# Patient Record
Sex: MALE | Race: WHITE | NOT HISPANIC OR LATINO | URBAN - METROPOLITAN AREA
[De-identification: names, ages, dates, MRNs, and addresses within clinical notes are randomized per-mention and may not be internally consistent; named-entity substitution may affect disease eponyms.]

---

## 2017-01-01 ENCOUNTER — INPATIENT (INPATIENT)
Facility: HOSPITAL | Age: 72
LOS: 22 days | DRG: 981 | End: 2017-12-27
Attending: INTERNAL MEDICINE | Admitting: INTERNAL MEDICINE
Payer: MEDICAID

## 2017-01-01 VITALS
HEART RATE: 83 BPM | DIASTOLIC BLOOD PRESSURE: 67 MMHG | TEMPERATURE: 99 F | SYSTOLIC BLOOD PRESSURE: 128 MMHG | OXYGEN SATURATION: 96 % | RESPIRATION RATE: 17 BRPM

## 2017-01-01 VITALS
OXYGEN SATURATION: 96 % | DIASTOLIC BLOOD PRESSURE: 62 MMHG | SYSTOLIC BLOOD PRESSURE: 104 MMHG | HEART RATE: 150 BPM | RESPIRATION RATE: 17 BRPM

## 2017-01-01 DIAGNOSIS — R53.81 OTHER MALAISE: ICD-10-CM

## 2017-01-01 DIAGNOSIS — J93.9 PNEUMOTHORAX, UNSPECIFIED: ICD-10-CM

## 2017-01-01 DIAGNOSIS — R50.9 FEVER, UNSPECIFIED: ICD-10-CM

## 2017-01-01 DIAGNOSIS — J96.01 ACUTE RESPIRATORY FAILURE WITH HYPOXIA: ICD-10-CM

## 2017-01-01 DIAGNOSIS — C85.20 MEDIASTINAL (THYMIC) LARGE B-CELL LYMPHOMA, UNSPECIFIED SITE: ICD-10-CM

## 2017-01-01 DIAGNOSIS — J44.9 CHRONIC OBSTRUCTIVE PULMONARY DISEASE, UNSPECIFIED: ICD-10-CM

## 2017-01-01 DIAGNOSIS — E87.1 HYPO-OSMOLALITY AND HYPONATREMIA: ICD-10-CM

## 2017-01-01 DIAGNOSIS — R53.1 WEAKNESS: ICD-10-CM

## 2017-01-01 DIAGNOSIS — T79.7XXA TRAUMATIC SUBCUTANEOUS EMPHYSEMA, INITIAL ENCOUNTER: ICD-10-CM

## 2017-01-01 DIAGNOSIS — N40.0 BENIGN PROSTATIC HYPERPLASIA WITHOUT LOWER URINARY TRACT SYMPTOMS: ICD-10-CM

## 2017-01-01 DIAGNOSIS — E87.2 ACIDOSIS: ICD-10-CM

## 2017-01-01 DIAGNOSIS — N48.89 OTHER SPECIFIED DISORDERS OF PENIS: ICD-10-CM

## 2017-01-01 DIAGNOSIS — I10 ESSENTIAL (PRIMARY) HYPERTENSION: ICD-10-CM

## 2017-01-01 DIAGNOSIS — J15.9 UNSPECIFIED BACTERIAL PNEUMONIA: ICD-10-CM

## 2017-01-01 DIAGNOSIS — Z29.9 ENCOUNTER FOR PROPHYLACTIC MEASURES, UNSPECIFIED: ICD-10-CM

## 2017-01-01 DIAGNOSIS — R60.1 GENERALIZED EDEMA: ICD-10-CM

## 2017-01-01 DIAGNOSIS — E43 UNSPECIFIED SEVERE PROTEIN-CALORIE MALNUTRITION: ICD-10-CM

## 2017-01-01 DIAGNOSIS — I27.20 PULMONARY HYPERTENSION, UNSPECIFIED: ICD-10-CM

## 2017-01-01 DIAGNOSIS — J96.90 RESPIRATORY FAILURE, UNSPECIFIED, UNSPECIFIED WHETHER WITH HYPOXIA OR HYPERCAPNIA: ICD-10-CM

## 2017-01-01 DIAGNOSIS — G93.40 ENCEPHALOPATHY, UNSPECIFIED: ICD-10-CM

## 2017-01-01 DIAGNOSIS — R91.8 OTHER NONSPECIFIC ABNORMAL FINDING OF LUNG FIELD: ICD-10-CM

## 2017-01-01 DIAGNOSIS — A41.9 SEPSIS, UNSPECIFIED ORGANISM: ICD-10-CM

## 2017-01-01 DIAGNOSIS — I95.89 OTHER HYPOTENSION: ICD-10-CM

## 2017-01-01 DIAGNOSIS — N17.9 ACUTE KIDNEY FAILURE, UNSPECIFIED: ICD-10-CM

## 2017-01-01 DIAGNOSIS — Z51.5 ENCOUNTER FOR PALLIATIVE CARE: ICD-10-CM

## 2017-01-01 DIAGNOSIS — E83.39 OTHER DISORDERS OF PHOSPHORUS METABOLISM: ICD-10-CM

## 2017-01-01 DIAGNOSIS — R22.2 LOCALIZED SWELLING, MASS AND LUMP, TRUNK: ICD-10-CM

## 2017-01-01 DIAGNOSIS — E87.5 HYPERKALEMIA: ICD-10-CM

## 2017-01-01 LAB
24R-OH-CALCIDIOL SERPL-MCNC: 20.7 NG/ML — LOW (ref 30–80)
ALBUMIN SERPL ELPH-MCNC: 1 G/DL — LOW (ref 3.5–5)
ALBUMIN SERPL ELPH-MCNC: 1.1 G/DL — LOW (ref 3.5–5)
ALBUMIN SERPL ELPH-MCNC: 1.2 G/DL — LOW (ref 3.5–5)
ALBUMIN SERPL ELPH-MCNC: 1.2 G/DL — LOW (ref 3.5–5)
ALBUMIN SERPL ELPH-MCNC: 1.3 G/DL — LOW (ref 3.5–5)
ALBUMIN SERPL ELPH-MCNC: 1.3 G/DL — LOW (ref 3.5–5)
ALBUMIN SERPL ELPH-MCNC: 1.9 G/DL — LOW (ref 3.5–5)
ALBUMIN SERPL ELPH-MCNC: 2 G/DL — LOW (ref 3.5–5)
ALBUMIN SERPL ELPH-MCNC: 2.4 G/DL — LOW (ref 3.5–5)
ALP SERPL-CCNC: 44 U/L — SIGNIFICANT CHANGE UP (ref 40–120)
ALP SERPL-CCNC: 46 U/L — SIGNIFICANT CHANGE UP (ref 40–120)
ALP SERPL-CCNC: 47 U/L — SIGNIFICANT CHANGE UP (ref 40–120)
ALP SERPL-CCNC: 47 U/L — SIGNIFICANT CHANGE UP (ref 40–120)
ALP SERPL-CCNC: 49 U/L — SIGNIFICANT CHANGE UP (ref 40–120)
ALP SERPL-CCNC: 50 U/L — SIGNIFICANT CHANGE UP (ref 40–120)
ALP SERPL-CCNC: 51 U/L — SIGNIFICANT CHANGE UP (ref 40–120)
ALP SERPL-CCNC: 54 U/L — SIGNIFICANT CHANGE UP (ref 40–120)
ALP SERPL-CCNC: 55 U/L — SIGNIFICANT CHANGE UP (ref 40–120)
ALP SERPL-CCNC: 73 U/L — SIGNIFICANT CHANGE UP (ref 40–120)
ALP SERPL-CCNC: 91 U/L — SIGNIFICANT CHANGE UP (ref 40–120)
ALT FLD-CCNC: 17 U/L DA — SIGNIFICANT CHANGE UP (ref 10–60)
ALT FLD-CCNC: 21 U/L DA — SIGNIFICANT CHANGE UP (ref 10–60)
ALT FLD-CCNC: 22 U/L DA — SIGNIFICANT CHANGE UP (ref 10–60)
ALT FLD-CCNC: 29 U/L DA — SIGNIFICANT CHANGE UP (ref 10–60)
ALT FLD-CCNC: 39 U/L DA — SIGNIFICANT CHANGE UP (ref 10–60)
ALT FLD-CCNC: 40 U/L DA — SIGNIFICANT CHANGE UP (ref 10–60)
ALT FLD-CCNC: 40 U/L DA — SIGNIFICANT CHANGE UP (ref 10–60)
ALT FLD-CCNC: 42 U/L DA — SIGNIFICANT CHANGE UP (ref 10–60)
ALT FLD-CCNC: 60 U/L DA — SIGNIFICANT CHANGE UP (ref 10–60)
ALT FLD-CCNC: 69 U/L DA — HIGH (ref 10–60)
ALT FLD-CCNC: 93 U/L DA — HIGH (ref 10–60)
ANION GAP SERPL CALC-SCNC: 10 MMOL/L — SIGNIFICANT CHANGE UP (ref 5–17)
ANION GAP SERPL CALC-SCNC: 10 MMOL/L — SIGNIFICANT CHANGE UP (ref 5–17)
ANION GAP SERPL CALC-SCNC: 11 MMOL/L — SIGNIFICANT CHANGE UP (ref 5–17)
ANION GAP SERPL CALC-SCNC: 12 MMOL/L — SIGNIFICANT CHANGE UP (ref 5–17)
ANION GAP SERPL CALC-SCNC: 13 MMOL/L — SIGNIFICANT CHANGE UP (ref 5–17)
ANION GAP SERPL CALC-SCNC: 15 MMOL/L — SIGNIFICANT CHANGE UP (ref 5–17)
ANION GAP SERPL CALC-SCNC: 17 MMOL/L — SIGNIFICANT CHANGE UP (ref 5–17)
ANION GAP SERPL CALC-SCNC: 20 MMOL/L — HIGH (ref 5–17)
ANION GAP SERPL CALC-SCNC: 6 MMOL/L — SIGNIFICANT CHANGE UP (ref 5–17)
ANION GAP SERPL CALC-SCNC: 7 MMOL/L — SIGNIFICANT CHANGE UP (ref 5–17)
ANION GAP SERPL CALC-SCNC: 8 MMOL/L — SIGNIFICANT CHANGE UP (ref 5–17)
ANION GAP SERPL CALC-SCNC: 8 MMOL/L — SIGNIFICANT CHANGE UP (ref 5–17)
ANION GAP SERPL CALC-SCNC: 9 MMOL/L — SIGNIFICANT CHANGE UP (ref 5–17)
ANISOCYTOSIS BLD QL: SLIGHT — SIGNIFICANT CHANGE UP
APPEARANCE UR: CLEAR — SIGNIFICANT CHANGE UP
APTT BLD: 23.4 SEC — LOW (ref 27.5–37.4)
APTT BLD: 23.5 SEC — LOW (ref 27.5–37.4)
APTT BLD: 25.5 SEC — LOW (ref 27.5–37.4)
APTT BLD: 27.5 SEC — SIGNIFICANT CHANGE UP (ref 27.5–37.4)
APTT BLD: 28.2 SEC — SIGNIFICANT CHANGE UP (ref 27.5–37.4)
APTT BLD: 30.5 SEC — SIGNIFICANT CHANGE UP (ref 27.5–37.4)
AST SERPL-CCNC: 16 U/L — SIGNIFICANT CHANGE UP (ref 10–40)
AST SERPL-CCNC: 21 U/L — SIGNIFICANT CHANGE UP (ref 10–40)
AST SERPL-CCNC: 24 U/L — SIGNIFICANT CHANGE UP (ref 10–40)
AST SERPL-CCNC: 30 U/L — SIGNIFICANT CHANGE UP (ref 10–40)
AST SERPL-CCNC: 33 U/L — SIGNIFICANT CHANGE UP (ref 10–40)
AST SERPL-CCNC: 36 U/L — SIGNIFICANT CHANGE UP (ref 10–40)
AST SERPL-CCNC: 43 U/L — HIGH (ref 10–40)
AST SERPL-CCNC: 50 U/L — HIGH (ref 10–40)
AST SERPL-CCNC: 63 U/L — HIGH (ref 10–40)
AST SERPL-CCNC: 75 U/L — HIGH (ref 10–40)
AST SERPL-CCNC: 79 U/L — HIGH (ref 10–40)
BASE EXCESS BLDA CALC-SCNC: -0.7 MMOL/L — SIGNIFICANT CHANGE UP (ref -2–2)
BASE EXCESS BLDA CALC-SCNC: -1.9 MMOL/L — SIGNIFICANT CHANGE UP (ref -2–2)
BASE EXCESS BLDA CALC-SCNC: -10.2 MMOL/L — LOW (ref -2–2)
BASE EXCESS BLDA CALC-SCNC: -10.2 MMOL/L — LOW (ref -2–2)
BASE EXCESS BLDA CALC-SCNC: -10.9 MMOL/L — LOW (ref -2–2)
BASE EXCESS BLDA CALC-SCNC: -11.1 MMOL/L — LOW (ref -2–2)
BASE EXCESS BLDA CALC-SCNC: -11.2 MMOL/L — LOW (ref -2–2)
BASE EXCESS BLDA CALC-SCNC: -11.4 MMOL/L — LOW (ref -2–2)
BASE EXCESS BLDA CALC-SCNC: -12.1 MMOL/L — LOW (ref -2–2)
BASE EXCESS BLDA CALC-SCNC: -12.2 MMOL/L — LOW (ref -2–2)
BASE EXCESS BLDA CALC-SCNC: -12.7 MMOL/L — LOW (ref -2–2)
BASE EXCESS BLDA CALC-SCNC: -13.2 MMOL/L — LOW (ref -2–2)
BASE EXCESS BLDA CALC-SCNC: -14.3 MMOL/L — LOW (ref -2–2)
BASE EXCESS BLDA CALC-SCNC: -15 MMOL/L — LOW (ref -2–2)
BASE EXCESS BLDA CALC-SCNC: -15.1 MMOL/L — LOW (ref -2–2)
BASE EXCESS BLDA CALC-SCNC: -15.5 MMOL/L — LOW (ref -2–2)
BASE EXCESS BLDA CALC-SCNC: -9.6 MMOL/L — LOW (ref -2–2)
BASE EXCESS BLDA CALC-SCNC: 5.7 MMOL/L — HIGH (ref -2–2)
BASE EXCESS BLDV CALC-SCNC: -11.1 MMOL/L — LOW (ref -2–2)
BASE EXCESS BLDV CALC-SCNC: -9.1 MMOL/L — LOW (ref -2–2)
BASOPHILS # BLD AUTO: 0 K/UL — SIGNIFICANT CHANGE UP (ref 0–0.2)
BASOPHILS # BLD AUTO: 0.1 K/UL — SIGNIFICANT CHANGE UP (ref 0–0.2)
BASOPHILS NFR BLD AUTO: 0.2 % — SIGNIFICANT CHANGE UP (ref 0–2)
BASOPHILS NFR BLD AUTO: 0.4 % — SIGNIFICANT CHANGE UP (ref 0–2)
BASOPHILS NFR BLD AUTO: 0.5 % — SIGNIFICANT CHANGE UP (ref 0–2)
BASOPHILS NFR BLD AUTO: 0.6 % — SIGNIFICANT CHANGE UP (ref 0–2)
BASOPHILS NFR BLD AUTO: 0.8 % — SIGNIFICANT CHANGE UP (ref 0–2)
BASOPHILS NFR BLD AUTO: 0.8 % — SIGNIFICANT CHANGE UP (ref 0–2)
BILIRUB SERPL-MCNC: 0.5 MG/DL — SIGNIFICANT CHANGE UP (ref 0.2–1.2)
BILIRUB SERPL-MCNC: 0.6 MG/DL — SIGNIFICANT CHANGE UP (ref 0.2–1.2)
BILIRUB SERPL-MCNC: 1.1 MG/DL — SIGNIFICANT CHANGE UP (ref 0.2–1.2)
BILIRUB SERPL-MCNC: 1.2 MG/DL — SIGNIFICANT CHANGE UP (ref 0.2–1.2)
BILIRUB SERPL-MCNC: 1.3 MG/DL — HIGH (ref 0.2–1.2)
BILIRUB SERPL-MCNC: 1.5 MG/DL — HIGH (ref 0.2–1.2)
BILIRUB SERPL-MCNC: 1.5 MG/DL — HIGH (ref 0.2–1.2)
BILIRUB SERPL-MCNC: 1.6 MG/DL — HIGH (ref 0.2–1.2)
BILIRUB SERPL-MCNC: 1.7 MG/DL — HIGH (ref 0.2–1.2)
BILIRUB SERPL-MCNC: 1.9 MG/DL — HIGH (ref 0.2–1.2)
BILIRUB SERPL-MCNC: 3 MG/DL — HIGH (ref 0.2–1.2)
BILIRUB UR-MCNC: ABNORMAL
BILIRUB UR-MCNC: NEGATIVE — SIGNIFICANT CHANGE UP
BILIRUB UR-MCNC: NEGATIVE — SIGNIFICANT CHANGE UP
BLOOD GAS COMMENTS ARTERIAL: SIGNIFICANT CHANGE UP
BLOOD GAS COMMENTS, VENOUS: SIGNIFICANT CHANGE UP
BLOOD GAS COMMENTS, VENOUS: SIGNIFICANT CHANGE UP
BUN SERPL-MCNC: 100 MG/DL — HIGH (ref 7–18)
BUN SERPL-MCNC: 114 MG/DL — HIGH (ref 7–18)
BUN SERPL-MCNC: 115 MG/DL — HIGH (ref 7–18)
BUN SERPL-MCNC: 128 MG/DL — HIGH (ref 7–18)
BUN SERPL-MCNC: 140 MG/DL — HIGH (ref 7–18)
BUN SERPL-MCNC: 149 MG/DL — SIGNIFICANT CHANGE UP (ref 7–18)
BUN SERPL-MCNC: 164 MG/DL — HIGH (ref 7–18)
BUN SERPL-MCNC: 171 MG/DL — SIGNIFICANT CHANGE UP (ref 7–18)
BUN SERPL-MCNC: 174 MG/DL — SIGNIFICANT CHANGE UP (ref 7–18)
BUN SERPL-MCNC: 21 MG/DL — HIGH (ref 7–18)
BUN SERPL-MCNC: 22 MG/DL — HIGH (ref 7–18)
BUN SERPL-MCNC: 22 MG/DL — HIGH (ref 7–18)
BUN SERPL-MCNC: 23 MG/DL — HIGH (ref 7–18)
BUN SERPL-MCNC: 23 MG/DL — HIGH (ref 7–18)
BUN SERPL-MCNC: 25 MG/DL — HIGH (ref 7–18)
BUN SERPL-MCNC: 32 MG/DL — HIGH (ref 7–18)
BUN SERPL-MCNC: 34 MG/DL — HIGH (ref 7–18)
BUN SERPL-MCNC: 43 MG/DL — HIGH (ref 7–18)
BUN SERPL-MCNC: 45 MG/DL — HIGH (ref 7–18)
BUN SERPL-MCNC: 60 MG/DL — HIGH (ref 7–18)
BUN SERPL-MCNC: 71 MG/DL — HIGH (ref 7–18)
BUN SERPL-MCNC: 82 MG/DL — HIGH (ref 7–18)
BUN SERPL-MCNC: 93 MG/DL — HIGH (ref 7–18)
BUN SERPL-MCNC: 96 MG/DL — HIGH (ref 7–18)
C DIFF BY PCR RESULT: SIGNIFICANT CHANGE UP
C DIFF TOX GENS STL QL NAA+PROBE: SIGNIFICANT CHANGE UP
CALCIUM SERPL-MCNC: 6.9 MG/DL — LOW (ref 8.4–10.5)
CALCIUM SERPL-MCNC: 6.9 MG/DL — LOW (ref 8.4–10.5)
CALCIUM SERPL-MCNC: 7 MG/DL — LOW (ref 8.4–10.5)
CALCIUM SERPL-MCNC: 7.1 MG/DL — LOW (ref 8.4–10.5)
CALCIUM SERPL-MCNC: 7.1 MG/DL — LOW (ref 8.4–10.5)
CALCIUM SERPL-MCNC: 7.2 MG/DL — LOW (ref 8.4–10.5)
CALCIUM SERPL-MCNC: 7.3 MG/DL — LOW (ref 8.4–10.5)
CALCIUM SERPL-MCNC: 7.4 MG/DL — LOW (ref 8.4–10.5)
CALCIUM SERPL-MCNC: 7.5 MG/DL — LOW (ref 8.4–10.5)
CALCIUM SERPL-MCNC: 7.6 MG/DL — LOW (ref 8.4–10.5)
CALCIUM SERPL-MCNC: 7.8 MG/DL — LOW (ref 8.4–10.5)
CALCIUM SERPL-MCNC: 7.8 MG/DL — LOW (ref 8.4–10.5)
CALCIUM SERPL-MCNC: 7.9 MG/DL — LOW (ref 8.4–10.5)
CALCIUM SERPL-MCNC: 7.9 MG/DL — LOW (ref 8.4–10.5)
CALCIUM SERPL-MCNC: 8 MG/DL — LOW (ref 8.4–10.5)
CALCIUM SERPL-MCNC: 8 MG/DL — LOW (ref 8.4–10.5)
CALCIUM SERPL-MCNC: 8.1 MG/DL — LOW (ref 8.4–10.5)
CALCIUM SERPL-MCNC: 8.1 MG/DL — LOW (ref 8.4–10.5)
CALCIUM SERPL-MCNC: 8.2 MG/DL — LOW (ref 8.4–10.5)
CALCIUM SERPL-MCNC: 8.5 MG/DL — SIGNIFICANT CHANGE UP (ref 8.4–10.5)
CANCER AG19-9 SERPL-ACNC: 31.3 U/ML — SIGNIFICANT CHANGE UP
CEA SERPL-MCNC: 3.9 NG/ML — HIGH (ref 0–3.8)
CEA SERPL-MCNC: 4.4 NG/ML — HIGH (ref 0–3.8)
CHLORIDE SERPL-SCNC: 103 MMOL/L — SIGNIFICANT CHANGE UP (ref 96–108)
CHLORIDE SERPL-SCNC: 107 MMOL/L — SIGNIFICANT CHANGE UP (ref 96–108)
CHLORIDE SERPL-SCNC: 109 MMOL/L — HIGH (ref 96–108)
CHLORIDE SERPL-SCNC: 110 MMOL/L — HIGH (ref 96–108)
CHLORIDE SERPL-SCNC: 111 MMOL/L — HIGH (ref 96–108)
CHLORIDE SERPL-SCNC: 112 MMOL/L — HIGH (ref 96–108)
CHLORIDE SERPL-SCNC: 113 MMOL/L — HIGH (ref 96–108)
CHLORIDE SERPL-SCNC: 114 MMOL/L — HIGH (ref 96–108)
CHLORIDE SERPL-SCNC: 115 MMOL/L — HIGH (ref 96–108)
CHLORIDE SERPL-SCNC: 116 MMOL/L — HIGH (ref 96–108)
CHLORIDE SERPL-SCNC: 118 MMOL/L — HIGH (ref 96–108)
CHLORIDE SERPL-SCNC: 119 MMOL/L — HIGH (ref 96–108)
CHLORIDE SERPL-SCNC: 90 MMOL/L — LOW (ref 96–108)
CHLORIDE SERPL-SCNC: 91 MMOL/L — LOW (ref 96–108)
CHLORIDE SERPL-SCNC: 93 MMOL/L — LOW (ref 96–108)
CHLORIDE SERPL-SCNC: 95 MMOL/L — LOW (ref 96–108)
CHLORIDE SERPL-SCNC: 96 MMOL/L — SIGNIFICANT CHANGE UP (ref 96–108)
CHLORIDE SERPL-SCNC: 97 MMOL/L — SIGNIFICANT CHANGE UP (ref 96–108)
CHLORIDE SERPL-SCNC: 97 MMOL/L — SIGNIFICANT CHANGE UP (ref 96–108)
CHLORIDE SERPL-SCNC: 98 MMOL/L — SIGNIFICANT CHANGE UP (ref 96–108)
CHLORIDE UR-SCNC: 90 MMOL/L — SIGNIFICANT CHANGE UP (ref 55–125)
CHLORIDE UR-SCNC: <10 MMOL/L — LOW (ref 55–125)
CHLORIDE UR-SCNC: <10 MMOL/L — LOW (ref 55–125)
CHOLEST SERPL-MCNC: 64 MG/DL — SIGNIFICANT CHANGE UP (ref 10–199)
CO2 SERPL-SCNC: 12 MMOL/L — LOW (ref 22–31)
CO2 SERPL-SCNC: 13 MMOL/L — LOW (ref 22–31)
CO2 SERPL-SCNC: 16 MMOL/L — LOW (ref 22–31)
CO2 SERPL-SCNC: 17 MMOL/L — LOW (ref 22–31)
CO2 SERPL-SCNC: 18 MMOL/L — LOW (ref 22–31)
CO2 SERPL-SCNC: 20 MMOL/L — LOW (ref 22–31)
CO2 SERPL-SCNC: 21 MMOL/L — LOW (ref 22–31)
CO2 SERPL-SCNC: 23 MMOL/L — SIGNIFICANT CHANGE UP (ref 22–31)
CO2 SERPL-SCNC: 23 MMOL/L — SIGNIFICANT CHANGE UP (ref 22–31)
CO2 SERPL-SCNC: 24 MMOL/L — SIGNIFICANT CHANGE UP (ref 22–31)
CO2 SERPL-SCNC: 24 MMOL/L — SIGNIFICANT CHANGE UP (ref 22–31)
CO2 SERPL-SCNC: 26 MMOL/L — SIGNIFICANT CHANGE UP (ref 22–31)
CO2 SERPL-SCNC: 27 MMOL/L — SIGNIFICANT CHANGE UP (ref 22–31)
CO2 SERPL-SCNC: 29 MMOL/L — SIGNIFICANT CHANGE UP (ref 22–31)
CO2 SERPL-SCNC: 30 MMOL/L — SIGNIFICANT CHANGE UP (ref 22–31)
COLOR SPEC: YELLOW — SIGNIFICANT CHANGE UP
CREAT ?TM UR-MCNC: 102 MG/DL — SIGNIFICANT CHANGE UP
CREAT ?TM UR-MCNC: 153 MG/DL — SIGNIFICANT CHANGE UP
CREAT ?TM UR-MCNC: 173 MG/DL — SIGNIFICANT CHANGE UP
CREAT ?TM UR-MCNC: 72 MG/DL — SIGNIFICANT CHANGE UP
CREAT SERPL-MCNC: 0.81 MG/DL — SIGNIFICANT CHANGE UP (ref 0.5–1.3)
CREAT SERPL-MCNC: 0.86 MG/DL — SIGNIFICANT CHANGE UP (ref 0.5–1.3)
CREAT SERPL-MCNC: 0.91 MG/DL — SIGNIFICANT CHANGE UP (ref 0.5–1.3)
CREAT SERPL-MCNC: 0.94 MG/DL — SIGNIFICANT CHANGE UP (ref 0.5–1.3)
CREAT SERPL-MCNC: 0.96 MG/DL — SIGNIFICANT CHANGE UP (ref 0.5–1.3)
CREAT SERPL-MCNC: 1.05 MG/DL — SIGNIFICANT CHANGE UP (ref 0.5–1.3)
CREAT SERPL-MCNC: 1.11 MG/DL — SIGNIFICANT CHANGE UP (ref 0.5–1.3)
CREAT SERPL-MCNC: 1.19 MG/DL — SIGNIFICANT CHANGE UP (ref 0.5–1.3)
CREAT SERPL-MCNC: 1.53 MG/DL — HIGH (ref 0.5–1.3)
CREAT SERPL-MCNC: 1.56 MG/DL — HIGH (ref 0.5–1.3)
CREAT SERPL-MCNC: 1.87 MG/DL — HIGH (ref 0.5–1.3)
CREAT SERPL-MCNC: 2.17 MG/DL — HIGH (ref 0.5–1.3)
CREAT SERPL-MCNC: 2.61 MG/DL — HIGH (ref 0.5–1.3)
CREAT SERPL-MCNC: 2.69 MG/DL — HIGH (ref 0.5–1.3)
CREAT SERPL-MCNC: 2.83 MG/DL — HIGH (ref 0.5–1.3)
CREAT SERPL-MCNC: 2.87 MG/DL — HIGH (ref 0.5–1.3)
CREAT SERPL-MCNC: 3.51 MG/DL — HIGH (ref 0.5–1.3)
CREAT SERPL-MCNC: 3.53 MG/DL — HIGH (ref 0.5–1.3)
CREAT SERPL-MCNC: 3.63 MG/DL — HIGH (ref 0.5–1.3)
CREAT SERPL-MCNC: 3.92 MG/DL — HIGH (ref 0.5–1.3)
CREAT SERPL-MCNC: 4.24 MG/DL — HIGH (ref 0.5–1.3)
CREAT SERPL-MCNC: 4.8 MG/DL — HIGH (ref 0.5–1.3)
CREAT SERPL-MCNC: 5.22 MG/DL — HIGH (ref 0.5–1.3)
CREAT SERPL-MCNC: 5.5 MG/DL — HIGH (ref 0.5–1.3)
CRP SERPL-MCNC: 5.8 MG/DL — HIGH (ref 0–0.4)
CULTURE RESULTS: SIGNIFICANT CHANGE UP
DIFF PNL FLD: ABNORMAL
DIFF PNL FLD: ABNORMAL
DIFF PNL FLD: NEGATIVE — SIGNIFICANT CHANGE UP
EOSINOPHIL # BLD AUTO: 0 K/UL — SIGNIFICANT CHANGE UP (ref 0–0.5)
EOSINOPHIL # BLD AUTO: 0.1 K/UL — SIGNIFICANT CHANGE UP (ref 0–0.5)
EOSINOPHIL # BLD AUTO: 0.1 K/UL — SIGNIFICANT CHANGE UP (ref 0–0.5)
EOSINOPHIL # BLD AUTO: 0.2 K/UL — SIGNIFICANT CHANGE UP (ref 0–0.5)
EOSINOPHIL # BLD AUTO: 0.2 K/UL — SIGNIFICANT CHANGE UP (ref 0–0.5)
EOSINOPHIL # BLD AUTO: 0.4 K/UL — SIGNIFICANT CHANGE UP (ref 0–0.5)
EOSINOPHIL # BLD AUTO: 0.6 K/UL — HIGH (ref 0–0.5)
EOSINOPHIL # BLD AUTO: 0.7 K/UL — HIGH (ref 0–0.5)
EOSINOPHIL # BLD AUTO: 0.8 K/UL — HIGH (ref 0–0.5)
EOSINOPHIL # BLD AUTO: 1 K/UL — HIGH (ref 0–0.5)
EOSINOPHIL # BLD AUTO: 1.4 K/UL — HIGH (ref 0–0.5)
EOSINOPHIL NFR BLD AUTO: 0 % — SIGNIFICANT CHANGE UP (ref 0–6)
EOSINOPHIL NFR BLD AUTO: 0 % — SIGNIFICANT CHANGE UP (ref 0–6)
EOSINOPHIL NFR BLD AUTO: 0.3 % — SIGNIFICANT CHANGE UP (ref 0–6)
EOSINOPHIL NFR BLD AUTO: 0.3 % — SIGNIFICANT CHANGE UP (ref 0–6)
EOSINOPHIL NFR BLD AUTO: 0.4 % — SIGNIFICANT CHANGE UP (ref 0–6)
EOSINOPHIL NFR BLD AUTO: 0.6 % — SIGNIFICANT CHANGE UP (ref 0–6)
EOSINOPHIL NFR BLD AUTO: 1 % — SIGNIFICANT CHANGE UP (ref 0–6)
EOSINOPHIL NFR BLD AUTO: 1.3 % — SIGNIFICANT CHANGE UP (ref 0–6)
EOSINOPHIL NFR BLD AUTO: 1.7 % — SIGNIFICANT CHANGE UP (ref 0–6)
EOSINOPHIL NFR BLD AUTO: 2 % — SIGNIFICANT CHANGE UP (ref 0–6)
EOSINOPHIL NFR BLD AUTO: 3.6 % — SIGNIFICANT CHANGE UP (ref 0–6)
EOSINOPHIL NFR BLD AUTO: 3.9 % — SIGNIFICANT CHANGE UP (ref 0–6)
EOSINOPHIL NFR BLD AUTO: 4.2 % — SIGNIFICANT CHANGE UP (ref 0–6)
EOSINOPHIL NFR BLD AUTO: 4.7 % — SIGNIFICANT CHANGE UP (ref 0–6)
EOSINOPHIL NFR BLD AUTO: 5.7 % — SIGNIFICANT CHANGE UP (ref 0–6)
EOSINOPHIL NFR BLD AUTO: 6 % — SIGNIFICANT CHANGE UP (ref 0–6)
EOSINOPHIL NFR BLD AUTO: 6 % — SIGNIFICANT CHANGE UP (ref 0–6)
EOSINOPHIL NFR BLD AUTO: 6.4 % — HIGH (ref 0–6)
EOSINOPHIL NFR BLD AUTO: 8.1 % — HIGH (ref 0–6)
ERYTHROCYTE [SEDIMENTATION RATE] IN BLOOD: 40 MM/HR — HIGH (ref 0–20)
FERRITIN SERPL-MCNC: 1142 NG/ML — HIGH (ref 30–400)
FOLATE SERPL-MCNC: 8.6 NG/ML — SIGNIFICANT CHANGE UP (ref 4.8–24.2)
GLUCOSE BLDC GLUCOMTR-MCNC: 201 MG/DL — HIGH (ref 70–99)
GLUCOSE SERPL-MCNC: 101 MG/DL — HIGH (ref 70–99)
GLUCOSE SERPL-MCNC: 104 MG/DL — HIGH (ref 70–99)
GLUCOSE SERPL-MCNC: 104 MG/DL — HIGH (ref 70–99)
GLUCOSE SERPL-MCNC: 107 MG/DL — HIGH (ref 70–99)
GLUCOSE SERPL-MCNC: 112 MG/DL — HIGH (ref 70–99)
GLUCOSE SERPL-MCNC: 115 MG/DL — HIGH (ref 70–99)
GLUCOSE SERPL-MCNC: 118 MG/DL — HIGH (ref 70–99)
GLUCOSE SERPL-MCNC: 123 MG/DL — HIGH (ref 70–99)
GLUCOSE SERPL-MCNC: 123 MG/DL — HIGH (ref 70–99)
GLUCOSE SERPL-MCNC: 125 MG/DL — HIGH (ref 70–99)
GLUCOSE SERPL-MCNC: 127 MG/DL — HIGH (ref 70–99)
GLUCOSE SERPL-MCNC: 133 MG/DL — HIGH (ref 70–99)
GLUCOSE SERPL-MCNC: 136 MG/DL — HIGH (ref 70–99)
GLUCOSE SERPL-MCNC: 149 MG/DL — HIGH (ref 70–99)
GLUCOSE SERPL-MCNC: 168 MG/DL — HIGH (ref 70–99)
GLUCOSE SERPL-MCNC: 169 MG/DL — HIGH (ref 70–99)
GLUCOSE SERPL-MCNC: 171 MG/DL — HIGH (ref 70–99)
GLUCOSE SERPL-MCNC: 172 MG/DL — HIGH (ref 70–99)
GLUCOSE SERPL-MCNC: 176 MG/DL — HIGH (ref 70–99)
GLUCOSE SERPL-MCNC: 70 MG/DL — SIGNIFICANT CHANGE UP (ref 70–99)
GLUCOSE SERPL-MCNC: 76 MG/DL — SIGNIFICANT CHANGE UP (ref 70–99)
GLUCOSE SERPL-MCNC: 84 MG/DL — SIGNIFICANT CHANGE UP (ref 70–99)
GLUCOSE SERPL-MCNC: 85 MG/DL — SIGNIFICANT CHANGE UP (ref 70–99)
GLUCOSE SERPL-MCNC: 96 MG/DL — SIGNIFICANT CHANGE UP (ref 70–99)
GLUCOSE UR QL: NEGATIVE — SIGNIFICANT CHANGE UP
GRAM STN FLD: SIGNIFICANT CHANGE UP
HBA1C BLD-MCNC: 6.2 % — HIGH (ref 4–5.6)
HBV SURFACE AG SER-ACNC: SIGNIFICANT CHANGE UP
HCO3 BLDA-SCNC: 12 MMOL/L — LOW (ref 23–27)
HCO3 BLDA-SCNC: 13 MMOL/L — LOW (ref 23–27)
HCO3 BLDA-SCNC: 14 MMOL/L — LOW (ref 23–27)
HCO3 BLDA-SCNC: 15 MMOL/L — LOW (ref 23–27)
HCO3 BLDA-SCNC: 16 MMOL/L — LOW (ref 23–27)
HCO3 BLDA-SCNC: 17 MMOL/L — LOW (ref 23–27)
HCO3 BLDA-SCNC: 17 MMOL/L — LOW (ref 23–27)
HCO3 BLDA-SCNC: 23 MMOL/L — SIGNIFICANT CHANGE UP (ref 23–27)
HCO3 BLDA-SCNC: 23 MMOL/L — SIGNIFICANT CHANGE UP (ref 23–27)
HCO3 BLDA-SCNC: 29 MMOL/L — HIGH (ref 23–27)
HCO3 BLDV-SCNC: 16 MMOL/L — LOW (ref 21–29)
HCO3 BLDV-SCNC: 17 MMOL/L — LOW (ref 21–29)
HCT VFR BLD CALC: 20 % — CRITICAL LOW (ref 39–50)
HCT VFR BLD CALC: 28.9 % — LOW (ref 39–50)
HCT VFR BLD CALC: 29.4 % — LOW (ref 39–50)
HCT VFR BLD CALC: 29.5 % — LOW (ref 39–50)
HCT VFR BLD CALC: 29.5 % — LOW (ref 39–50)
HCT VFR BLD CALC: 29.6 % — LOW (ref 39–50)
HCT VFR BLD CALC: 29.8 % — LOW (ref 39–50)
HCT VFR BLD CALC: 30 % — LOW (ref 39–50)
HCT VFR BLD CALC: 30.1 % — LOW (ref 39–50)
HCT VFR BLD CALC: 30.6 % — LOW (ref 39–50)
HCT VFR BLD CALC: 31.7 % — LOW (ref 39–50)
HCT VFR BLD CALC: 31.9 % — LOW (ref 39–50)
HCT VFR BLD CALC: 32.1 % — LOW (ref 39–50)
HCT VFR BLD CALC: 32.2 % — LOW (ref 39–50)
HCT VFR BLD CALC: 32.2 % — LOW (ref 39–50)
HCT VFR BLD CALC: 32.7 % — LOW (ref 39–50)
HCT VFR BLD CALC: 32.9 % — LOW (ref 39–50)
HCT VFR BLD CALC: 33.1 % — LOW (ref 39–50)
HCT VFR BLD CALC: 33.7 % — LOW (ref 39–50)
HCT VFR BLD CALC: 33.8 % — LOW (ref 39–50)
HCT VFR BLD CALC: 34.1 % — LOW (ref 39–50)
HCT VFR BLD CALC: 34.8 % — LOW (ref 39–50)
HCT VFR BLD CALC: 35.2 % — LOW (ref 39–50)
HCT VFR BLD CALC: 35.9 % — LOW (ref 39–50)
HCT VFR BLD CALC: 37.6 % — LOW (ref 39–50)
HCV RNA FLD QL NAA+PROBE: SIGNIFICANT CHANGE UP
HCV RNA SPEC QL PROBE+SIG AMP: DETECTED
HDLC SERPL-MCNC: 26 MG/DL — LOW (ref 40–125)
HGB BLD-MCNC: 10 G/DL — LOW (ref 13–17)
HGB BLD-MCNC: 10 G/DL — LOW (ref 13–17)
HGB BLD-MCNC: 10.1 G/DL — LOW (ref 13–17)
HGB BLD-MCNC: 10.2 G/DL — LOW (ref 13–17)
HGB BLD-MCNC: 10.2 G/DL — LOW (ref 13–17)
HGB BLD-MCNC: 10.7 G/DL — LOW (ref 13–17)
HGB BLD-MCNC: 10.9 G/DL — LOW (ref 13–17)
HGB BLD-MCNC: 10.9 G/DL — LOW (ref 13–17)
HGB BLD-MCNC: 11 G/DL — LOW (ref 13–17)
HGB BLD-MCNC: 11 G/DL — LOW (ref 13–17)
HGB BLD-MCNC: 11.1 G/DL — LOW (ref 13–17)
HGB BLD-MCNC: 11.1 G/DL — LOW (ref 13–17)
HGB BLD-MCNC: 11.4 G/DL — LOW (ref 13–17)
HGB BLD-MCNC: 12 G/DL — LOW (ref 13–17)
HGB BLD-MCNC: 12.1 G/DL — LOW (ref 13–17)
HGB BLD-MCNC: 12.9 G/DL — LOW (ref 13–17)
HGB BLD-MCNC: 6.4 G/DL — CRITICAL LOW (ref 13–17)
HGB BLD-MCNC: 8.9 G/DL — LOW (ref 13–17)
HGB BLD-MCNC: 9.2 G/DL — LOW (ref 13–17)
HGB BLD-MCNC: 9.3 G/DL — LOW (ref 13–17)
HGB BLD-MCNC: 9.5 G/DL — LOW (ref 13–17)
HGB BLD-MCNC: 9.7 G/DL — LOW (ref 13–17)
HGB BLD-MCNC: 9.7 G/DL — LOW (ref 13–17)
HOROWITZ INDEX BLDA+IHG-RTO: 28 — SIGNIFICANT CHANGE UP
HOROWITZ INDEX BLDA+IHG-RTO: 40 — SIGNIFICANT CHANGE UP
HOROWITZ INDEX BLDA+IHG-RTO: 50 — SIGNIFICANT CHANGE UP
HOROWITZ INDEX BLDA+IHG-RTO: SIGNIFICANT CHANGE UP
HOROWITZ INDEX BLDA+IHG-RTO: SIGNIFICANT CHANGE UP
HOROWITZ INDEX BLDV+IHG-RTO: 40 — SIGNIFICANT CHANGE UP
HOROWITZ INDEX BLDV+IHG-RTO: SIGNIFICANT CHANGE UP
INR BLD: 1.04 RATIO — SIGNIFICANT CHANGE UP (ref 0.88–1.16)
INR BLD: 1.04 RATIO — SIGNIFICANT CHANGE UP (ref 0.88–1.16)
INR BLD: 1.07 RATIO — SIGNIFICANT CHANGE UP (ref 0.88–1.16)
INR BLD: 1.21 RATIO — HIGH (ref 0.88–1.16)
INR BLD: 1.22 RATIO — HIGH (ref 0.88–1.16)
INR BLD: 1.28 RATIO — HIGH (ref 0.88–1.16)
INR BLD: 2.12 RATIO — HIGH (ref 0.88–1.16)
IRON SATN MFR SERPL: 18 % — LOW (ref 20–55)
IRON SATN MFR SERPL: 28 UG/DL — LOW (ref 65–170)
KETONES UR-MCNC: ABNORMAL
KETONES UR-MCNC: NEGATIVE — SIGNIFICANT CHANGE UP
KETONES UR-MCNC: NEGATIVE — SIGNIFICANT CHANGE UP
LACTATE SERPL-SCNC: 0.9 MMOL/L — SIGNIFICANT CHANGE UP (ref 0.7–2)
LACTATE SERPL-SCNC: 1.2 MMOL/L — SIGNIFICANT CHANGE UP (ref 0.7–2)
LACTATE SERPL-SCNC: 1.5 MMOL/L — SIGNIFICANT CHANGE UP (ref 0.7–2)
LDH SERPL L TO P-CCNC: 316 U/L — HIGH (ref 120–225)
LEUKOCYTE ESTERASE UR-ACNC: NEGATIVE — SIGNIFICANT CHANGE UP
LIPID PNL WITH DIRECT LDL SERPL: 20 MG/DL — SIGNIFICANT CHANGE UP
LYMPHOCYTES # BLD AUTO: 0.5 K/UL — LOW (ref 1–3.3)
LYMPHOCYTES # BLD AUTO: 0.7 K/UL — LOW (ref 1–3.3)
LYMPHOCYTES # BLD AUTO: 1 K/UL — SIGNIFICANT CHANGE UP (ref 1–3.3)
LYMPHOCYTES # BLD AUTO: 1.2 K/UL — SIGNIFICANT CHANGE UP (ref 1–3.3)
LYMPHOCYTES # BLD AUTO: 1.3 K/UL — SIGNIFICANT CHANGE UP (ref 1–3.3)
LYMPHOCYTES # BLD AUTO: 1.3 K/UL — SIGNIFICANT CHANGE UP (ref 1–3.3)
LYMPHOCYTES # BLD AUTO: 1.4 K/UL — SIGNIFICANT CHANGE UP (ref 1–3.3)
LYMPHOCYTES # BLD AUTO: 1.4 K/UL — SIGNIFICANT CHANGE UP (ref 1–3.3)
LYMPHOCYTES # BLD AUTO: 1.6 K/UL — SIGNIFICANT CHANGE UP (ref 1–3.3)
LYMPHOCYTES # BLD AUTO: 1.8 K/UL — SIGNIFICANT CHANGE UP (ref 1–3.3)
LYMPHOCYTES # BLD AUTO: 1.9 K/UL — SIGNIFICANT CHANGE UP (ref 1–3.3)
LYMPHOCYTES # BLD AUTO: 10 % — LOW (ref 13–44)
LYMPHOCYTES # BLD AUTO: 12 % — LOW (ref 13–44)
LYMPHOCYTES # BLD AUTO: 14.4 % — SIGNIFICANT CHANGE UP (ref 13–44)
LYMPHOCYTES # BLD AUTO: 2 % — LOW (ref 13–44)
LYMPHOCYTES # BLD AUTO: 2.2 K/UL — SIGNIFICANT CHANGE UP (ref 1–3.3)
LYMPHOCYTES # BLD AUTO: 3 % — LOW (ref 13–44)
LYMPHOCYTES # BLD AUTO: 3.3 % — LOW (ref 13–44)
LYMPHOCYTES # BLD AUTO: 4 % — LOW (ref 13–44)
LYMPHOCYTES # BLD AUTO: 4.2 % — LOW (ref 13–44)
LYMPHOCYTES # BLD AUTO: 5.3 % — LOW (ref 13–44)
LYMPHOCYTES # BLD AUTO: 5.3 % — LOW (ref 13–44)
LYMPHOCYTES # BLD AUTO: 6.3 % — LOW (ref 13–44)
LYMPHOCYTES # BLD AUTO: 6.7 % — LOW (ref 13–44)
LYMPHOCYTES # BLD AUTO: 6.8 % — LOW (ref 13–44)
LYMPHOCYTES # BLD AUTO: 6.8 % — LOW (ref 13–44)
LYMPHOCYTES # BLD AUTO: 7.5 % — LOW (ref 13–44)
LYMPHOCYTES # BLD AUTO: 7.6 % — LOW (ref 13–44)
LYMPHOCYTES # BLD AUTO: 7.8 % — LOW (ref 13–44)
LYMPHOCYTES # BLD AUTO: 8 % — LOW (ref 13–44)
LYMPHOCYTES # BLD AUTO: 9.2 % — LOW (ref 13–44)
MAGNESIUM SERPL-MCNC: 1.8 MG/DL — SIGNIFICANT CHANGE UP (ref 1.6–2.6)
MAGNESIUM SERPL-MCNC: 1.9 MG/DL — SIGNIFICANT CHANGE UP (ref 1.6–2.6)
MAGNESIUM SERPL-MCNC: 1.9 MG/DL — SIGNIFICANT CHANGE UP (ref 1.6–2.6)
MAGNESIUM SERPL-MCNC: 2.1 MG/DL — SIGNIFICANT CHANGE UP (ref 1.6–2.6)
MAGNESIUM SERPL-MCNC: 2.3 MG/DL — SIGNIFICANT CHANGE UP (ref 1.6–2.6)
MAGNESIUM SERPL-MCNC: 2.4 MG/DL — SIGNIFICANT CHANGE UP (ref 1.6–2.6)
MAGNESIUM SERPL-MCNC: 2.5 MG/DL — SIGNIFICANT CHANGE UP (ref 1.6–2.6)
MAGNESIUM SERPL-MCNC: 2.6 MG/DL — SIGNIFICANT CHANGE UP (ref 1.6–2.6)
MAGNESIUM SERPL-MCNC: 2.8 MG/DL — HIGH (ref 1.6–2.6)
MAGNESIUM SERPL-MCNC: 2.9 MG/DL — HIGH (ref 1.6–2.6)
MAGNESIUM SERPL-MCNC: 2.9 MG/DL — HIGH (ref 1.6–2.6)
MAGNESIUM SERPL-MCNC: 3 MG/DL — HIGH (ref 1.6–2.6)
MAGNESIUM SERPL-MCNC: 3.1 MG/DL — HIGH (ref 1.6–2.6)
MANUAL DIF COMMENT BLD-IMP: SIGNIFICANT CHANGE UP
MCHC RBC-ENTMCNC: 28.6 PG — SIGNIFICANT CHANGE UP (ref 27–34)
MCHC RBC-ENTMCNC: 30.1 PG — SIGNIFICANT CHANGE UP (ref 27–34)
MCHC RBC-ENTMCNC: 30.2 GM/DL — LOW (ref 32–36)
MCHC RBC-ENTMCNC: 30.7 GM/DL — LOW (ref 32–36)
MCHC RBC-ENTMCNC: 31 GM/DL — LOW (ref 32–36)
MCHC RBC-ENTMCNC: 31 PG — SIGNIFICANT CHANGE UP (ref 27–34)
MCHC RBC-ENTMCNC: 31.1 GM/DL — LOW (ref 32–36)
MCHC RBC-ENTMCNC: 31.2 GM/DL — LOW (ref 32–36)
MCHC RBC-ENTMCNC: 31.2 PG — SIGNIFICANT CHANGE UP (ref 27–34)
MCHC RBC-ENTMCNC: 31.3 GM/DL — LOW (ref 32–36)
MCHC RBC-ENTMCNC: 31.3 PG — SIGNIFICANT CHANGE UP (ref 27–34)
MCHC RBC-ENTMCNC: 31.4 GM/DL — LOW (ref 32–36)
MCHC RBC-ENTMCNC: 31.4 GM/DL — LOW (ref 32–36)
MCHC RBC-ENTMCNC: 31.5 GM/DL — LOW (ref 32–36)
MCHC RBC-ENTMCNC: 31.6 PG — SIGNIFICANT CHANGE UP (ref 27–34)
MCHC RBC-ENTMCNC: 31.7 GM/DL — LOW (ref 32–36)
MCHC RBC-ENTMCNC: 31.7 PG — SIGNIFICANT CHANGE UP (ref 27–34)
MCHC RBC-ENTMCNC: 31.7 PG — SIGNIFICANT CHANGE UP (ref 27–34)
MCHC RBC-ENTMCNC: 31.8 GM/DL — LOW (ref 32–36)
MCHC RBC-ENTMCNC: 31.8 PG — SIGNIFICANT CHANGE UP (ref 27–34)
MCHC RBC-ENTMCNC: 31.9 PG — SIGNIFICANT CHANGE UP (ref 27–34)
MCHC RBC-ENTMCNC: 32 GM/DL — SIGNIFICANT CHANGE UP (ref 32–36)
MCHC RBC-ENTMCNC: 32 GM/DL — SIGNIFICANT CHANGE UP (ref 32–36)
MCHC RBC-ENTMCNC: 32 PG — SIGNIFICANT CHANGE UP (ref 27–34)
MCHC RBC-ENTMCNC: 32.2 PG — SIGNIFICANT CHANGE UP (ref 27–34)
MCHC RBC-ENTMCNC: 32.3 PG — SIGNIFICANT CHANGE UP (ref 27–34)
MCHC RBC-ENTMCNC: 32.4 GM/DL — SIGNIFICANT CHANGE UP (ref 32–36)
MCHC RBC-ENTMCNC: 32.5 GM/DL — SIGNIFICANT CHANGE UP (ref 32–36)
MCHC RBC-ENTMCNC: 32.6 PG — SIGNIFICANT CHANGE UP (ref 27–34)
MCHC RBC-ENTMCNC: 32.6 PG — SIGNIFICANT CHANGE UP (ref 27–34)
MCHC RBC-ENTMCNC: 32.7 PG — SIGNIFICANT CHANGE UP (ref 27–34)
MCHC RBC-ENTMCNC: 32.8 PG — SIGNIFICANT CHANGE UP (ref 27–34)
MCHC RBC-ENTMCNC: 32.8 PG — SIGNIFICANT CHANGE UP (ref 27–34)
MCHC RBC-ENTMCNC: 33 PG — SIGNIFICANT CHANGE UP (ref 27–34)
MCHC RBC-ENTMCNC: 33.1 GM/DL — SIGNIFICANT CHANGE UP (ref 32–36)
MCHC RBC-ENTMCNC: 33.1 PG — SIGNIFICANT CHANGE UP (ref 27–34)
MCHC RBC-ENTMCNC: 33.2 PG — SIGNIFICANT CHANGE UP (ref 27–34)
MCHC RBC-ENTMCNC: 33.5 GM/DL — SIGNIFICANT CHANGE UP (ref 32–36)
MCHC RBC-ENTMCNC: 33.6 GM/DL — SIGNIFICANT CHANGE UP (ref 32–36)
MCHC RBC-ENTMCNC: 33.7 GM/DL — SIGNIFICANT CHANGE UP (ref 32–36)
MCHC RBC-ENTMCNC: 33.9 GM/DL — SIGNIFICANT CHANGE UP (ref 32–36)
MCHC RBC-ENTMCNC: 34.1 GM/DL — SIGNIFICANT CHANGE UP (ref 32–36)
MCHC RBC-ENTMCNC: 34.2 GM/DL — SIGNIFICANT CHANGE UP (ref 32–36)
MCHC RBC-ENTMCNC: 34.4 GM/DL — SIGNIFICANT CHANGE UP (ref 32–36)
MCHC RBC-ENTMCNC: 34.5 GM/DL — SIGNIFICANT CHANGE UP (ref 32–36)
MCHC RBC-ENTMCNC: 34.8 GM/DL — SIGNIFICANT CHANGE UP (ref 32–36)
MCV RBC AUTO: 100 FL — SIGNIFICANT CHANGE UP (ref 80–100)
MCV RBC AUTO: 100.1 FL — HIGH (ref 80–100)
MCV RBC AUTO: 100.5 FL — HIGH (ref 80–100)
MCV RBC AUTO: 101.3 FL — HIGH (ref 80–100)
MCV RBC AUTO: 101.4 FL — HIGH (ref 80–100)
MCV RBC AUTO: 101.4 FL — HIGH (ref 80–100)
MCV RBC AUTO: 102 FL — HIGH (ref 80–100)
MCV RBC AUTO: 102.4 FL — HIGH (ref 80–100)
MCV RBC AUTO: 103.3 FL — HIGH (ref 80–100)
MCV RBC AUTO: 90.4 FL — SIGNIFICANT CHANGE UP (ref 80–100)
MCV RBC AUTO: 94.5 FL — SIGNIFICANT CHANGE UP (ref 80–100)
MCV RBC AUTO: 94.6 FL — SIGNIFICANT CHANGE UP (ref 80–100)
MCV RBC AUTO: 94.8 FL — SIGNIFICANT CHANGE UP (ref 80–100)
MCV RBC AUTO: 94.9 FL — SIGNIFICANT CHANGE UP (ref 80–100)
MCV RBC AUTO: 95.5 FL — SIGNIFICANT CHANGE UP (ref 80–100)
MCV RBC AUTO: 95.8 FL — SIGNIFICANT CHANGE UP (ref 80–100)
MCV RBC AUTO: 96 FL — SIGNIFICANT CHANGE UP (ref 80–100)
MCV RBC AUTO: 96.1 FL — SIGNIFICANT CHANGE UP (ref 80–100)
MCV RBC AUTO: 97.4 FL — SIGNIFICANT CHANGE UP (ref 80–100)
MCV RBC AUTO: 97.4 FL — SIGNIFICANT CHANGE UP (ref 80–100)
MCV RBC AUTO: 98.8 FL — SIGNIFICANT CHANGE UP (ref 80–100)
MCV RBC AUTO: 99 FL — SIGNIFICANT CHANGE UP (ref 80–100)
MCV RBC AUTO: 99.4 FL — SIGNIFICANT CHANGE UP (ref 80–100)
MCV RBC AUTO: 99.7 FL — SIGNIFICANT CHANGE UP (ref 80–100)
MCV RBC AUTO: 99.8 FL — SIGNIFICANT CHANGE UP (ref 80–100)
MONOCYTES # BLD AUTO: 0.3 K/UL — SIGNIFICANT CHANGE UP (ref 0–0.9)
MONOCYTES # BLD AUTO: 0.4 K/UL — SIGNIFICANT CHANGE UP (ref 0–0.9)
MONOCYTES # BLD AUTO: 0.5 K/UL — SIGNIFICANT CHANGE UP (ref 0–0.9)
MONOCYTES # BLD AUTO: 0.6 K/UL — SIGNIFICANT CHANGE UP (ref 0–0.9)
MONOCYTES # BLD AUTO: 0.7 K/UL — SIGNIFICANT CHANGE UP (ref 0–0.9)
MONOCYTES # BLD AUTO: 0.7 K/UL — SIGNIFICANT CHANGE UP (ref 0–0.9)
MONOCYTES # BLD AUTO: 0.8 K/UL — SIGNIFICANT CHANGE UP (ref 0–0.9)
MONOCYTES # BLD AUTO: 1 K/UL — HIGH (ref 0–0.9)
MONOCYTES # BLD AUTO: 1.3 K/UL — HIGH (ref 0–0.9)
MONOCYTES NFR BLD AUTO: 1.8 % — LOW (ref 2–14)
MONOCYTES NFR BLD AUTO: 1.8 % — LOW (ref 2–14)
MONOCYTES NFR BLD AUTO: 1.9 % — LOW (ref 2–14)
MONOCYTES NFR BLD AUTO: 2 % — SIGNIFICANT CHANGE UP (ref 2–14)
MONOCYTES NFR BLD AUTO: 2.2 % — SIGNIFICANT CHANGE UP (ref 2–14)
MONOCYTES NFR BLD AUTO: 2.3 % — SIGNIFICANT CHANGE UP (ref 2–14)
MONOCYTES NFR BLD AUTO: 2.4 % — SIGNIFICANT CHANGE UP (ref 2–14)
MONOCYTES NFR BLD AUTO: 2.5 % — SIGNIFICANT CHANGE UP (ref 2–14)
MONOCYTES NFR BLD AUTO: 2.8 % — SIGNIFICANT CHANGE UP (ref 2–14)
MONOCYTES NFR BLD AUTO: 3 % — SIGNIFICANT CHANGE UP (ref 2–14)
MONOCYTES NFR BLD AUTO: 3.1 % — SIGNIFICANT CHANGE UP (ref 2–14)
MONOCYTES NFR BLD AUTO: 4.8 % — SIGNIFICANT CHANGE UP (ref 2–14)
MONOCYTES NFR BLD AUTO: 6 % — SIGNIFICANT CHANGE UP (ref 2–14)
MONOCYTES NFR BLD AUTO: 7.6 % — SIGNIFICANT CHANGE UP (ref 2–14)
MONOCYTES NFR BLD AUTO: 8 % — SIGNIFICANT CHANGE UP (ref 2–14)
MONOCYTES NFR BLD AUTO: 8.6 % — SIGNIFICANT CHANGE UP (ref 2–14)
MYELOCYTES NFR BLD: 1 % — HIGH (ref 0–0)
NEUTROPHILS # BLD AUTO: 12.8 K/UL — HIGH (ref 1.8–7.4)
NEUTROPHILS # BLD AUTO: 13.3 K/UL — HIGH (ref 1.8–7.4)
NEUTROPHILS # BLD AUTO: 13.7 K/UL — HIGH (ref 1.8–7.4)
NEUTROPHILS # BLD AUTO: 14.1 K/UL — HIGH (ref 1.8–7.4)
NEUTROPHILS # BLD AUTO: 14.5 K/UL — HIGH (ref 1.8–7.4)
NEUTROPHILS # BLD AUTO: 16.3 K/UL — HIGH (ref 1.8–7.4)
NEUTROPHILS # BLD AUTO: 16.7 K/UL — HIGH (ref 1.8–7.4)
NEUTROPHILS # BLD AUTO: 16.9 K/UL — HIGH (ref 1.8–7.4)
NEUTROPHILS # BLD AUTO: 16.9 K/UL — HIGH (ref 1.8–7.4)
NEUTROPHILS # BLD AUTO: 18.6 K/UL — HIGH (ref 1.8–7.4)
NEUTROPHILS # BLD AUTO: 20.2 K/UL — HIGH (ref 1.8–7.4)
NEUTROPHILS # BLD AUTO: 23.4 K/UL — HIGH (ref 1.8–7.4)
NEUTROPHILS # BLD AUTO: 28.4 K/UL — HIGH (ref 1.8–7.4)
NEUTROPHILS # BLD AUTO: 8.5 K/UL — HIGH (ref 1.8–7.4)
NEUTROPHILS # BLD AUTO: 9.7 K/UL — HIGH (ref 1.8–7.4)
NEUTROPHILS NFR BLD AUTO: 50 % — SIGNIFICANT CHANGE UP (ref 43–77)
NEUTROPHILS NFR BLD AUTO: 53 % — SIGNIFICANT CHANGE UP (ref 43–77)
NEUTROPHILS NFR BLD AUTO: 55 % — SIGNIFICANT CHANGE UP (ref 43–77)
NEUTROPHILS NFR BLD AUTO: 76 % — SIGNIFICANT CHANGE UP (ref 43–77)
NEUTROPHILS NFR BLD AUTO: 78.3 % — HIGH (ref 43–77)
NEUTROPHILS NFR BLD AUTO: 78.9 % — HIGH (ref 43–77)
NEUTROPHILS NFR BLD AUTO: 80 % — HIGH (ref 43–77)
NEUTROPHILS NFR BLD AUTO: 80.5 % — HIGH (ref 43–77)
NEUTROPHILS NFR BLD AUTO: 82.6 % — HIGH (ref 43–77)
NEUTROPHILS NFR BLD AUTO: 84.2 % — HIGH (ref 43–77)
NEUTROPHILS NFR BLD AUTO: 85 % — HIGH (ref 43–77)
NEUTROPHILS NFR BLD AUTO: 86.1 % — HIGH (ref 43–77)
NEUTROPHILS NFR BLD AUTO: 87.5 % — HIGH (ref 43–77)
NEUTROPHILS NFR BLD AUTO: 88 % — HIGH (ref 43–77)
NEUTROPHILS NFR BLD AUTO: 88.3 % — HIGH (ref 43–77)
NEUTROPHILS NFR BLD AUTO: 89.2 % — HIGH (ref 43–77)
NEUTROPHILS NFR BLD AUTO: 90 % — HIGH (ref 43–77)
NEUTROPHILS NFR BLD AUTO: 90.8 % — HIGH (ref 43–77)
NEUTROPHILS NFR BLD AUTO: 91.6 % — HIGH (ref 43–77)
NEUTROPHILS NFR BLD AUTO: 92.1 % — HIGH (ref 43–77)
NEUTROPHILS NFR BLD AUTO: 93.8 % — HIGH (ref 43–77)
NEUTS BAND # BLD: 5 % — SIGNIFICANT CHANGE UP (ref 0–8)
NIGHT BLUE STAIN TISS: SIGNIFICANT CHANGE UP
NITRITE UR-MCNC: NEGATIVE — SIGNIFICANT CHANGE UP
NON-GYNECOLOGICAL CYTOLOGY STUDY: SIGNIFICANT CHANGE UP
NON-GYNECOLOGICAL CYTOLOGY STUDY: SIGNIFICANT CHANGE UP
OSMOLALITY SERPL: 280 MOS/KG — SIGNIFICANT CHANGE UP (ref 275–300)
OSMOLALITY SERPL: 281 MOS/KG — SIGNIFICANT CHANGE UP (ref 275–300)
OSMOLALITY UR: 330 MOS/KG — SIGNIFICANT CHANGE UP (ref 50–1200)
OSMOLALITY UR: 340 MOS/KG — SIGNIFICANT CHANGE UP (ref 50–1200)
OSMOLALITY UR: 427 MOS/KG — SIGNIFICANT CHANGE UP (ref 50–1200)
OSMOLALITY UR: 448 MOS/KG — SIGNIFICANT CHANGE UP (ref 50–1200)
PCO2 BLDA: 28 MMHG — LOW (ref 32–46)
PCO2 BLDA: 34 MMHG — SIGNIFICANT CHANGE UP (ref 32–46)
PCO2 BLDA: 35 MMHG — SIGNIFICANT CHANGE UP (ref 32–46)
PCO2 BLDA: 36 MMHG — SIGNIFICANT CHANGE UP (ref 32–46)
PCO2 BLDA: 36 MMHG — SIGNIFICANT CHANGE UP (ref 32–46)
PCO2 BLDA: 37 MMHG — SIGNIFICANT CHANGE UP (ref 32–46)
PCO2 BLDA: 39 MMHG — SIGNIFICANT CHANGE UP (ref 32–46)
PCO2 BLDA: 39 MMHG — SIGNIFICANT CHANGE UP (ref 32–46)
PCO2 BLDA: 40 MMHG — SIGNIFICANT CHANGE UP (ref 32–46)
PCO2 BLDA: 41 MMHG — SIGNIFICANT CHANGE UP (ref 32–46)
PCO2 BLDA: 42 MMHG — SIGNIFICANT CHANGE UP (ref 32–46)
PCO2 BLDA: 42 MMHG — SIGNIFICANT CHANGE UP (ref 32–46)
PCO2 BLDA: 44 MMHG — SIGNIFICANT CHANGE UP (ref 32–46)
PCO2 BLDA: 45 MMHG — SIGNIFICANT CHANGE UP (ref 32–46)
PCO2 BLDA: 45 MMHG — SIGNIFICANT CHANGE UP (ref 32–46)
PCO2 BLDA: 50 MMHG — HIGH (ref 32–46)
PCO2 BLDV: 39 MMHG — SIGNIFICANT CHANGE UP (ref 35–50)
PCO2 BLDV: 45 MMHG — SIGNIFICANT CHANGE UP (ref 35–50)
PH BLDA: 7.14 — CRITICAL LOW (ref 7.35–7.45)
PH BLDA: 7.15 — CRITICAL LOW (ref 7.35–7.45)
PH BLDA: 7.16 — CRITICAL LOW (ref 7.35–7.45)
PH BLDA: 7.18 — CRITICAL LOW (ref 7.35–7.45)
PH BLDA: 7.18 — CRITICAL LOW (ref 7.35–7.45)
PH BLDA: 7.22 — LOW (ref 7.35–7.45)
PH BLDA: 7.24 — LOW (ref 7.35–7.45)
PH BLDA: 7.25 — LOW (ref 7.35–7.45)
PH BLDA: 7.26 — LOW (ref 7.35–7.45)
PH BLDA: 7.27 — LOW (ref 7.35–7.45)
PH BLDA: 7.36 — SIGNIFICANT CHANGE UP (ref 7.35–7.45)
PH BLDA: 7.42 — SIGNIFICANT CHANGE UP (ref 7.35–7.45)
PH BLDA: 7.49 — HIGH (ref 7.35–7.45)
PH BLDV: 7.18 — CRITICAL LOW (ref 7.35–7.45)
PH BLDV: 7.26 — LOW (ref 7.35–7.45)
PH UR: 5 — SIGNIFICANT CHANGE UP (ref 5–8)
PH UR: 5 — SIGNIFICANT CHANGE UP (ref 5–8)
PH UR: 6.5 — SIGNIFICANT CHANGE UP (ref 5–8)
PHOSPHATE SERPL-MCNC: 2.8 MG/DL — SIGNIFICANT CHANGE UP (ref 2.5–4.5)
PHOSPHATE SERPL-MCNC: 2.9 MG/DL — SIGNIFICANT CHANGE UP (ref 2.5–4.5)
PHOSPHATE SERPL-MCNC: 3.3 MG/DL — SIGNIFICANT CHANGE UP (ref 2.5–4.5)
PHOSPHATE SERPL-MCNC: 4 MG/DL — SIGNIFICANT CHANGE UP (ref 2.5–4.5)
PHOSPHATE SERPL-MCNC: 4.1 MG/DL — SIGNIFICANT CHANGE UP (ref 2.5–4.5)
PHOSPHATE SERPL-MCNC: 4.1 MG/DL — SIGNIFICANT CHANGE UP (ref 2.5–4.5)
PHOSPHATE SERPL-MCNC: 4.4 MG/DL — SIGNIFICANT CHANGE UP (ref 2.5–4.5)
PHOSPHATE SERPL-MCNC: 6 MG/DL — HIGH (ref 2.5–4.5)
PHOSPHATE SERPL-MCNC: 6.4 MG/DL — HIGH (ref 2.5–4.5)
PHOSPHATE SERPL-MCNC: 6.5 MG/DL — HIGH (ref 2.5–4.5)
PHOSPHATE SERPL-MCNC: 6.6 MG/DL — HIGH (ref 2.5–4.5)
PHOSPHATE SERPL-MCNC: 6.6 MG/DL — HIGH (ref 2.5–4.5)
PHOSPHATE SERPL-MCNC: 7 MG/DL — HIGH (ref 2.5–4.5)
PHOSPHATE SERPL-MCNC: 7 MG/DL — HIGH (ref 2.5–4.5)
PHOSPHATE SERPL-MCNC: 7.1 MG/DL — HIGH (ref 2.5–4.5)
PHOSPHATE SERPL-MCNC: 7.2 MG/DL — HIGH (ref 2.5–4.5)
PHOSPHATE SERPL-MCNC: 7.5 MG/DL — HIGH (ref 2.5–4.5)
PHOSPHATE SERPL-MCNC: 7.8 MG/DL — HIGH (ref 2.5–4.5)
PHOSPHATE SERPL-MCNC: 7.8 MG/DL — SIGNIFICANT CHANGE UP (ref 2.5–4.5)
PHOSPHATE SERPL-MCNC: 9.7 MG/DL — SIGNIFICANT CHANGE UP (ref 2.5–4.5)
PLAT MORPH BLD: NORMAL — SIGNIFICANT CHANGE UP
PLATELET # BLD AUTO: 112 K/UL — LOW (ref 150–400)
PLATELET # BLD AUTO: 127 K/UL — LOW (ref 150–400)
PLATELET # BLD AUTO: 128 K/UL — LOW (ref 150–400)
PLATELET # BLD AUTO: 161 K/UL — SIGNIFICANT CHANGE UP (ref 150–400)
PLATELET # BLD AUTO: 189 K/UL — SIGNIFICANT CHANGE UP (ref 150–400)
PLATELET # BLD AUTO: 45 K/UL — LOW (ref 150–400)
PLATELET # BLD AUTO: 49 K/UL — LOW (ref 150–400)
PLATELET # BLD AUTO: 56 K/UL — LOW (ref 150–400)
PLATELET # BLD AUTO: 62 K/UL — LOW (ref 150–400)
PLATELET # BLD AUTO: 67 K/UL — LOW (ref 150–400)
PLATELET # BLD AUTO: 71 K/UL — LOW (ref 150–400)
PLATELET # BLD AUTO: 71 K/UL — LOW (ref 150–400)
PLATELET # BLD AUTO: 87 K/UL — LOW (ref 150–400)
PLATELET # BLD AUTO: ABNORMAL (ref 150–400)
PLATELET # BLD AUTO: SIGNIFICANT CHANGE UP K/UL (ref 150–400)
PO2 BLDA: 102 MMHG — SIGNIFICANT CHANGE UP (ref 74–108)
PO2 BLDA: 103 MMHG — SIGNIFICANT CHANGE UP (ref 74–108)
PO2 BLDA: 115 MMHG — HIGH (ref 74–108)
PO2 BLDA: 116 MMHG — HIGH (ref 74–108)
PO2 BLDA: 123 MMHG — HIGH (ref 74–108)
PO2 BLDA: 133 MMHG — HIGH (ref 74–108)
PO2 BLDA: 148 MMHG — HIGH (ref 74–108)
PO2 BLDA: 150 MMHG — HIGH (ref 74–108)
PO2 BLDA: 54 MMHG — LOW (ref 74–108)
PO2 BLDA: 74 MMHG — SIGNIFICANT CHANGE UP (ref 74–108)
PO2 BLDA: 77 MMHG — SIGNIFICANT CHANGE UP (ref 74–108)
PO2 BLDA: 82 MMHG — SIGNIFICANT CHANGE UP (ref 74–108)
PO2 BLDA: 86 MMHG — SIGNIFICANT CHANGE UP (ref 74–108)
PO2 BLDA: 90 MMHG — SIGNIFICANT CHANGE UP (ref 74–108)
PO2 BLDA: 92 MMHG — SIGNIFICANT CHANGE UP (ref 74–108)
PO2 BLDA: 94 MMHG — SIGNIFICANT CHANGE UP (ref 74–108)
PO2 BLDV: 52 MMHG — HIGH (ref 25–45)
PO2 BLDV: 52 MMHG — HIGH (ref 25–45)
POIKILOCYTOSIS BLD QL AUTO: SLIGHT — SIGNIFICANT CHANGE UP
POLYCHROMASIA BLD QL SMEAR: SLIGHT — SIGNIFICANT CHANGE UP
POTASSIUM SERPL-MCNC: 3.8 MMOL/L — SIGNIFICANT CHANGE UP (ref 3.5–5.3)
POTASSIUM SERPL-MCNC: 4 MMOL/L — SIGNIFICANT CHANGE UP (ref 3.5–5.3)
POTASSIUM SERPL-MCNC: 4 MMOL/L — SIGNIFICANT CHANGE UP (ref 3.5–5.3)
POTASSIUM SERPL-MCNC: 4.1 MMOL/L — SIGNIFICANT CHANGE UP (ref 3.5–5.3)
POTASSIUM SERPL-MCNC: 4.1 MMOL/L — SIGNIFICANT CHANGE UP (ref 3.5–5.3)
POTASSIUM SERPL-MCNC: 4.3 MMOL/L — SIGNIFICANT CHANGE UP (ref 3.5–5.3)
POTASSIUM SERPL-MCNC: 4.4 MMOL/L — SIGNIFICANT CHANGE UP (ref 3.5–5.3)
POTASSIUM SERPL-MCNC: 4.4 MMOL/L — SIGNIFICANT CHANGE UP (ref 3.5–5.3)
POTASSIUM SERPL-MCNC: 4.6 MMOL/L — SIGNIFICANT CHANGE UP (ref 3.5–5.3)
POTASSIUM SERPL-MCNC: 4.7 MMOL/L — SIGNIFICANT CHANGE UP (ref 3.5–5.3)
POTASSIUM SERPL-MCNC: 4.8 MMOL/L — SIGNIFICANT CHANGE UP (ref 3.5–5.3)
POTASSIUM SERPL-MCNC: 4.8 MMOL/L — SIGNIFICANT CHANGE UP (ref 3.5–5.3)
POTASSIUM SERPL-MCNC: 4.9 MMOL/L — SIGNIFICANT CHANGE UP (ref 3.5–5.3)
POTASSIUM SERPL-MCNC: 5 MMOL/L — SIGNIFICANT CHANGE UP (ref 3.5–5.3)
POTASSIUM SERPL-MCNC: 5.1 MMOL/L — SIGNIFICANT CHANGE UP (ref 3.5–5.3)
POTASSIUM SERPL-MCNC: 5.2 MMOL/L — SIGNIFICANT CHANGE UP (ref 3.5–5.3)
POTASSIUM SERPL-MCNC: 5.3 MMOL/L — SIGNIFICANT CHANGE UP (ref 3.5–5.3)
POTASSIUM SERPL-MCNC: 5.3 MMOL/L — SIGNIFICANT CHANGE UP (ref 3.5–5.3)
POTASSIUM SERPL-MCNC: 5.9 MMOL/L — HIGH (ref 3.5–5.3)
POTASSIUM SERPL-MCNC: 6.1 MMOL/L — HIGH (ref 3.5–5.3)
POTASSIUM SERPL-SCNC: 3.8 MMOL/L — SIGNIFICANT CHANGE UP (ref 3.5–5.3)
POTASSIUM SERPL-SCNC: 4 MMOL/L — SIGNIFICANT CHANGE UP (ref 3.5–5.3)
POTASSIUM SERPL-SCNC: 4 MMOL/L — SIGNIFICANT CHANGE UP (ref 3.5–5.3)
POTASSIUM SERPL-SCNC: 4.1 MMOL/L — SIGNIFICANT CHANGE UP (ref 3.5–5.3)
POTASSIUM SERPL-SCNC: 4.1 MMOL/L — SIGNIFICANT CHANGE UP (ref 3.5–5.3)
POTASSIUM SERPL-SCNC: 4.3 MMOL/L — SIGNIFICANT CHANGE UP (ref 3.5–5.3)
POTASSIUM SERPL-SCNC: 4.4 MMOL/L — SIGNIFICANT CHANGE UP (ref 3.5–5.3)
POTASSIUM SERPL-SCNC: 4.4 MMOL/L — SIGNIFICANT CHANGE UP (ref 3.5–5.3)
POTASSIUM SERPL-SCNC: 4.6 MMOL/L — SIGNIFICANT CHANGE UP (ref 3.5–5.3)
POTASSIUM SERPL-SCNC: 4.7 MMOL/L — SIGNIFICANT CHANGE UP (ref 3.5–5.3)
POTASSIUM SERPL-SCNC: 4.8 MMOL/L — SIGNIFICANT CHANGE UP (ref 3.5–5.3)
POTASSIUM SERPL-SCNC: 4.8 MMOL/L — SIGNIFICANT CHANGE UP (ref 3.5–5.3)
POTASSIUM SERPL-SCNC: 4.9 MMOL/L — SIGNIFICANT CHANGE UP (ref 3.5–5.3)
POTASSIUM SERPL-SCNC: 5 MMOL/L — SIGNIFICANT CHANGE UP (ref 3.5–5.3)
POTASSIUM SERPL-SCNC: 5.1 MMOL/L — SIGNIFICANT CHANGE UP (ref 3.5–5.3)
POTASSIUM SERPL-SCNC: 5.2 MMOL/L — SIGNIFICANT CHANGE UP (ref 3.5–5.3)
POTASSIUM SERPL-SCNC: 5.3 MMOL/L — SIGNIFICANT CHANGE UP (ref 3.5–5.3)
POTASSIUM SERPL-SCNC: 5.3 MMOL/L — SIGNIFICANT CHANGE UP (ref 3.5–5.3)
POTASSIUM SERPL-SCNC: 5.9 MMOL/L — HIGH (ref 3.5–5.3)
POTASSIUM SERPL-SCNC: 6.1 MMOL/L — HIGH (ref 3.5–5.3)
POTASSIUM UR-SCNC: 32 MMOL/L — SIGNIFICANT CHANGE UP (ref 25–125)
POTASSIUM UR-SCNC: 36 MMOL/L — SIGNIFICANT CHANGE UP (ref 25–125)
POTASSIUM UR-SCNC: 58 MMOL/L — SIGNIFICANT CHANGE UP (ref 25–125)
PROCALCITONIN SERPL-MCNC: 3.6 NG/ML — HIGH (ref 0–0.04)
PROT ?TM UR-MCNC: 110 MG/DL — HIGH (ref 0–12)
PROT ?TM UR-MCNC: 164 MG/DL — HIGH (ref 0–12)
PROT ?TM UR-MCNC: 58 MG/DL — HIGH (ref 0–12)
PROT ?TM UR-MCNC: 68 MG/DL — HIGH (ref 0–12)
PROT SERPL-MCNC: 5 G/DL — LOW (ref 6–8.3)
PROT SERPL-MCNC: 5.1 G/DL — LOW (ref 6–8.3)
PROT SERPL-MCNC: 5.2 G/DL — LOW (ref 6–8.3)
PROT SERPL-MCNC: 5.2 G/DL — LOW (ref 6–8.3)
PROT SERPL-MCNC: 5.3 G/DL — LOW (ref 6–8.3)
PROT SERPL-MCNC: 5.3 G/DL — LOW (ref 6–8.3)
PROT SERPL-MCNC: 5.4 G/DL — LOW (ref 6–8.3)
PROT SERPL-MCNC: 5.8 G/DL — LOW (ref 6–8.3)
PROT SERPL-MCNC: 5.8 G/DL — LOW (ref 6–8.3)
PROT SERPL-MCNC: 6.3 G/DL — SIGNIFICANT CHANGE UP (ref 6–8.3)
PROT SERPL-MCNC: 6.5 G/DL — SIGNIFICANT CHANGE UP (ref 6–8.3)
PROT UR-MCNC: 30 MG/DL
PROTHROM AB SERPL-ACNC: 11.3 SEC — SIGNIFICANT CHANGE UP (ref 9.8–12.7)
PROTHROM AB SERPL-ACNC: 11.3 SEC — SIGNIFICANT CHANGE UP (ref 9.8–12.7)
PROTHROM AB SERPL-ACNC: 11.7 SEC — SIGNIFICANT CHANGE UP (ref 9.8–12.7)
PROTHROM AB SERPL-ACNC: 13.2 SEC — HIGH (ref 9.8–12.7)
PROTHROM AB SERPL-ACNC: 13.3 SEC — HIGH (ref 9.8–12.7)
PROTHROM AB SERPL-ACNC: 14 SEC — HIGH (ref 9.8–12.7)
PROTHROM AB SERPL-ACNC: 23.5 SEC — HIGH (ref 9.8–12.7)
RBC # BLD: 2.22 M/UL — LOW (ref 4.2–5.8)
RBC # BLD: 2.83 M/UL — LOW (ref 4.2–5.8)
RBC # BLD: 2.92 M/UL — LOW (ref 4.2–5.8)
RBC # BLD: 2.94 M/UL — LOW (ref 4.2–5.8)
RBC # BLD: 2.94 M/UL — LOW (ref 4.2–5.8)
RBC # BLD: 2.95 M/UL — LOW (ref 4.2–5.8)
RBC # BLD: 2.97 M/UL — LOW (ref 4.2–5.8)
RBC # BLD: 2.99 M/UL — LOW (ref 4.2–5.8)
RBC # BLD: 3.02 M/UL — LOW (ref 4.2–5.8)
RBC # BLD: 3.08 M/UL — LOW (ref 4.2–5.8)
RBC # BLD: 3.18 M/UL — LOW (ref 4.2–5.8)
RBC # BLD: 3.21 M/UL — LOW (ref 4.2–5.8)
RBC # BLD: 3.24 M/UL — LOW (ref 4.2–5.8)
RBC # BLD: 3.33 M/UL — LOW (ref 4.2–5.8)
RBC # BLD: 3.34 M/UL — LOW (ref 4.2–5.8)
RBC # BLD: 3.36 M/UL — LOW (ref 4.2–5.8)
RBC # BLD: 3.38 M/UL — LOW (ref 4.2–5.8)
RBC # BLD: 3.39 M/UL — LOW (ref 4.2–5.8)
RBC # BLD: 3.4 M/UL — LOW (ref 4.2–5.8)
RBC # BLD: 3.45 M/UL — LOW (ref 4.2–5.8)
RBC # BLD: 3.49 M/UL — LOW (ref 4.2–5.8)
RBC # BLD: 3.57 M/UL — LOW (ref 4.2–5.8)
RBC # BLD: 3.66 M/UL — LOW (ref 4.2–5.8)
RBC # BLD: 3.79 M/UL — LOW (ref 4.2–5.8)
RBC # BLD: 3.98 M/UL — LOW (ref 4.2–5.8)
RBC # FLD: 11.1 % — SIGNIFICANT CHANGE UP (ref 10.3–14.5)
RBC # FLD: 11.1 % — SIGNIFICANT CHANGE UP (ref 10.3–14.5)
RBC # FLD: 11.2 % — SIGNIFICANT CHANGE UP (ref 10.3–14.5)
RBC # FLD: 11.2 % — SIGNIFICANT CHANGE UP (ref 10.3–14.5)
RBC # FLD: 11.3 % — SIGNIFICANT CHANGE UP (ref 10.3–14.5)
RBC # FLD: 11.4 % — SIGNIFICANT CHANGE UP (ref 10.3–14.5)
RBC # FLD: 11.4 % — SIGNIFICANT CHANGE UP (ref 10.3–14.5)
RBC # FLD: 11.5 % — SIGNIFICANT CHANGE UP (ref 10.3–14.5)
RBC # FLD: 11.5 % — SIGNIFICANT CHANGE UP (ref 10.3–14.5)
RBC # FLD: 11.6 % — SIGNIFICANT CHANGE UP (ref 10.3–14.5)
RBC # FLD: 12.8 % — SIGNIFICANT CHANGE UP (ref 10.3–14.5)
RBC # FLD: 12.9 % — SIGNIFICANT CHANGE UP (ref 10.3–14.5)
RBC # FLD: 13.4 % — SIGNIFICANT CHANGE UP (ref 10.3–14.5)
RBC # FLD: 13.7 % — SIGNIFICANT CHANGE UP (ref 10.3–14.5)
RBC # FLD: 13.7 % — SIGNIFICANT CHANGE UP (ref 10.3–14.5)
RBC # FLD: 13.8 % — SIGNIFICANT CHANGE UP (ref 10.3–14.5)
RBC # FLD: 13.8 % — SIGNIFICANT CHANGE UP (ref 10.3–14.5)
RBC # FLD: 14 % — SIGNIFICANT CHANGE UP (ref 10.3–14.5)
RBC # FLD: 14.2 % — SIGNIFICANT CHANGE UP (ref 10.3–14.5)
RBC # FLD: 14.5 % — SIGNIFICANT CHANGE UP (ref 10.3–14.5)
RBC # FLD: 14.6 % — HIGH (ref 10.3–14.5)
RBC # FLD: 15 % — HIGH (ref 10.3–14.5)
RBC # FLD: 17.3 % — HIGH (ref 10.3–14.5)
RBC BLD AUTO: ABNORMAL
SAO2 % BLDA: 83 % — LOW (ref 92–96)
SAO2 % BLDA: 92 % — SIGNIFICANT CHANGE UP (ref 92–96)
SAO2 % BLDA: 93 % — SIGNIFICANT CHANGE UP (ref 92–96)
SAO2 % BLDA: 93 % — SIGNIFICANT CHANGE UP (ref 92–96)
SAO2 % BLDA: 94 % — SIGNIFICANT CHANGE UP (ref 92–96)
SAO2 % BLDA: 94 % — SIGNIFICANT CHANGE UP (ref 92–96)
SAO2 % BLDA: 95 % — SIGNIFICANT CHANGE UP (ref 92–96)
SAO2 % BLDA: 95 % — SIGNIFICANT CHANGE UP (ref 92–96)
SAO2 % BLDA: 96 % — SIGNIFICANT CHANGE UP (ref 92–96)
SAO2 % BLDA: 97 % — HIGH (ref 92–96)
SAO2 % BLDA: 98 % — HIGH (ref 92–96)
SAO2 % BLDA: 99 % — HIGH (ref 92–96)
SAO2 % BLDV: 77 % — SIGNIFICANT CHANGE UP (ref 67–88)
SAO2 % BLDV: 80 % — SIGNIFICANT CHANGE UP (ref 67–88)
SODIUM SERPL-SCNC: 125 MMOL/L — LOW (ref 135–145)
SODIUM SERPL-SCNC: 127 MMOL/L — LOW (ref 135–145)
SODIUM SERPL-SCNC: 128 MMOL/L — LOW (ref 135–145)
SODIUM SERPL-SCNC: 129 MMOL/L — LOW (ref 135–145)
SODIUM SERPL-SCNC: 130 MMOL/L — LOW (ref 135–145)
SODIUM SERPL-SCNC: 131 MMOL/L — LOW (ref 135–145)
SODIUM SERPL-SCNC: 132 MMOL/L — LOW (ref 135–145)
SODIUM SERPL-SCNC: 133 MMOL/L — LOW (ref 135–145)
SODIUM SERPL-SCNC: 134 MMOL/L — LOW (ref 135–145)
SODIUM SERPL-SCNC: 135 MMOL/L — SIGNIFICANT CHANGE UP (ref 135–145)
SODIUM SERPL-SCNC: 138 MMOL/L — SIGNIFICANT CHANGE UP (ref 135–145)
SODIUM SERPL-SCNC: 140 MMOL/L — SIGNIFICANT CHANGE UP (ref 135–145)
SODIUM SERPL-SCNC: 141 MMOL/L — SIGNIFICANT CHANGE UP (ref 135–145)
SODIUM SERPL-SCNC: 142 MMOL/L — SIGNIFICANT CHANGE UP (ref 135–145)
SODIUM SERPL-SCNC: 143 MMOL/L — SIGNIFICANT CHANGE UP (ref 135–145)
SODIUM SERPL-SCNC: 144 MMOL/L — SIGNIFICANT CHANGE UP (ref 135–145)
SODIUM SERPL-SCNC: 145 MMOL/L — SIGNIFICANT CHANGE UP (ref 135–145)
SODIUM SERPL-SCNC: 148 MMOL/L — HIGH (ref 135–145)
SODIUM SERPL-SCNC: 149 MMOL/L — HIGH (ref 135–145)
SODIUM SERPL-SCNC: 149 MMOL/L — HIGH (ref 135–145)
SODIUM UR-SCNC: 10 MMOL/L — LOW (ref 40–220)
SODIUM UR-SCNC: 56 MMOL/L — SIGNIFICANT CHANGE UP (ref 40–220)
SODIUM UR-SCNC: 6 MMOL/L — LOW (ref 40–220)
SODIUM UR-SCNC: 6 MMOL/L — LOW (ref 40–220)
SODIUM UR-SCNC: 9 MMOL/L — LOW (ref 40–220)
SP GR SPEC: 1.01 — SIGNIFICANT CHANGE UP (ref 1.01–1.02)
SP GR SPEC: 1.02 — SIGNIFICANT CHANGE UP (ref 1.01–1.02)
SP GR SPEC: 1.02 — SIGNIFICANT CHANGE UP (ref 1.01–1.02)
SPECIMEN SOURCE: SIGNIFICANT CHANGE UP
SURGICAL PATHOLOGY FINAL REPORT - CH: SIGNIFICANT CHANGE UP
TIBC SERPL-MCNC: 158 UG/DL — LOW (ref 250–450)
TOTAL CHOLESTEROL/HDL RATIO MEASUREMENT: 2.5 RATIO — LOW (ref 3.4–9.6)
TRANSFERRIN SERPL-MCNC: 140 MG/DL — LOW (ref 200–360)
TRIGL SERPL-MCNC: 88 MG/DL — SIGNIFICANT CHANGE UP (ref 10–149)
TSH SERPL-MCNC: 0.45 UU/ML — SIGNIFICANT CHANGE UP (ref 0.34–4.82)
UIBC SERPL-MCNC: 130 UG/DL — SIGNIFICANT CHANGE UP (ref 110–370)
URATE SERPL-MCNC: 4.5 MG/DL — SIGNIFICANT CHANGE UP (ref 3.4–8.8)
UROBILINOGEN FLD QL: 1
UROBILINOGEN FLD QL: 4
UROBILINOGEN FLD QL: NEGATIVE — SIGNIFICANT CHANGE UP
VANCOMYCIN TROUGH SERPL-MCNC: 11.6 UG/ML — SIGNIFICANT CHANGE UP (ref 10–20)
VANCOMYCIN TROUGH SERPL-MCNC: 16.9 UG/ML — SIGNIFICANT CHANGE UP (ref 10–20)
VANCOMYCIN TROUGH SERPL-MCNC: 20.8 UG/ML — HIGH (ref 10–20)
VANCOMYCIN TROUGH SERPL-MCNC: 21.9 UG/ML — HIGH (ref 10–20)
VANCOMYCIN TROUGH SERPL-MCNC: 25.6 UG/ML — CRITICAL HIGH (ref 10–20)
VIT B12 SERPL-MCNC: 534 PG/ML — SIGNIFICANT CHANGE UP (ref 243–894)
WBC # BLD: 11.1 K/UL — HIGH (ref 3.8–10.5)
WBC # BLD: 11.8 K/UL — HIGH (ref 3.8–10.5)
WBC # BLD: 12 K/UL — HIGH (ref 3.8–10.5)
WBC # BLD: 12.3 K/UL — HIGH (ref 3.8–10.5)
WBC # BLD: 12.5 K/UL — HIGH (ref 3.8–10.5)
WBC # BLD: 13 K/UL — HIGH (ref 3.8–10.5)
WBC # BLD: 14 K/UL — HIGH (ref 3.8–10.5)
WBC # BLD: 16.1 K/UL — HIGH (ref 3.8–10.5)
WBC # BLD: 17 K/UL — HIGH (ref 3.8–10.5)
WBC # BLD: 17.2 K/UL — HIGH (ref 3.8–10.5)
WBC # BLD: 17.8 K/UL — HIGH (ref 3.8–10.5)
WBC # BLD: 18 K/UL — HIGH (ref 3.8–10.5)
WBC # BLD: 19 K/UL — HIGH (ref 3.8–10.5)
WBC # BLD: 19 K/UL — HIGH (ref 3.8–10.5)
WBC # BLD: 19.3 K/UL — HIGH (ref 3.8–10.5)
WBC # BLD: 19.8 K/UL — HIGH (ref 3.8–10.5)
WBC # BLD: 21 K/UL — HIGH (ref 3.8–10.5)
WBC # BLD: 21 K/UL — HIGH (ref 3.8–10.5)
WBC # BLD: 22 K/UL — HIGH (ref 3.8–10.5)
WBC # BLD: 25.8 K/UL — HIGH (ref 3.8–10.5)
WBC # BLD: 28.6 K/UL — HIGH (ref 3.8–10.5)
WBC # BLD: 30.4 K/UL — HIGH (ref 3.8–10.5)
WBC # BLD: 31.5 K/UL — HIGH (ref 3.8–10.5)
WBC # BLD: 31.6 K/UL — HIGH (ref 3.8–10.5)
WBC # BLD: 32 K/UL — HIGH (ref 3.8–10.5)
WBC # FLD AUTO: 11.1 K/UL — HIGH (ref 3.8–10.5)
WBC # FLD AUTO: 11.8 K/UL — HIGH (ref 3.8–10.5)
WBC # FLD AUTO: 12 K/UL — HIGH (ref 3.8–10.5)
WBC # FLD AUTO: 12.3 K/UL — HIGH (ref 3.8–10.5)
WBC # FLD AUTO: 12.5 K/UL — HIGH (ref 3.8–10.5)
WBC # FLD AUTO: 13 K/UL — HIGH (ref 3.8–10.5)
WBC # FLD AUTO: 14 K/UL — HIGH (ref 3.8–10.5)
WBC # FLD AUTO: 16.1 K/UL — HIGH (ref 3.8–10.5)
WBC # FLD AUTO: 17 K/UL — HIGH (ref 3.8–10.5)
WBC # FLD AUTO: 17.2 K/UL — HIGH (ref 3.8–10.5)
WBC # FLD AUTO: 17.8 K/UL — HIGH (ref 3.8–10.5)
WBC # FLD AUTO: 18 K/UL — HIGH (ref 3.8–10.5)
WBC # FLD AUTO: 19 K/UL — HIGH (ref 3.8–10.5)
WBC # FLD AUTO: 19 K/UL — HIGH (ref 3.8–10.5)
WBC # FLD AUTO: 19.3 K/UL — HIGH (ref 3.8–10.5)
WBC # FLD AUTO: 19.8 K/UL — HIGH (ref 3.8–10.5)
WBC # FLD AUTO: 21 K/UL — HIGH (ref 3.8–10.5)
WBC # FLD AUTO: 21 K/UL — HIGH (ref 3.8–10.5)
WBC # FLD AUTO: 22 K/UL — HIGH (ref 3.8–10.5)
WBC # FLD AUTO: 25.8 K/UL — HIGH (ref 3.8–10.5)
WBC # FLD AUTO: 28.6 K/UL — HIGH (ref 3.8–10.5)
WBC # FLD AUTO: 30.4 K/UL — HIGH (ref 3.8–10.5)
WBC # FLD AUTO: 31.5 K/UL — HIGH (ref 3.8–10.5)
WBC # FLD AUTO: 31.6 K/UL — HIGH (ref 3.8–10.5)
WBC # FLD AUTO: 32 K/UL — HIGH (ref 3.8–10.5)

## 2017-01-01 PROCEDURE — 82607 VITAMIN B-12: CPT

## 2017-01-01 PROCEDURE — 71010: CPT | Mod: 26

## 2017-01-01 PROCEDURE — 87521 HEPATITIS C PROBE&RVRS TRNSC: CPT

## 2017-01-01 PROCEDURE — 86301 IMMUNOASSAY TUMOR CA 19-9: CPT

## 2017-01-01 PROCEDURE — 76775 US EXAM ABDO BACK WALL LIM: CPT

## 2017-01-01 PROCEDURE — 87075 CULTR BACTERIA EXCEPT BLOOD: CPT

## 2017-01-01 PROCEDURE — 71020: CPT | Mod: 26

## 2017-01-01 PROCEDURE — 85730 THROMBOPLASTIN TIME PARTIAL: CPT

## 2017-01-01 PROCEDURE — 71045 X-RAY EXAM CHEST 1 VIEW: CPT

## 2017-01-01 PROCEDURE — 88305 TISSUE EXAM BY PATHOLOGIST: CPT | Mod: 26

## 2017-01-01 PROCEDURE — 87340 HEPATITIS B SURFACE AG IA: CPT

## 2017-01-01 PROCEDURE — 84466 ASSAY OF TRANSFERRIN: CPT

## 2017-01-01 PROCEDURE — 71010: CPT | Mod: 26,77

## 2017-01-01 PROCEDURE — 82803 BLOOD GASES ANY COMBINATION: CPT

## 2017-01-01 PROCEDURE — 71260 CT THORAX DX C+: CPT | Mod: 26

## 2017-01-01 PROCEDURE — 94003 VENT MGMT INPAT SUBQ DAY: CPT

## 2017-01-01 PROCEDURE — 82570 ASSAY OF URINE CREATININE: CPT

## 2017-01-01 PROCEDURE — 82306 VITAMIN D 25 HYDROXY: CPT

## 2017-01-01 PROCEDURE — 99291 CRITICAL CARE FIRST HOUR: CPT

## 2017-01-01 PROCEDURE — 74177 CT ABD & PELVIS W/CONTRAST: CPT | Mod: 26

## 2017-01-01 PROCEDURE — 99285 EMERGENCY DEPT VISIT HI MDM: CPT | Mod: 25

## 2017-01-01 PROCEDURE — 93010 ELECTROCARDIOGRAM REPORT: CPT

## 2017-01-01 PROCEDURE — 85610 PROTHROMBIN TIME: CPT

## 2017-01-01 PROCEDURE — 76000 FLUOROSCOPY <1 HR PHYS/QHP: CPT

## 2017-01-01 PROCEDURE — 84300 ASSAY OF URINE SODIUM: CPT

## 2017-01-01 PROCEDURE — 81001 URINALYSIS AUTO W/SCOPE: CPT

## 2017-01-01 PROCEDURE — 88334 PATH CONSLTJ SURG CYTO XM EA: CPT | Mod: 26,59

## 2017-01-01 PROCEDURE — 74177 CT ABD & PELVIS W/CONTRAST: CPT

## 2017-01-01 PROCEDURE — 88331 PATH CONSLTJ SURG 1 BLK 1SPC: CPT | Mod: 26

## 2017-01-01 PROCEDURE — 99233 SBSQ HOSP IP/OBS HIGH 50: CPT

## 2017-01-01 PROCEDURE — 82746 ASSAY OF FOLIC ACID SERUM: CPT

## 2017-01-01 PROCEDURE — 31653 BRONCH EBUS SAMPLNG 3/> NODE: CPT

## 2017-01-01 PROCEDURE — 80053 COMPREHEN METABOLIC PANEL: CPT

## 2017-01-01 PROCEDURE — 76775 US EXAM ABDO BACK WALL LIM: CPT | Mod: 26

## 2017-01-01 PROCEDURE — 88112 CYTOPATH CELL ENHANCE TECH: CPT | Mod: 26,59

## 2017-01-01 PROCEDURE — 99223 1ST HOSP IP/OBS HIGH 75: CPT

## 2017-01-01 PROCEDURE — 71046 X-RAY EXAM CHEST 2 VIEWS: CPT

## 2017-01-01 PROCEDURE — 87070 CULTURE OTHR SPECIMN AEROBIC: CPT

## 2017-01-01 PROCEDURE — 83930 ASSAY OF BLOOD OSMOLALITY: CPT

## 2017-01-01 PROCEDURE — 36430 TRANSFUSION BLD/BLD COMPNT: CPT

## 2017-01-01 PROCEDURE — 80061 LIPID PANEL: CPT

## 2017-01-01 PROCEDURE — 85652 RBC SED RATE AUTOMATED: CPT

## 2017-01-01 PROCEDURE — 87206 SMEAR FLUORESCENT/ACID STAI: CPT

## 2017-01-01 PROCEDURE — 31624 DX BRONCHOSCOPE/LAVAGE: CPT

## 2017-01-01 PROCEDURE — 88305 TISSUE EXAM BY PATHOLOGIST: CPT | Mod: 26,59

## 2017-01-01 PROCEDURE — 88112 CYTOPATH CELL ENHANCE TECH: CPT

## 2017-01-01 PROCEDURE — 88173 CYTOPATH EVAL FNA REPORT: CPT | Mod: 26

## 2017-01-01 PROCEDURE — 83036 HEMOGLOBIN GLYCOSYLATED A1C: CPT

## 2017-01-01 PROCEDURE — 88173 CYTOPATH EVAL FNA REPORT: CPT

## 2017-01-01 PROCEDURE — 82378 CARCINOEMBRYONIC ANTIGEN: CPT

## 2017-01-01 PROCEDURE — 86900 BLOOD TYPING SEROLOGIC ABO: CPT

## 2017-01-01 PROCEDURE — 83605 ASSAY OF LACTIC ACID: CPT

## 2017-01-01 PROCEDURE — 82962 GLUCOSE BLOOD TEST: CPT

## 2017-01-01 PROCEDURE — 84550 ASSAY OF BLOOD/URIC ACID: CPT

## 2017-01-01 PROCEDURE — 87116 MYCOBACTERIA CULTURE: CPT

## 2017-01-01 PROCEDURE — 87015 SPECIMEN INFECT AGNT CONCNTJ: CPT

## 2017-01-01 PROCEDURE — 94002 VENT MGMT INPAT INIT DAY: CPT

## 2017-01-01 PROCEDURE — 86140 C-REACTIVE PROTEIN: CPT

## 2017-01-01 PROCEDURE — 82436 ASSAY OF URINE CHLORIDE: CPT

## 2017-01-01 PROCEDURE — 71250 CT THORAX DX C-: CPT

## 2017-01-01 PROCEDURE — 93005 ELECTROCARDIOGRAM TRACING: CPT

## 2017-01-01 PROCEDURE — 83615 LACTATE (LD) (LDH) ENZYME: CPT

## 2017-01-01 PROCEDURE — 85027 COMPLETE CBC AUTOMATED: CPT

## 2017-01-01 PROCEDURE — P9047: CPT

## 2017-01-01 PROCEDURE — 80202 ASSAY OF VANCOMYCIN: CPT

## 2017-01-01 PROCEDURE — 84133 ASSAY OF URINE POTASSIUM: CPT

## 2017-01-01 PROCEDURE — 82728 ASSAY OF FERRITIN: CPT

## 2017-01-01 PROCEDURE — 84443 ASSAY THYROID STIM HORMONE: CPT

## 2017-01-01 PROCEDURE — 88305 TISSUE EXAM BY PATHOLOGIST: CPT

## 2017-01-01 PROCEDURE — 99497 ADVNCD CARE PLAN 30 MIN: CPT

## 2017-01-01 PROCEDURE — 93306 TTE W/DOPPLER COMPLETE: CPT

## 2017-01-01 PROCEDURE — 83935 ASSAY OF URINE OSMOLALITY: CPT

## 2017-01-01 PROCEDURE — 86850 RBC ANTIBODY SCREEN: CPT

## 2017-01-01 PROCEDURE — 84156 ASSAY OF PROTEIN URINE: CPT

## 2017-01-01 PROCEDURE — 87493 C DIFF AMPLIFIED PROBE: CPT

## 2017-01-01 PROCEDURE — 83550 IRON BINDING TEST: CPT

## 2017-01-01 PROCEDURE — 84100 ASSAY OF PHOSPHORUS: CPT

## 2017-01-01 PROCEDURE — 71260 CT THORAX DX C+: CPT

## 2017-01-01 PROCEDURE — 86901 BLOOD TYPING SEROLOGIC RH(D): CPT

## 2017-01-01 PROCEDURE — 84145 PROCALCITONIN (PCT): CPT

## 2017-01-01 PROCEDURE — 71250 CT THORAX DX C-: CPT | Mod: 26

## 2017-01-01 PROCEDURE — 87040 BLOOD CULTURE FOR BACTERIA: CPT

## 2017-01-01 PROCEDURE — 99285 EMERGENCY DEPT VISIT HI MDM: CPT

## 2017-01-01 PROCEDURE — 83735 ASSAY OF MAGNESIUM: CPT

## 2017-01-01 PROCEDURE — 80048 BASIC METABOLIC PNL TOTAL CA: CPT

## 2017-01-01 RX ORDER — OXYCODONE AND ACETAMINOPHEN 5; 325 MG/1; MG/1
2 TABLET ORAL EVERY 6 HOURS
Qty: 0 | Refills: 0 | Status: DISCONTINUED | OUTPATIENT
Start: 2017-01-01 | End: 2017-01-01

## 2017-01-01 RX ORDER — SODIUM BICARBONATE 1 MEQ/ML
1950 SYRINGE (ML) INTRAVENOUS THREE TIMES A DAY
Qty: 0 | Refills: 0 | Status: DISCONTINUED | OUTPATIENT
Start: 2017-01-01 | End: 2017-01-01

## 2017-01-01 RX ORDER — DEXMEDETOMIDINE HYDROCHLORIDE IN 0.9% SODIUM CHLORIDE 4 UG/ML
0.2 INJECTION INTRAVENOUS
Qty: 200 | Refills: 0 | Status: DISCONTINUED | OUTPATIENT
Start: 2017-01-01 | End: 2017-01-01

## 2017-01-01 RX ORDER — FENTANYL CITRATE 50 UG/ML
2 INJECTION INTRAVENOUS
Qty: 2500 | Refills: 0 | Status: DISCONTINUED | OUTPATIENT
Start: 2017-01-01 | End: 2017-01-01

## 2017-01-01 RX ORDER — VANCOMYCIN HCL 1 G
500 VIAL (EA) INTRAVENOUS EVERY 12 HOURS
Qty: 0 | Refills: 0 | Status: DISCONTINUED | OUTPATIENT
Start: 2017-01-01 | End: 2017-01-01

## 2017-01-01 RX ORDER — FENTANYL CITRATE 50 UG/ML
0.5 INJECTION INTRAVENOUS
Qty: 2500 | Refills: 0 | Status: DISCONTINUED | OUTPATIENT
Start: 2017-01-01 | End: 2017-01-01

## 2017-01-01 RX ORDER — PANTOPRAZOLE SODIUM 20 MG/1
40 TABLET, DELAYED RELEASE ORAL DAILY
Qty: 0 | Refills: 0 | Status: DISCONTINUED | OUTPATIENT
Start: 2017-01-01 | End: 2017-01-01

## 2017-01-01 RX ORDER — METOCLOPRAMIDE HCL 10 MG
5 TABLET ORAL ONCE
Qty: 0 | Refills: 0 | Status: COMPLETED | OUTPATIENT
Start: 2017-01-01 | End: 2017-01-01

## 2017-01-01 RX ORDER — SODIUM BICARBONATE 1 MEQ/ML
650 SYRINGE (ML) INTRAVENOUS
Qty: 0 | Refills: 0 | Status: DISCONTINUED | OUTPATIENT
Start: 2017-01-01 | End: 2017-01-01

## 2017-01-01 RX ORDER — SODIUM BICARBONATE 1 MEQ/ML
0.25 SYRINGE (ML) INTRAVENOUS
Qty: 150 | Refills: 0 | Status: DISCONTINUED | OUTPATIENT
Start: 2017-01-01 | End: 2017-01-01

## 2017-01-01 RX ORDER — CARVEDILOL PHOSPHATE 80 MG/1
1 CAPSULE, EXTENDED RELEASE ORAL
Qty: 0 | Refills: 0 | COMMUNITY

## 2017-01-01 RX ORDER — NOREPINEPHRINE BITARTRATE/D5W 8 MG/250ML
0.5 PLASTIC BAG, INJECTION (ML) INTRAVENOUS
Qty: 16 | Refills: 0 | Status: DISCONTINUED | OUTPATIENT
Start: 2017-01-01 | End: 2017-01-01

## 2017-01-01 RX ORDER — VANCOMYCIN HCL 1 G
1000 VIAL (EA) INTRAVENOUS EVERY 24 HOURS
Qty: 0 | Refills: 0 | Status: COMPLETED | OUTPATIENT
Start: 2017-01-01 | End: 2017-01-01

## 2017-01-01 RX ORDER — FENTANYL CITRATE 50 UG/ML
50 INJECTION INTRAVENOUS ONCE
Qty: 0 | Refills: 0 | Status: DISCONTINUED | OUTPATIENT
Start: 2017-01-01 | End: 2017-01-01

## 2017-01-01 RX ORDER — PROPOFOL 10 MG/ML
5 INJECTION, EMULSION INTRAVENOUS
Qty: 1000 | Refills: 0 | Status: DISCONTINUED | OUTPATIENT
Start: 2017-01-01 | End: 2017-01-01

## 2017-01-01 RX ORDER — MIDAZOLAM HYDROCHLORIDE 1 MG/ML
1 INJECTION, SOLUTION INTRAMUSCULAR; INTRAVENOUS ONCE
Qty: 0 | Refills: 0 | Status: DISCONTINUED | OUTPATIENT
Start: 2017-01-01 | End: 2017-01-01

## 2017-01-01 RX ORDER — HEPARIN SODIUM 5000 [USP'U]/ML
5000 INJECTION INTRAVENOUS; SUBCUTANEOUS EVERY 8 HOURS
Qty: 0 | Refills: 0 | Status: DISCONTINUED | OUTPATIENT
Start: 2017-01-01 | End: 2017-01-01

## 2017-01-01 RX ORDER — SODIUM BICARBONATE 1 MEQ/ML
50 SYRINGE (ML) INTRAVENOUS ONCE
Qty: 0 | Refills: 0 | Status: COMPLETED | OUTPATIENT
Start: 2017-01-01 | End: 2017-01-01

## 2017-01-01 RX ORDER — METOPROLOL TARTRATE 50 MG
5 TABLET ORAL ONCE
Qty: 0 | Refills: 0 | Status: COMPLETED | OUTPATIENT
Start: 2017-01-01 | End: 2017-01-01

## 2017-01-01 RX ORDER — ASPIRIN/CALCIUM CARB/MAGNESIUM 324 MG
81 TABLET ORAL DAILY
Qty: 0 | Refills: 0 | Status: DISCONTINUED | OUTPATIENT
Start: 2017-01-01 | End: 2017-01-01

## 2017-01-01 RX ORDER — MICAFUNGIN SODIUM 100 MG/1
INJECTION, POWDER, LYOPHILIZED, FOR SOLUTION INTRAVENOUS
Qty: 0 | Refills: 0 | Status: DISCONTINUED | OUTPATIENT
Start: 2017-01-01 | End: 2017-01-01

## 2017-01-01 RX ORDER — VASOPRESSIN 20 [USP'U]/ML
0.04 INJECTION INTRAVENOUS
Qty: 50 | Refills: 0 | Status: DISCONTINUED | OUTPATIENT
Start: 2017-01-01 | End: 2017-01-01

## 2017-01-01 RX ORDER — MIRTAZAPINE 45 MG/1
7.5 TABLET, ORALLY DISINTEGRATING ORAL AT BEDTIME
Qty: 0 | Refills: 0 | Status: DISCONTINUED | OUTPATIENT
Start: 2017-01-01 | End: 2017-01-01

## 2017-01-01 RX ORDER — VALSARTAN 80 MG/1
160 TABLET ORAL DAILY
Qty: 0 | Refills: 0 | Status: DISCONTINUED | OUTPATIENT
Start: 2017-01-01 | End: 2017-01-01

## 2017-01-01 RX ORDER — VANCOMYCIN HCL 1 G
1000 VIAL (EA) INTRAVENOUS EVERY 12 HOURS
Qty: 0 | Refills: 0 | Status: DISCONTINUED | OUTPATIENT
Start: 2017-01-01 | End: 2017-01-01

## 2017-01-01 RX ORDER — SODIUM CHLORIDE 9 MG/ML
1000 INJECTION INTRAMUSCULAR; INTRAVENOUS; SUBCUTANEOUS
Qty: 0 | Refills: 0 | Status: DISCONTINUED | OUTPATIENT
Start: 2017-01-01 | End: 2017-01-01

## 2017-01-01 RX ORDER — FENTANYL CITRATE 50 UG/ML
4 INJECTION INTRAVENOUS
Qty: 2500 | Refills: 0 | Status: DISCONTINUED | OUTPATIENT
Start: 2017-01-01 | End: 2017-01-01

## 2017-01-01 RX ORDER — PIPERACILLIN AND TAZOBACTAM 4; .5 G/20ML; G/20ML
3.38 INJECTION, POWDER, LYOPHILIZED, FOR SOLUTION INTRAVENOUS EVERY 12 HOURS
Qty: 0 | Refills: 0 | Status: DISCONTINUED | OUTPATIENT
Start: 2017-01-01 | End: 2017-01-01

## 2017-01-01 RX ORDER — FUROSEMIDE 40 MG
40 TABLET ORAL ONCE
Qty: 0 | Refills: 0 | Status: COMPLETED | OUTPATIENT
Start: 2017-01-01 | End: 2017-01-01

## 2017-01-01 RX ORDER — FUROSEMIDE 40 MG
60 TABLET ORAL ONCE
Qty: 0 | Refills: 0 | Status: COMPLETED | OUTPATIENT
Start: 2017-01-01 | End: 2017-01-01

## 2017-01-01 RX ORDER — FENTANYL CITRATE 50 UG/ML
3 INJECTION INTRAVENOUS
Qty: 2500 | Refills: 0 | Status: DISCONTINUED | OUTPATIENT
Start: 2017-01-01 | End: 2017-01-01

## 2017-01-01 RX ORDER — VASOPRESSIN 20 [USP'U]/ML
0.04 INJECTION INTRAVENOUS
Qty: 100 | Refills: 0 | Status: DISCONTINUED | OUTPATIENT
Start: 2017-01-01 | End: 2017-01-01

## 2017-01-01 RX ORDER — ZALEPLON 10 MG
5 CAPSULE ORAL ONCE
Qty: 0 | Refills: 0 | Status: DISCONTINUED | OUTPATIENT
Start: 2017-01-01 | End: 2017-01-01

## 2017-01-01 RX ORDER — VANCOMYCIN HCL 1 G
1000 VIAL (EA) INTRAVENOUS EVERY 24 HOURS
Qty: 0 | Refills: 0 | Status: DISCONTINUED | OUTPATIENT
Start: 2017-01-01 | End: 2017-01-01

## 2017-01-01 RX ORDER — MIDODRINE HYDROCHLORIDE 2.5 MG/1
5 TABLET ORAL EVERY 8 HOURS
Qty: 0 | Refills: 0 | Status: DISCONTINUED | OUTPATIENT
Start: 2017-01-01 | End: 2017-01-01

## 2017-01-01 RX ORDER — HEPARIN SODIUM 5000 [USP'U]/ML
5000 INJECTION INTRAVENOUS; SUBCUTANEOUS EVERY 12 HOURS
Qty: 0 | Refills: 0 | Status: DISCONTINUED | OUTPATIENT
Start: 2017-01-01 | End: 2017-01-01

## 2017-01-01 RX ORDER — AMIODARONE HYDROCHLORIDE 400 MG/1
200 TABLET ORAL DAILY
Qty: 0 | Refills: 0 | Status: DISCONTINUED | OUTPATIENT
Start: 2017-01-01 | End: 2017-01-01

## 2017-01-01 RX ORDER — MEROPENEM 1 G/30ML
500 INJECTION INTRAVENOUS EVERY 8 HOURS
Qty: 0 | Refills: 0 | Status: DISCONTINUED | OUTPATIENT
Start: 2017-01-01 | End: 2017-01-01

## 2017-01-01 RX ORDER — MICAFUNGIN SODIUM 100 MG/1
100 INJECTION, POWDER, LYOPHILIZED, FOR SOLUTION INTRAVENOUS ONCE
Qty: 0 | Refills: 0 | Status: COMPLETED | OUTPATIENT
Start: 2017-01-01 | End: 2017-01-01

## 2017-01-01 RX ORDER — DEXTROSE 50 % IN WATER 50 %
50 SYRINGE (ML) INTRAVENOUS ONCE
Qty: 0 | Refills: 0 | Status: COMPLETED | OUTPATIENT
Start: 2017-01-01 | End: 2017-01-01

## 2017-01-01 RX ORDER — SODIUM CHLORIDE 9 MG/ML
1000 INJECTION, SOLUTION INTRAVENOUS
Qty: 0 | Refills: 0 | Status: DISCONTINUED | OUTPATIENT
Start: 2017-01-01 | End: 2017-01-01

## 2017-01-01 RX ORDER — LIDOCAINE HCL 20 MG/ML
10 VIAL (ML) INJECTION ONCE
Qty: 0 | Refills: 0 | Status: COMPLETED | OUTPATIENT
Start: 2017-01-01 | End: 2017-01-01

## 2017-01-01 RX ORDER — ZOLPIDEM TARTRATE 10 MG/1
5 TABLET ORAL AT BEDTIME
Qty: 0 | Refills: 0 | Status: DISCONTINUED | OUTPATIENT
Start: 2017-01-01 | End: 2017-01-01

## 2017-01-01 RX ORDER — ENOXAPARIN SODIUM 100 MG/ML
40 INJECTION SUBCUTANEOUS DAILY
Qty: 0 | Refills: 0 | Status: DISCONTINUED | OUTPATIENT
Start: 2017-01-01 | End: 2017-01-01

## 2017-01-01 RX ORDER — CHLORPROMAZINE HCL 10 MG
25 TABLET ORAL EVERY 6 HOURS
Qty: 0 | Refills: 0 | Status: DISCONTINUED | OUTPATIENT
Start: 2017-01-01 | End: 2017-01-01

## 2017-01-01 RX ORDER — AMIODARONE HYDROCHLORIDE 400 MG/1
400 TABLET ORAL THREE TIMES A DAY
Qty: 0 | Refills: 0 | Status: DISCONTINUED | OUTPATIENT
Start: 2017-01-01 | End: 2017-01-01

## 2017-01-01 RX ORDER — ASPIRIN/CALCIUM CARB/MAGNESIUM 324 MG
1 TABLET ORAL
Qty: 0 | Refills: 0 | COMMUNITY

## 2017-01-01 RX ORDER — ACETAMINOPHEN 500 MG
650 TABLET ORAL EVERY 6 HOURS
Qty: 0 | Refills: 0 | Status: DISCONTINUED | OUTPATIENT
Start: 2017-01-01 | End: 2017-01-01

## 2017-01-01 RX ORDER — SODIUM CHLORIDE 9 MG/ML
1000 INJECTION INTRAMUSCULAR; INTRAVENOUS; SUBCUTANEOUS ONCE
Qty: 0 | Refills: 0 | Status: COMPLETED | OUTPATIENT
Start: 2017-01-01 | End: 2017-01-01

## 2017-01-01 RX ORDER — HYDROMORPHONE HYDROCHLORIDE 2 MG/ML
1 INJECTION INTRAMUSCULAR; INTRAVENOUS; SUBCUTANEOUS EVERY 4 HOURS
Qty: 0 | Refills: 0 | Status: DISCONTINUED | OUTPATIENT
Start: 2017-01-01 | End: 2017-01-01

## 2017-01-01 RX ORDER — ALBUMIN HUMAN 25 %
50 VIAL (ML) INTRAVENOUS ONCE
Qty: 0 | Refills: 0 | Status: COMPLETED | OUTPATIENT
Start: 2017-01-01 | End: 2017-01-01

## 2017-01-01 RX ORDER — SODIUM CHLORIDE 9 MG/ML
1000 INJECTION, SOLUTION INTRAVENOUS ONCE
Qty: 0 | Refills: 0 | Status: COMPLETED | OUTPATIENT
Start: 2017-01-01 | End: 2017-01-01

## 2017-01-01 RX ORDER — PIPERACILLIN AND TAZOBACTAM 4; .5 G/20ML; G/20ML
3.38 INJECTION, POWDER, LYOPHILIZED, FOR SOLUTION INTRAVENOUS EVERY 8 HOURS
Qty: 0 | Refills: 0 | Status: DISCONTINUED | OUTPATIENT
Start: 2017-01-01 | End: 2017-01-01

## 2017-01-01 RX ORDER — VALSARTAN 80 MG/1
1 TABLET ORAL
Qty: 0 | Refills: 0 | COMMUNITY

## 2017-01-01 RX ORDER — METOPROLOL TARTRATE 50 MG
2.5 TABLET ORAL ONCE
Qty: 0 | Refills: 0 | Status: COMPLETED | OUTPATIENT
Start: 2017-01-01 | End: 2017-01-01

## 2017-01-01 RX ORDER — BACITRACIN ZINC 500 UNIT/G
1 OINTMENT IN PACKET (EA) TOPICAL
Qty: 0 | Refills: 0 | Status: DISCONTINUED | OUTPATIENT
Start: 2017-01-01 | End: 2017-01-01

## 2017-01-01 RX ORDER — PHENYLEPHRINE HYDROCHLORIDE 10 MG/ML
0.5 INJECTION INTRAVENOUS
Qty: 160 | Refills: 0 | Status: DISCONTINUED | OUTPATIENT
Start: 2017-01-01 | End: 2017-01-01

## 2017-01-01 RX ORDER — TADALAFIL 10 MG/1
1 TABLET, FILM COATED ORAL
Qty: 0 | Refills: 0 | COMMUNITY

## 2017-01-01 RX ORDER — AMIODARONE HYDROCHLORIDE 400 MG/1
150 TABLET ORAL ONCE
Qty: 0 | Refills: 0 | Status: COMPLETED | OUTPATIENT
Start: 2017-01-01 | End: 2017-01-01

## 2017-01-01 RX ORDER — DIGOXIN 250 MCG
0.25 TABLET ORAL ONCE
Qty: 0 | Refills: 0 | Status: COMPLETED | OUTPATIENT
Start: 2017-01-01 | End: 2017-01-01

## 2017-01-01 RX ORDER — INSULIN HUMAN 100 [IU]/ML
10 INJECTION, SOLUTION SUBCUTANEOUS ONCE
Qty: 0 | Refills: 0 | Status: COMPLETED | OUTPATIENT
Start: 2017-01-01 | End: 2017-01-01

## 2017-01-01 RX ORDER — PIPERACILLIN AND TAZOBACTAM 4; .5 G/20ML; G/20ML
3.38 INJECTION, POWDER, LYOPHILIZED, FOR SOLUTION INTRAVENOUS ONCE
Qty: 0 | Refills: 0 | Status: COMPLETED | OUTPATIENT
Start: 2017-01-01 | End: 2017-01-01

## 2017-01-01 RX ORDER — LANOLIN ALCOHOL/MO/W.PET/CERES
3 CREAM (GRAM) TOPICAL AT BEDTIME
Qty: 0 | Refills: 0 | Status: DISCONTINUED | OUTPATIENT
Start: 2017-01-01 | End: 2017-01-01

## 2017-01-01 RX ORDER — SODIUM BICARBONATE 1 MEQ/ML
1300 SYRINGE (ML) INTRAVENOUS
Qty: 0 | Refills: 0 | Status: DISCONTINUED | OUTPATIENT
Start: 2017-01-01 | End: 2017-01-01

## 2017-01-01 RX ORDER — ONDANSETRON 8 MG/1
4 TABLET, FILM COATED ORAL ONCE
Qty: 0 | Refills: 0 | Status: COMPLETED | OUTPATIENT
Start: 2017-01-01 | End: 2017-01-01

## 2017-01-01 RX ORDER — CALCIUM GLUCONATE 100 MG/ML
1 VIAL (ML) INTRAVENOUS ONCE
Qty: 0 | Refills: 0 | Status: COMPLETED | OUTPATIENT
Start: 2017-01-01 | End: 2017-01-01

## 2017-01-01 RX ORDER — FENTANYL CITRATE 50 UG/ML
25 INJECTION INTRAVENOUS
Qty: 0 | Refills: 0 | Status: DISCONTINUED | OUTPATIENT
Start: 2017-01-01 | End: 2017-01-01

## 2017-01-01 RX ORDER — DEXTROSE 50 % IN WATER 50 %
25 SYRINGE (ML) INTRAVENOUS ONCE
Qty: 0 | Refills: 0 | Status: COMPLETED | OUTPATIENT
Start: 2017-01-01 | End: 2017-01-01

## 2017-01-01 RX ORDER — SODIUM BICARBONATE 1 MEQ/ML
1300 SYRINGE (ML) INTRAVENOUS THREE TIMES A DAY
Qty: 0 | Refills: 0 | Status: DISCONTINUED | OUTPATIENT
Start: 2017-01-01 | End: 2017-01-01

## 2017-01-01 RX ORDER — SODIUM POLYSTYRENE SULFONATE 4.1 MEQ/G
15 POWDER, FOR SUSPENSION ORAL ONCE
Qty: 0 | Refills: 0 | Status: COMPLETED | OUTPATIENT
Start: 2017-01-01 | End: 2017-01-01

## 2017-01-01 RX ORDER — SODIUM CHLORIDE 9 MG/ML
2000 INJECTION INTRAMUSCULAR; INTRAVENOUS; SUBCUTANEOUS ONCE
Qty: 0 | Refills: 0 | Status: COMPLETED | OUTPATIENT
Start: 2017-01-01 | End: 2017-01-01

## 2017-01-01 RX ORDER — CARVEDILOL PHOSPHATE 80 MG/1
12.5 CAPSULE, EXTENDED RELEASE ORAL EVERY 12 HOURS
Qty: 0 | Refills: 0 | Status: DISCONTINUED | OUTPATIENT
Start: 2017-01-01 | End: 2017-01-01

## 2017-01-01 RX ORDER — TAMSULOSIN HYDROCHLORIDE 0.4 MG/1
0.4 CAPSULE ORAL AT BEDTIME
Qty: 0 | Refills: 0 | Status: DISCONTINUED | OUTPATIENT
Start: 2017-01-01 | End: 2017-01-01

## 2017-01-01 RX ORDER — CALCIUM GLUCONATE 100 MG/ML
2 VIAL (ML) INTRAVENOUS ONCE
Qty: 0 | Refills: 0 | Status: COMPLETED | OUTPATIENT
Start: 2017-01-01 | End: 2017-01-01

## 2017-01-01 RX ORDER — OXYCODONE AND ACETAMINOPHEN 5; 325 MG/1; MG/1
1 TABLET ORAL EVERY 6 HOURS
Qty: 0 | Refills: 0 | Status: DISCONTINUED | OUTPATIENT
Start: 2017-01-01 | End: 2017-01-01

## 2017-01-01 RX ORDER — OXYCODONE AND ACETAMINOPHEN 5; 325 MG/1; MG/1
1 TABLET ORAL ONCE
Qty: 0 | Refills: 0 | Status: DISCONTINUED | OUTPATIENT
Start: 2017-01-01 | End: 2017-01-01

## 2017-01-01 RX ORDER — HYDROMORPHONE HYDROCHLORIDE 2 MG/ML
1 INJECTION INTRAMUSCULAR; INTRAVENOUS; SUBCUTANEOUS ONCE
Qty: 0 | Refills: 0 | Status: DISCONTINUED | OUTPATIENT
Start: 2017-01-01 | End: 2017-01-01

## 2017-01-01 RX ORDER — FENTANYL CITRATE 50 UG/ML
1 INJECTION INTRAVENOUS
Qty: 2500 | Refills: 0 | Status: DISCONTINUED | OUTPATIENT
Start: 2017-01-01 | End: 2017-01-01

## 2017-01-01 RX ORDER — MICAFUNGIN SODIUM 100 MG/1
100 INJECTION, POWDER, LYOPHILIZED, FOR SOLUTION INTRAVENOUS EVERY 24 HOURS
Qty: 0 | Refills: 0 | Status: DISCONTINUED | OUTPATIENT
Start: 2017-01-01 | End: 2017-01-01

## 2017-01-01 RX ADMIN — ENOXAPARIN SODIUM 40 MILLIGRAM(S): 100 INJECTION SUBCUTANEOUS at 11:34

## 2017-01-01 RX ADMIN — Medication 1 APPLICATION(S): at 05:24

## 2017-01-01 RX ADMIN — VALSARTAN 160 MILLIGRAM(S): 80 TABLET ORAL at 05:13

## 2017-01-01 RX ADMIN — OXYCODONE AND ACETAMINOPHEN 1 TABLET(S): 5; 325 TABLET ORAL at 23:44

## 2017-01-01 RX ADMIN — ENOXAPARIN SODIUM 40 MILLIGRAM(S): 100 INJECTION SUBCUTANEOUS at 11:38

## 2017-01-01 RX ADMIN — PHENYLEPHRINE HYDROCHLORIDE 6.56 MICROGRAM(S)/KG/MIN: 10 INJECTION INTRAVENOUS at 11:47

## 2017-01-01 RX ADMIN — Medication 650 MILLIGRAM(S): at 17:31

## 2017-01-01 RX ADMIN — MIDODRINE HYDROCHLORIDE 5 MILLIGRAM(S): 2.5 TABLET ORAL at 22:57

## 2017-01-01 RX ADMIN — INSULIN HUMAN 10 UNIT(S): 100 INJECTION, SOLUTION SUBCUTANEOUS at 09:40

## 2017-01-01 RX ADMIN — HEPARIN SODIUM 5000 UNIT(S): 5000 INJECTION INTRAVENOUS; SUBCUTANEOUS at 05:32

## 2017-01-01 RX ADMIN — PHENYLEPHRINE HYDROCHLORIDE 6.56 MICROGRAM(S)/KG/MIN: 10 INJECTION INTRAVENOUS at 08:00

## 2017-01-01 RX ADMIN — PIPERACILLIN AND TAZOBACTAM 12.5 GRAM(S): 4; .5 INJECTION, POWDER, LYOPHILIZED, FOR SOLUTION INTRAVENOUS at 18:23

## 2017-01-01 RX ADMIN — PROPOFOL 2.29 MICROGRAM(S)/KG/MIN: 10 INJECTION, EMULSION INTRAVENOUS at 04:56

## 2017-01-01 RX ADMIN — ENOXAPARIN SODIUM 40 MILLIGRAM(S): 100 INJECTION SUBCUTANEOUS at 12:18

## 2017-01-01 RX ADMIN — MIDODRINE HYDROCHLORIDE 5 MILLIGRAM(S): 2.5 TABLET ORAL at 23:05

## 2017-01-01 RX ADMIN — ENOXAPARIN SODIUM 40 MILLIGRAM(S): 100 INJECTION SUBCUTANEOUS at 14:13

## 2017-01-01 RX ADMIN — FENTANYL CITRATE 15.28 MICROGRAM(S)/KG/HR: 50 INJECTION INTRAVENOUS at 01:40

## 2017-01-01 RX ADMIN — Medication 250 MILLIGRAM(S): at 17:32

## 2017-01-01 RX ADMIN — MIDODRINE HYDROCHLORIDE 5 MILLIGRAM(S): 2.5 TABLET ORAL at 07:03

## 2017-01-01 RX ADMIN — ZOLPIDEM TARTRATE 5 MILLIGRAM(S): 10 TABLET ORAL at 22:31

## 2017-01-01 RX ADMIN — VALSARTAN 160 MILLIGRAM(S): 80 TABLET ORAL at 05:37

## 2017-01-01 RX ADMIN — PIPERACILLIN AND TAZOBACTAM 12.5 GRAM(S): 4; .5 INJECTION, POWDER, LYOPHILIZED, FOR SOLUTION INTRAVENOUS at 17:58

## 2017-01-01 RX ADMIN — MIDODRINE HYDROCHLORIDE 5 MILLIGRAM(S): 2.5 TABLET ORAL at 05:24

## 2017-01-01 RX ADMIN — PIPERACILLIN AND TAZOBACTAM 12.5 GRAM(S): 4; .5 INJECTION, POWDER, LYOPHILIZED, FOR SOLUTION INTRAVENOUS at 05:39

## 2017-01-01 RX ADMIN — PANTOPRAZOLE SODIUM 40 MILLIGRAM(S): 20 TABLET, DELAYED RELEASE ORAL at 12:13

## 2017-01-01 RX ADMIN — PHENYLEPHRINE HYDROCHLORIDE 6.56 MICROGRAM(S)/KG/MIN: 10 INJECTION INTRAVENOUS at 12:02

## 2017-01-01 RX ADMIN — Medication 650 MILLIGRAM(S): at 17:42

## 2017-01-01 RX ADMIN — Medication 81 MILLIGRAM(S): at 18:12

## 2017-01-01 RX ADMIN — HYDROMORPHONE HYDROCHLORIDE 1 MILLIGRAM(S): 2 INJECTION INTRAMUSCULAR; INTRAVENOUS; SUBCUTANEOUS at 03:12

## 2017-01-01 RX ADMIN — Medication 40 MILLIGRAM(S): at 23:28

## 2017-01-01 RX ADMIN — HEPARIN SODIUM 5000 UNIT(S): 5000 INJECTION INTRAVENOUS; SUBCUTANEOUS at 21:10

## 2017-01-01 RX ADMIN — PIPERACILLIN AND TAZOBACTAM 12.5 GRAM(S): 4; .5 INJECTION, POWDER, LYOPHILIZED, FOR SOLUTION INTRAVENOUS at 21:22

## 2017-01-01 RX ADMIN — PIPERACILLIN AND TAZOBACTAM 12.5 GRAM(S): 4; .5 INJECTION, POWDER, LYOPHILIZED, FOR SOLUTION INTRAVENOUS at 17:35

## 2017-01-01 RX ADMIN — PIPERACILLIN AND TAZOBACTAM 25 GRAM(S): 4; .5 INJECTION, POWDER, LYOPHILIZED, FOR SOLUTION INTRAVENOUS at 06:11

## 2017-01-01 RX ADMIN — Medication 650 MILLIGRAM(S): at 18:23

## 2017-01-01 RX ADMIN — CARVEDILOL PHOSPHATE 12.5 MILLIGRAM(S): 80 CAPSULE, EXTENDED RELEASE ORAL at 17:19

## 2017-01-01 RX ADMIN — HYDROMORPHONE HYDROCHLORIDE 1 MILLIGRAM(S): 2 INJECTION INTRAMUSCULAR; INTRAVENOUS; SUBCUTANEOUS at 00:06

## 2017-01-01 RX ADMIN — PROPOFOL 2.29 MICROGRAM(S)/KG/MIN: 10 INJECTION, EMULSION INTRAVENOUS at 22:26

## 2017-01-01 RX ADMIN — FENTANYL CITRATE 50 MICROGRAM(S): 50 INJECTION INTRAVENOUS at 09:01

## 2017-01-01 RX ADMIN — PROPOFOL 2.29 MICROGRAM(S)/KG/MIN: 10 INJECTION, EMULSION INTRAVENOUS at 00:03

## 2017-01-01 RX ADMIN — SODIUM CHLORIDE 100 MILLILITER(S): 9 INJECTION INTRAMUSCULAR; INTRAVENOUS; SUBCUTANEOUS at 06:00

## 2017-01-01 RX ADMIN — Medication 1 APPLICATION(S): at 17:01

## 2017-01-01 RX ADMIN — AMIODARONE HYDROCHLORIDE 200 MILLIGRAM(S): 400 TABLET ORAL at 05:40

## 2017-01-01 RX ADMIN — AMIODARONE HYDROCHLORIDE 200 MILLIGRAM(S): 400 TABLET ORAL at 05:58

## 2017-01-01 RX ADMIN — FENTANYL CITRATE 7.64 MICROGRAM(S)/KG/HR: 50 INJECTION INTRAVENOUS at 21:58

## 2017-01-01 RX ADMIN — Medication 650 MILLIGRAM(S): at 13:22

## 2017-01-01 RX ADMIN — Medication 1 APPLICATION(S): at 17:49

## 2017-01-01 RX ADMIN — Medication 1 APPLICATION(S): at 05:57

## 2017-01-01 RX ADMIN — PIPERACILLIN AND TAZOBACTAM 12.5 GRAM(S): 4; .5 INJECTION, POWDER, LYOPHILIZED, FOR SOLUTION INTRAVENOUS at 14:04

## 2017-01-01 RX ADMIN — PHENYLEPHRINE HYDROCHLORIDE 6.56 MICROGRAM(S)/KG/MIN: 10 INJECTION INTRAVENOUS at 10:39

## 2017-01-01 RX ADMIN — MIDODRINE HYDROCHLORIDE 5 MILLIGRAM(S): 2.5 TABLET ORAL at 06:09

## 2017-01-01 RX ADMIN — CARVEDILOL PHOSPHATE 12.5 MILLIGRAM(S): 80 CAPSULE, EXTENDED RELEASE ORAL at 17:45

## 2017-01-01 RX ADMIN — HEPARIN SODIUM 5000 UNIT(S): 5000 INJECTION INTRAVENOUS; SUBCUTANEOUS at 22:57

## 2017-01-01 RX ADMIN — AMIODARONE HYDROCHLORIDE 200 MILLIGRAM(S): 400 TABLET ORAL at 05:34

## 2017-01-01 RX ADMIN — Medication 650 MILLIGRAM(S): at 08:23

## 2017-01-01 RX ADMIN — CARVEDILOL PHOSPHATE 12.5 MILLIGRAM(S): 80 CAPSULE, EXTENDED RELEASE ORAL at 06:39

## 2017-01-01 RX ADMIN — Medication 250 MILLIGRAM(S): at 17:58

## 2017-01-01 RX ADMIN — Medication 0.25 MILLIGRAM(S): at 03:19

## 2017-01-01 RX ADMIN — FENTANYL CITRATE 22.92 MICROGRAM(S)/KG/HR: 50 INJECTION INTRAVENOUS at 12:12

## 2017-01-01 RX ADMIN — ZOLPIDEM TARTRATE 5 MILLIGRAM(S): 10 TABLET ORAL at 21:44

## 2017-01-01 RX ADMIN — VALSARTAN 160 MILLIGRAM(S): 80 TABLET ORAL at 05:55

## 2017-01-01 RX ADMIN — Medication 650 MILLIGRAM(S): at 07:10

## 2017-01-01 RX ADMIN — PANTOPRAZOLE SODIUM 40 MILLIGRAM(S): 20 TABLET, DELAYED RELEASE ORAL at 13:22

## 2017-01-01 RX ADMIN — AMIODARONE HYDROCHLORIDE 200 MILLIGRAM(S): 400 TABLET ORAL at 07:03

## 2017-01-01 RX ADMIN — PIPERACILLIN AND TAZOBACTAM 12.5 GRAM(S): 4; .5 INJECTION, POWDER, LYOPHILIZED, FOR SOLUTION INTRAVENOUS at 05:25

## 2017-01-01 RX ADMIN — PHENYLEPHRINE HYDROCHLORIDE 6.56 MICROGRAM(S)/KG/MIN: 10 INJECTION INTRAVENOUS at 22:14

## 2017-01-01 RX ADMIN — PHENYLEPHRINE HYDROCHLORIDE 6.56 MICROGRAM(S)/KG/MIN: 10 INJECTION INTRAVENOUS at 21:10

## 2017-01-01 RX ADMIN — PHENYLEPHRINE HYDROCHLORIDE 6.56 MICROGRAM(S)/KG/MIN: 10 INJECTION INTRAVENOUS at 17:15

## 2017-01-01 RX ADMIN — Medication 1300 MILLIGRAM(S): at 05:57

## 2017-01-01 RX ADMIN — PIPERACILLIN AND TAZOBACTAM 12.5 GRAM(S): 4; .5 INJECTION, POWDER, LYOPHILIZED, FOR SOLUTION INTRAVENOUS at 17:47

## 2017-01-01 RX ADMIN — Medication 5 MILLIGRAM(S): at 23:37

## 2017-01-01 RX ADMIN — MEROPENEM 100 MILLIGRAM(S): 1 INJECTION INTRAVENOUS at 05:25

## 2017-01-01 RX ADMIN — Medication 650 MILLIGRAM(S): at 18:14

## 2017-01-01 RX ADMIN — HEPARIN SODIUM 5000 UNIT(S): 5000 INJECTION INTRAVENOUS; SUBCUTANEOUS at 17:32

## 2017-01-01 RX ADMIN — VALSARTAN 160 MILLIGRAM(S): 80 TABLET ORAL at 12:30

## 2017-01-01 RX ADMIN — Medication 81 MILLIGRAM(S): at 11:38

## 2017-01-01 RX ADMIN — Medication 650 MILLIGRAM(S): at 05:50

## 2017-01-01 RX ADMIN — Medication 1 APPLICATION(S): at 17:58

## 2017-01-01 RX ADMIN — SODIUM CHLORIDE 2000 MILLILITER(S): 9 INJECTION, SOLUTION INTRAVENOUS at 16:13

## 2017-01-01 RX ADMIN — MIDODRINE HYDROCHLORIDE 5 MILLIGRAM(S): 2.5 TABLET ORAL at 15:27

## 2017-01-01 RX ADMIN — PIPERACILLIN AND TAZOBACTAM 12.5 GRAM(S): 4; .5 INJECTION, POWDER, LYOPHILIZED, FOR SOLUTION INTRAVENOUS at 17:23

## 2017-01-01 RX ADMIN — Medication 81 MILLIGRAM(S): at 00:03

## 2017-01-01 RX ADMIN — VASOPRESSIN 2.4 UNIT(S)/MIN: 20 INJECTION INTRAVENOUS at 11:19

## 2017-01-01 RX ADMIN — MEROPENEM 100 MILLIGRAM(S): 1 INJECTION INTRAVENOUS at 22:21

## 2017-01-01 RX ADMIN — Medication 81 MILLIGRAM(S): at 14:13

## 2017-01-01 RX ADMIN — MIDODRINE HYDROCHLORIDE 5 MILLIGRAM(S): 2.5 TABLET ORAL at 22:33

## 2017-01-01 RX ADMIN — MIDODRINE HYDROCHLORIDE 5 MILLIGRAM(S): 2.5 TABLET ORAL at 22:21

## 2017-01-01 RX ADMIN — SODIUM CHLORIDE 100 MILLILITER(S): 9 INJECTION INTRAMUSCULAR; INTRAVENOUS; SUBCUTANEOUS at 17:51

## 2017-01-01 RX ADMIN — MIDODRINE HYDROCHLORIDE 5 MILLIGRAM(S): 2.5 TABLET ORAL at 05:33

## 2017-01-01 RX ADMIN — Medication 250 MILLIGRAM(S): at 11:10

## 2017-01-01 RX ADMIN — AMIODARONE HYDROCHLORIDE 400 MILLIGRAM(S): 400 TABLET ORAL at 21:03

## 2017-01-01 RX ADMIN — MICAFUNGIN SODIUM 105 MILLIGRAM(S): 100 INJECTION, POWDER, LYOPHILIZED, FOR SOLUTION INTRAVENOUS at 10:22

## 2017-01-01 RX ADMIN — OXYCODONE AND ACETAMINOPHEN 1 TABLET(S): 5; 325 TABLET ORAL at 11:00

## 2017-01-01 RX ADMIN — ENOXAPARIN SODIUM 40 MILLIGRAM(S): 100 INJECTION SUBCUTANEOUS at 18:11

## 2017-01-01 RX ADMIN — HEPARIN SODIUM 5000 UNIT(S): 5000 INJECTION INTRAVENOUS; SUBCUTANEOUS at 14:25

## 2017-01-01 RX ADMIN — Medication 25 MILLILITER(S): at 09:00

## 2017-01-01 RX ADMIN — Medication 81 MILLIGRAM(S): at 11:39

## 2017-01-01 RX ADMIN — OXYCODONE AND ACETAMINOPHEN 1 TABLET(S): 5; 325 TABLET ORAL at 17:48

## 2017-01-01 RX ADMIN — Medication 3 MILLIGRAM(S): at 21:11

## 2017-01-01 RX ADMIN — Medication 1 APPLICATION(S): at 18:00

## 2017-01-01 RX ADMIN — FENTANYL CITRATE 15.28 MICROGRAM(S)/KG/HR: 50 INJECTION INTRAVENOUS at 05:12

## 2017-01-01 RX ADMIN — HEPARIN SODIUM 5000 UNIT(S): 5000 INJECTION INTRAVENOUS; SUBCUTANEOUS at 22:14

## 2017-01-01 RX ADMIN — SODIUM POLYSTYRENE SULFONATE 15 GRAM(S): 4.1 POWDER, FOR SUSPENSION ORAL at 09:37

## 2017-01-01 RX ADMIN — PHENYLEPHRINE HYDROCHLORIDE 6.56 MICROGRAM(S)/KG/MIN: 10 INJECTION INTRAVENOUS at 12:27

## 2017-01-01 RX ADMIN — Medication 1 APPLICATION(S): at 17:52

## 2017-01-01 RX ADMIN — MIDODRINE HYDROCHLORIDE 5 MILLIGRAM(S): 2.5 TABLET ORAL at 13:22

## 2017-01-01 RX ADMIN — CARVEDILOL PHOSPHATE 12.5 MILLIGRAM(S): 80 CAPSULE, EXTENDED RELEASE ORAL at 17:39

## 2017-01-01 RX ADMIN — CARVEDILOL PHOSPHATE 12.5 MILLIGRAM(S): 80 CAPSULE, EXTENDED RELEASE ORAL at 17:12

## 2017-01-01 RX ADMIN — PHENYLEPHRINE HYDROCHLORIDE 6.56 MICROGRAM(S)/KG/MIN: 10 INJECTION INTRAVENOUS at 06:30

## 2017-01-01 RX ADMIN — CARVEDILOL PHOSPHATE 12.5 MILLIGRAM(S): 80 CAPSULE, EXTENDED RELEASE ORAL at 17:09

## 2017-01-01 RX ADMIN — HYDROMORPHONE HYDROCHLORIDE 1 MILLIGRAM(S): 2 INJECTION INTRAMUSCULAR; INTRAVENOUS; SUBCUTANEOUS at 22:23

## 2017-01-01 RX ADMIN — Medication 1300 MILLIGRAM(S): at 17:59

## 2017-01-01 RX ADMIN — MICAFUNGIN SODIUM 105 MILLIGRAM(S): 100 INJECTION, POWDER, LYOPHILIZED, FOR SOLUTION INTRAVENOUS at 13:52

## 2017-01-01 RX ADMIN — MICAFUNGIN SODIUM 105 MILLIGRAM(S): 100 INJECTION, POWDER, LYOPHILIZED, FOR SOLUTION INTRAVENOUS at 11:03

## 2017-01-01 RX ADMIN — OXYCODONE AND ACETAMINOPHEN 1 TABLET(S): 5; 325 TABLET ORAL at 08:48

## 2017-01-01 RX ADMIN — Medication 1 APPLICATION(S): at 06:10

## 2017-01-01 RX ADMIN — Medication 81 MILLIGRAM(S): at 11:02

## 2017-01-01 RX ADMIN — PHENYLEPHRINE HYDROCHLORIDE 6.56 MICROGRAM(S)/KG/MIN: 10 INJECTION INTRAVENOUS at 14:17

## 2017-01-01 RX ADMIN — Medication 1300 MILLIGRAM(S): at 17:01

## 2017-01-01 RX ADMIN — AMIODARONE HYDROCHLORIDE 200 MILLIGRAM(S): 400 TABLET ORAL at 05:02

## 2017-01-01 RX ADMIN — Medication 81 MILLIGRAM(S): at 15:07

## 2017-01-01 RX ADMIN — Medication 81 MILLIGRAM(S): at 11:53

## 2017-01-01 RX ADMIN — PHENYLEPHRINE HYDROCHLORIDE 6.56 MICROGRAM(S)/KG/MIN: 10 INJECTION INTRAVENOUS at 06:44

## 2017-01-01 RX ADMIN — Medication 650 MILLIGRAM(S): at 00:03

## 2017-01-01 RX ADMIN — Medication 5 MILLIGRAM(S): at 21:39

## 2017-01-01 RX ADMIN — Medication 50 MILLILITER(S): at 18:44

## 2017-01-01 RX ADMIN — Medication 60 MILLIGRAM(S): at 11:24

## 2017-01-01 RX ADMIN — Medication 1300 MILLIGRAM(S): at 06:09

## 2017-01-01 RX ADMIN — Medication 50 MILLIEQUIVALENT(S): at 08:53

## 2017-01-01 RX ADMIN — HEPARIN SODIUM 5000 UNIT(S): 5000 INJECTION INTRAVENOUS; SUBCUTANEOUS at 13:40

## 2017-01-01 RX ADMIN — AMIODARONE HYDROCHLORIDE 200 MILLIGRAM(S): 400 TABLET ORAL at 05:24

## 2017-01-01 RX ADMIN — PIPERACILLIN AND TAZOBACTAM 200 GRAM(S): 4; .5 INJECTION, POWDER, LYOPHILIZED, FOR SOLUTION INTRAVENOUS at 17:02

## 2017-01-01 RX ADMIN — PANTOPRAZOLE SODIUM 40 MILLIGRAM(S): 20 TABLET, DELAYED RELEASE ORAL at 11:22

## 2017-01-01 RX ADMIN — MIDODRINE HYDROCHLORIDE 5 MILLIGRAM(S): 2.5 TABLET ORAL at 14:28

## 2017-01-01 RX ADMIN — FENTANYL CITRATE 7.64 MICROGRAM(S)/KG/HR: 50 INJECTION INTRAVENOUS at 17:09

## 2017-01-01 RX ADMIN — PHENYLEPHRINE HYDROCHLORIDE 6.56 MICROGRAM(S)/KG/MIN: 10 INJECTION INTRAVENOUS at 12:43

## 2017-01-01 RX ADMIN — Medication 1 APPLICATION(S): at 18:14

## 2017-01-01 RX ADMIN — PIPERACILLIN AND TAZOBACTAM 12.5 GRAM(S): 4; .5 INJECTION, POWDER, LYOPHILIZED, FOR SOLUTION INTRAVENOUS at 05:50

## 2017-01-01 RX ADMIN — Medication 650 MILLIGRAM(S): at 06:43

## 2017-01-01 RX ADMIN — MIDODRINE HYDROCHLORIDE 5 MILLIGRAM(S): 2.5 TABLET ORAL at 22:19

## 2017-01-01 RX ADMIN — PROPOFOL 2.29 MICROGRAM(S)/KG/MIN: 10 INJECTION, EMULSION INTRAVENOUS at 18:16

## 2017-01-01 RX ADMIN — PIPERACILLIN AND TAZOBACTAM 12.5 GRAM(S): 4; .5 INJECTION, POWDER, LYOPHILIZED, FOR SOLUTION INTRAVENOUS at 18:14

## 2017-01-01 RX ADMIN — HEPARIN SODIUM 5000 UNIT(S): 5000 INJECTION INTRAVENOUS; SUBCUTANEOUS at 05:51

## 2017-01-01 RX ADMIN — Medication 81 MILLIGRAM(S): at 12:30

## 2017-01-01 RX ADMIN — AMIODARONE HYDROCHLORIDE 400 MILLIGRAM(S): 400 TABLET ORAL at 05:12

## 2017-01-01 RX ADMIN — FENTANYL CITRATE 22.92 MICROGRAM(S)/KG/HR: 50 INJECTION INTRAVENOUS at 19:48

## 2017-01-01 RX ADMIN — Medication 1 APPLICATION(S): at 07:04

## 2017-01-01 RX ADMIN — PHENYLEPHRINE HYDROCHLORIDE 6.56 MICROGRAM(S)/KG/MIN: 10 INJECTION INTRAVENOUS at 05:26

## 2017-01-01 RX ADMIN — Medication 650 MILLIGRAM(S): at 00:52

## 2017-01-01 RX ADMIN — MEROPENEM 100 MILLIGRAM(S): 1 INJECTION INTRAVENOUS at 15:47

## 2017-01-01 RX ADMIN — Medication 1 APPLICATION(S): at 05:33

## 2017-01-01 RX ADMIN — FENTANYL CITRATE 3.82 MICROGRAM(S)/KG/HR: 50 INJECTION INTRAVENOUS at 11:17

## 2017-01-01 RX ADMIN — PHENYLEPHRINE HYDROCHLORIDE 6.56 MICROGRAM(S)/KG/MIN: 10 INJECTION INTRAVENOUS at 02:15

## 2017-01-01 RX ADMIN — Medication 5 MILLIGRAM(S): at 03:18

## 2017-01-01 RX ADMIN — HYDROMORPHONE HYDROCHLORIDE 1 MILLIGRAM(S): 2 INJECTION INTRAMUSCULAR; INTRAVENOUS; SUBCUTANEOUS at 23:04

## 2017-01-01 RX ADMIN — Medication 650 MILLIGRAM(S): at 18:08

## 2017-01-01 RX ADMIN — AMIODARONE HYDROCHLORIDE 400 MILLIGRAM(S): 400 TABLET ORAL at 21:16

## 2017-01-01 RX ADMIN — SODIUM CHLORIDE 100 MILLILITER(S): 9 INJECTION INTRAMUSCULAR; INTRAVENOUS; SUBCUTANEOUS at 15:45

## 2017-01-01 RX ADMIN — ENOXAPARIN SODIUM 40 MILLIGRAM(S): 100 INJECTION SUBCUTANEOUS at 11:27

## 2017-01-01 RX ADMIN — Medication 650 MILLIGRAM(S): at 17:52

## 2017-01-01 RX ADMIN — FENTANYL CITRATE 22.92 MICROGRAM(S)/KG/HR: 50 INJECTION INTRAVENOUS at 06:13

## 2017-01-01 RX ADMIN — Medication 1 APPLICATION(S): at 05:50

## 2017-01-01 RX ADMIN — PIPERACILLIN AND TAZOBACTAM 12.5 GRAM(S): 4; .5 INJECTION, POWDER, LYOPHILIZED, FOR SOLUTION INTRAVENOUS at 18:00

## 2017-01-01 RX ADMIN — Medication 250 MILLIGRAM(S): at 17:00

## 2017-01-01 RX ADMIN — OXYCODONE AND ACETAMINOPHEN 1 TABLET(S): 5; 325 TABLET ORAL at 03:58

## 2017-01-01 RX ADMIN — Medication 1 APPLICATION(S): at 05:02

## 2017-01-01 RX ADMIN — PANTOPRAZOLE SODIUM 40 MILLIGRAM(S): 20 TABLET, DELAYED RELEASE ORAL at 12:58

## 2017-01-01 RX ADMIN — AMIODARONE HYDROCHLORIDE 400 MILLIGRAM(S): 400 TABLET ORAL at 07:41

## 2017-01-01 RX ADMIN — PIPERACILLIN AND TAZOBACTAM 12.5 GRAM(S): 4; .5 INJECTION, POWDER, LYOPHILIZED, FOR SOLUTION INTRAVENOUS at 17:01

## 2017-01-01 RX ADMIN — Medication 650 MILLIGRAM(S): at 21:45

## 2017-01-01 RX ADMIN — HEPARIN SODIUM 5000 UNIT(S): 5000 INJECTION INTRAVENOUS; SUBCUTANEOUS at 06:30

## 2017-01-01 RX ADMIN — PHENYLEPHRINE HYDROCHLORIDE 6.56 MICROGRAM(S)/KG/MIN: 10 INJECTION INTRAVENOUS at 06:14

## 2017-01-01 RX ADMIN — Medication 81 MILLIGRAM(S): at 12:13

## 2017-01-01 RX ADMIN — CARVEDILOL PHOSPHATE 12.5 MILLIGRAM(S): 80 CAPSULE, EXTENDED RELEASE ORAL at 17:23

## 2017-01-01 RX ADMIN — PHENYLEPHRINE HYDROCHLORIDE 6.56 MICROGRAM(S)/KG/MIN: 10 INJECTION INTRAVENOUS at 22:25

## 2017-01-01 RX ADMIN — HEPARIN SODIUM 5000 UNIT(S): 5000 INJECTION INTRAVENOUS; SUBCUTANEOUS at 17:02

## 2017-01-01 RX ADMIN — PANTOPRAZOLE SODIUM 40 MILLIGRAM(S): 20 TABLET, DELAYED RELEASE ORAL at 13:52

## 2017-01-01 RX ADMIN — PIPERACILLIN AND TAZOBACTAM 12.5 GRAM(S): 4; .5 INJECTION, POWDER, LYOPHILIZED, FOR SOLUTION INTRAVENOUS at 18:42

## 2017-01-01 RX ADMIN — MIDAZOLAM HYDROCHLORIDE 1 MILLIGRAM(S): 1 INJECTION, SOLUTION INTRAMUSCULAR; INTRAVENOUS at 16:38

## 2017-01-01 RX ADMIN — MIDODRINE HYDROCHLORIDE 5 MILLIGRAM(S): 2.5 TABLET ORAL at 15:56

## 2017-01-01 RX ADMIN — PHENYLEPHRINE HYDROCHLORIDE 6.56 MICROGRAM(S)/KG/MIN: 10 INJECTION INTRAVENOUS at 13:54

## 2017-01-01 RX ADMIN — Medication 1 APPLICATION(S): at 05:25

## 2017-01-01 RX ADMIN — Medication 200 GRAM(S): at 09:37

## 2017-01-01 RX ADMIN — PHENYLEPHRINE HYDROCHLORIDE 6.56 MICROGRAM(S)/KG/MIN: 10 INJECTION INTRAVENOUS at 21:42

## 2017-01-01 RX ADMIN — FENTANYL CITRATE 15.28 MICROGRAM(S)/KG/HR: 50 INJECTION INTRAVENOUS at 21:08

## 2017-01-01 RX ADMIN — OXYCODONE AND ACETAMINOPHEN 1 TABLET(S): 5; 325 TABLET ORAL at 04:00

## 2017-01-01 RX ADMIN — HEPARIN SODIUM 5000 UNIT(S): 5000 INJECTION INTRAVENOUS; SUBCUTANEOUS at 06:10

## 2017-01-01 RX ADMIN — PIPERACILLIN AND TAZOBACTAM 12.5 GRAM(S): 4; .5 INJECTION, POWDER, LYOPHILIZED, FOR SOLUTION INTRAVENOUS at 07:05

## 2017-01-01 RX ADMIN — Medication 81 MILLIGRAM(S): at 13:53

## 2017-01-01 RX ADMIN — FENTANYL CITRATE 15.28 MICROGRAM(S)/KG/HR: 50 INJECTION INTRAVENOUS at 13:48

## 2017-01-01 RX ADMIN — SODIUM CHLORIDE 2000 MILLILITER(S): 9 INJECTION, SOLUTION INTRAVENOUS at 06:11

## 2017-01-01 RX ADMIN — Medication 1300 MILLIGRAM(S): at 17:48

## 2017-01-01 RX ADMIN — Medication 1950 MILLIGRAM(S): at 14:26

## 2017-01-01 RX ADMIN — PHENYLEPHRINE HYDROCHLORIDE 6.56 MICROGRAM(S)/KG/MIN: 10 INJECTION INTRAVENOUS at 04:45

## 2017-01-01 RX ADMIN — OXYCODONE AND ACETAMINOPHEN 1 TABLET(S): 5; 325 TABLET ORAL at 22:34

## 2017-01-01 RX ADMIN — Medication 81 MILLIGRAM(S): at 11:33

## 2017-01-01 RX ADMIN — SODIUM CHLORIDE 60 MILLILITER(S): 9 INJECTION INTRAMUSCULAR; INTRAVENOUS; SUBCUTANEOUS at 23:59

## 2017-01-01 RX ADMIN — PHENYLEPHRINE HYDROCHLORIDE 6.56 MICROGRAM(S)/KG/MIN: 10 INJECTION INTRAVENOUS at 12:29

## 2017-01-01 RX ADMIN — VASOPRESSIN 2.4 UNIT(S)/MIN: 20 INJECTION INTRAVENOUS at 20:19

## 2017-01-01 RX ADMIN — AMIODARONE HYDROCHLORIDE 200 MILLIGRAM(S): 400 TABLET ORAL at 05:25

## 2017-01-01 RX ADMIN — MIDODRINE HYDROCHLORIDE 5 MILLIGRAM(S): 2.5 TABLET ORAL at 05:58

## 2017-01-01 RX ADMIN — FENTANYL CITRATE 25 MICROGRAM(S): 50 INJECTION INTRAVENOUS at 15:20

## 2017-01-01 RX ADMIN — PANTOPRAZOLE SODIUM 40 MILLIGRAM(S): 20 TABLET, DELAYED RELEASE ORAL at 11:02

## 2017-01-01 RX ADMIN — HEPARIN SODIUM 5000 UNIT(S): 5000 INJECTION INTRAVENOUS; SUBCUTANEOUS at 14:04

## 2017-01-01 RX ADMIN — AMIODARONE HYDROCHLORIDE 400 MILLIGRAM(S): 400 TABLET ORAL at 21:10

## 2017-01-01 RX ADMIN — HEPARIN SODIUM 5000 UNIT(S): 5000 INJECTION INTRAVENOUS; SUBCUTANEOUS at 05:17

## 2017-01-01 RX ADMIN — TAMSULOSIN HYDROCHLORIDE 0.4 MILLIGRAM(S): 0.4 CAPSULE ORAL at 21:21

## 2017-01-01 RX ADMIN — Medication 25 MILLIGRAM(S): at 09:18

## 2017-01-01 RX ADMIN — ONDANSETRON 4 MILLIGRAM(S): 8 TABLET, FILM COATED ORAL at 08:30

## 2017-01-01 RX ADMIN — MIDODRINE HYDROCHLORIDE 5 MILLIGRAM(S): 2.5 TABLET ORAL at 21:44

## 2017-01-01 RX ADMIN — MICAFUNGIN SODIUM 105 MILLIGRAM(S): 100 INJECTION, POWDER, LYOPHILIZED, FOR SOLUTION INTRAVENOUS at 13:22

## 2017-01-01 RX ADMIN — Medication 1 APPLICATION(S): at 18:41

## 2017-01-01 RX ADMIN — PHENYLEPHRINE HYDROCHLORIDE 6.56 MICROGRAM(S)/KG/MIN: 10 INJECTION INTRAVENOUS at 06:43

## 2017-01-01 RX ADMIN — PHENYLEPHRINE HYDROCHLORIDE 6.56 MICROGRAM(S)/KG/MIN: 10 INJECTION INTRAVENOUS at 22:28

## 2017-01-01 RX ADMIN — PHENYLEPHRINE HYDROCHLORIDE 6.56 MICROGRAM(S)/KG/MIN: 10 INJECTION INTRAVENOUS at 03:00

## 2017-01-01 RX ADMIN — HEPARIN SODIUM 5000 UNIT(S): 5000 INJECTION INTRAVENOUS; SUBCUTANEOUS at 06:11

## 2017-01-01 RX ADMIN — TAMSULOSIN HYDROCHLORIDE 0.4 MILLIGRAM(S): 0.4 CAPSULE ORAL at 21:11

## 2017-01-01 RX ADMIN — PHENYLEPHRINE HYDROCHLORIDE 6.56 MICROGRAM(S)/KG/MIN: 10 INJECTION INTRAVENOUS at 09:57

## 2017-01-01 RX ADMIN — AMIODARONE HYDROCHLORIDE 400 MILLIGRAM(S): 400 TABLET ORAL at 11:53

## 2017-01-01 RX ADMIN — PIPERACILLIN AND TAZOBACTAM 12.5 GRAM(S): 4; .5 INJECTION, POWDER, LYOPHILIZED, FOR SOLUTION INTRAVENOUS at 14:26

## 2017-01-01 RX ADMIN — PANTOPRAZOLE SODIUM 40 MILLIGRAM(S): 20 TABLET, DELAYED RELEASE ORAL at 11:26

## 2017-01-01 RX ADMIN — Medication 81 MILLIGRAM(S): at 11:25

## 2017-01-01 RX ADMIN — MEROPENEM 100 MILLIGRAM(S): 1 INJECTION INTRAVENOUS at 21:42

## 2017-01-01 RX ADMIN — Medication 81 MILLIGRAM(S): at 11:27

## 2017-01-01 RX ADMIN — Medication 1 APPLICATION(S): at 17:32

## 2017-01-01 RX ADMIN — ENOXAPARIN SODIUM 40 MILLIGRAM(S): 100 INJECTION SUBCUTANEOUS at 12:30

## 2017-01-01 RX ADMIN — MIDODRINE HYDROCHLORIDE 5 MILLIGRAM(S): 2.5 TABLET ORAL at 05:34

## 2017-01-01 RX ADMIN — OXYCODONE AND ACETAMINOPHEN 1 TABLET(S): 5; 325 TABLET ORAL at 04:28

## 2017-01-01 RX ADMIN — PHENYLEPHRINE HYDROCHLORIDE 6.56 MICROGRAM(S)/KG/MIN: 10 INJECTION INTRAVENOUS at 22:59

## 2017-01-01 RX ADMIN — Medication 50 MILLILITER(S): at 09:02

## 2017-01-01 RX ADMIN — VALSARTAN 160 MILLIGRAM(S): 80 TABLET ORAL at 05:45

## 2017-01-01 RX ADMIN — SODIUM CHLORIDE 100 MILLILITER(S): 9 INJECTION INTRAMUSCULAR; INTRAVENOUS; SUBCUTANEOUS at 17:30

## 2017-01-01 RX ADMIN — MIDODRINE HYDROCHLORIDE 5 MILLIGRAM(S): 2.5 TABLET ORAL at 13:53

## 2017-01-01 RX ADMIN — AMIODARONE HYDROCHLORIDE 400 MILLIGRAM(S): 400 TABLET ORAL at 15:23

## 2017-01-01 RX ADMIN — Medication 35.81 MICROGRAM(S)/KG/MIN: at 19:48

## 2017-01-01 RX ADMIN — SODIUM CHLORIDE 125 MILLILITER(S): 9 INJECTION, SOLUTION INTRAVENOUS at 15:42

## 2017-01-01 RX ADMIN — FENTANYL CITRATE 15.28 MICROGRAM(S)/KG/HR: 50 INJECTION INTRAVENOUS at 00:41

## 2017-01-01 RX ADMIN — MICAFUNGIN SODIUM 105 MILLIGRAM(S): 100 INJECTION, POWDER, LYOPHILIZED, FOR SOLUTION INTRAVENOUS at 12:58

## 2017-01-01 RX ADMIN — PROPOFOL 2.29 MICROGRAM(S)/KG/MIN: 10 INJECTION, EMULSION INTRAVENOUS at 03:00

## 2017-01-01 RX ADMIN — VASOPRESSIN 2.4 UNIT(S)/MIN: 20 INJECTION INTRAVENOUS at 09:20

## 2017-01-01 RX ADMIN — FENTANYL CITRATE 15.28 MICROGRAM(S)/KG/HR: 50 INJECTION INTRAVENOUS at 11:07

## 2017-01-01 RX ADMIN — CARVEDILOL PHOSPHATE 12.5 MILLIGRAM(S): 80 CAPSULE, EXTENDED RELEASE ORAL at 18:12

## 2017-01-01 RX ADMIN — Medication 650 MILLIGRAM(S): at 07:40

## 2017-01-01 RX ADMIN — PANTOPRAZOLE SODIUM 40 MILLIGRAM(S): 20 TABLET, DELAYED RELEASE ORAL at 12:37

## 2017-01-01 RX ADMIN — HEPARIN SODIUM 5000 UNIT(S): 5000 INJECTION INTRAVENOUS; SUBCUTANEOUS at 15:56

## 2017-01-01 RX ADMIN — TAMSULOSIN HYDROCHLORIDE 0.4 MILLIGRAM(S): 0.4 CAPSULE ORAL at 21:33

## 2017-01-01 RX ADMIN — Medication 1300 MILLIGRAM(S): at 05:33

## 2017-01-01 RX ADMIN — AMIODARONE HYDROCHLORIDE 200 MILLIGRAM(S): 400 TABLET ORAL at 06:31

## 2017-01-01 RX ADMIN — TAMSULOSIN HYDROCHLORIDE 0.4 MILLIGRAM(S): 0.4 CAPSULE ORAL at 21:44

## 2017-01-01 RX ADMIN — PHENYLEPHRINE HYDROCHLORIDE 6.56 MICROGRAM(S)/KG/MIN: 10 INJECTION INTRAVENOUS at 21:40

## 2017-01-01 RX ADMIN — Medication 1300 MILLIGRAM(S): at 13:52

## 2017-01-01 RX ADMIN — Medication 2.5 MILLIGRAM(S): at 22:26

## 2017-01-01 RX ADMIN — PIPERACILLIN AND TAZOBACTAM 100 GRAM(S): 4; .5 INJECTION, POWDER, LYOPHILIZED, FOR SOLUTION INTRAVENOUS at 05:12

## 2017-01-01 RX ADMIN — HEPARIN SODIUM 5000 UNIT(S): 5000 INJECTION INTRAVENOUS; SUBCUTANEOUS at 22:26

## 2017-01-01 RX ADMIN — VASOPRESSIN 2.4 UNIT(S)/MIN: 20 INJECTION INTRAVENOUS at 14:48

## 2017-01-01 RX ADMIN — AMIODARONE HYDROCHLORIDE 400 MILLIGRAM(S): 400 TABLET ORAL at 13:43

## 2017-01-01 RX ADMIN — Medication 1 APPLICATION(S): at 05:40

## 2017-01-01 RX ADMIN — CARVEDILOL PHOSPHATE 12.5 MILLIGRAM(S): 80 CAPSULE, EXTENDED RELEASE ORAL at 05:48

## 2017-01-01 RX ADMIN — HEPARIN SODIUM 5000 UNIT(S): 5000 INJECTION INTRAVENOUS; SUBCUTANEOUS at 17:47

## 2017-01-01 RX ADMIN — PANTOPRAZOLE SODIUM 40 MILLIGRAM(S): 20 TABLET, DELAYED RELEASE ORAL at 11:56

## 2017-01-01 RX ADMIN — PHENYLEPHRINE HYDROCHLORIDE 6.56 MICROGRAM(S)/KG/MIN: 10 INJECTION INTRAVENOUS at 04:08

## 2017-01-01 RX ADMIN — PIPERACILLIN AND TAZOBACTAM 12.5 GRAM(S): 4; .5 INJECTION, POWDER, LYOPHILIZED, FOR SOLUTION INTRAVENOUS at 05:34

## 2017-01-01 RX ADMIN — PHENYLEPHRINE HYDROCHLORIDE 6.56 MICROGRAM(S)/KG/MIN: 10 INJECTION INTRAVENOUS at 18:15

## 2017-01-01 RX ADMIN — INSULIN HUMAN 10 UNIT(S): 100 INJECTION, SOLUTION SUBCUTANEOUS at 09:57

## 2017-01-01 RX ADMIN — OXYCODONE AND ACETAMINOPHEN 1 TABLET(S): 5; 325 TABLET ORAL at 23:26

## 2017-01-01 RX ADMIN — PROPOFOL 2.29 MICROGRAM(S)/KG/MIN: 10 INJECTION, EMULSION INTRAVENOUS at 06:44

## 2017-01-01 RX ADMIN — PHENYLEPHRINE HYDROCHLORIDE 6.56 MICROGRAM(S)/KG/MIN: 10 INJECTION INTRAVENOUS at 23:55

## 2017-01-01 RX ADMIN — Medication 81 MILLIGRAM(S): at 12:58

## 2017-01-01 RX ADMIN — PROPOFOL 2.29 MICROGRAM(S)/KG/MIN: 10 INJECTION, EMULSION INTRAVENOUS at 20:19

## 2017-01-01 RX ADMIN — PHENYLEPHRINE HYDROCHLORIDE 6.56 MICROGRAM(S)/KG/MIN: 10 INJECTION INTRAVENOUS at 16:30

## 2017-01-01 RX ADMIN — CARVEDILOL PHOSPHATE 12.5 MILLIGRAM(S): 80 CAPSULE, EXTENDED RELEASE ORAL at 05:45

## 2017-01-01 RX ADMIN — MIDODRINE HYDROCHLORIDE 5 MILLIGRAM(S): 2.5 TABLET ORAL at 13:40

## 2017-01-01 RX ADMIN — PIPERACILLIN AND TAZOBACTAM 12.5 GRAM(S): 4; .5 INJECTION, POWDER, LYOPHILIZED, FOR SOLUTION INTRAVENOUS at 06:32

## 2017-01-01 RX ADMIN — MIDODRINE HYDROCHLORIDE 5 MILLIGRAM(S): 2.5 TABLET ORAL at 05:02

## 2017-01-01 RX ADMIN — AMIODARONE HYDROCHLORIDE 400 MILLIGRAM(S): 400 TABLET ORAL at 13:47

## 2017-01-01 RX ADMIN — Medication 81 MILLIGRAM(S): at 12:18

## 2017-01-01 RX ADMIN — CARVEDILOL PHOSPHATE 12.5 MILLIGRAM(S): 80 CAPSULE, EXTENDED RELEASE ORAL at 05:55

## 2017-01-01 RX ADMIN — OXYCODONE AND ACETAMINOPHEN 1 TABLET(S): 5; 325 TABLET ORAL at 03:16

## 2017-01-01 RX ADMIN — Medication 250 MILLIGRAM(S): at 15:24

## 2017-01-01 RX ADMIN — VASOPRESSIN 2.4 UNIT(S)/MIN: 20 INJECTION INTRAVENOUS at 23:52

## 2017-01-01 RX ADMIN — SODIUM CHLORIDE 100 MILLILITER(S): 9 INJECTION INTRAMUSCULAR; INTRAVENOUS; SUBCUTANEOUS at 22:15

## 2017-01-01 RX ADMIN — PHENYLEPHRINE HYDROCHLORIDE 6.56 MICROGRAM(S)/KG/MIN: 10 INJECTION INTRAVENOUS at 09:47

## 2017-01-01 RX ADMIN — Medication 35.81 MICROGRAM(S)/KG/MIN: at 12:27

## 2017-01-01 RX ADMIN — PROPOFOL 2.29 MICROGRAM(S)/KG/MIN: 10 INJECTION, EMULSION INTRAVENOUS at 13:57

## 2017-01-01 RX ADMIN — VALSARTAN 160 MILLIGRAM(S): 80 TABLET ORAL at 05:48

## 2017-01-01 RX ADMIN — Medication 650 MILLIGRAM(S): at 13:04

## 2017-01-01 RX ADMIN — PANTOPRAZOLE SODIUM 40 MILLIGRAM(S): 20 TABLET, DELAYED RELEASE ORAL at 17:02

## 2017-01-01 RX ADMIN — HEPARIN SODIUM 5000 UNIT(S): 5000 INJECTION INTRAVENOUS; SUBCUTANEOUS at 07:04

## 2017-01-01 RX ADMIN — MICAFUNGIN SODIUM 105 MILLIGRAM(S): 100 INJECTION, POWDER, LYOPHILIZED, FOR SOLUTION INTRAVENOUS at 12:16

## 2017-01-01 RX ADMIN — HEPARIN SODIUM 5000 UNIT(S): 5000 INJECTION INTRAVENOUS; SUBCUTANEOUS at 05:57

## 2017-01-01 RX ADMIN — PIPERACILLIN AND TAZOBACTAM 12.5 GRAM(S): 4; .5 INJECTION, POWDER, LYOPHILIZED, FOR SOLUTION INTRAVENOUS at 22:14

## 2017-01-01 RX ADMIN — PROPOFOL 2.1 MICROGRAM(S)/KG/MIN: 10 INJECTION, EMULSION INTRAVENOUS at 16:30

## 2017-01-01 RX ADMIN — Medication 125 MEQ/KG/HR: at 21:42

## 2017-01-01 RX ADMIN — Medication 650 MILLIGRAM(S): at 21:21

## 2017-01-01 RX ADMIN — Medication 5 MILLIGRAM(S): at 18:01

## 2017-01-01 RX ADMIN — Medication 125 MEQ/KG/HR: at 13:23

## 2017-01-01 RX ADMIN — FENTANYL CITRATE 15.28 MICROGRAM(S)/KG/HR: 50 INJECTION INTRAVENOUS at 08:25

## 2017-01-01 RX ADMIN — Medication 1 APPLICATION(S): at 17:48

## 2017-01-01 RX ADMIN — PROPOFOL 2.29 MICROGRAM(S)/KG/MIN: 10 INJECTION, EMULSION INTRAVENOUS at 12:30

## 2017-01-01 RX ADMIN — FENTANYL CITRATE 50 MICROGRAM(S): 50 INJECTION INTRAVENOUS at 08:20

## 2017-01-01 RX ADMIN — HEPARIN SODIUM 5000 UNIT(S): 5000 INJECTION INTRAVENOUS; SUBCUTANEOUS at 05:13

## 2017-01-01 RX ADMIN — Medication 127.33 MEQ/KG/HR: at 16:54

## 2017-01-01 RX ADMIN — SODIUM CHLORIDE 100 MILLILITER(S): 9 INJECTION INTRAMUSCULAR; INTRAVENOUS; SUBCUTANEOUS at 06:31

## 2017-01-01 RX ADMIN — PIPERACILLIN AND TAZOBACTAM 12.5 GRAM(S): 4; .5 INJECTION, POWDER, LYOPHILIZED, FOR SOLUTION INTRAVENOUS at 17:52

## 2017-01-01 RX ADMIN — Medication 1300 MILLIGRAM(S): at 07:04

## 2017-01-01 RX ADMIN — Medication 1 APPLICATION(S): at 17:02

## 2017-01-01 RX ADMIN — PROPOFOL 2.29 MICROGRAM(S)/KG/MIN: 10 INJECTION, EMULSION INTRAVENOUS at 14:00

## 2017-01-01 RX ADMIN — PROPOFOL 2.29 MICROGRAM(S)/KG/MIN: 10 INJECTION, EMULSION INTRAVENOUS at 06:32

## 2017-01-01 RX ADMIN — PANTOPRAZOLE SODIUM 40 MILLIGRAM(S): 20 TABLET, DELAYED RELEASE ORAL at 11:39

## 2017-01-01 RX ADMIN — AMIODARONE HYDROCHLORIDE 400 MILLIGRAM(S): 400 TABLET ORAL at 06:06

## 2017-01-01 RX ADMIN — Medication 1 APPLICATION(S): at 05:51

## 2017-01-01 RX ADMIN — SODIUM CHLORIDE 100 MILLILITER(S): 9 INJECTION INTRAMUSCULAR; INTRAVENOUS; SUBCUTANEOUS at 07:30

## 2017-01-01 RX ADMIN — TAMSULOSIN HYDROCHLORIDE 0.4 MILLIGRAM(S): 0.4 CAPSULE ORAL at 21:55

## 2017-01-01 RX ADMIN — HEPARIN SODIUM 5000 UNIT(S): 5000 INJECTION INTRAVENOUS; SUBCUTANEOUS at 05:25

## 2017-01-01 RX ADMIN — Medication 1300 MILLIGRAM(S): at 05:24

## 2017-01-01 RX ADMIN — Medication 650 MILLIGRAM(S): at 16:03

## 2017-01-01 RX ADMIN — PANTOPRAZOLE SODIUM 40 MILLIGRAM(S): 20 TABLET, DELAYED RELEASE ORAL at 13:39

## 2017-01-01 RX ADMIN — MIDODRINE HYDROCHLORIDE 5 MILLIGRAM(S): 2.5 TABLET ORAL at 15:46

## 2017-01-01 RX ADMIN — Medication 50 MILLIEQUIVALENT(S): at 06:03

## 2017-01-01 RX ADMIN — HYDROMORPHONE HYDROCHLORIDE 1 MILLIGRAM(S): 2 INJECTION INTRAMUSCULAR; INTRAVENOUS; SUBCUTANEOUS at 03:46

## 2017-01-01 RX ADMIN — ENOXAPARIN SODIUM 40 MILLIGRAM(S): 100 INJECTION SUBCUTANEOUS at 11:28

## 2017-01-01 RX ADMIN — SODIUM CHLORIDE 100 MILLILITER(S): 9 INJECTION INTRAMUSCULAR; INTRAVENOUS; SUBCUTANEOUS at 13:24

## 2017-01-01 RX ADMIN — SODIUM CHLORIDE 2000 MILLILITER(S): 9 INJECTION, SOLUTION INTRAVENOUS at 17:05

## 2017-01-01 RX ADMIN — OXYCODONE AND ACETAMINOPHEN 1 TABLET(S): 5; 325 TABLET ORAL at 00:14

## 2017-01-01 RX ADMIN — HEPARIN SODIUM 5000 UNIT(S): 5000 INJECTION INTRAVENOUS; SUBCUTANEOUS at 22:18

## 2017-01-01 RX ADMIN — PROPOFOL 2.29 MICROGRAM(S)/KG/MIN: 10 INJECTION, EMULSION INTRAVENOUS at 11:47

## 2017-01-01 RX ADMIN — HEPARIN SODIUM 5000 UNIT(S): 5000 INJECTION INTRAVENOUS; SUBCUTANEOUS at 14:27

## 2017-01-01 RX ADMIN — CARVEDILOL PHOSPHATE 12.5 MILLIGRAM(S): 80 CAPSULE, EXTENDED RELEASE ORAL at 06:03

## 2017-01-01 RX ADMIN — PROPOFOL 2.29 MICROGRAM(S)/KG/MIN: 10 INJECTION, EMULSION INTRAVENOUS at 22:24

## 2017-01-01 RX ADMIN — Medication 10 MILLILITER(S): at 17:32

## 2017-01-01 RX ADMIN — AMIODARONE HYDROCHLORIDE 600 MILLIGRAM(S): 400 TABLET ORAL at 09:03

## 2017-01-01 RX ADMIN — SODIUM CHLORIDE 2000 MILLILITER(S): 9 INJECTION INTRAMUSCULAR; INTRAVENOUS; SUBCUTANEOUS at 10:43

## 2017-01-01 RX ADMIN — MIDODRINE HYDROCHLORIDE 5 MILLIGRAM(S): 2.5 TABLET ORAL at 21:16

## 2017-01-01 RX ADMIN — HYDROMORPHONE HYDROCHLORIDE 1 MILLIGRAM(S): 2 INJECTION INTRAMUSCULAR; INTRAVENOUS; SUBCUTANEOUS at 16:43

## 2017-01-01 RX ADMIN — PIPERACILLIN AND TAZOBACTAM 12.5 GRAM(S): 4; .5 INJECTION, POWDER, LYOPHILIZED, FOR SOLUTION INTRAVENOUS at 06:10

## 2017-01-01 RX ADMIN — PIPERACILLIN AND TAZOBACTAM 25 GRAM(S): 4; .5 INJECTION, POWDER, LYOPHILIZED, FOR SOLUTION INTRAVENOUS at 22:26

## 2017-01-01 RX ADMIN — Medication 35.81 MICROGRAM(S)/KG/MIN: at 01:15

## 2017-01-01 RX ADMIN — AMIODARONE HYDROCHLORIDE 400 MILLIGRAM(S): 400 TABLET ORAL at 21:08

## 2017-01-01 RX ADMIN — PROPOFOL 2.29 MICROGRAM(S)/KG/MIN: 10 INJECTION, EMULSION INTRAVENOUS at 17:15

## 2017-01-01 RX ADMIN — PHENYLEPHRINE HYDROCHLORIDE 6.56 MICROGRAM(S)/KG/MIN: 10 INJECTION INTRAVENOUS at 23:39

## 2017-01-01 RX ADMIN — Medication 300 GRAM(S): at 09:57

## 2017-01-01 RX ADMIN — CARVEDILOL PHOSPHATE 12.5 MILLIGRAM(S): 80 CAPSULE, EXTENDED RELEASE ORAL at 05:37

## 2017-01-01 RX ADMIN — FENTANYL CITRATE 15.28 MICROGRAM(S)/KG/HR: 50 INJECTION INTRAVENOUS at 11:53

## 2017-01-01 RX ADMIN — PROPOFOL 2.29 MICROGRAM(S)/KG/MIN: 10 INJECTION, EMULSION INTRAVENOUS at 05:36

## 2017-01-01 RX ADMIN — FENTANYL CITRATE 22.92 MICROGRAM(S)/KG/HR: 50 INJECTION INTRAVENOUS at 05:28

## 2017-01-01 RX ADMIN — AMIODARONE HYDROCHLORIDE 200 MILLIGRAM(S): 400 TABLET ORAL at 06:11

## 2017-01-01 RX ADMIN — Medication 1950 MILLIGRAM(S): at 21:15

## 2017-01-01 RX ADMIN — PHENYLEPHRINE HYDROCHLORIDE 6.56 MICROGRAM(S)/KG/MIN: 10 INJECTION INTRAVENOUS at 19:29

## 2017-01-01 RX ADMIN — HEPARIN SODIUM 5000 UNIT(S): 5000 INJECTION INTRAVENOUS; SUBCUTANEOUS at 18:53

## 2017-01-01 RX ADMIN — Medication 1 APPLICATION(S): at 06:31

## 2017-01-01 RX ADMIN — FENTANYL CITRATE 25 MICROGRAM(S): 50 INJECTION INTRAVENOUS at 14:48

## 2017-01-01 RX ADMIN — AMIODARONE HYDROCHLORIDE 200 MILLIGRAM(S): 400 TABLET ORAL at 05:33

## 2017-01-01 RX ADMIN — PIPERACILLIN AND TAZOBACTAM 12.5 GRAM(S): 4; .5 INJECTION, POWDER, LYOPHILIZED, FOR SOLUTION INTRAVENOUS at 05:56

## 2017-01-01 RX ADMIN — PROPOFOL 2.29 MICROGRAM(S)/KG/MIN: 10 INJECTION, EMULSION INTRAVENOUS at 18:48

## 2017-01-01 RX ADMIN — AMIODARONE HYDROCHLORIDE 400 MILLIGRAM(S): 400 TABLET ORAL at 05:50

## 2017-01-01 RX ADMIN — PANTOPRAZOLE SODIUM 40 MILLIGRAM(S): 20 TABLET, DELAYED RELEASE ORAL at 12:21

## 2017-01-01 RX ADMIN — Medication 650 MILLIGRAM(S): at 05:40

## 2017-01-01 RX ADMIN — PIPERACILLIN AND TAZOBACTAM 12.5 GRAM(S): 4; .5 INJECTION, POWDER, LYOPHILIZED, FOR SOLUTION INTRAVENOUS at 06:31

## 2017-01-01 RX ADMIN — PHENYLEPHRINE HYDROCHLORIDE 6.56 MICROGRAM(S)/KG/MIN: 10 INJECTION INTRAVENOUS at 10:51

## 2017-01-01 RX ADMIN — PIPERACILLIN AND TAZOBACTAM 12.5 GRAM(S): 4; .5 INJECTION, POWDER, LYOPHILIZED, FOR SOLUTION INTRAVENOUS at 07:40

## 2017-01-01 RX ADMIN — PANTOPRAZOLE SODIUM 40 MILLIGRAM(S): 20 TABLET, DELAYED RELEASE ORAL at 11:43

## 2017-01-01 RX ADMIN — PROPOFOL 2.29 MICROGRAM(S)/KG/MIN: 10 INJECTION, EMULSION INTRAVENOUS at 05:30

## 2017-01-01 RX ADMIN — PHENYLEPHRINE HYDROCHLORIDE 6.56 MICROGRAM(S)/KG/MIN: 10 INJECTION INTRAVENOUS at 15:42

## 2017-01-01 RX ADMIN — Medication 650 MILLIGRAM(S): at 06:54

## 2017-01-01 RX ADMIN — VASOPRESSIN 2.4 UNIT(S)/MIN: 20 INJECTION INTRAVENOUS at 13:54

## 2017-01-01 RX ADMIN — PHENYLEPHRINE HYDROCHLORIDE 6.56 MICROGRAM(S)/KG/MIN: 10 INJECTION INTRAVENOUS at 17:34

## 2017-01-01 RX ADMIN — Medication 81 MILLIGRAM(S): at 13:40

## 2017-01-01 RX ADMIN — Medication 81 MILLIGRAM(S): at 11:28

## 2017-01-01 RX ADMIN — Medication 650 MILLIGRAM(S): at 06:05

## 2017-01-01 RX ADMIN — Medication 1300 MILLIGRAM(S): at 21:44

## 2017-01-01 RX ADMIN — MEROPENEM 100 MILLIGRAM(S): 1 INJECTION INTRAVENOUS at 05:32

## 2017-01-01 RX ADMIN — Medication 250 MILLIGRAM(S): at 16:26

## 2017-01-01 RX ADMIN — AMIODARONE HYDROCHLORIDE 200 MILLIGRAM(S): 400 TABLET ORAL at 00:02

## 2017-01-01 RX ADMIN — PROPOFOL 2.29 MICROGRAM(S)/KG/MIN: 10 INJECTION, EMULSION INTRAVENOUS at 10:42

## 2017-01-01 RX ADMIN — CARVEDILOL PHOSPHATE 12.5 MILLIGRAM(S): 80 CAPSULE, EXTENDED RELEASE ORAL at 05:13

## 2017-01-01 RX ADMIN — PIPERACILLIN AND TAZOBACTAM 12.5 GRAM(S): 4; .5 INJECTION, POWDER, LYOPHILIZED, FOR SOLUTION INTRAVENOUS at 05:02

## 2017-01-01 RX ADMIN — VASOPRESSIN 2.4 UNIT(S)/MIN: 20 INJECTION INTRAVENOUS at 03:00

## 2017-01-01 RX ADMIN — PHENYLEPHRINE HYDROCHLORIDE 6.56 MICROGRAM(S)/KG/MIN: 10 INJECTION INTRAVENOUS at 20:19

## 2017-01-01 RX ADMIN — Medication 3 MILLIGRAM(S): at 21:55

## 2017-01-01 RX ADMIN — VALSARTAN 160 MILLIGRAM(S): 80 TABLET ORAL at 06:03

## 2017-01-01 RX ADMIN — HEPARIN SODIUM 5000 UNIT(S): 5000 INJECTION INTRAVENOUS; SUBCUTANEOUS at 18:41

## 2017-01-01 RX ADMIN — Medication 3 MILLIGRAM(S): at 03:45

## 2017-01-01 RX ADMIN — SODIUM CHLORIDE 2000 MILLILITER(S): 9 INJECTION INTRAMUSCULAR; INTRAVENOUS; SUBCUTANEOUS at 22:37

## 2017-01-01 RX ADMIN — Medication 1 APPLICATION(S): at 18:45

## 2017-01-01 RX ADMIN — Medication 1950 MILLIGRAM(S): at 05:02

## 2017-12-04 NOTE — ED PROVIDER NOTE - MUSCULOSKELETAL, MLM
Spine appears normal, range of motion is not limited, no muscle or joint tenderness. No b/l calf tenderness.

## 2017-12-04 NOTE — ED PROVIDER NOTE - OBJECTIVE STATEMENT
73 y/o M pt with no PMHx and no PSHx presents to ED c/o L-sided CP radiating to the back with associated general weakness x6 months. Pt also reports loss of voice x6 months, in addition to decreased appetite and weight loss x2 weeks. Pt reports visiting PMD on multiple occasions; pt has been worked up for strep throat and laryngitis in the past, and has undergone multiple tests. Pt states he has been taking Ibuprofen for pain relief, and has also been on Abx's treatments recently. Pt notes undergoing a CXR today by his PMD, which showed a 5.2x6.7cm L suprahilar mass, as well as a CT Chest showing a lesion in the R hepatic lobe, a large necrotic macie mass, and emphysema, prompting pt to be sent to the ED for treatment including fluids and pain medication. Pt denies fever, chills, or any other complaints. Pt reports he is a former smoker, and quit smoking x20 years ago. Pt also reports he has only been able to consume x3 Ensure today. NKDA.

## 2017-12-04 NOTE — ED ADULT NURSE NOTE - OBJECTIVE STATEMENT
pt complained of chest discomfort x 5months, pt denies SOB at this time, pt vital signs stable, ambulates independently, skin intact, pt safety will be maintained  pt vital signs will remain hemodynamically stablemaintained

## 2017-12-04 NOTE — ED PROVIDER NOTE - MEDICAL DECISION MAKING DETAILS
73 y/o M pt sent by PMD for treatment. Pt presents with L-sided chest pain, weakness, and recent weight loss. Plan for CXR, labs, IV hydration, analgesia, and likely admission.

## 2017-12-04 NOTE — ED PROVIDER NOTE - PROGRESS NOTE DETAILS
Pt with mass, has ct results with him, copy made and placed in chart, not tolerating po well, will admit, pain meds, iv hydration, further testing/treatment.

## 2017-12-05 NOTE — H&P ADULT - PROBLEM SELECTOR PLAN 1
-patient presenting with weakness. 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   -Hem/Onc Dr. Otero consulted.   -Pulmonary consult Dr. German  -pain control.

## 2017-12-05 NOTE — H&P ADULT - PROBLEM SELECTOR PLAN 4
-Improved VTE score of 2 (age and immobilization) -patient is on Cialis, pharmacy does not carry it.

## 2017-12-05 NOTE — CONSULT NOTE ADULT - SUBJECTIVE AND OBJECTIVE BOX
PULMONARY CONSULT NOTE      FRANCISCO DUKE  MRN-[id number]    Patient is a 72y old  Male who presents with a chief complaint of weakness, chest discomfort  and appetite changes. Patient had a Chest-XRAY  showed a 5.2x6.7cm L perihilar mass. Awake, alert, comfortable in NAD.    HISTORY OF PRESENT ILLNESS:    MEDICATIONS  (STANDING):  aspirin  chewable 81 milliGRAM(s) Oral daily  carvedilol 12.5 milliGRAM(s) Oral every 12 hours  enoxaparin Injectable 40 milliGRAM(s) SubCutaneous daily  valsartan 160 milliGRAM(s) Oral daily      MEDICATIONS  (PRN):  acetaminophen   Tablet 650 milliGRAM(s) Oral every 6 hours PRN For Temp greater than 38 C (100.4 F) and/or mild pain (1-3)  oxyCODONE    5 mG/acetaminophen 325 mG 2 Tablet(s) Oral every 6 hours PRN Severe Pain (7 - 10)  oxyCODONE    5 mG/acetaminophen 325 mG 1 Tablet(s) Oral every 6 hours PRN Moderate Pain (4 - 6)      Allergies    No Known Allergies    Intolerances        PAST MEDICAL & SURGICAL HISTORY:  BPH (benign prostatic hyperplasia)  Hypertension  No significant past surgical history      FAMILY HISTORY:  Family history of diabetes mellitus (Mother)      SOCIAL HISTORY  Smoking History:     REVIEW OF SYSTEMS:    CONSTITUTIONAL:  No fevers, chills, sweats    HEENT:  Eyes:  No diplopia or blurred vision. ENT:  No earache, sore throat or runny nose.    CARDIOVASCULAR:  No pressure, squeezing, tightness, or heaviness about the chest; no palpitations.    RESPIRATORY:  Per HPI    GASTROINTESTINAL:  No abdominal pain, nausea, vomiting or diarrhea.    GENITOURINARY:  No dysuria, frequency or urgency.    NEUROLOGIC:  No paresthesias, fasciculations, seizures or weakness.    PSYCHIATRIC:  No disorder of thought or mood.    Vital Signs Last 24 Hrs  T(C): 37 (05 Dec 2017 10:13), Max: 38.1 (04 Dec 2017 21:28)  T(F): 98.6 (05 Dec 2017 10:13), Max: 100.5 (04 Dec 2017 21:28)  HR: 70 (05 Dec 2017 10:13) (66 - 83)  BP: 141/74 (05 Dec 2017 10:13) (128/67 - 157/68)  BP(mean): --  RR: 17 (05 Dec 2017 10:13) (17 - 17)  SpO2: 96% (05 Dec 2017 10:13) (96% - 97%)  I&O's Detail      PHYSICAL EXAMINATION:    GENERAL: The patient is a well-developed, well-nourished _____in no apparent distress.     HEENT: Head is normocephalic and atraumatic. Extraocular muscles are intact. Mucous membranes are moist.     NECK: Supple.     LUNGS: Clear to auscultation without wheezing, rales, or rhonchi. Respirations unlabored    HEART: Regular rate and rhythm without murmur.    ABDOMEN: Soft, nontender, and nondistended.  No hepatosplenomegaly is noted.    EXTREMITIES: Without any cyanosis, clubbing, rash, lesions or edema.    NEUROLOGIC: Grossly intact.      LABS:                        12.0   16.1  )-----------( CLUMPED    ( 05 Dec 2017 07:14 )             35.2     12-05    132<L>  |  96  |  25<H>  ----------------------------<  125<H>  3.8   |  29  |  1.11    Ca    8.2<L>      05 Dec 2017 07:14  Phos  2.9     12-05  Mg     2.1     12-05    TPro  6.5  /  Alb  2.4<L>  /  TBili  0.6  /  DBili  x   /  AST  33  /  ALT  60  /  AlkPhos  55  12-04    PT/INR - ( 04 Dec 2017 19:31 )   PT: 13.2 sec;   INR: 1.21 ratio         PTT - ( 04 Dec 2017 19:31 )  PTT:23.5 sec                    MICROBIOLOGY:    RADIOLOGY & ADDITIONAL STUDIES:    CXR:  < from: Xray Chest 2 Views PA/Lat (12.04.17 @ 20:04) >  Apparent 6.0 cm left perihilar masslike density may represent   a lung cancer or focal pneumonia. 7 mm nodular density in the right lower   lung zone may represent metastasis. If clinically indicated, chest CT   with IV contrast may be pursued for further evaluation. This finding is   communicated with the emergency department via the PACS communication   system.     No evidence for pleural effusion or pneumothorax.    < end of copied text >    Ct scan chest:    ekg;    echo: PULMONARY CONSULT NOTE      FRANCISCO DUKE  MRN-[id number]    Patient is a 72y old  Male who presents with a chief complaint of weakness, chest discomfort  and appetite changes. Patient had a Chest-XRAY  showed a 5.2x6.7cm L perihilar mass. Awake, alert, comfortable in NAD.    HISTORY OF PRESENT ILLNESS: As above. Former smoker. Poor appetite. Weight loss. hoarseness. Cough productive of clear phlegm. Abnormal CT scan of chest as outpatient showing left hilar mass, lever and spleen mets. Scheduled for bronchoscopy in am    MEDICATIONS  (STANDING):  aspirin  chewable 81 milliGRAM(s) Oral daily  carvedilol 12.5 milliGRAM(s) Oral every 12 hours  enoxaparin Injectable 40 milliGRAM(s) SubCutaneous daily  valsartan 160 milliGRAM(s) Oral daily      MEDICATIONS  (PRN):  acetaminophen   Tablet 650 milliGRAM(s) Oral every 6 hours PRN For Temp greater than 38 C (100.4 F) and/or mild pain (1-3)  oxyCODONE    5 mG/acetaminophen 325 mG 2 Tablet(s) Oral every 6 hours PRN Severe Pain (7 - 10)  oxyCODONE    5 mG/acetaminophen 325 mG 1 Tablet(s) Oral every 6 hours PRN Moderate Pain (4 - 6)      Allergies    No Known Allergies    Intolerances        PAST MEDICAL & SURGICAL HISTORY:  BPH (benign prostatic hyperplasia)  Hypertension  No significant past surgical history      FAMILY HISTORY:  Family history of diabetes mellitus (Mother)      SOCIAL HISTORY  Smoking History:     REVIEW OF SYSTEMS:    CONSTITUTIONAL:  No fevers, chills, sweats    HEENT:  Eyes:  No diplopia or blurred vision. ENT:  No earache, sore throat or runny nose.    CARDIOVASCULAR:  No pressure, squeezing, tightness, or heaviness about the chest; no palpitations.    RESPIRATORY:  Per HPI    GASTROINTESTINAL:  No abdominal pain, nausea, vomiting or diarrhea.    GENITOURINARY:  No dysuria, frequency or urgency.    NEUROLOGIC:  No paresthesias, fasciculations, seizures or weakness.    PSYCHIATRIC:  No disorder of thought or mood.    Vital Signs Last 24 Hrs  T(C): 37 (05 Dec 2017 10:13), Max: 38.1 (04 Dec 2017 21:28)  T(F): 98.6 (05 Dec 2017 10:13), Max: 100.5 (04 Dec 2017 21:28)  HR: 70 (05 Dec 2017 10:13) (66 - 83)  BP: 141/74 (05 Dec 2017 10:13) (128/67 - 157/68)  BP(mean): --  RR: 17 (05 Dec 2017 10:13) (17 - 17)  SpO2: 96% (05 Dec 2017 10:13) (96% - 97%)  I&O's Detail      PHYSICAL EXAMINATION:    GENERAL: The patient is a well-developed, well-nourished _____in no apparent distress.     HEENT: Head is normocephalic and atraumatic. Extraocular muscles are intact. Mucous membranes are moist.     NECK: Supple.     LUNGS: Rales on left posteriorly    HEART: Regular rate and rhythm without murmur.    ABDOMEN: Soft, nontender, and nondistended.  No hepatosplenomegaly is noted.    EXTREMITIES: Without any cyanosis, clubbing, rash, lesions or edema.    NEUROLOGIC: Grossly intact.      LABS:                        12.0   16.1  )-----------( CLUMPED    ( 05 Dec 2017 07:14 )             35.2     12-05    132<L>  |  96  |  25<H>  ----------------------------<  125<H>  3.8   |  29  |  1.11    Ca    8.2<L>      05 Dec 2017 07:14  Phos  2.9     12-05  Mg     2.1     12-05    TPro  6.5  /  Alb  2.4<L>  /  TBili  0.6  /  DBili  x   /  AST  33  /  ALT  60  /  AlkPhos  55  12-04    PT/INR - ( 04 Dec 2017 19:31 )   PT: 13.2 sec;   INR: 1.21 ratio         PTT - ( 04 Dec 2017 19:31 )  PTT:23.5 sec                    MICROBIOLOGY:    RADIOLOGY & ADDITIONAL STUDIES:    CXR:  < from: Xray Chest 2 Views PA/Lat (12.04.17 @ 20:04) >  Apparent 6.0 cm left perihilar masslike density may represent   a lung cancer or focal pneumonia. 7 mm nodular density in the right lower   lung zone may represent metastasis. If clinically indicated, chest CT   with IV contrast may be pursued for further evaluation. This finding is   communicated with the emergency department via the PACS communication   system.     No evidence for pleural effusion or pneumothorax.    < end of copied text >    Ct scan chest:    ekg;    echo:

## 2017-12-05 NOTE — CONSULT NOTE ADULT - PROBLEM SELECTOR RECOMMENDATION 9
Quantiferon Gold TB test.  Chest CT Scan. Quantiferon Gold TB test.  Chest CT Scan.  Bronchoscopy in AM

## 2017-12-05 NOTE — H&P ADULT - ASSESSMENT
72 years old male from home with PMHx of HTN and BPH and no PSH presents to ED c/o L-sided chest pain radiating to the back with associated general weakness x6 months. Also voice changes over past months associated with loss of appetite and weight loss over past 2 weeks. Was seen by PMD and had work up which showed a 5.2x6.7cm L suprahilar mass, as well as a CT Chest showing a lesion in the R hepatic lobe, a large necrotic macie mass, and emphysema, prompting patient to be sent to the ED for further treatment.

## 2017-12-05 NOTE — H&P ADULT - NSHPSOCIALHISTORY_GEN_ALL_CORE
Former smoker, quit 20 years before Former smoker, smoked 1 pack x 20 years, quit 20 years before.  Denies alcohol or drug use.

## 2017-12-05 NOTE — H&P ADULT - PROBLEM SELECTOR PLAN 2
-Low grade temp of 100.5  -Leukocytosis of 17  -Check UA  -Discussed with attending, will hold off on antibiotics for now.

## 2017-12-05 NOTE — H&P ADULT - ATTENDING COMMENTS
72 years old male from home with PMHx of HTN and BPH and no PSH presents to ED c/o L-sided chest pain radiating to the back with associated general weakness x6 months. Patient also reports loss of voice x6 months, in addition to decreased appetite and weight loss x2 weeks, only been able to consume x3 Ensure today. Patient reports visiting PMD on multiple occasions; patient has been worked up for strep throat and laryngitis in the past, and has undergone multiple tests. Patient states he has been taking Ibuprofen for pain relief, and has also been on Abx's treatments recently. Patient had a CXR today by his PMD, which showed a 5.2x6.7cm L suprahilar mass, as well as a CT Chest showing a lesion in the R hepatic lobe, a large necrotic macie mass, and emphysema, prompting patient to be sent to the ED for treatment including fluids and pain medication. Patient denies fever, chills, abdominal pain, nausea, vomiting or any other complaints. Patient reports he is a former smoker, and quit smoking x20 years ago.     pt seen in bed, vitals stable, physical exam reveals pt with hoarseness, lungs cta b/l, heart s1s2, abd soft nd nt bs+, ext no edema. labs and diagnostic test result reviewed.    assessment  --  perihilar mass, hepatic mass, r/o malig, copd h/o HTN and BPH    plan  --  admit to med, atrov and prov nebs, supplemental oxygen, cont preadmit home meds, gi and dvt profilaxis,   cbc, bmp, mg, phos, lipids, tsh,     ct chest-and-pelv    pulm cons    pt for bronch in am

## 2017-12-05 NOTE — H&P ADULT - HISTORY OF PRESENT ILLNESS
72 years old male from home with no PMHx and no PSHx presents to ED c/o L-sided CP radiating to the back with associated general weakness x6 months. Patient also reports loss of voice x6 months, in addition to decreased appetite and weight loss x2 weeks, only been able to consume x3 Ensure today. Patient reports visiting PMD on multiple occasions; patient has been worked up for strep throat and laryngitis in the past, and has undergone multiple tests. Patient states he has been taking Ibuprofen for pain relief, and has also been on Abx's treatments recently. Patient had a CXR today by his PMD, which showed a 5.2x6.7cm L suprahilar mass, as well as a CT Chest showing a lesion in the R hepatic lobe, a large necrotic macie mass, and emphysema, prompting patient to be sent to the ED for treatment including fluids and pain medication. Patient denies fever, chills, abdominal pain, nausea, vomiting or any other complaints. Patient reports he is a former smoker, and quit smoking x20 years ago. 72 years old male from home with PMHx of HTN and BPH and no PSH presents to ED c/o L-sided chest pain radiating to the back with associated general weakness x6 months. Patient also reports loss of voice x6 months, in addition to decreased appetite and weight loss x2 weeks, only been able to consume x3 Ensure today. Patient reports visiting PMD on multiple occasions; patient has been worked up for strep throat and laryngitis in the past, and has undergone multiple tests. Patient states he has been taking Ibuprofen for pain relief, and has also been on Abx's treatments recently. Patient had a CXR today by his PMD, which showed a 5.2x6.7cm L suprahilar mass, as well as a CT Chest showing a lesion in the R hepatic lobe, a large necrotic macie mass, and emphysema, prompting patient to be sent to the ED for treatment including fluids and pain medication. Patient denies fever, chills, abdominal pain, nausea, vomiting or any other complaints. Patient reports he is a former smoker, and quit smoking x20 years ago.

## 2017-12-06 NOTE — CONSULT NOTE ADULT - ASSESSMENT
72 year old male with subcutaneous emphysema s/p bronchoscopy    1) admit for observation  2) serial chest xrays  3) no need for chest tube a this time  4) case and plan discussed with Dr. Gonzales and agrees

## 2017-12-06 NOTE — CONSULT NOTE ADULT - SUBJECTIVE AND OBJECTIVE BOX
72y Male with PMHx of HTN, BPH presented today for bronchoscopy. Procedure went well but patient was noted to have left sided anterior chest subcutaneous emphysema. Pt was seen in PACU and denies chest pain or chest pressure, no shortness of breath and reports breathing fine without O2 supplementation. Pt will be admitted for observation and serial chest xrays.    pmhx:a s above  pshx: none  meds:  acetaminophen   Tablet 650 milliGRAM(s) Oral every 6 hours PRN  aspirin  chewable 81 milliGRAM(s) Oral daily  carvedilol 12.5 milliGRAM(s) Oral every 12 hours  enoxaparin Injectable 40 milliGRAM(s) SubCutaneous daily  lactated ringers. 1000 milliLiter(s) IV Continuous <Continuous>  oxyCODONE    5 mG/acetaminophen 325 mG 2 Tablet(s) Oral every 6 hours PRN  oxyCODONE    5 mG/acetaminophen 325 mG 1 Tablet(s) Oral every 6 hours PRN  valsartan 160 milliGRAM(s) Oral daily    No Known Allergies    vitals:  T(C): 37.2 (12-06-17 @ 10:41), Max: 37.3 (12-06-17 @ 08:29)  HR: 71 (12-06-17 @ 11:11) (67 - 75)  BP: 135/64 (12-06-17 @ 11:11) (123/78 - 161/77)  RR: 17 (12-06-17 @ 11:11) (16 - 28)  SpO2: 96% (12-06-17 @ 11:11) (95% - 97%)  Wt(kg): --    Gen:A&O x3, NAD  Chest: significant left side anterior chest subcutaneous emphysema, + crepitus, nontender  Abdomen: soft, nontender, nondistended, no masses, no guarding, no rebound tenderness  Lower Extremities: no edema, no skin discoloration, nontender, warm extremities bennett, no ulcers, palpable pedal pulses  Upper Extremities: no edema, no skin discoloration, nontender, warm extremities bennett, no ulcers        12-06 @ 07:01  -  12-06 @ 13:47  --------------------------------------------------------  IN: 1000 mL / OUT: 0 mL / NET: 1000 mL    < from: Xray Chest 1 View AP-PORTABLE IMMEDIATE (12.06.17 @ 12:05) >  IMPRESSION: New left-sided subcutaneous emphysema. Lucency noted   overlying the medial aspect of the left lower lung field; loculated   pleural air cannot be excluded. New airspace opacities left upper lobe   and left lower lobe. Redemonstration of a known mass within the superior   aspect of the left lower lobe lung parenchyma.    < end of copied text >

## 2017-12-06 NOTE — CONSULT NOTE ADULT - ASSESSMENT
72 years old male from home with PMHx of HTN and BPH and no PSH presents to ED c/o L-sided chest pain radiating to the back with associated general weakness x6 months. Patient also reports loss of voice x6 months, in addition to decreased appetite and weight loss x2 weeks, only been able to consume x3 Ensure today. Patient reports visiting PMD on multiple occasions; pt has also been on Abx's treatments recently. Patient had a CXR by PMD, which showed a 5.2x 6.7cm L suprahilar mass, as well as a CT Chest showing a lesion in the R hepatic lobe, a large necrotic macie mass, and emphysema.   s/p bronchoscopy for biopsy of Left esme hilar mass and right lower lung density 7mm . Pt is admitted to ICU for post bronchoscopic respiratory monitoring. 72 years old male from home with PMHx of HTN and BPH and no PSH presents to ED c/o L-sided chest pain radiating to the back with associated general weakness x6 months. Patient also reports loss of voice x6 months, in addition to decreased appetite and weight loss x2 weeks, only been able to consume x3 Ensure today. Patient reports visiting PMD on multiple occasions; pt has also been on Abx's treatments recently. Patient had a CXR by PMD, which showed a 5.2x 6.7cm L suprahilar mass, as well as a CT Chest showing a lesion in the R hepatic lobe, a large necrotic macie mass, and emphysema.   s/p bronchoscopy for biopsy of Left esme hilar mass and right lower lung density 7mm . Pt is admitted to ICU for post bronchoscopic respiratory monitoring.      Close respiratory monitoring   serial chest xrays  f/u stat labs , ABG  s/p chest tube for left upper lobe pneumothorax  monitor I/O

## 2017-12-06 NOTE — CONSULT NOTE ADULT - SUBJECTIVE AND OBJECTIVE BOX
Patient is a 72y old  Male who presents with a chief complaint of weakness, chest discomfort and voice and appetite changes (05 Dec 2017 01:22)      HPI:  72 years old male from home with PMHx of HTN and BPH and no PSH presents to ED c/o L-sided chest pain radiating to the back with associated general weakness x6 months. Patient also reports loss of voice x6 months, in addition to decreased appetite and weight loss x2 weeks, only been able to consume x3 Ensure today. Patient reports visiting PMD on multiple occasions; patient has been worked up for strep throat and laryngitis in the past, and has undergone multiple tests. Patient states he has been taking Ibuprofen for pain relief, and has also been on Abx's treatments recently. Patient had a CXR today by his PMD, which showed a 5.2x6.7cm L suprahilar mass, as well as a CT Chest showing a lesion in the R hepatic lobe, a large necrotic macie mass, and emphysema.       PAST MEDICAL & SURGICAL HISTORY:  BPH (benign prostatic hyperplasia)  Hypertension  No significant past surgical history    Allergies    No Known Allergies    Intolerances      FAMILY HISTORY:  Family history of diabetes mellitus (Mother)    Social history reviewed: ***    Review of Systems:  CONSTITUTIONAL: No fever, chills, or fatigue  EYES: No eye pain, visual disturbances, or discharge  ENMT:  No difficulty hearing, tinnitus, vertigo; No sinus or throat pain  NECK: No pain or stiffness  RESPIRATORY: No cough, wheezing, chills or hemoptysis; No shortness of breath  CARDIOVASCULAR: No chest pain, palpitations, dizziness, or leg swelling  GASTROINTESTINAL: No abdominal or epigastric pain. No nausea, vomiting, or hematemesis; No diarrhea or constipation. No melena or hematochezia.  GENITOURINARY: No dysuria, frequency, hematuria, or incontinence  NEUROLOGICAL: No headaches, memory loss, loss of strength, numbness, or tremors  SKIN: No itching, burning, rashes, or lesions   MUSCULOSKELETAL: No joint pain or swelling; No muscle, back, or extremity pain  PSYCHIATRIC: No depression, anxiety, mood swings, or difficulty sleeping      Medications:  acetaminophen   Tablet 650 milliGRAM(s) Oral every 6 hours PRN  aspirin  chewable 81 milliGRAM(s) Oral daily  carvedilol 12.5 milliGRAM(s) Oral every 12 hours  enoxaparin Injectable 40 milliGRAM(s) SubCutaneous daily  lactated ringers. 1000 milliLiter(s) IV Continuous <Continuous>  oxyCODONE    5 mG/acetaminophen 325 mG 2 Tablet(s) Oral every 6 hours PRN  oxyCODONE    5 mG/acetaminophen 325 mG 1 Tablet(s) Oral every 6 hours PRN  valsartan 160 milliGRAM(s) Oral daily      vent settings      Vital Signs Last 24 Hrs  T(C): 37.2 (06 Dec 2017 10:41), Max: 37.3 (06 Dec 2017 08:29)  T(F): 98.9 (06 Dec 2017 10:41), Max: 99.1 (06 Dec 2017 08:29)  HR: 71 (06 Dec 2017 11:11) (67 - 75)  BP: 135/64 (06 Dec 2017 11:11) (123/78 - 161/77)  BP(mean): --  RR: 17 (06 Dec 2017 11:11) (16 - 28)  SpO2: 96% (06 Dec 2017 11:11) (95% - 97%)                LABS:                        11.1   12.3  )-----------( CLUMPED    ( 06 Dec 2017 08:39 )             32.7     12-06    132<L>  |  96  |  21<H>  ----------------------------<  112<H>  4.1   |  29  |  0.91    Ca    8.0<L>      06 Dec 2017 08:39  Phos  2.9     12-05  Mg     2.1     12-05    TPro  5.8<L>  /  Alb  2.0<L>  /  TBili  0.5  /  DBili  x   /  AST  16  /  ALT  39  /  AlkPhos  47  12-06          CAPILLARY BLOOD GLUCOSE        PT/INR - ( 04 Dec 2017 19:31 )   PT: 13.2 sec;   INR: 1.21 ratio         PTT - ( 04 Dec 2017 19:31 )  PTT:23.5 sec  Urinalysis Basic - ( 06 Dec 2017 03:02 )    Color: Yellow / Appearance: Clear / S.010 / pH: x  Gluc: x / Ketone: Trace  / Bili: Negative / Urobili: Negative   Blood: x / Protein: 30 mg/dL / Nitrite: Negative   Leuk Esterase: Negative / RBC: 0-2 /HPF / WBC 6-10 /HPF   Sq Epi: x / Non Sq Epi: Few /HPF / Bacteria: Moderate /HPF      CULTURES:  Culture Results:   No growth to date. ( @ 10:57)    .Blood Blood-Peripheral      Physical Examination:    General: No acute distress.      HEENT: Pupils equal, reactive to light.  Symmetric.    PULM: Clear to auscultation bilaterally, no significant sputum production    CVS: Regular rate and rhythm, no murmurs, rubs, or gallops    ABD: Soft, nondistended, nontender, normoactive bowel sounds, no masses    EXT: No edema, nontender    SKIN: Warm and well perfused, no rashes noted.    NEURO: Alert, oriented, interactive, nonfocal    RADIOLOGY REVIEWED ***    CRITICAL CARE TIME SPENT: 35 minutes CC - Patient is a 72y old  Male who presents with a chief complaint of weakness, chest discomfort and voice and appetite changes.       HPI:   72 years old male from home with PMHx of HTN and BPH and no PSH presents to ED c/o L-sided chest pain radiating to the back with associated general weakness x6 months. Patient also reports loss of voice x6 months, in addition to decreased appetite and weight loss x2 weeks, only been able to consume x3 Ensure today. Patient reports visiting PMD on multiple occasions; pt has also been on Abx's treatments recently. Patient had a CXR by PMD, which showed a 5.2x 6.7cm L suprahilar mass, as well as a CT Chest showing a lesion in the R hepatic lobe, a large necrotic macie mass, and emphysema.   s/p bronchoscopy for biopsy of Left esme hilar mass and right lower lung density 7mm . Pt is admitted to ICU for post bronchoscopic respiratory monitoring.    ROS - negative except above    PAST MEDICAL & SURGICAL HISTORY:  BPH (benign prostatic hyperplasia)  Hypertension  No significant past surgical history    Allergies    No Known Allergies    Intolerances      FAMILY HISTORY:  Family history of diabetes mellitus (Mother)        Review of Systems:  CONSTITUTIONAL: No fever, chills, or fatigue  EYES: No eye pain, visual disturbances, or discharge  ENMT:  No difficulty hearing, tinnitus, vertigo; No sinus or throat pain  NECK: No pain or stiffness  RESPIRATORY: No cough, wheezing, chills or hemoptysis; No shortness of breath  CARDIOVASCULAR: No chest pain, palpitations, dizziness, or leg swelling  GASTROINTESTINAL: No abdominal or epigastric pain. No nausea, vomiting, or hematemesis; No diarrhea or constipation. No melena or hematochezia.  GENITOURINARY: No dysuria, frequency, hematuria, or incontinence  NEUROLOGICAL: No headaches, memory loss, loss of strength, numbness, or tremors  SKIN: No itching, burning, rashes, or lesions   MUSCULOSKELETAL: No joint pain or swelling; No muscle, back, or extremity pain  PSYCHIATRIC: No depression, anxiety, mood swings, or difficulty sleeping      Medications:  acetaminophen   Tablet 650 milliGRAM(s) Oral every 6 hours PRN  aspirin  chewable 81 milliGRAM(s) Oral daily  carvedilol 12.5 milliGRAM(s) Oral every 12 hours  enoxaparin Injectable 40 milliGRAM(s) SubCutaneous daily  lactated ringers. 1000 milliLiter(s) IV Continuous <Continuous>  oxyCODONE    5 mG/acetaminophen 325 mG 2 Tablet(s) Oral every 6 hours PRN  oxyCODONE    5 mG/acetaminophen 325 mG 1 Tablet(s) Oral every 6 hours PRN  valsartan 160 milliGRAM(s) Oral daily      vent settings      Vital Signs Last 24 Hrs  T(C): 37.2 (06 Dec 2017 10:41), Max: 37.3 (06 Dec 2017 08:29)  T(F): 98.9 (06 Dec 2017 10:41), Max: 99.1 (06 Dec 2017 08:29)  HR: 71 (06 Dec 2017 11:11) (67 - 75)  BP: 135/64 (06 Dec 2017 11:11) (123/78 - 161/77)  BP(mean): --  RR: 17 (06 Dec 2017 11:11) (16 - 28)  SpO2: 96% (06 Dec 2017 11:11) (95% - 97%)                LABS:                        11.1   12.3  )-----------( CLUMPED    ( 06 Dec 2017 08:39 )             32.7     12-06    132<L>  |  96  |  21<H>  ----------------------------<  112<H>  4.1   |  29  |  0.91    Ca    8.0<L>      06 Dec 2017 08:39  Phos  2.9     12-05  Mg     2.1     12-05    TPro  5.8<L>  /  Alb  2.0<L>  /  TBili  0.5  /  DBili  x   /  AST  16  /  ALT  39  /  AlkPhos  47  12-06          CAPILLARY BLOOD GLUCOSE        PT/INR - ( 04 Dec 2017 19:31 )   PT: 13.2 sec;   INR: 1.21 ratio         PTT - ( 04 Dec 2017 19:31 )  PTT:23.5 sec  Urinalysis Basic - ( 06 Dec 2017 03:02 )    Color: Yellow / Appearance: Clear / S.010 / pH: x  Gluc: x / Ketone: Trace  / Bili: Negative / Urobili: Negative   Blood: x / Protein: 30 mg/dL / Nitrite: Negative   Leuk Esterase: Negative / RBC: 0-2 /HPF / WBC 6-10 /HPF   Sq Epi: x / Non Sq Epi: Few /HPF / Bacteria: Moderate /HPF      CULTURES:  Culture Results:   No growth to date. ( @ 10:57)    .Blood Blood-Peripheral      Physical Examination:    GGen:A&O x3, NAD  Chest: significant left side anterior chest subcutaneous emphysema, + crepitus, nontender  Abdomen: soft, nontender, nondistended, no masses, no guarding, no rebound tenderness  Lower Extremities: no edema, no skin discoloration, nontender, warm extremities bennett, no ulcers, palpable pedal pulses  Upper Extremities: no edema, no skin discoloration, nontender, warm extremities bennett, no ulcers        CRITICAL CARE TIME SPENT: 35 minutes

## 2017-12-06 NOTE — PROGRESS NOTE ADULT - SUBJECTIVE AND OBJECTIVE BOX
Patient is a 72y old  Male who presents with a chief complaint of weakness, chest discomfort and voice and appetite changes.  Former smoker. Poor appetite. Weight loss. hoarseness. Cough productive of clear phlegm. Abnormal CT scan of chest as outpatient showing left hilar mass, lever and spleen mets. Follow up bronchoscopy.       INTERVAL HPI/OVERNIGHT EVENTS:      VITAL SIGNS:  T(F): 99.1 (17 @ 08:29)  HR: 74 (17 @ 08:29)  BP: 161/77 (17 @ 08:29)  RR: 16 (17 @ 08:29)  SpO2: 96% (17 @ 08:29)  Wt(kg): --  I&O's Detail          REVIEW OF SYSTEMS:    CONSTITUTIONAL:  No fevers, chills, sweats    HEENT:  Eyes:  No diplopia or blurred vision. ENT:  No earache, sore throat or runny nose.    CARDIOVASCULAR:  No pressure, squeezing, tightness, or heaviness about the chest; no palpitations.    RESPIRATORY:  Per HPI    GASTROINTESTINAL:  No abdominal pain, nausea, vomiting or diarrhea.    GENITOURINARY:  No dysuria, frequency or urgency.    NEUROLOGIC:  No paresthesias, fasciculations, seizures or weakness.    PSYCHIATRIC:  No disorder of thought or mood.      PHYSICAL EXAM:    Constitutional: Well developed and nourished  Eyes:Perrla  ENMT: normal  Neck:supple  Respiratory: good air entry  Cardiovascular: S1 S2 regular  Gastrointestinal: Soft, Non tender  Extremities: No edema  Vascular:normal  Neurological:Awake, alert,Ox3  Musculoskeletal:Normal      MEDICATIONS  (STANDING):  aspirin  chewable 81 milliGRAM(s) Oral daily  carvedilol 12.5 milliGRAM(s) Oral every 12 hours  enoxaparin Injectable 40 milliGRAM(s) SubCutaneous daily  valsartan 160 milliGRAM(s) Oral daily    MEDICATIONS  (PRN):  acetaminophen   Tablet 650 milliGRAM(s) Oral every 6 hours PRN For Temp greater than 38 C (100.4 F) and/or mild pain (1-3)  oxyCODONE    5 mG/acetaminophen 325 mG 2 Tablet(s) Oral every 6 hours PRN Severe Pain (7 - 10)  oxyCODONE    5 mG/acetaminophen 325 mG 1 Tablet(s) Oral every 6 hours PRN Moderate Pain (4 - 6)      Allergies    No Known Allergies    Intolerances        LABS:                        11.1   12.3  )-----------( x        ( 06 Dec 2017 08:39 )             32.7     12-    132<L>  |  96  |  21<H>  ----------------------------<  112<H>  4.1   |  29  |  0.91    Ca    8.0<L>      06 Dec 2017 08:39  Phos  2.9     12  Mg     2.1     12    TPro  5.8<L>  /  Alb  2.0<L>  /  TBili  0.5  /  DBili  x   /  AST  16  /  ALT  39  /  AlkPhos  47  12-    PT/INR - ( 04 Dec 2017 19:31 )   PT: 13.2 sec;   INR: 1.21 ratio         PTT - ( 04 Dec 2017 19:31 )  PTT:23.5 sec  Urinalysis Basic - ( 06 Dec 2017 03:02 )    Color: Yellow / Appearance: Clear / S.010 / pH: x  Gluc: x / Ketone: Trace  / Bili: Negative / Urobili: Negative   Blood: x / Protein: 30 mg/dL / Nitrite: Negative   Leuk Esterase: Negative / RBC: 0-2 /HPF / WBC 6-10 /HPF   Sq Epi: x / Non Sq Epi: Few /HPF / Bacteria: Moderate /HPF            CAPILLARY BLOOD GLUCOSE            RADIOLOGY & ADDITIONAL TESTS:    CXR:  < from: Xray Chest 2 Views PA/Lat (17 @ 20:04) >  Apparent 6.0 cm left perihilar masslike density may represent   a lung cancer or focal pneumonia. 7 mm nodular density in the right lower   lung zone may represent metastasis. If clinically indicated, chest CT   with IV contrast may be pursued for further evaluation.    < end of copied text >    Ct scan chest:  < from: CT Chest w/ IV Cont (17 @ 10:00) >  Patent central airways. 7.6 x 6.2 x 8.3 cm   cavitary mass in the left lower lobe. There is encasement and narrowing   of the left lower lobe pulmonary artery and bronchus. The mass also abuts   the left inferior pulmonary vein. Debris is present within this mass.   There is destruction of the adjacent left posterior sixth and seventh   ribs and left T6 transverse process.    3 mm right lower lobe nodules noted (series 4, image 350).    There is right dependent subsegmental atelectasis and/or trace pleural   effusion. Mild bilateral upper lobe centrilobular emphysema.    < end of copied text >    ekg;    echo: Patient is a 72y old  Male who presents with a chief complaint of weakness, chest discomfort and voice and appetite changes.  Former smoker. Poor appetite. Weight loss. hoarseness. Cough productive of clear phlegm. Abnormal CT scan of chest as outpatient showing left hilar mass, liver and spleen mets. Had Bronchoscopy today. See Bronch report. Still with hoarseness      INTERVAL HPI/OVERNIGHT EVENTS:      VITAL SIGNS:  T(F): 99.1 (17 @ 08:29)  HR: 74 (17 @ 08:29)  BP: 161/77 (17 @ 08:29)  RR: 16 (17 @ 08:29)  SpO2: 96% (17 @ 08:29)  Wt(kg): --  I&O's Detail          REVIEW OF SYSTEMS:    CONSTITUTIONAL:  No fevers, chills, sweats    HEENT:  Eyes:  No diplopia or blurred vision. ENT:  No earache, sore throat or runny nose.    CARDIOVASCULAR:  No pressure, squeezing, tightness, or heaviness about the chest; no palpitations.    RESPIRATORY:  Per HPI    GASTROINTESTINAL:  No abdominal pain, nausea, vomiting or diarrhea.    GENITOURINARY:  No dysuria, frequency or urgency.    NEUROLOGIC:  No paresthesias, fasciculations, seizures or weakness.    PSYCHIATRIC:  No disorder of thought or mood.      PHYSICAL EXAM:    Constitutional: Well developed and nourished  Eyes:Perrla  ENMT: normal  Neck:supple  Respiratory: good air entry  Cardiovascular: S1 S2 regular  Gastrointestinal: Soft, Non tender  Extremities: No edema  Vascular:normal  Neurological:Awake, alert,Ox3  Musculoskeletal:Normal      MEDICATIONS  (STANDING):  aspirin  chewable 81 milliGRAM(s) Oral daily  carvedilol 12.5 milliGRAM(s) Oral every 12 hours  enoxaparin Injectable 40 milliGRAM(s) SubCutaneous daily  valsartan 160 milliGRAM(s) Oral daily    MEDICATIONS  (PRN):  acetaminophen   Tablet 650 milliGRAM(s) Oral every 6 hours PRN For Temp greater than 38 C (100.4 F) and/or mild pain (1-3)  oxyCODONE    5 mG/acetaminophen 325 mG 2 Tablet(s) Oral every 6 hours PRN Severe Pain (7 - 10)  oxyCODONE    5 mG/acetaminophen 325 mG 1 Tablet(s) Oral every 6 hours PRN Moderate Pain (4 - 6)      Allergies    No Known Allergies    Intolerances        LABS:                        11.1   12.3  )-----------( x        ( 06 Dec 2017 08:39 )             32.7     12-06    132<L>  |  96  |  21<H>  ----------------------------<  112<H>  4.1   |  29  |  0.91    Ca    8.0<L>      06 Dec 2017 08:39  Phos  2.9     12-  Mg     2.1     12    TPro  5.8<L>  /  Alb  2.0<L>  /  TBili  0.5  /  DBili  x   /  AST  16  /  ALT  39  /  AlkPhos  47  12-06    PT/INR - ( 04 Dec 2017 19:31 )   PT: 13.2 sec;   INR: 1.21 ratio         PTT - ( 04 Dec 2017 19:31 )  PTT:23.5 sec  Urinalysis Basic - ( 06 Dec 2017 03:02 )    Color: Yellow / Appearance: Clear / S.010 / pH: x  Gluc: x / Ketone: Trace  / Bili: Negative / Urobili: Negative   Blood: x / Protein: 30 mg/dL / Nitrite: Negative   Leuk Esterase: Negative / RBC: 0-2 /HPF / WBC 6-10 /HPF   Sq Epi: x / Non Sq Epi: Few /HPF / Bacteria: Moderate /HPF            CAPILLARY BLOOD GLUCOSE            RADIOLOGY & ADDITIONAL TESTS:    CXR:  < from: Xray Chest 2 Views PA/Lat (17 @ 20:04) >  Apparent 6.0 cm left perihilar masslike density may represent   a lung cancer or focal pneumonia. 7 mm nodular density in the right lower   lung zone may represent metastasis. If clinically indicated, chest CT   with IV contrast may be pursued for further evaluation.    < end of copied text >    Ct scan chest:  < from: CT Chest w/ IV Cont (17 @ 10:00) >  Patent central airways. 7.6 x 6.2 x 8.3 cm   cavitary mass in the left lower lobe. There is encasement and narrowing   of the left lower lobe pulmonary artery and bronchus. The mass also abuts   the left inferior pulmonary vein. Debris is present within this mass.   There is destruction of the adjacent left posterior sixth and seventh   ribs and left T6 transverse process.    3 mm right lower lobe nodules noted (series 4, image 350).    There is right dependent subsegmental atelectasis and/or trace pleural   effusion. Mild bilateral upper lobe centrilobular emphysema.    < end of copied text >    ekg;    echo:

## 2017-12-06 NOTE — PROGRESS NOTE ADULT - SUBJECTIVE AND OBJECTIVE BOX
Patient is a 72y old  Male who presents with a chief complaint of weakness, chest discomfort and voice and appetite changes (05 Dec 2017 01:22)    pt seen in icu [  ], reg med floor [   ], bed [  ], chair at bedside [   ], a+o x3 [  ], lethargic [  ],  nad [  ]    frias [  ], ngt [  ], peg [  ], et tube [  ], cent line [  ], picc line [  ]        Allergies    No Known Allergies        Vitals    T(F): 98.1 (12-06-17 @ 05:40), Max: 98.6 (12-05-17 @ 10:13)  HR: 67 (12-06-17 @ 05:40) (67 - 75)  BP: 145/60 (12-06-17 @ 05:40) (129/69 - 150/74)  RR: 16 (12-06-17 @ 05:40) (16 - 17)  SpO2: 97% (12-06-17 @ 05:40) (95% - 97%)  Wt(kg): --  CAPILLARY BLOOD GLUCOSE          Labs                          12.0   16.1  )-----------( CLUMPED    ( 05 Dec 2017 07:14 )             35.2       12-05    132<L>  |  96  |  25<H>  ----------------------------<  125<H>  3.8   |  29  |  1.11    Ca    8.2<L>      05 Dec 2017 07:14  Phos  2.9     12-05  Mg     2.1     12-05    TPro  6.5  /  Alb  2.4<L>  /  TBili  0.6  /  DBili  x   /  AST  33  /  ALT  60  /  AlkPhos  55  12-04                Radiology Results      Meds    MEDICATIONS  (STANDING):  aspirin  chewable 81 milliGRAM(s) Oral daily  carvedilol 12.5 milliGRAM(s) Oral every 12 hours  enoxaparin Injectable 40 milliGRAM(s) SubCutaneous daily  valsartan 160 milliGRAM(s) Oral daily      MEDICATIONS  (PRN):  acetaminophen   Tablet 650 milliGRAM(s) Oral every 6 hours PRN For Temp greater than 38 C (100.4 F) and/or mild pain (1-3)  oxyCODONE    5 mG/acetaminophen 325 mG 2 Tablet(s) Oral every 6 hours PRN Severe Pain (7 - 10)  oxyCODONE    5 mG/acetaminophen 325 mG 1 Tablet(s) Oral every 6 hours PRN Moderate Pain (4 - 6)      Physical Exam    Neuro :  no focal deficits  Respiratory: CTA B/L  CV: RRR, S1S2, no murmurs,   Abdominal: Soft, NT, ND +BS,  Extremities: No edema, + peripheral pulses    ASSESSMENT    Weakness  BPH (benign prostatic hyperplasia)  Hypertension  No pertinent past medical history  No significant past surgical history      PLAN Patient is a 72y old  Male who presents with a chief complaint of weakness, chest discomfort and voice and appetite changes (05 Dec 2017 01:22)    pt seen in icu [  ], reg med floor [ x  ], bed [ x ], chair at bedside [   ], a+o x3 [x  ], lethargic [  ],  nad [x  ]        Allergies    No Known Allergies        Vitals    T(F): 98.1 (12-06-17 @ 05:40), Max: 98.6 (12-05-17 @ 10:13)  HR: 67 (12-06-17 @ 05:40) (67 - 75)  BP: 145/60 (12-06-17 @ 05:40) (129/69 - 150/74)  RR: 16 (12-06-17 @ 05:40) (16 - 17)  SpO2: 97% (12-06-17 @ 05:40) (95% - 97%)  Wt(kg): --  CAPILLARY BLOOD GLUCOSE          Labs                          12.0   16.1  )-----------( CLUMPED    ( 05 Dec 2017 07:14 )             35.2       12-05    132<L>  |  96  |  25<H>  ----------------------------<  125<H>  3.8   |  29  |  1.11    Ca    8.2<L>      05 Dec 2017 07:14  Phos  2.9     12-05  Mg     2.1     12-05    TPro  6.5  /  Alb  2.4<L>  /  TBili  0.6  /  DBili  x   /  AST  33  /  ALT  60  /  AlkPhos  55  12-04          Radiology Results    < from: CT Chest w/ IV Cont (12.05.17 @ 10:00) >  LUNGS AND LARGE AIRWAYS: Patent central airways. 7.6 x 6.2 x 8.3 cm   cavitary mass in the left lower lobe. There is encasement and narrowing   of the left lower lobe pulmonary artery and bronchus. The mass also abuts   the left inferior pulmonary vein. Debris is present within this mass.   There is destruction of the adjacent left posterior sixth and seventh   ribs and left T6 transverse process.    3 mm right lower lobe nodules noted (series 4, image 350).    There is right dependent subsegmental atelectasis and/or trace pleural   effusion. Mild bilateral upper lobe centrilobular emphysema.    PLEURA: There is a moderate loculated left-sided pleural effusion likely   secondary to malignancy.  VESSELS: There are mild atherosclerotic changes seen throughout the   aortic vessels.  HEART: Heart size is normal. No pericardial effusion.  MEDIASTINUM AND ALPHONSE: Precarinal and AP window adenopathy, the largest   measuring 3.8 x 2.9 cm and AP window.  CHEST WALL AND LOWER NECK: 2.9 x 2.1 cm intramuscular lipoma in left   lateral chest wall.    ABDOMEN AND PELVIS:    LIVER: 1.5 x 1.4 cm right hepatic lesion, concerning for metastasis.  BILE DUCTS: Normal caliber.  GALLBLADDER: Within normal limits.  SPLEEN: Enlarged measuring 19 cm in craniocaudad dimension. Indeterminate   6.3 x 5.5 cm central low density lesion with associated peripheral   infarct.  PANCREAS: Within normal limits.  ADRENALS: Within normal limits.  KIDNEYS/URETERS: 1.0 cm indeterminate exophytic lesion arising from the   lower pole of left kidney. No hydronephrosis.    BLADDER: Within normal limits.  REPRODUCTIVE ORGANS: The prostate is within normal limits.    BOWEL: No bowel obstruction. Sigmoid diverticulosis without active   diverticulitis.   PERITONEUM: 2.2 x 2.1 cm necrotic implant in the right paracolic gutter.   No ascites.  VESSELS: Atherosclerotic calcifications.  RETROPERITONEUM: 4.0 x 3.2 cm necrotic portacaval adenopathy.    ABDOMINAL WALL: Within normal limits.  BONES: Destruction of the left 6th and 7th ribs and left T6 transverse   process as described above. No additional destructive osseous lesions.    IMPRESSION: 7.6 cm necrotic left lower lobe mass consistent with primary   neoplasm.  Mediastinal adenopathy and destruction of adjacent osseous structures.  Hepatic and splenic lesions, likely metastatic.  Upper abdominal adenopathy.  1.0 cm indeterminate exophytic left renal lesion.      < end of copied text >          Meds    MEDICATIONS  (STANDING):  aspirin  chewable 81 milliGRAM(s) Oral daily  carvedilol 12.5 milliGRAM(s) Oral every 12 hours  enoxaparin Injectable 40 milliGRAM(s) SubCutaneous daily  valsartan 160 milliGRAM(s) Oral daily      MEDICATIONS  (PRN):  acetaminophen   Tablet 650 milliGRAM(s) Oral every 6 hours PRN For Temp greater than 38 C (100.4 F) and/or mild pain (1-3)  oxyCODONE    5 mG/acetaminophen 325 mG 2 Tablet(s) Oral every 6 hours PRN Severe Pain (7 - 10)  oxyCODONE    5 mG/acetaminophen 325 mG 1 Tablet(s) Oral every 6 hours PRN Moderate Pain (4 - 6)      Physical Exam    Neuro :  no focal deficits  Respiratory: CTA B/L  CV: RRR, S1S2, no murmurs,   Abdominal: Soft, NT, ND +BS,  Extremities: No edema, + peripheral pulses    ASSESSMENT    perihilar mass,   hepatic and splenic lesions,   r/o malig,   copd   destruction of left 6th and 7th ribs and left T6 transverse process   exophytic left renal lesion.  h/o BPH (benign prostatic hyperplasia)  Hypertension        PLAN        cont atrov and prov nebs,   cont supplemental oxygen,  ct chest-and-pelv  pulm cons  pt for bronch today.   heme onc eval  cont current meds

## 2017-12-06 NOTE — PROGRESS NOTE ADULT - PROBLEM SELECTOR PLAN 1
Quantiferon Gold TB test.  Follow up CT Scan  Follow up on Bronchoscopy Quantiferon Gold TB test  Follow up on Bronchoscopy results  Empiric antibiotics

## 2017-12-06 NOTE — PROCEDURE NOTE - ADDITIONAL PROCEDURE DETAILS
chest tube placed due to post bronchoscopy left sided PTX, after pleural space opened a rush of air was heard, 24F tube placed in axillary space.  remained stable throughout, saturations improved after procedure.

## 2017-12-07 NOTE — DIETITIAN INITIAL EVALUATION ADULT. - OTHER INFO
nutrition assessment for ICU length of stay; lives home PTA; skin intact; usual gm=597 to 175 lb, current qe=153.6 lb, 2.6% loss from usual wt x 2wks; denied GI distress, chewing or swallowing problem at present, food choices obtained, tolerating 50% food at breakfast, but drank a can of Ensure nutrition assessment for ICU length of stay; lives home PTA; skin intact; usual qa=052 to 175 lb, current ub=601.6 lb, 2.6% to 5.5% loss from usual wt x 2wks; denied GI distress, chewing or swallowing problem at present, food choices obtained, tolerating 50% food at breakfast, but drank a can of Ensure nutrition assessment for ICU length of stay, pneumothorax with chest tube, lung mass noted; lives home PTA; skin intact; usual ne=938 to 175 lb, current ju=286.6 lb, 2.6% to 5.5% loss from usual wt x 2wks; denied GI distress, chewing or swallowing problem at present, food choices obtained, tolerating 50% food at breakfast, but drank a can of Ensure;

## 2017-12-07 NOTE — DIETITIAN INITIAL EVALUATION ADULT. - SIGNS/SYMPTOMS
decreased intake with taste change PTA, 50% intake at breakfast ( but able to take Ensure ) decreased intake + 2.6% wt loss x 2 wks decreased intake + 2.6% to 5.5% wt loss x 2 wks

## 2017-12-07 NOTE — CHART NOTE - NSCHARTNOTEFT_GEN_A_CORE
72 former smoker 20 pack year M PMH HTN and BPH p/w pneumothorax s/p diagnostic bronchoscopy for left lobe lesions found on CT imaging - chest tube placed in ICU 12/6 on suction - admitted for urgent CT placement and monitoring. Patient has chronic complaints of decreased appetite and voice weakness.    Pneumothorax   - Crepitus along neck and L anterior chest wall  - CXR shows small pneumomediastinum, overall improvement  - Afebrile, WBC improved to 12K from 19K    Lung mass  - S/p bronchoscopy biopsy  ***F/u pathology.     Hypertension.  Plan: BP acceptable on home medications.     BPH (benign prostatic hyperplasia)  - Good UO; 1370/24 hrs  - Restarted Tamsulosin    Prophylactic measure  - IMPROVE score 1 2/2 immobilization; DVT PPx 2/2 ICU stay  - GI stress ulcer PPx w/ Protonix.    To Do  - F/u pathology results  - Monitor chest tube output and f/u pulmonary recommendations

## 2017-12-07 NOTE — PROGRESS NOTE ADULT - PROBLEM SELECTOR PLAN 1
Chest tube placed 12/6, crepitus along neck and L anterior chest wall  - CXR shows small pneumomediastinum, overall improvement  - Afebrile, WBC improved to 12K from 19K  Pulmonology Dr. Bocanegra

## 2017-12-07 NOTE — PROGRESS NOTE ADULT - ASSESSMENT
72 M PMH HTN and BPH p/w pneumothorax s/p diagnostic bronchoscopy - chest tube placed in ICU - admitted for urgent CT placement and monitoring

## 2017-12-07 NOTE — PROGRESS NOTE ADULT - SUBJECTIVE AND OBJECTIVE BOX
Patient is a 72y old  Male who presents with a chief complaint of weakness, chest discomfort and voice and appetite changes (05 Dec 2017 01:22)    pt seen in icu [  ], reg med floor [ x  ], bed [ x ], chair at bedside [   ], a+o x3 [x  ], lethargic [  ],    nad [x  ]      Allergies    No Known Allergies        Vitals    T(F): 97.4 (12-07-17 @ 05:00), Max: 99.3 (12-06-17 @ 21:28)  HR: 66 (12-07-17 @ 06:00) (61 - 90)  BP: 140/65 (12-07-17 @ 06:00) (104/54 - 190/78)  RR: 21 (12-07-17 @ 06:00) (16 - 32)  SpO2: 96% (12-07-17 @ 06:00) (91% - 100%)  Wt(kg): --  CAPILLARY BLOOD GLUCOSE          Labs                          10.7   12.0  )-----------( CLUMPED    ( 07 Dec 2017 06:36 )             31.9       12-07    133<L>  |  98  |  23<H>  ----------------------------<  84  4.3   |  27  |  0.86    Ca    7.9<L>      07 Dec 2017 06:36  Phos  4.1     12-07  Mg     1.8     12-07    TPro  5.8<L>  /  Alb  2.0<L>  /  TBili  0.5  /  DBili  x   /  AST  16  /  ALT  39  /  AlkPhos  47  12-06            .Blood Blood-Peripheral  12-05 @ 10:57   No growth to date.  --  --          Radiology Results      Meds    MEDICATIONS  (STANDING):  aspirin  chewable 81 milliGRAM(s) Oral daily  carvedilol 12.5 milliGRAM(s) Oral every 12 hours  enoxaparin Injectable 40 milliGRAM(s) SubCutaneous daily  melatonin 3 milliGRAM(s) Oral at bedtime  valsartan 160 milliGRAM(s) Oral daily      MEDICATIONS  (PRN):  acetaminophen   Tablet 650 milliGRAM(s) Oral every 6 hours PRN For Temp greater than 38 C (100.4 F) and/or mild pain (1-3)  fentaNYL    Injectable 25 MICROGram(s) IV Push every 10 minutes PRN Moderate Pain (4 - 6)  oxyCODONE    5 mG/acetaminophen 325 mG 2 Tablet(s) Oral every 6 hours PRN Severe Pain (7 - 10)  oxyCODONE    5 mG/acetaminophen 325 mG 1 Tablet(s) Oral every 6 hours PRN Moderate Pain (4 - 6)      Physical Exam    Neuro :  no focal deficits  Respiratory: CTA B/L  CV: RRR, S1S2, no murmurs,   Abdominal: Soft, NT, ND +BS,  Extremities: No edema, + peripheral pulses    ASSESSMENT    perihilar mass,   hepatic and splenic lesions,   r/o malig,   copd   destruction of left 6th and 7th ribs and left T6 transverse process   exophytic left renal lesion.  h/o BPH (benign prostatic hyperplasia)  Hypertension        PLAN        cont atrov and prov nebs,   cont supplemental oxygen,  ct chest-and-pelv  pulm cons  pt for bronch today.   heme onc eval  cont current meds Patient is a 72y old  Male who presents with a chief complaint of weakness, chest discomfort and voice and appetite changes (05 Dec 2017 01:22)    pt seen in icu [  ], reg med floor [ x  ], bed [ x ], chair at bedside [   ], a+o x3 [x  ], lethargic [  ],    nad [x  ], left chest to pleurovac [x], frias to bsd [x]      Allergies    No Known Allergies        Vitals    T(F): 97.4 (12-07-17 @ 05:00), Max: 99.3 (12-06-17 @ 21:28)  HR: 66 (12-07-17 @ 06:00) (61 - 90)  BP: 140/65 (12-07-17 @ 06:00) (104/54 - 190/78)  RR: 21 (12-07-17 @ 06:00) (16 - 32)  SpO2: 96% (12-07-17 @ 06:00) (91% - 100%)  Wt(kg): --  CAPILLARY BLOOD GLUCOSE          Labs                          10.7   12.0  )-----------( CLUMPED    ( 07 Dec 2017 06:36 )             31.9       12-07    133<L>  |  98  |  23<H>  ----------------------------<  84  4.3   |  27  |  0.86    Ca    7.9<L>      07 Dec 2017 06:36  Phos  4.1     12-07  Mg     1.8     12-07    TPro  5.8<L>  /  Alb  2.0<L>  /  TBili  0.5  /  DBili  x   /  AST  16  /  ALT  39  /  AlkPhos  47  12-06            .Blood Blood-Peripheral  12-05 @ 10:57   No growth to date.  --  --          Radiology Results          Meds    MEDICATIONS  (STANDING):  aspirin  chewable 81 milliGRAM(s) Oral daily  carvedilol 12.5 milliGRAM(s) Oral every 12 hours  enoxaparin Injectable 40 milliGRAM(s) SubCutaneous daily  melatonin 3 milliGRAM(s) Oral at bedtime  valsartan 160 milliGRAM(s) Oral daily      MEDICATIONS  (PRN):  acetaminophen   Tablet 650 milliGRAM(s) Oral every 6 hours PRN For Temp greater than 38 C (100.4 F) and/or mild pain (1-3)  fentaNYL    Injectable 25 MICROGram(s) IV Push every 10 minutes PRN Moderate Pain (4 - 6)  oxyCODONE    5 mG/acetaminophen 325 mG 2 Tablet(s) Oral every 6 hours PRN Severe Pain (7 - 10)  oxyCODONE    5 mG/acetaminophen 325 mG 1 Tablet(s) Oral every 6 hours PRN Moderate Pain (4 - 6)      Physical Exam    Neuro :  no focal deficits  Respiratory: CTA B/L, left subcutaneous emphysema, left chest tube to pleurovac  CV: RRR, S1S2, no murmurs,   Abdominal: Soft, NT, ND +BS,  Extremities: No edema, + peripheral pulses    ASSESSMENT    perihilar mass,   hepatic and splenic lesions,   r/o malig,   copd   destruction of left 6th and 7th ribs and left T6 transverse process   exophytic left renal lesion  subcutaneous emphysema.  h/o BPH (benign prostatic hyperplasia)  Hypertension        PLAN        cont atrov and prov nebs,   cont supplemental oxygen,  ct chest-abd-pelv result noted  pulm f/u  s/p bronch with bx yesterday  f/u cytopath of bronch.   monitor chest tube  vasc surg f/u  heme onc eval  urology cons  cont current meds  mgmt as per icu Patient is a 72y old  Male who presents with a chief complaint of weakness, chest discomfort and voice and appetite changes (05 Dec 2017 01:22)    pt seen in icu [ x ], reg med floor [  ], bed [ x ], chair at bedside [   ], a+o x3 [x  ], lethargic [  ],    nad [x  ], left chest to pleurovac [x], frias to bsd [x]      Allergies    No Known Allergies        Vitals    T(F): 97.4 (12-07-17 @ 05:00), Max: 99.3 (12-06-17 @ 21:28)  HR: 66 (12-07-17 @ 06:00) (61 - 90)  BP: 140/65 (12-07-17 @ 06:00) (104/54 - 190/78)  RR: 21 (12-07-17 @ 06:00) (16 - 32)  SpO2: 96% (12-07-17 @ 06:00) (91% - 100%)  Wt(kg): --  CAPILLARY BLOOD GLUCOSE          Labs                          10.7   12.0  )-----------( CLUMPED    ( 07 Dec 2017 06:36 )             31.9       12-07    133<L>  |  98  |  23<H>  ----------------------------<  84  4.3   |  27  |  0.86    Ca    7.9<L>      07 Dec 2017 06:36  Phos  4.1     12-07  Mg     1.8     12-07    TPro  5.8<L>  /  Alb  2.0<L>  /  TBili  0.5  /  DBili  x   /  AST  16  /  ALT  39  /  AlkPhos  47  12-06            .Blood Blood-Peripheral  12-05 @ 10:57   No growth to date.  --  --          Radiology Results          Meds    MEDICATIONS  (STANDING):  aspirin  chewable 81 milliGRAM(s) Oral daily  carvedilol 12.5 milliGRAM(s) Oral every 12 hours  enoxaparin Injectable 40 milliGRAM(s) SubCutaneous daily  melatonin 3 milliGRAM(s) Oral at bedtime  valsartan 160 milliGRAM(s) Oral daily      MEDICATIONS  (PRN):  acetaminophen   Tablet 650 milliGRAM(s) Oral every 6 hours PRN For Temp greater than 38 C (100.4 F) and/or mild pain (1-3)  fentaNYL    Injectable 25 MICROGram(s) IV Push every 10 minutes PRN Moderate Pain (4 - 6)  oxyCODONE    5 mG/acetaminophen 325 mG 2 Tablet(s) Oral every 6 hours PRN Severe Pain (7 - 10)  oxyCODONE    5 mG/acetaminophen 325 mG 1 Tablet(s) Oral every 6 hours PRN Moderate Pain (4 - 6)      Physical Exam    Neuro :  no focal deficits  Respiratory: CTA B/L, left subcutaneous emphysema, left chest tube to pleurovac  CV: RRR, S1S2, no murmurs,   Abdominal: Soft, NT, ND +BS,  Extremities: No edema, + peripheral pulses    ASSESSMENT    perihilar mass,   hepatic and splenic lesions,   r/o malig,   copd   destruction of left 6th and 7th ribs and left T6 transverse process   exophytic left renal lesion  subcutaneous emphysema.  h/o BPH (benign prostatic hyperplasia)  Hypertension        PLAN        cont atrov and prov nebs,   cont supplemental oxygen,  ct chest-abd-pelv result noted  pulm f/u  s/p bronch with bx yesterday  f/u cytopath of bronch.   monitor chest tube  vasc surg f/u  heme onc eval  urology cons  cont current meds  mgmt as per icu

## 2017-12-07 NOTE — PROGRESS NOTE ADULT - SUBJECTIVE AND OBJECTIVE BOX
Thoracic Surgery    Subjective:  Pt resting comfortably. No SOB on NC. L chest tube to suction.  c/o dark phlegm, loss of appetite and voice hoarseness.    T(C): 37.1 (12-07-17 @ 08:00), Max: 37.4 (12-06-17 @ 21:28)  HR: 81 (12-07-17 @ 10:00) (61 - 90)  BP: 114/41 (12-07-17 @ 10:00) (104/54 - 190/78)  RR: 23 (12-07-17 @ 10:00) (17 - 32)  SpO2: 99% (12-07-17 @ 10:00) (91% - 100%)    Physical:  Gen: A&O x3  Chest: Subq emphysema throughout chest wall, extending caudally to lateral maxilla and mastoid b/l. Decrease swelling compared to yesterday. Dressing C/D/I     I&O's Detail    06 Dec 2017 07:01  -  07 Dec 2017 07:00  --------------------------------------------------------  IN:    IV PiggyBack: 1000 mL    lactated ringers.: 875 mL  Total IN: 1875 mL    OUT:    Chest Tube: 110 mL serosanguinous fluid. +airleak    Indwelling Catheter - Urethral: 1370 mL  Total OUT: 1480 mL    Total NET: 395 mL      07 Dec 2017 07:01  -  07 Dec 2017 10:44  --------------------------------------------------------  IN:  Total IN: 0 mL    OUT:    Indwelling Catheter - Urethral: 40 mL  Total OUT: 40 mL    Total NET: -40 mL

## 2017-12-07 NOTE — PROGRESS NOTE ADULT - SUBJECTIVE AND OBJECTIVE BOX
Patient is a 72y old  Male who presents with a chief complaint of weakness, chest discomfort and voice and appetite changes (05 Dec 2017 01:22)  Awake, alert, comfortable in bed in NAD. Had Bronchoscopy yesterday because of a left hilar mass. He develop a significant subcutaneous emphysema post procedure without a definitive PTX on CXR however ultimately required a Chest Tube placement while under clinical observation in ICU. No chest pain or sob post procedure. Awaiting path report. May need mediastinoscopy and biopsy if FOB results come back negative. Still with hoarseness.    INTERVAL HPI/OVERNIGHT EVENTS:      VITAL SIGNS:  T(F): 98.8 (17 @ 08:00)  HR: 81 (17 @ 10:00)  BP: 114/41 (17 @ 10:00)  RR: 23 (17 @ 10:00)  SpO2: 99% (17 @ 10:00)  Wt(kg): --  I&O's Detail    06 Dec 2017 07:01  -  07 Dec 2017 07:00  --------------------------------------------------------  IN:    IV PiggyBack: 1000 mL    lactated ringers.: 875 mL  Total IN: 1875 mL    OUT:    Chest Tube: 110 mL    Indwelling Catheter - Urethral: 1370 mL  Total OUT: 1480 mL    Total NET: 395 mL      07 Dec 2017 07:01  -  07 Dec 2017 10:21  --------------------------------------------------------  IN:  Total IN: 0 mL    OUT:    Indwelling Catheter - Urethral: 40 mL  Total OUT: 40 mL    Total NET: -40 mL              REVIEW OF SYSTEMS:    CONSTITUTIONAL:  No fevers, chills, sweats    HEENT:  Eyes:  No diplopia or blurred vision. ENT:  No earache, sore throat or runny nose.    CARDIOVASCULAR:  No pressure, squeezing, tightness, or heaviness about the chest; no palpitations.    RESPIRATORY:  Per HPI    GASTROINTESTINAL:  No abdominal pain, nausea, vomiting or diarrhea.    GENITOURINARY:  No dysuria, frequency or urgency.    NEUROLOGIC:  No paresthesias, fasciculations, seizures or weakness.    PSYCHIATRIC:  No disorder of thought or mood.      PHYSICAL EXAM:    Constitutional: Well developed and nourished  Eyes:Perrla  ENMT: normal  Neck:supple  Respiratory: Decrease BS on left side. +chest tub and crepitations on palpation  Cardiovascular: S1 S2 regular  Gastrointestinal: Soft, Non tender  Extremities: No edema  Vascular:normal  Neurological:Awake, alert,Ox3  Musculoskeletal:Normal      MEDICATIONS  (STANDING):  aspirin  chewable 81 milliGRAM(s) Oral daily  carvedilol 12.5 milliGRAM(s) Oral every 12 hours  enoxaparin Injectable 40 milliGRAM(s) SubCutaneous daily  melatonin 3 milliGRAM(s) Oral at bedtime  tamsulosin 0.4 milliGRAM(s) Oral at bedtime  valsartan 160 milliGRAM(s) Oral daily    MEDICATIONS  (PRN):  acetaminophen   Tablet 650 milliGRAM(s) Oral every 6 hours PRN For Temp greater than 38 C (100.4 F) and/or mild pain (1-3)  fentaNYL    Injectable 25 MICROGram(s) IV Push every 10 minutes PRN Moderate Pain (4 - 6)  oxyCODONE    5 mG/acetaminophen 325 mG 2 Tablet(s) Oral every 6 hours PRN Severe Pain (7 - 10)  oxyCODONE    5 mG/acetaminophen 325 mG 1 Tablet(s) Oral every 6 hours PRN Moderate Pain (4 - 6)      Allergies    No Known Allergies    Intolerances        LABS:                        10.7   12.0  )-----------( CLUMPED    ( 07 Dec 2017 06:36 )             31.9     12-07    133<L>  |  98  |  23<H>  ----------------------------<  84  4.3   |  27  |  0.86    Ca    7.9<L>      07 Dec 2017 06:36  Phos  4.1     12-  Mg     1.8         TPro  5.8<L>  /  Alb  2.0<L>  /  TBili  0.5  /  DBili  x   /  AST  16  /  ALT  39  /  AlkPhos  47  12-    PT/INR - ( 06 Dec 2017 17:29 )   PT: 13.3 sec;   INR: 1.22 ratio         PTT - ( 06 Dec 2017 17:29 )  PTT:23.4 sec  Urinalysis Basic - ( 06 Dec 2017 03:02 )    Color: Yellow / Appearance: Clear / S.010 / pH: x  Gluc: x / Ketone: Trace  / Bili: Negative / Urobili: Negative   Blood: x / Protein: 30 mg/dL / Nitrite: Negative   Leuk Esterase: Negative / RBC: 0-2 /HPF / WBC 6-10 /HPF   Sq Epi: x / Non Sq Epi: Few /HPF / Bacteria: Moderate /HPF            CAPILLARY BLOOD GLUCOSE            RADIOLOGY & ADDITIONAL TESTS:    CXR:  < from: Xray Chest 1 View AP -PORTABLE-Routine (17 @ 08:13) >  IMPRESSION:  Left chest tube again noted. Left subcutaneous emphysema again noted.   This limits evaluation for pneumothorax. No large left pneumothorax noted.    Left perihilar opacity without significant change.    No significant pleural effusion.    Heart size cannot be accurately assessed in this projection.    < end of copied text >    Ct scan chest:    ekg;    echo:

## 2017-12-07 NOTE — CHART NOTE - NSCHARTNOTEFT_GEN_A_CORE
Upon Nutritional Assessment by the Registered Dietitian your patient was determined to meet criteria / has evidence of the following diagnosis/diagnoses:          [ ]  Mild Protein Calorie Malnutrition        [ X ]  Moderate Protein Calorie Malnutrition        [ ] Severe Protein Calorie Malnutrition        [ ] Unspecified Protein Calorie Malnutrition        [ ] Underweight / BMI <19        [ ] Morbid Obesity / BMI > 40      Findings as based on:  •  Comprehensive nutrition assessment and consultation  •  Calorie counts (nutrient intake analysis)  •  Food acceptance and intake status from observations by staff  •  Follow up  •  Patient education  •  Intervention secondary to interdisciplinary rounds  •   concerns      Treatment:    The following diet has been recommended: change supplement to Ensure Enlive 1can (240ml) x tid (1050kcal, 60g protein) ( Ensure Muscle Health is no longer available)      PROVIDER Section:     By signing this assessment you are acknowledging and agree with the diagnosis/diagnoses assigned by the Registered Dietitian    Comments:

## 2017-12-07 NOTE — PROGRESS NOTE ADULT - SUBJECTIVE AND OBJECTIVE BOX
INTERVAL HPI/OVERNIGHT EVENTS: no acute events,     PRESSORS: NO    ANTIBIOTICS: NO    Cardiovascular:  carvedilol 12.5 milliGRAM(s) Oral every 12 hours  valsartan 160 milliGRAM(s) Oral daily    Pulmonary:    Hematalogic:  aspirin  chewable 81 milliGRAM(s) Oral daily  enoxaparin Injectable 40 milliGRAM(s) SubCutaneous daily    Other:  acetaminophen   Tablet 650 milliGRAM(s) Oral every 6 hours PRN  fentaNYL    Injectable 25 MICROGram(s) IV Push every 10 minutes PRN  melatonin 3 milliGRAM(s) Oral at bedtime  oxyCODONE    5 mG/acetaminophen 325 mG 2 Tablet(s) Oral every 6 hours PRN  oxyCODONE    5 mG/acetaminophen 325 mG 1 Tablet(s) Oral every 6 hours PRN    acetaminophen   Tablet 650 milliGRAM(s) Oral every 6 hours PRN  aspirin  chewable 81 milliGRAM(s) Oral daily  carvedilol 12.5 milliGRAM(s) Oral every 12 hours  enoxaparin Injectable 40 milliGRAM(s) SubCutaneous daily  fentaNYL    Injectable 25 MICROGram(s) IV Push every 10 minutes PRN  melatonin 3 milliGRAM(s) Oral at bedtime  oxyCODONE    5 mG/acetaminophen 325 mG 2 Tablet(s) Oral every 6 hours PRN  oxyCODONE    5 mG/acetaminophen 325 mG 1 Tablet(s) Oral every 6 hours PRN  valsartan 160 milliGRAM(s) Oral daily    Drug Dosing Weight      CENTRAL LINE: [] YES [] NO  LOCATION:   DATE INSERTED:  REMOVE: [] YES [] NO  EXPLAIN:    LERMA: [] YES [] NO    DATE INSERTED:  REMOVE:  [] YES [] NO  EXPLAIN:    A-LINE:  [] YES [] NO  LOCATION:   DATE INSERTED:  REMOVE:  [] YES [] NO  EXPLAIN:    PMH -reviewed admission note, no change since admission  PAST MEDICAL & SURGICAL HISTORY:  BPH (benign prostatic hyperplasia)  Hypertension  No significant past surgical history      ICU Vital Signs Last 24 Hrs  T(C): 36.3 (07 Dec 2017 05:00), Max: 37.4 (06 Dec 2017 21:28)  T(F): 97.4 (07 Dec 2017 05:00), Max: 99.3 (06 Dec 2017 21:28)  HR: 67 (07 Dec 2017 07:00) (61 - 90)  BP: 136/65 (07 Dec 2017 07:00) (104/54 - 190/78)  BP(mean): 82 (07 Dec 2017 07:00) (66 - 106)  ABP: --  ABP(mean): --  RR: 21 (07 Dec 2017 07:00) (16 - 32)  SpO2: 99% (07 Dec 2017 07:00) (91% - 100%)             @ 07:01  -   @ 07:00  --------------------------------------------------------  IN: 1875 mL / OUT: 1480 mL / NET: 395 mL            PHYSICAL EXAM:    GENERAL: []NAD, []well-groomed, []well-developed  HEAD:  []Atraumatic, []Normocephalic  EYES: []EOMI, []PERRLA, []conjunctiva and sclera clear  ENMT: []No tonsillar erythema, exudates, or enlargement; []Moist mucous membranes, []Good dentition, []No lesions  NECK: []Supple, normal appearance, []No JVD; []Normal thyroid; []Trachea midline  NERVOUS SYSTEM:  []Alert & Oriented X3, []Good concentration; []Motor Strength 5/5 B/L upper and lower extremities; []DTRs 2+ intact and symmetric  CHEST/LUNG: []No chest deformity; []Normal percussion bilaterally; []No rales, rhonchi, wheezing   HEART: []Regular rate and rhythm; []No murmurs, rubs, or gallops  ABDOMEN: []Soft, Nontender, Nondistended; []Bowel sounds present  EXTREMITIES:  []2+ Peripheral Pulses, []No clubbing, cyanosis, or edema  LYMPH: []No lymphadenopathy noted  SKIN: []No rashes or lesions; []Good capillary refill      LABS:  CBC Full  -  ( 07 Dec 2017 06:36 )  WBC Count : 12.0 K/uL  Hemoglobin : 10.7 g/dL  Hematocrit : 31.9 %  Platelet Count - Automated : CLUMPED  Mean Cell Volume : 95.8 fl  Mean Cell Hemoglobin : 32.0 pg  Mean Cell Hemoglobin Concentration : 33.5 gm/dL  Auto Neutrophil # : x  Auto Lymphocyte # : x  Auto Monocyte # : x  Auto Eosinophil # : x  Auto Basophil # : x  Auto Neutrophil % : x  Auto Lymphocyte % : x  Auto Monocyte % : x  Auto Eosinophil % : x  Auto Basophil % : x        133<L>  |  98  |  23<H>  ----------------------------<  84  4.3   |  27  |  0.86    Ca    7.9<L>      07 Dec 2017 06:36  Phos  4.1       Mg     1.8         TPro  5.8<L>  /  Alb  2.0<L>  /  TBili  0.5  /  DBili  x   /  AST  16  /  ALT  39  /  AlkPhos  47      PT/INR - ( 06 Dec 2017 17:29 )   PT: 13.3 sec;   INR: 1.22 ratio         PTT - ( 06 Dec 2017 17:29 )  PTT:23.4 sec  Urinalysis Basic - ( 06 Dec 2017 03:02 )    Color: Yellow / Appearance: Clear / S.010 / pH: x  Gluc: x / Ketone: Trace  / Bili: Negative / Urobili: Negative   Blood: x / Protein: 30 mg/dL / Nitrite: Negative   Leuk Esterase: Negative / RBC: 0-2 /HPF / WBC 6-10 /HPF   Sq Epi: x / Non Sq Epi: Few /HPF / Bacteria: Moderate /HPF      Culture Results:   No growth to date. ( @ 10:57)      RADIOLOGY & ADDITIONAL STUDIES REVIEWED:  ***    []GOALS OF CARE DISCUSSION WITH PATIENT/FAMILY/PROXY:    CRITICAL CARE TIME SPENT: 35 minutes INTERVAL HPI/OVERNIGHT EVENTS: no acute events, interval improvement in CXR    PRESSORS: NO    ANTIBIOTICS: NO    Cardiovascular:  carvedilol 12.5 milliGRAM(s) Oral every 12 hours  valsartan 160 milliGRAM(s) Oral daily    Pulmonary:    Hematalogic:  aspirin  chewable 81 milliGRAM(s) Oral daily  enoxaparin Injectable 40 milliGRAM(s) SubCutaneous daily    Other:  acetaminophen   Tablet 650 milliGRAM(s) Oral every 6 hours PRN  fentaNYL    Injectable 25 MICROGram(s) IV Push every 10 minutes PRN  melatonin 3 milliGRAM(s) Oral at bedtime  oxyCODONE    5 mG/acetaminophen 325 mG 2 Tablet(s) Oral every 6 hours PRN  oxyCODONE    5 mG/acetaminophen 325 mG 1 Tablet(s) Oral every 6 hours PRN    acetaminophen   Tablet 650 milliGRAM(s) Oral every 6 hours PRN  aspirin  chewable 81 milliGRAM(s) Oral daily  carvedilol 12.5 milliGRAM(s) Oral every 12 hours  enoxaparin Injectable 40 milliGRAM(s) SubCutaneous daily  fentaNYL    Injectable 25 MICROGram(s) IV Push every 10 minutes PRN  melatonin 3 milliGRAM(s) Oral at bedtime  oxyCODONE    5 mG/acetaminophen 325 mG 2 Tablet(s) Oral every 6 hours PRN  oxyCODONE    5 mG/acetaminophen 325 mG 1 Tablet(s) Oral every 6 hours PRN  valsartan 160 milliGRAM(s) Oral daily    Drug Dosing Weight      CENTRAL LINE: NO    LERMA: NO    A-LINE:  NO    PMH -reviewed admission note, no change since admission  PAST MEDICAL & SURGICAL HISTORY:  BPH (benign prostatic hyperplasia)  Hypertension  No significant past surgical history      ICU Vital Signs Last 24 Hrs  T(C): 36.3 (07 Dec 2017 05:00), Max: 37.4 (06 Dec 2017 21:28)  T(F): 97.4 (07 Dec 2017 05:00), Max: 99.3 (06 Dec 2017 21:28)  HR: 67 (07 Dec 2017 07:00) (61 - 90)  BP: 136/65 (07 Dec 2017 07:00) (104/54 - 190/78)  BP(mean): 82 (07 Dec 2017 07:00) (66 - 106)  ABP: --  ABP(mean): --  RR: 21 (07 Dec 2017 07:00) (16 - 32)  SpO2: 99% (07 Dec 2017 07:00) (91% - 100%)             @ 07:01  -   @ 07:00  --------------------------------------------------------  IN: 1875 mL / OUT: 1480 mL / NET: 395 mL            PHYSICAL EXAM:    GENERAL: [x]NAD, []well-groomed, []well-developed  HEAD:  [x]Atraumatic, []Normocephalic  EYES: [x]EOMI, []PERRLA, []conjunctiva and sclera clear  ENMT: [x]No tonsillar erythema, exudates, or enlargement; []Moist mucous membranes, []Good dentition, []No lesions  NECK: [x]Supple, normal appearance, []No JVD; []Normal thyroid; []Trachea midline  NERVOUS SYSTEM:  [x]Alert & Oriented X3, []Good concentration; []Motor Strength 5/5 B/L upper and lower extremities; []DTRs 2+ intact and symmetric  CHEST/LUNG: [x]L chest wall crepitus; []Normal percussion bilaterally; []No rales, rhonchi, wheezing   HEART: [x]Regular rate and rhythm; []No murmurs, rubs, or gallops  ABDOMEN: [x]Soft, Nontender, Nondistended; []Bowel sounds present  EXTREMITIES:  [x]2+ Peripheral Pulses, []No clubbing, cyanosis, or edema  LYMPH: [x]No lymphadenopathy noted  SKIN: [x]No rashes or lesions; []Good capillary refill      LABS:  CBC Full  -  ( 07 Dec 2017 06:36 )  WBC Count : 12.0 K/uL  Hemoglobin : 10.7 g/dL  Hematocrit : 31.9 %  Platelet Count - Automated : CLUMPED  Mean Cell Volume : 95.8 fl  Mean Cell Hemoglobin : 32.0 pg  Mean Cell Hemoglobin Concentration : 33.5 gm/dL  Auto Neutrophil # : x  Auto Lymphocyte # : x  Auto Monocyte # : x  Auto Eosinophil # : x  Auto Basophil # : x  Auto Neutrophil % : x  Auto Lymphocyte % : x  Auto Monocyte % : x  Auto Eosinophil % : x  Auto Basophil % : x        133<L>  |  98  |  23<H>  ----------------------------<  84  4.3   |  27  |  0.86    Ca    7.9<L>      07 Dec 2017 06:36  Phos  4.1       Mg     1.8         TPro  5.8<L>  /  Alb  2.0<L>  /  TBili  0.5  /  DBili  x   /  AST  16  /  ALT  39  /  AlkPhos  47      PT/INR - ( 06 Dec 2017 17:29 )   PT: 13.3 sec;   INR: 1.22 ratio         PTT - ( 06 Dec 2017 17:29 )  PTT:23.4 sec  Urinalysis Basic - ( 06 Dec 2017 03:02 )    Color: Yellow / Appearance: Clear / S.010 / pH: x  Gluc: x / Ketone: Trace  / Bili: Negative / Urobili: Negative   Blood: x / Protein: 30 mg/dL / Nitrite: Negative   Leuk Esterase: Negative / RBC: 0-2 /HPF / WBC 6-10 /HPF   Sq Epi: x / Non Sq Epi: Few /HPF / Bacteria: Moderate /HPF      Culture Results:   No growth to date. ( @ 10:57)      RADIOLOGY & ADDITIONAL STUDIES REVIEWED: YES    []GOALS OF CARE DISCUSSION WITH PATIENT/FAMILY/PROXY:    CRITICAL CARE TIME SPENT: 35 minutes INTERVAL HPI/OVERNIGHT EVENTS: no acute events, interval improvement in CXR    PRESSORS: NO    ANTIBIOTICS: NO    Cardiovascular:  carvedilol 12.5 milliGRAM(s) Oral every 12 hours  valsartan 160 milliGRAM(s) Oral daily    Pulmonary:    Hematalogic:  aspirin  chewable 81 milliGRAM(s) Oral daily  enoxaparin Injectable 40 milliGRAM(s) SubCutaneous daily    Other:  acetaminophen   Tablet 650 milliGRAM(s) Oral every 6 hours PRN  fentaNYL    Injectable 25 MICROGram(s) IV Push every 10 minutes PRN  melatonin 3 milliGRAM(s) Oral at bedtime  oxyCODONE    5 mG/acetaminophen 325 mG 2 Tablet(s) Oral every 6 hours PRN  oxyCODONE    5 mG/acetaminophen 325 mG 1 Tablet(s) Oral every 6 hours PRN    acetaminophen   Tablet 650 milliGRAM(s) Oral every 6 hours PRN  aspirin  chewable 81 milliGRAM(s) Oral daily  carvedilol 12.5 milliGRAM(s) Oral every 12 hours  enoxaparin Injectable 40 milliGRAM(s) SubCutaneous daily  fentaNYL    Injectable 25 MICROGram(s) IV Push every 10 minutes PRN  melatonin 3 milliGRAM(s) Oral at bedtime  oxyCODONE    5 mG/acetaminophen 325 mG 2 Tablet(s) Oral every 6 hours PRN  oxyCODONE    5 mG/acetaminophen 325 mG 1 Tablet(s) Oral every 6 hours PRN  valsartan 160 milliGRAM(s) Oral daily    Drug Dosing Weight      CENTRAL LINE: NO    LERMA: NO    A-LINE:  NO    PMH -reviewed admission note, no change since admission  PAST MEDICAL & SURGICAL HISTORY:  BPH (benign prostatic hyperplasia)  Hypertension  No significant past surgical history      ICU Vital Signs Last 24 Hrs  T(C): 36.3 (07 Dec 2017 05:00), Max: 37.4 (06 Dec 2017 21:28)  T(F): 97.4 (07 Dec 2017 05:00), Max: 99.3 (06 Dec 2017 21:28)  HR: 67 (07 Dec 2017 07:00) (61 - 90)  BP: 136/65 (07 Dec 2017 07:00) (104/54 - 190/78)  BP(mean): 82 (07 Dec 2017 07:00) (66 - 106)  ABP: --  ABP(mean): --  RR: 21 (07 Dec 2017 07:00) (16 - 32)  SpO2: 99% (07 Dec 2017 07:00) (91% - 100%)             @ 07:01  -   @ 07:00  --------------------------------------------------------  IN: 1875 mL / OUT: 1480 mL / NET: 395 mL            PHYSICAL EXAM:    GENERAL: [x]NAD, []well-groomed, []well-developed  HEAD:  [x]Atraumatic, []Normocephalic  EYES: [x]EOMI, []PERRLA, []conjunctiva and sclera clear  ENMT: [x]No tonsillar erythema, exudates, or enlargement; []Moist mucous membranes, []Good dentition, []No lesions  NECK: [x]Supple, normal appearance, []No JVD; []Normal thyroid; []Trachea midline  NERVOUS SYSTEM:  [x]Alert & Oriented X3, []Good concentration; []Motor Strength 5/5 B/L upper and lower extremities; []DTRs 2+ intact and symmetric  CHEST/LUNG: [x]L chest wall crepitus; []Normal percussion bilaterally; []No rales, rhonchi, wheezing   HEART: [x]Regular rate and rhythm; []No murmurs, rubs, or gallops  ABDOMEN: [x]Soft, Nontender, Nondistended; []Bowel sounds present  EXTREMITIES:  [x]2+ Peripheral Pulses, []No clubbing, cyanosis, or edema  LYMPH: [x]No lymphadenopathy noted  SKIN: [x]No rashes or lesions; []Good capillary refill      LABS:  CBC Full  -  ( 07 Dec 2017 06:36 )  WBC Count : 12.0 K/uL  Hemoglobin : 10.7 g/dL  Hematocrit : 31.9 %  Platelet Count - Automated : CLUMPED  Mean Cell Volume : 95.8 fl  Mean Cell Hemoglobin : 32.0 pg  Mean Cell Hemoglobin Concentration : 33.5 gm/dL  Auto Neutrophil # : x  Auto Lymphocyte # : x  Auto Monocyte # : x  Auto Eosinophil # : x  Auto Basophil # : x  Auto Neutrophil % : x  Auto Lymphocyte % : x  Auto Monocyte % : x  Auto Eosinophil % : x  Auto Basophil % : x        133<L>  |  98  |  23<H>  ----------------------------<  84  4.3   |  27  |  0.86    Ca    7.9<L>      07 Dec 2017 06:36  Phos  4.1       Mg     1.8         TPro  5.8<L>  /  Alb  2.0<L>  /  TBili  0.5  /  DBili  x   /  AST  16  /  ALT  39  /  AlkPhos  47      PT/INR - ( 06 Dec 2017 17:29 )   PT: 13.3 sec;   INR: 1.22 ratio         PTT - ( 06 Dec 2017 17:29 )  PTT:23.4 sec  Urinalysis Basic - ( 06 Dec 2017 03:02 )    Color: Yellow / Appearance: Clear / S.010 / pH: x  Gluc: x / Ketone: Trace  / Bili: Negative / Urobili: Negative   Blood: x / Protein: 30 mg/dL / Nitrite: Negative   Leuk Esterase: Negative / RBC: 0-2 /HPF / WBC 6-10 /HPF   Sq Epi: x / Non Sq Epi: Few /HPF / Bacteria: Moderate /HPF      Culture Results:   No growth to date. ( @ 10:57)      RADIOLOGY & ADDITIONAL STUDIES REVIEWED: YES - subcutaneous emphysema, chest tube in place, no obvious PTX.    []GOALS OF CARE DISCUSSION WITH PATIENT/FAMILY/PROXY:    CRITICAL CARE TIME SPENT: 35 minutes

## 2017-12-07 NOTE — DIETITIAN INITIAL EVALUATION ADULT. - MD RECOMMEND
Continue Ensure Enlive 1can (240ml) x tid (1050kcal, 60g protein) ( Ensure Muscle Health is no longer available)

## 2017-12-08 NOTE — PROGRESS NOTE ADULT - PROBLEM SELECTOR PLAN 2
Condt chest tube to suction  Thoracic surgery follow up Cont chest tube to suction  Thoracic surgery follow up

## 2017-12-08 NOTE — PROGRESS NOTE ADULT - SUBJECTIVE AND OBJECTIVE BOX
Patient is a 72y old  Male who presents with a chief complaint of weakness, chest discomfort and voice and appetite changes  Awake, alert, comfortable in bed in NAD. Had Bronchoscopy  because of a left hilar mass. He develop a significant subcutaneous emphysema post procedure without a definitive PTX on CXR however ultimately required a Chest Tube placement while under clinical observation in ICU. No chest pain or sob post procedure. Awaiting path report. May need mediastinoscopy and biopsy if FOB results come back negative. Still with hoarseness.        INTERVAL HPI/OVERNIGHT EVENTS:      VITAL SIGNS:  T(F): 97.7 (12-08-17 @ 05:25)  HR: 68 (12-08-17 @ 05:25)  BP: 143/68 (12-08-17 @ 05:25)  RR: 16 (12-08-17 @ 05:25)  SpO2: 98% (12-08-17 @ 05:25)  Wt(kg): --  I&O's Detail    07 Dec 2017 07:01  -  08 Dec 2017 07:00  --------------------------------------------------------  IN:    Oral Fluid: 1050 mL  Total IN: 1050 mL    OUT:    Chest Tube: 56 mL    Indwelling Catheter - Urethral: 1450 mL  Total OUT: 1506 mL    Total NET: -456 mL              REVIEW OF SYSTEMS:    CONSTITUTIONAL:  No fevers, chills, sweats    HEENT:  Eyes:  No diplopia or blurred vision. ENT:  No earache, sore throat or runny nose.    CARDIOVASCULAR:  No pressure, squeezing, tightness, or heaviness about the chest; no palpitations.    RESPIRATORY:  Per HPI    GASTROINTESTINAL:  No abdominal pain, nausea, vomiting or diarrhea.    GENITOURINARY:  No dysuria, frequency or urgency.    NEUROLOGIC:  No paresthesias, fasciculations, seizures or weakness.    PSYCHIATRIC:  No disorder of thought or mood.      PHYSICAL EXAM:    Constitutional: Well developed and nourished  Eyes:Perrla  ENMT: normal  Neck:supple  Respiratory: good air entry  Cardiovascular: S1 S2 regular  Gastrointestinal: Soft, Non tender  Extremities: No edema  Vascular:normal  Neurological:Awake, alert,Ox3  Musculoskeletal:Normal      MEDICATIONS  (STANDING):  aspirin  chewable 81 milliGRAM(s) Oral daily  carvedilol 12.5 milliGRAM(s) Oral every 12 hours  enoxaparin Injectable 40 milliGRAM(s) SubCutaneous daily  melatonin 3 milliGRAM(s) Oral at bedtime  ondansetron Injectable 4 milliGRAM(s) IV Push once  tamsulosin 0.4 milliGRAM(s) Oral at bedtime  valsartan 160 milliGRAM(s) Oral daily    MEDICATIONS  (PRN):  acetaminophen   Tablet 650 milliGRAM(s) Oral every 6 hours PRN For Temp greater than 38 C (100.4 F) and/or mild pain (1-3)  fentaNYL    Injectable 25 MICROGram(s) IV Push every 10 minutes PRN Moderate Pain (4 - 6)  oxyCODONE    5 mG/acetaminophen 325 mG 2 Tablet(s) Oral every 6 hours PRN Severe Pain (7 - 10)  oxyCODONE    5 mG/acetaminophen 325 mG 1 Tablet(s) Oral every 6 hours PRN Moderate Pain (4 - 6)      Allergies    No Known Allergies    Intolerances        LABS:                        11.0   12.5  )-----------( x        ( 08 Dec 2017 07:53 )             33.1     12-08    132<L>  |  96  |  22<H>  ----------------------------<  171<H>  4.6   |  30  |  0.81    Ca    8.1<L>      08 Dec 2017 07:53  Phos  3.3     12-08  Mg     1.8     12-08      PT/INR - ( 06 Dec 2017 17:29 )   PT: 13.3 sec;   INR: 1.22 ratio         PTT - ( 06 Dec 2017 17:29 )  PTT:23.4 sec    ABG - ( 07 Dec 2017 18:10 )  pH: 7.49  /  pCO2: 39    /  pO2: 94    / HCO3: 29    / Base Excess: 5.7   /  SaO2: 97                    CAPILLARY BLOOD GLUCOSE            RADIOLOGY & ADDITIONAL TESTS:    CXR:  < from: Xray Chest 1 View AP -PORTABLE-Routine (12.07.17 @ 08:13) >  Left chest tube again noted. Left subcutaneous emphysema again noted.   This limits evaluation for pneumothorax. No large left pneumothorax noted.    Left perihilar opacity without significant change.    No significant pleural effusion.    Heart size cannot be accurately assessed in this projection.    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  Mediastinal adenopathy and destruction of adjacent osseous structures.  Hepatic and splenic lesions, likely metastatic.  Upper abdominal adenopathy.  1.0 cm indeterminate exophytic left renal lesion.    < end of copied text >    ekg;    echo: Patient is a 72y old  Male who presents with a chief complaint of weakness, chest discomfort and voice and appetite changes  Awake, alert, comfortable in bed in NAD. Had Bronchoscopy  because of a left hilar mass. He develop a significant subcutaneous emphysema post procedure without a definitive PTX on CXR however ultimately required a Chest Tube placement while under clinical observation in ICU. No chest pain or sob post procedure. Awaiting path report. May need mediastinoscopy and biopsy if FOB results come back negative. Still with hoarseness.  Still with chest tube in place due to persistent although decreased left subcutaneous emphysema.      INTERVAL HPI/OVERNIGHT EVENTS:      VITAL SIGNS:  T(F): 97.7 (12-08-17 @ 05:25)  HR: 68 (12-08-17 @ 05:25)  BP: 143/68 (12-08-17 @ 05:25)  RR: 16 (12-08-17 @ 05:25)  SpO2: 98% (12-08-17 @ 05:25)  Wt(kg): --  I&O's Detail    07 Dec 2017 07:01  -  08 Dec 2017 07:00  --------------------------------------------------------  IN:    Oral Fluid: 1050 mL  Total IN: 1050 mL    OUT:    Chest Tube: 56 mL    Indwelling Catheter - Urethral: 1450 mL  Total OUT: 1506 mL    Total NET: -456 mL              REVIEW OF SYSTEMS:    CONSTITUTIONAL:  No fevers, chills, sweats    HEENT:  Eyes:  No diplopia or blurred vision. ENT:  No earache, sore throat or runny nose.    CARDIOVASCULAR:  No pressure, squeezing, tightness, or heaviness about the chest; no palpitations.    RESPIRATORY:  Per HPI    GASTROINTESTINAL:  No abdominal pain, nausea, vomiting or diarrhea.    GENITOURINARY:  No dysuria, frequency or urgency.    NEUROLOGIC:  No paresthesias, fasciculations, seizures or weakness.    PSYCHIATRIC:  No disorder of thought or mood.      PHYSICAL EXAM:    Constitutional: Well developed and nourished  Eyes:Perrla  ENMT: normal  Neck:supple  Respiratory: Decreased BS on left  Cardiovascular: S1 S2 regular  Gastrointestinal: Soft, Non tender  Extremities: No edema  Vascular:normal  Neurological:Awake, alert,Ox3  Musculoskeletal:Normal      MEDICATIONS  (STANDING):  aspirin  chewable 81 milliGRAM(s) Oral daily  carvedilol 12.5 milliGRAM(s) Oral every 12 hours  enoxaparin Injectable 40 milliGRAM(s) SubCutaneous daily  melatonin 3 milliGRAM(s) Oral at bedtime  ondansetron Injectable 4 milliGRAM(s) IV Push once  tamsulosin 0.4 milliGRAM(s) Oral at bedtime  valsartan 160 milliGRAM(s) Oral daily    MEDICATIONS  (PRN):  acetaminophen   Tablet 650 milliGRAM(s) Oral every 6 hours PRN For Temp greater than 38 C (100.4 F) and/or mild pain (1-3)  fentaNYL    Injectable 25 MICROGram(s) IV Push every 10 minutes PRN Moderate Pain (4 - 6)  oxyCODONE    5 mG/acetaminophen 325 mG 2 Tablet(s) Oral every 6 hours PRN Severe Pain (7 - 10)  oxyCODONE    5 mG/acetaminophen 325 mG 1 Tablet(s) Oral every 6 hours PRN Moderate Pain (4 - 6)      Allergies    No Known Allergies    Intolerances        LABS:                        11.0   12.5  )-----------( x        ( 08 Dec 2017 07:53 )             33.1     12-08    132<L>  |  96  |  22<H>  ----------------------------<  171<H>  4.6   |  30  |  0.81    Ca    8.1<L>      08 Dec 2017 07:53  Phos  3.3     12-08  Mg     1.8     12-08      PT/INR - ( 06 Dec 2017 17:29 )   PT: 13.3 sec;   INR: 1.22 ratio         PTT - ( 06 Dec 2017 17:29 )  PTT:23.4 sec    ABG - ( 07 Dec 2017 18:10 )  pH: 7.49  /  pCO2: 39    /  pO2: 94    / HCO3: 29    / Base Excess: 5.7   /  SaO2: 97                    CAPILLARY BLOOD GLUCOSE            RADIOLOGY & ADDITIONAL TESTS:    CXR:  < from: Xray Chest 1 View AP -PORTABLE-Routine (12.07.17 @ 08:13) >  Left chest tube again noted. Left subcutaneous emphysema again noted.   This limits evaluation for pneumothorax. No large left pneumothorax noted.    Left perihilar opacity without significant change.    No significant pleural effusion.    Heart size cannot be accurately assessed in this projection.    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  Mediastinal adenopathy and destruction of adjacent osseous structures.  Hepatic and splenic lesions, likely metastatic.  Upper abdominal adenopathy.  1.0 cm indeterminate exophytic left renal lesion.    < end of copied text >    ekg;    echo:

## 2017-12-08 NOTE — PROGRESS NOTE ADULT - ASSESSMENT
72 former smoker 20 pack year M PMH HTN and BPH p/w pneumothorax s/p diagnostic bronchoscopy for left lobe lesions found on CT imaging - chest tube placed in ICU 12/6 on suction - admitted for urgent CT placement and monitoring. Patient has chronic complaints of decreased appetite and voice weakness. Downgraded from ICU     Left Pneumothorax   -S/P Left chest tube  - Crepitus along neck and L anterior chest wall  - CXR shows small pneumomediastinum, overall improvement  - Afebrile, WBC improved to 12K from 19K    Lung mass  - S/p bronchoscopy biopsy  -F/u pathology.     Hypertension.  Plan: BP acceptable on home medications.     BPH (benign prostatic hyperplasia)  - Good UO; 1370/24 hrs  - Restarted Tamsulosin    Prophylactic measure  - IMPROVE score 1 2/2 immobilization; DVT PPx 2/2 ICU stay  - GI stress ulcer PPx w/ Protonix.

## 2017-12-08 NOTE — PROGRESS NOTE ADULT - SUBJECTIVE AND OBJECTIVE BOX
INTERVAL HPI/OVERNIGHT EVENTS: Pt came for CC for chest discomfort, voice weakness    VITAL SIGNS:  T(F): 97.7 (12-08-17 @ 05:25)  HR: 68 (12-08-17 @ 05:25)  BP: 143/68 (12-08-17 @ 05:25)  RR: 16 (12-08-17 @ 05:25)  SpO2: 98% (12-08-17 @ 05:25)  Wt(kg): --    PHYSICAL EXAM:    Constitutional: NAD  Respiratory: decreased breath sounds on Rt  Cardiovascular: RRR, S1 S2 present  Gastrointestinal: ND, NT , BS++  Extremities: no pedal edema  Vascular: intact  Neurological: AAOX3  Musculoskeletal: power equal    MEDICATIONS  (STANDING):  aspirin  chewable 81 milliGRAM(s) Oral daily  carvedilol 12.5 milliGRAM(s) Oral every 12 hours  enoxaparin Injectable 40 milliGRAM(s) SubCutaneous daily  melatonin 3 milliGRAM(s) Oral at bedtime  tamsulosin 0.4 milliGRAM(s) Oral at bedtime  valsartan 160 milliGRAM(s) Oral daily    MEDICATIONS  (PRN):  acetaminophen   Tablet 650 milliGRAM(s) Oral every 6 hours PRN For Temp greater than 38 C (100.4 F) and/or mild pain (1-3)  fentaNYL    Injectable 25 MICROGram(s) IV Push every 10 minutes PRN Moderate Pain (4 - 6)  oxyCODONE    5 mG/acetaminophen 325 mG 2 Tablet(s) Oral every 6 hours PRN Severe Pain (7 - 10)  oxyCODONE    5 mG/acetaminophen 325 mG 1 Tablet(s) Oral every 6 hours PRN Moderate Pain (4 - 6)      Allergies    No Known Allergies    Intolerances        LABS:                        11.0   12.5  )-----------( CLUMPED    ( 08 Dec 2017 07:53 )             33.1     12-08    132<L>  |  96  |  22<H>  ----------------------------<  171<H>  4.6   |  30  |  0.81    Ca    8.1<L>      08 Dec 2017 07:53  Phos  3.3     12-08  Mg     1.8     12-08      PT/INR - ( 06 Dec 2017 17:29 )   PT: 13.3 sec;   INR: 1.22 ratio         PTT - ( 06 Dec 2017 17:29 )  PTT:23.4 sec      RADIOLOGY & ADDITIONAL TESTS: INTERVAL HPI/OVERNIGHT EVENTS: Pt came for CC for chest discomfort, voice weakness    VITAL SIGNS:  T(F): 97.7 (12-08-17 @ 05:25)  HR: 68 (12-08-17 @ 05:25)  BP: 143/68 (12-08-17 @ 05:25)  RR: 16 (12-08-17 @ 05:25)  SpO2: 98% (12-08-17 @ 05:25)  Wt(kg): --    PHYSICAL EXAM:    Constitutional: NAD  Respiratory: CTAB, left subcutaneous emphysema, left chest tube in place  Cardiovascular: RRR, S1 S2 present  Gastrointestinal: ND, NT , BS++  Extremities: no pedal edema  Vascular: intact  Neurological: AAOX3  Musculoskeletal: power equal    MEDICATIONS  (STANDING):  aspirin  chewable 81 milliGRAM(s) Oral daily  carvedilol 12.5 milliGRAM(s) Oral every 12 hours  enoxaparin Injectable 40 milliGRAM(s) SubCutaneous daily  melatonin 3 milliGRAM(s) Oral at bedtime  tamsulosin 0.4 milliGRAM(s) Oral at bedtime  valsartan 160 milliGRAM(s) Oral daily    MEDICATIONS  (PRN):  acetaminophen   Tablet 650 milliGRAM(s) Oral every 6 hours PRN For Temp greater than 38 C (100.4 F) and/or mild pain (1-3)  fentaNYL    Injectable 25 MICROGram(s) IV Push every 10 minutes PRN Moderate Pain (4 - 6)  oxyCODONE    5 mG/acetaminophen 325 mG 2 Tablet(s) Oral every 6 hours PRN Severe Pain (7 - 10)  oxyCODONE    5 mG/acetaminophen 325 mG 1 Tablet(s) Oral every 6 hours PRN Moderate Pain (4 - 6)      Allergies    No Known Allergies    Intolerances        LABS:                        11.0   12.5  )-----------( CLUMPED    ( 08 Dec 2017 07:53 )             33.1     12-08    132<L>  |  96  |  22<H>  ----------------------------<  171<H>  4.6   |  30  |  0.81    Ca    8.1<L>      08 Dec 2017 07:53  Phos  3.3     12-08  Mg     1.8     12-08      PT/INR - ( 06 Dec 2017 17:29 )   PT: 13.3 sec;   INR: 1.22 ratio         PTT - ( 06 Dec 2017 17:29 )  PTT:23.4 sec      RADIOLOGY & ADDITIONAL TESTS:

## 2017-12-08 NOTE — PROGRESS NOTE ADULT - SUBJECTIVE AND OBJECTIVE BOX
Patient is a 72y old  Male who presents with a chief complaint of weakness, chest discomfort and voice and appetite changes (05 Dec 2017 01:22)    pt seen in icu [ x ], reg med floor [  ], bed [ x ], chair at bedside [   ], a+o x3 [x  ], lethargic [  ],    nad [x  ], left chest to pleurovac [x], frias to bsd [x]        Allergies    No Known Allergies        Vitals    T(F): 97.7 (12-08-17 @ 05:25), Max: 98.8 (12-07-17 @ 08:00)  HR: 68 (12-08-17 @ 05:25) (64 - 81)  BP: 143/68 (12-08-17 @ 05:25) (91/74 - 143/68)  RR: 16 (12-08-17 @ 05:25) (16 - 29)  SpO2: 98% (12-08-17 @ 05:25) (94% - 99%)  Wt(kg): --  CAPILLARY BLOOD GLUCOSE          Labs                          10.7   12.0  )-----------( CLUMPED    ( 07 Dec 2017 06:36 )             31.9       12-07    133<L>  |  98  |  23<H>  ----------------------------<  84  4.3   |  27  |  0.86    Ca    7.9<L>      07 Dec 2017 06:36  Phos  4.1     12-07  Mg     1.8     12-07    TPro  5.8<L>  /  Alb  2.0<L>  /  TBili  0.5  /  DBili  x   /  AST  16  /  ALT  39  /  AlkPhos  47  12-06            .Broncial Other, bronchioalveolar lavage  12-06 @ 22:28 --  --  --      .Blood Blood-Peripheral  12-05 @ 10:57   No growth to date.  --  --          Radiology Results      Meds    MEDICATIONS  (STANDING):  aspirin  chewable 81 milliGRAM(s) Oral daily  carvedilol 12.5 milliGRAM(s) Oral every 12 hours  enoxaparin Injectable 40 milliGRAM(s) SubCutaneous daily  melatonin 3 milliGRAM(s) Oral at bedtime  tamsulosin 0.4 milliGRAM(s) Oral at bedtime  valsartan 160 milliGRAM(s) Oral daily      MEDICATIONS  (PRN):  acetaminophen   Tablet 650 milliGRAM(s) Oral every 6 hours PRN For Temp greater than 38 C (100.4 F) and/or mild pain (1-3)  fentaNYL    Injectable 25 MICROGram(s) IV Push every 10 minutes PRN Moderate Pain (4 - 6)  oxyCODONE    5 mG/acetaminophen 325 mG 2 Tablet(s) Oral every 6 hours PRN Severe Pain (7 - 10)  oxyCODONE    5 mG/acetaminophen 325 mG 1 Tablet(s) Oral every 6 hours PRN Moderate Pain (4 - 6)      Physical Exam    Neuro :  no focal deficits  Respiratory: CTA B/L, left subcutaneous emphysema, left chest tube to pleurovac  CV: RRR, S1S2, no murmurs,   Abdominal: Soft, NT, ND +BS,  Extremities: No edema, + peripheral pulses    ASSESSMENT    perihilar mass,   hepatic and splenic lesions,   r/o malig,   copd   destruction of left 6th and 7th ribs and left T6 transverse process   exophytic left renal lesion  subcutaneous emphysema.  h/o BPH (benign prostatic hyperplasia)  Hypertension        PLAN        cont atrov and prov nebs,   cont supplemental oxygen,  ct chest-abd-pelv result noted  pulm f/u  s/p bronch with bx yesterday  f/u cytopath of bronch.   monitor chest tube  vasc surg f/u  heme onc eval  urology cons  cont current meds  mgmt as per icu Patient is a 72y old  Male who presents with a chief complaint of weakness, chest discomfort and voice and appetite changes (05 Dec 2017 01:22)    pt seen in icu [  ], reg med floor [ x ], bed [ x ], chair at bedside [   ], a+o x3 [x  ], lethargic [  ],    nad [x  ], left chest to pleurovac [x], frias to bsd [x]        Allergies    No Known Allergies        Vitals    T(F): 97.7 (12-08-17 @ 05:25), Max: 98.8 (12-07-17 @ 08:00)  HR: 68 (12-08-17 @ 05:25) (64 - 81)  BP: 143/68 (12-08-17 @ 05:25) (91/74 - 143/68)  RR: 16 (12-08-17 @ 05:25) (16 - 29)  SpO2: 98% (12-08-17 @ 05:25) (94% - 99%)  Wt(kg): --  CAPILLARY BLOOD GLUCOSE          Labs                          10.7   12.0  )-----------( CLUMPED    ( 07 Dec 2017 06:36 )             31.9       12-07    133<L>  |  98  |  23<H>  ----------------------------<  84  4.3   |  27  |  0.86    Ca    7.9<L>      07 Dec 2017 06:36  Phos  4.1     12-07  Mg     1.8     12-07    TPro  5.8<L>  /  Alb  2.0<L>  /  TBili  0.5  /  DBili  x   /  AST  16  /  ALT  39  /  AlkPhos  47  12-06            .Broncial Other, bronchioalveolar lavage  12-06 @ 22:28 --  --  --      .Blood Blood-Peripheral  12-05 @ 10:57   No growth to date.  --  --          Radiology Results      Meds    MEDICATIONS  (STANDING):  aspirin  chewable 81 milliGRAM(s) Oral daily  carvedilol 12.5 milliGRAM(s) Oral every 12 hours  enoxaparin Injectable 40 milliGRAM(s) SubCutaneous daily  melatonin 3 milliGRAM(s) Oral at bedtime  tamsulosin 0.4 milliGRAM(s) Oral at bedtime  valsartan 160 milliGRAM(s) Oral daily      MEDICATIONS  (PRN):  acetaminophen   Tablet 650 milliGRAM(s) Oral every 6 hours PRN For Temp greater than 38 C (100.4 F) and/or mild pain (1-3)  fentaNYL    Injectable 25 MICROGram(s) IV Push every 10 minutes PRN Moderate Pain (4 - 6)  oxyCODONE    5 mG/acetaminophen 325 mG 2 Tablet(s) Oral every 6 hours PRN Severe Pain (7 - 10)  oxyCODONE    5 mG/acetaminophen 325 mG 1 Tablet(s) Oral every 6 hours PRN Moderate Pain (4 - 6)      Physical Exam    Neuro :  no focal deficits  Respiratory: CTA B/L, left subcutaneous emphysema, left chest tube to pleurovac  CV: RRR, S1S2, no murmurs,   Abdominal: Soft, NT, ND +BS,  Extremities: No edema, + peripheral pulses    ASSESSMENT    perihilar mass,   hepatic and splenic lesions,   r/o malig,   copd   destruction of left 6th and 7th ribs and left T6 transverse process   exophytic left renal lesion  subcutaneous emphysema.  h/o BPH (benign prostatic hyperplasia)  Hypertension        PLAN        cont atrov and prov nebs,   cont supplemental oxygen,  ct chest-abd-pelv result noted  pulm f/u  s/p bronch with bx yesterday  f/u cytopath of bronch.   monitor chest tube  thoracic surg f/u  heme onc eval  urology cons  d/c frias  cont current meds

## 2017-12-09 NOTE — PROGRESS NOTE ADULT - SUBJECTIVE AND OBJECTIVE BOX
Thoracic Surgery    Subjective:  Pt resting comfortably. No SOB on NC. L chest tube to suction.  continues with voice hoarseness.      Vital Signs Last 24 Hrs  T(C): 36.6 (09 Dec 2017 05:21), Max: 37 (08 Dec 2017 20:53)  T(F): 97.8 (09 Dec 2017 05:21), Max: 98.6 (08 Dec 2017 20:53)  HR: 71 (09 Dec 2017 05:21) (68 - 72)  BP: 132/77 (09 Dec 2017 05:21) (128/65 - 164/64)  RR: 16 (09 Dec 2017 05:21) (16 - 18)  SpO2: 97% (09 Dec 2017 05:21) (97% - 100%)    Physical:  Gen: A&O x3  Chest: decrease in Subq emphysema throughout chest wall, Decrease swelling compared to yesterday. Dressing C/D/I , chest tube dresing site clean and dry      OUT:    Chest Tube: 75 mL serosanguinous fluid. +airleak

## 2017-12-09 NOTE — PROGRESS NOTE ADULT - SUBJECTIVE AND OBJECTIVE BOX
Patient is a 72y old  Male who presents with a chief complaint of weakness, chest discomfort and voice and appetite changes (05 Dec 2017 01:22)    pt seen in icu [  ], reg med floor [ x ], bed [ x ], chair at bedside [   ], a+o x3 [x  ], lethargic [  ],    nad [x  ], left chest to pleurovac [x], frias to bsd [x]        Allergies    No Known Allergies        Vitals    T(F): 97.8 (12-09-17 @ 05:21), Max: 98.6 (12-08-17 @ 20:53)  HR: 71 (12-09-17 @ 05:21) (68 - 72)  BP: 132/77 (12-09-17 @ 05:21) (128/65 - 164/64)  RR: 16 (12-09-17 @ 05:21) (16 - 18)  SpO2: 97% (12-09-17 @ 05:21) (97% - 100%)  Wt(kg): --  CAPILLARY BLOOD GLUCOSE          Labs                          11.0   12.5  )-----------( CLUMPED    ( 08 Dec 2017 07:53 )             33.1       12-08    132<L>  |  96  |  22<H>  ----------------------------<  171<H>  4.6   |  30  |  0.81    Ca    8.1<L>      08 Dec 2017 07:53  Phos  3.3     12-08  Mg     1.8     12-08              .Broncial Other, bronchioalveolar lavage  12-06 @ 22:28 --  --  --      .Blood Blood-Peripheral  12-05 @ 10:57   No growth to date.  --  --          Radiology Results      Meds    MEDICATIONS  (STANDING):  aspirin  chewable 81 milliGRAM(s) Oral daily  carvedilol 12.5 milliGRAM(s) Oral every 12 hours  enoxaparin Injectable 40 milliGRAM(s) SubCutaneous daily  melatonin 3 milliGRAM(s) Oral at bedtime  tamsulosin 0.4 milliGRAM(s) Oral at bedtime  valsartan 160 milliGRAM(s) Oral daily      MEDICATIONS  (PRN):  acetaminophen   Tablet 650 milliGRAM(s) Oral every 6 hours PRN For Temp greater than 38 C (100.4 F) and/or mild pain (1-3)  fentaNYL    Injectable 25 MICROGram(s) IV Push every 10 minutes PRN Moderate Pain (4 - 6)  oxyCODONE    5 mG/acetaminophen 325 mG 2 Tablet(s) Oral every 6 hours PRN Severe Pain (7 - 10)  oxyCODONE    5 mG/acetaminophen 325 mG 1 Tablet(s) Oral every 6 hours PRN Moderate Pain (4 - 6)      Physical Exam      Neuro :  no focal deficits  Respiratory: CTA B/L, left subcutaneous emphysema, left chest tube to pleurovac  CV: RRR, S1S2, no murmurs,   Abdominal: Soft, NT, ND +BS,  Extremities: No edema, + peripheral pulses    ASSESSMENT    perihilar mass,   hepatic and splenic lesions,   r/o malig,   copd   destruction of left 6th and 7th ribs and left T6 transverse process   exophytic left renal lesion  subcutaneous emphysema.  h/o BPH (benign prostatic hyperplasia)  Hypertension        PLAN        cont atrov and prov nebs,   cont supplemental oxygen,  ct chest-abd-pelv result noted  pulm f/u  s/p bronch with bx yesterday  f/u cytopath of bronch.   monitor chest tube  thoracic surg f/u  heme onc eval  urology cons  d/c frias  cont current meds Patient is a 72y old  Male who presents with a chief complaint of weakness, chest discomfort and voice and appetite changes (05 Dec 2017 01:22)    pt seen in icu [  ], reg med floor [ x ], bed [ x ], chair at bedside [   ], a+o x3 [x  ], lethargic [  ],    nad [x  ], left chest to pleurovac [x],        Allergies    No Known Allergies        Vitals    T(F): 97.8 (12-09-17 @ 05:21), Max: 98.6 (12-08-17 @ 20:53)  HR: 71 (12-09-17 @ 05:21) (68 - 72)  BP: 132/77 (12-09-17 @ 05:21) (128/65 - 164/64)  RR: 16 (12-09-17 @ 05:21) (16 - 18)  SpO2: 97% (12-09-17 @ 05:21) (97% - 100%)  Wt(kg): --  CAPILLARY BLOOD GLUCOSE          Labs                          11.0   12.5  )-----------( CLUMPED    ( 08 Dec 2017 07:53 )             33.1       12-08    132<L>  |  96  |  22<H>  ----------------------------<  171<H>  4.6   |  30  |  0.81    Ca    8.1<L>      08 Dec 2017 07:53  Phos  3.3     12-08  Mg     1.8     12-08        Broncial Other, bronchioalveolar lavage  12-06 @ 22:28 --  --  --    Cytopathology - Non Gyn Report (12.06.17 @ 11:55)    Cytopathology - Non Gyn Report:   ACCESSION No:  [id number]    FRANCISCO DUKE                       1        Cytopathology Report            Specimen Description  BRONCHIAL WASH, LEFT UPPER      Clinical Information  COPD, left upper lobe mass. History of endobronchial lesion.      Gross Description  Received: 40  ml of cloudy fluid in CytoLyt  Prepared: 1 ThinPrep slide, 1 cell block      Final Diagnosis  NEGATIVE FOR MALIGNANT CELLS.    Cytology slide and cell block display mostly mixed inflammation  and rare alveolar macrophages. No malignant cells or granuloma  identified in submitted sample. Clinical, pathologic, and  radiologic correlation is essential.    Please also refer to specimen number: [id number]  Please see concurrent biopsy specimen report: [id number]    Screened by:Rahul Frazier, SCT(ASCP)  Verified by:JOHN Mercedes  (Electronically Signed Out)  _________________________________________________________        .Blood Blood-Peripheral  12-05 @ 10:57   No growth to date.  --  --          Radiology Results      Meds    MEDICATIONS  (STANDING):  aspirin  chewable 81 milliGRAM(s) Oral daily  carvedilol 12.5 milliGRAM(s) Oral every 12 hours  enoxaparin Injectable 40 milliGRAM(s) SubCutaneous daily  melatonin 3 milliGRAM(s) Oral at bedtime  tamsulosin 0.4 milliGRAM(s) Oral at bedtime  valsartan 160 milliGRAM(s) Oral daily      MEDICATIONS  (PRN):  acetaminophen   Tablet 650 milliGRAM(s) Oral every 6 hours PRN For Temp greater than 38 C (100.4 F) and/or mild pain (1-3)  fentaNYL    Injectable 25 MICROGram(s) IV Push every 10 minutes PRN Moderate Pain (4 - 6)  oxyCODONE    5 mG/acetaminophen 325 mG 2 Tablet(s) Oral every 6 hours PRN Severe Pain (7 - 10)  oxyCODONE    5 mG/acetaminophen 325 mG 1 Tablet(s) Oral every 6 hours PRN Moderate Pain (4 - 6)      Physical Exam      Neuro :  no focal deficits  Respiratory: CTA B/L, left subcutaneous emphysema, left chest tube to pleurovac  CV: RRR, S1S2, no murmurs,   Abdominal: Soft, NT, ND +BS,  Extremities: No edema, + peripheral pulses    ASSESSMENT    perihilar mass,   hepatic and splenic lesions,   r/o malig,   copd   destruction of left 6th and 7th ribs and left T6 transverse process   exophytic left renal lesion  subcutaneous emphysema.  h/o BPH (benign prostatic hyperplasia)  Hypertension        PLAN        cont atrov and prov nebs,   cont supplemental oxygen,  ct chest-abd-pelv result noted  pulm f/u  s/p bronch with bx   cytopath of bronch neg for malig noted above.   monitor chest tube  thoracic surg f/u  heme onc eval  urology cons  cont current meds

## 2017-12-09 NOTE — PROGRESS NOTE ADULT - SUBJECTIVE AND OBJECTIVE BOX
Patient is a 72y old  Male who presents with a chief complaint of weakness, chest discomfort and voice and appetite changes (05 Dec 2017 01:22)  awake, alert, comfortable in bed in NAD. Still with left sided chest tube placed after an iatrogenic subcutaneous emphysema post Bronchoscopy. Still awaiting biopsy report    INTERVAL HPI/OVERNIGHT EVENTS:      VITAL SIGNS:  T(F): 97.8 (12-09-17 @ 05:21)  HR: 71 (12-09-17 @ 05:21)  BP: 132/77 (12-09-17 @ 05:21)  RR: 16 (12-09-17 @ 05:21)  SpO2: 97% (12-09-17 @ 05:21)  Wt(kg): --  I&O's Detail    08 Dec 2017 07:01  -  09 Dec 2017 07:00  --------------------------------------------------------  IN:  Total IN: 0 mL    OUT:    Chest Tube: 75 mL  Total OUT: 75 mL    Total NET: -75 mL              REVIEW OF SYSTEMS:    CONSTITUTIONAL:  No fevers, chills, sweats    HEENT:  Eyes:  No diplopia or blurred vision. ENT:  No earache, sore throat or runny nose.    CARDIOVASCULAR:  No pressure, squeezing, tightness, or heaviness about the chest; no palpitations.    RESPIRATORY:  Per HPI    GASTROINTESTINAL:  No abdominal pain, nausea, vomiting or diarrhea.    GENITOURINARY:  No dysuria, frequency or urgency.    NEUROLOGIC:  No paresthesias, fasciculations, seizures or weakness.    PSYCHIATRIC:  No disorder of thought or mood.      PHYSICAL EXAM:    Constitutional: Well developed and nourished  Eyes:Perrla  ENMT: normal  Neck:supple  Respiratory: Decreased BS on left  Cardiovascular: S1 S2 regular  Gastrointestinal: Soft, Non tender  Extremities: No edema  Vascular:normal  Neurological:Awake, alert,Ox3  Musculoskeletal:Normal      MEDICATIONS  (STANDING):  aspirin  chewable 81 milliGRAM(s) Oral daily  carvedilol 12.5 milliGRAM(s) Oral every 12 hours  enoxaparin Injectable 40 milliGRAM(s) SubCutaneous daily  melatonin 3 milliGRAM(s) Oral at bedtime  tamsulosin 0.4 milliGRAM(s) Oral at bedtime  valsartan 160 milliGRAM(s) Oral daily    MEDICATIONS  (PRN):  acetaminophen   Tablet 650 milliGRAM(s) Oral every 6 hours PRN For Temp greater than 38 C (100.4 F) and/or mild pain (1-3)  fentaNYL    Injectable 25 MICROGram(s) IV Push every 10 minutes PRN Moderate Pain (4 - 6)  oxyCODONE    5 mG/acetaminophen 325 mG 2 Tablet(s) Oral every 6 hours PRN Severe Pain (7 - 10)  oxyCODONE    5 mG/acetaminophen 325 mG 1 Tablet(s) Oral every 6 hours PRN Moderate Pain (4 - 6)      Allergies    No Known Allergies    Intolerances        LABS:                        11.0   12.5  )-----------( CLUMPED    ( 08 Dec 2017 07:53 )             33.1     12-08    132<L>  |  96  |  22<H>  ----------------------------<  171<H>  4.6   |  30  |  0.81    Ca    8.1<L>      08 Dec 2017 07:53  Phos  3.3     12-08  Mg     1.8     12-08          ABG - ( 07 Dec 2017 18:10 )  pH: 7.49  /  pCO2: 39    /  pO2: 94    / HCO3: 29    / Base Excess: 5.7   /  SaO2: 97                    CAPILLARY BLOOD GLUCOSE            RADIOLOGY & ADDITIONAL TESTS:    CXR:    Ct scan chest:    ekg;    echo:

## 2017-12-10 NOTE — PROGRESS NOTE ADULT - SUBJECTIVE AND OBJECTIVE BOX
pt seen in icu [  ], reg med floor [ x  ], bed [ x ], chair at bedside [   ]  Resting comfortable in bed in NAD. Still with chest tube due to subcutaneous emphysema.  REVIEW OF SYSTEMS:    CONSTITUTIONAL: No weakness, fevers or chills  EYES/ENT: No visual changes;  No vertigo or throat pain   NECK: No pain or stiffness. +subcutaneous emphysema  RESPIRATORY: No cough, wheezing, hemoptysis; No shortness of breath  CARDIOVASCULAR: No chest pain or palpitations  GASTROINTESTINAL: No Abdominal pain. No nausea, vomiting, or hematemesis; No diarrhea or constipation. No melena or hematochezia.  GENITOURINARY: No dysuria, frequency or hematuria  NEUROLOGICAL: No numbness or weakness  SKIN: No itching, burning, rashes, or lesions   All other review of systems is negative unless indicated above.    Physical Exam    General: WN/WD NAD  Neurology: A&Ox3, nonfocal, NAVA x 4  Respiratory: CTA B/L  CV: RRR, S1S2, no murmurs, rubs or gallops  Abdominal: Soft, non tender  Extremities: No edema, + peripheral pulses      Allergies  No Known Allergies      Health Issues  Weakness  BPH (benign prostatic hyperplasia)  Hypertension  No pertinent past medical history  No significant past surgical history      Vitals  T(F): 98.3 (12-10-17 @ 04:50), Max: 99 (12-09-17 @ 20:55)  HR: 70 (12-10-17 @ 04:50) (70 - 83)  BP: 130/81 (12-10-17 @ 04:50) (124/62 - 130/81)  RR: 16 (12-10-17 @ 04:50) (16 - 18)  SpO2: 95% (12-10-17 @ 04:50) (95% - 98%)  Wt(kg): --  CAPILLARY BLOOD GLUCOSE          Labs                      Radiology Results  < from: Xray Chest 1 View AP -PORTABLE-Routine (12.08.17 @ 11:25) >  IMPRESSION:  Small left pneumothorax. Left chest tube in place.    Findings were discussedwith Dr. Davila on 12/8/2017 1:32 PM with   readback.        < end of copied text >      Meds    MEDICATIONS  (STANDING):  aspirin  chewable 81 milliGRAM(s) Oral daily  carvedilol 12.5 milliGRAM(s) Oral every 12 hours  enoxaparin Injectable 40 milliGRAM(s) SubCutaneous daily  melatonin 3 milliGRAM(s) Oral at bedtime  tamsulosin 0.4 milliGRAM(s) Oral at bedtime  valsartan 160 milliGRAM(s) Oral daily      MEDICATIONS  (PRN):  acetaminophen   Tablet 650 milliGRAM(s) Oral every 6 hours PRN For Temp greater than 38 C (100.4 F) and/or mild pain (1-3)  fentaNYL    Injectable 25 MICROGram(s) IV Push every 10 minutes PRN Moderate Pain (4 - 6)  oxyCODONE    5 mG/acetaminophen 325 mG 2 Tablet(s) Oral every 6 hours PRN Severe Pain (7 - 10)  oxyCODONE    5 mG/acetaminophen 325 mG 1 Tablet(s) Oral every 6 hours PRN Moderate Pain (4 - 6)  zolpidem 5 milliGRAM(s) Oral at bedtime PRN Insomnia

## 2017-12-10 NOTE — PROGRESS NOTE ADULT - SUBJECTIVE AND OBJECTIVE BOX
pt s- new complaints. no cp no sob. Pt states he feels the SQ air is less than previously.  VSS tmax 102.8   Lungs:good air entry bilat  Crepitus palpable Left lower Neck./chest . No Crepitus laterally or to face or upper arm.  CT in place, no air leak noted.  260cc straw colored fluid drained 24 hrs.

## 2017-12-10 NOTE — PROGRESS NOTE ADULT - ASSESSMENT
hx chest mass  s/p bronchoscopy with SQ emphysema, subsequent Chest tube placed.  No air leak  small persistend sq emphysema, clinically improving.  Febrile, no evidence of empyema.    1. f/u CXR (ordered)  f/u pathology of bronchial bx  CT to pleurovac o suction,  02 NC.  will follow

## 2017-12-10 NOTE — PROGRESS NOTE ADULT - SUBJECTIVE AND OBJECTIVE BOX
Patient is a 72y old  Male who presents with a chief complaint of weakness, chest discomfort and voice and appetite changes (05 Dec 2017 01:22)    pt seen in icu [  ], reg med floor [ x ], bed [ x ], chair at bedside [   ], a+o x3 [x  ], lethargic [  ],    nad [x  ], left chest to pleurovac [x],      Allergies    No Known Allergies        Vitals    T(F): 98.3 (12-10-17 @ 04:50), Max: 99 (12-09-17 @ 20:55)  HR: 70 (12-10-17 @ 04:50) (70 - 83)  BP: 130/81 (12-10-17 @ 04:50) (124/62 - 130/81)  RR: 16 (12-10-17 @ 04:50) (16 - 18)  SpO2: 95% (12-10-17 @ 04:50) (95% - 98%)  Wt(kg): --  CAPILLARY BLOOD GLUCOSE          Labs                      .Broncial Other, bronchioalveolar lavage  12-06 @ 22:28 --  --  --      .Blood Blood-Peripheral  12-05 @ 10:57   No growth to date.  --  --          Radiology Results      Meds    MEDICATIONS  (STANDING):  aspirin  chewable 81 milliGRAM(s) Oral daily  carvedilol 12.5 milliGRAM(s) Oral every 12 hours  enoxaparin Injectable 40 milliGRAM(s) SubCutaneous daily  melatonin 3 milliGRAM(s) Oral at bedtime  tamsulosin 0.4 milliGRAM(s) Oral at bedtime  valsartan 160 milliGRAM(s) Oral daily      MEDICATIONS  (PRN):  acetaminophen   Tablet 650 milliGRAM(s) Oral every 6 hours PRN For Temp greater than 38 C (100.4 F) and/or mild pain (1-3)  fentaNYL    Injectable 25 MICROGram(s) IV Push every 10 minutes PRN Moderate Pain (4 - 6)  oxyCODONE    5 mG/acetaminophen 325 mG 2 Tablet(s) Oral every 6 hours PRN Severe Pain (7 - 10)  oxyCODONE    5 mG/acetaminophen 325 mG 1 Tablet(s) Oral every 6 hours PRN Moderate Pain (4 - 6)  zolpidem 5 milliGRAM(s) Oral at bedtime PRN Insomnia      Physical Exam      Neuro :  no focal deficits  Respiratory: CTA B/L, left subcutaneous emphysema, left chest tube to pleurovac  CV: RRR, S1S2, no murmurs,   Abdominal: Soft, NT, ND +BS,  Extremities: No edema, + peripheral pulses    ASSESSMENT    perihilar mass,   hepatic and splenic lesions,   r/o malig,   copd   destruction of left 6th and 7th ribs and left T6 transverse process   exophytic left renal lesion  subcutaneous emphysema.  h/o BPH (benign prostatic hyperplasia)  Hypertension        PLAN        cont atrov and prov nebs,   cont supplemental oxygen,  ct chest-abd-pelv result noted  pulm f/u  s/p bronch with bx   cytopath of bronch neg for malig noted above.   monitor chest tube  thoracic surg f/u  heme onc eval  urology cons  cont current meds Patient is a 72y old  Male who presents with a chief complaint of weakness, chest discomfort and voice and appetite changes (05 Dec 2017 01:22)    pt seen in icu [  ], reg med floor [ x ], bed [ x ], chair at bedside [   ], a+o x3 [x  ], lethargic [  ],    nad [x  ], left chest to pleurovac [x],      Allergies    No Known Allergies        Vitals    T(F): 98.3 (12-10-17 @ 04:50), Max: 99 (12-09-17 @ 20:55)  HR: 70 (12-10-17 @ 04:50) (70 - 83)  BP: 130/81 (12-10-17 @ 04:50) (124/62 - 130/81)  RR: 16 (12-10-17 @ 04:50) (16 - 18)  SpO2: 95% (12-10-17 @ 04:50) (95% - 98%)  Wt(kg): --  CAPILLARY BLOOD GLUCOSE          Labs      .Broncial Other, bronchioalveolar lavage  12-06 @ 22:28 --  --  --      .Blood Blood-Peripheral  12-05 @ 10:57   No growth to date.  --  --          Radiology Results      Meds    MEDICATIONS  (STANDING):  aspirin  chewable 81 milliGRAM(s) Oral daily  carvedilol 12.5 milliGRAM(s) Oral every 12 hours  enoxaparin Injectable 40 milliGRAM(s) SubCutaneous daily  melatonin 3 milliGRAM(s) Oral at bedtime  tamsulosin 0.4 milliGRAM(s) Oral at bedtime  valsartan 160 milliGRAM(s) Oral daily      MEDICATIONS  (PRN):  acetaminophen   Tablet 650 milliGRAM(s) Oral every 6 hours PRN For Temp greater than 38 C (100.4 F) and/or mild pain (1-3)  fentaNYL    Injectable 25 MICROGram(s) IV Push every 10 minutes PRN Moderate Pain (4 - 6)  oxyCODONE    5 mG/acetaminophen 325 mG 2 Tablet(s) Oral every 6 hours PRN Severe Pain (7 - 10)  oxyCODONE    5 mG/acetaminophen 325 mG 1 Tablet(s) Oral every 6 hours PRN Moderate Pain (4 - 6)  zolpidem 5 milliGRAM(s) Oral at bedtime PRN Insomnia      Physical Exam      Neuro :  no focal deficits  Respiratory: CTA B/L, left subcutaneous emphysema, left chest tube to pleurovac  CV: RRR, S1S2, no murmurs,   Abdominal: Soft, NT, ND +BS,  Extremities: No edema, + peripheral pulses    ASSESSMENT    perihilar mass,   hepatic and splenic lesions,   r/o malig,   copd   destruction of left 6th and 7th ribs and left T6 transverse process   exophytic left renal lesion  subcutaneous emphysema.  h/o BPH (benign prostatic hyperplasia)  Hypertension        PLAN        cont atrov and prov nebs,   cont supplemental oxygen,  ct chest-abd-pelv result noted  pulm f/u  s/p bronch with bx   cytopath of bronch neg for malig noted.   monitor chest tube  thoracic surg f/u  heme onc eval  urology cons  cont current meds

## 2017-12-11 NOTE — PROGRESS NOTE ADULT - PROBLEM SELECTOR PLAN 1
Quantiferon Gold TB test  Empiric antibiotics  Bronch path negative for Ca.  Will need mediastinoscopy  Awaiting cyto report from [facility]

## 2017-12-11 NOTE — PROGRESS NOTE ADULT - ASSESSMENT
73 yo male s/p L cxt placement for pneumothorax, resolved, after bronch    1- consider placing L CxT to [facility]  2- daily chest xrays  3- plan for removal soon, will discuss with Dr Gonzales  4- daily dressing changes

## 2017-12-11 NOTE — PROGRESS NOTE ADULT - SUBJECTIVE AND OBJECTIVE BOX
INTERVAL HPI/OVERNIGHT EVENTS: no major overnight events     VITAL SIGNS:  Vital Signs Last 24 Hrs  T(C): 37.6 (11 Dec 2017 14:30), Max: 38.4 (10 Dec 2017 20:45)  T(F): 99.7 (11 Dec 2017 14:30), Max: 101.2 (10 Dec 2017 20:45)  HR: 85 (11 Dec 2017 14:30) (78 - 89)  BP: 123/61 (11 Dec 2017 14:30) (123/61 - 156/90)  BP(mean): --  RR: 17 (11 Dec 2017 14:30) (16 - 17)  SpO2: 95% (11 Dec 2017 14:30) (95% - 95%)        PHYSICAL EXAM:    Constitutional: NAD  Respiratory: CTAB, left subcutaneous emphysema, left chest tube in place  Cardiovascular: RRR, S1 S2 present  Gastrointestinal: ND, NT , BS++  Extremities: no pedal edema  Vascular: intact  Neurological: AAOX3  Musculoskeletal: power equal    MEDICATIONS  (STANDING):  aspirin  chewable 81 milliGRAM(s) Oral daily  carvedilol 12.5 milliGRAM(s) Oral every 12 hours  enoxaparin Injectable 40 milliGRAM(s) SubCutaneous daily  melatonin 3 milliGRAM(s) Oral at bedtime  tamsulosin 0.4 milliGRAM(s) Oral at bedtime  valsartan 160 milliGRAM(s) Oral daily    MEDICATIONS  (PRN):  acetaminophen   Tablet 650 milliGRAM(s) Oral every 6 hours PRN For Temp greater than 38 C (100.4 F) and/or mild pain (1-3)  fentaNYL    Injectable 25 MICROGram(s) IV Push every 10 minutes PRN Moderate Pain (4 - 6)  oxyCODONE    5 mG/acetaminophen 325 mG 2 Tablet(s) Oral every 6 hours PRN Severe Pain (7 - 10)  oxyCODONE    5 mG/acetaminophen 325 mG 1 Tablet(s) Oral every 6 hours PRN Moderate Pain (4 - 6)      Allergies    No Known Allergies    Intolerances        LABS:                        11.0   12.5  )-----------( CLUMPED    ( 08 Dec 2017 07:53 )             33.1     12-08    132<L>  |  96  |  22<H>  ----------------------------<  171<H>  4.6   |  30  |  0.81    Ca    8.1<L>      08 Dec 2017 07:53  Phos  3.3     12-08  Mg     1.8     12-08      PT/INR - ( 06 Dec 2017 17:29 )   PT: 13.3 sec;   INR: 1.22 ratio         PTT - ( 06 Dec 2017 17:29 )  PTT:23.4 sec      RADIOLOGY & ADDITIONAL TESTS:

## 2017-12-11 NOTE — PROGRESS NOTE ADULT - SUBJECTIVE AND OBJECTIVE BOX
Patient is a 72y old  Male who presents with a chief complaint of weakness, chest discomfort and voice and appetite changes (05 Dec 2017 01:22)    pt seen in icu [  ], reg med floor [ x ], bed [ x ], chair at bedside [   ], a+o x3 [x  ], lethargic [  ],    nad [x  ], left chest to pleurovac [x],      Allergies    No Known Allergies        Vitals    T(F): 98.6 (12-11-17 @ 05:26), Max: 102.8 (12-10-17 @ 15:18)  HR: 89 (12-11-17 @ 05:26) (78 - 89)  BP: 138/66 (12-11-17 @ 05:26) (133/65 - 165/76)  RR: 16 (12-11-17 @ 05:26) (16 - 17)  SpO2: 95% (12-11-17 @ 05:26) (95% - 97%)  Wt(kg): --  CAPILLARY BLOOD GLUCOSE          Labs                          11.4   13.0  )-----------( CLUMPED    ( 11 Dec 2017 06:27 )             33.7       12-11    127<L>  |  91<L>  |  22<H>  ----------------------------<  136<H>  4.1   |  24  |  0.96    Ca    7.8<L>      11 Dec 2017 06:27    TPro  6.3  /  Alb  1.9<L>  /  TBili  1.1  /  DBili  x   /  AST  24  /  ALT  40  /  AlkPhos  54  12-11            .Broncial Other, bronchioalveolar lavage  12-06 @ 22:28 --  --  --      .Blood Blood-Peripheral  12-05 @ 10:57   No growth at 5 days.  --  --          Radiology Results      Meds    MEDICATIONS  (STANDING):  aspirin  chewable 81 milliGRAM(s) Oral daily  carvedilol 12.5 milliGRAM(s) Oral every 12 hours  enoxaparin Injectable 40 milliGRAM(s) SubCutaneous daily  melatonin 3 milliGRAM(s) Oral at bedtime  tamsulosin 0.4 milliGRAM(s) Oral at bedtime  valsartan 160 milliGRAM(s) Oral daily      MEDICATIONS  (PRN):  acetaminophen   Tablet 650 milliGRAM(s) Oral every 6 hours PRN For Temp greater than 38 C (100.4 F) and/or mild pain (1-3)  fentaNYL    Injectable 25 MICROGram(s) IV Push every 10 minutes PRN Moderate Pain (4 - 6)  oxyCODONE    5 mG/acetaminophen 325 mG 2 Tablet(s) Oral every 6 hours PRN Severe Pain (7 - 10)  oxyCODONE    5 mG/acetaminophen 325 mG 1 Tablet(s) Oral every 6 hours PRN Moderate Pain (4 - 6)  zolpidem 5 milliGRAM(s) Oral at bedtime PRN Insomnia      Physical Exam      Neuro :  no focal deficits  Respiratory: CTA B/L, left subcutaneous emphysema, left chest tube to pleurovac  CV: RRR, S1S2, no murmurs,   Abdominal: Soft, NT, ND +BS,  Extremities: No edema, + peripheral pulses    ASSESSMENT    perihilar mass,   hepatic and splenic lesions,   r/o malig,   copd   destruction of left 6th and 7th ribs and left T6 transverse process   exophytic left renal lesion  subcutaneous emphysema.  h/o BPH (benign prostatic hyperplasia)  Hypertension        PLAN        cont atrov and prov nebs,   cont supplemental oxygen,  ct chest-abd-pelv result noted  pulm f/u noted  s/p bronch with bx   cytopath of bronch neg for malig noted.   pt Will need mediastinoscopy  Awaiting cyto report from [facility].   monitor chest tube  thoracic surg f/u noted  heme onc eval  urology cons  cont current meds

## 2017-12-11 NOTE — PROGRESS NOTE ADULT - ASSESSMENT
pre op for 12/12    1- npo after midnight except for PO meds  2- IVF while npo  3- informed consent to be obtained  4- pre op labs ordered

## 2017-12-11 NOTE — PROGRESS NOTE ADULT - SUBJECTIVE AND OBJECTIVE BOX
Patient is a 72y old  Male who presents with a chief complaint of weakness, chest discomfort and voice and appetite changes (05 Dec 2017 01:22)  awake, alert, comfortable in NAD. Sttill with some subcutaneous emphysema and small PTX on CXR. Feels well with no chest pain or sob. Awaiting path report of lung biopsy.    INTERVAL HPI/OVERNIGHT EVENTS:      VITAL SIGNS:  T(F): 98.6 (12-11-17 @ 05:26)  HR: 89 (12-11-17 @ 05:26)  BP: 138/66 (12-11-17 @ 05:26)  RR: 16 (12-11-17 @ 05:26)  SpO2: 95% (12-11-17 @ 05:26)  Wt(kg): --  I&O's Detail    10 Dec 2017 07:01  -  11 Dec 2017 07:00  --------------------------------------------------------  IN:  Total IN: 0 mL    OUT:    Chest Tube: 110 mL  Total OUT: 110 mL    Total NET: -110 mL              REVIEW OF SYSTEMS:    CONSTITUTIONAL:  No fevers, chills, sweats    HEENT:  Eyes:  No diplopia or blurred vision. ENT:  No earache, sore throat or runny nose.    CARDIOVASCULAR:  No pressure, squeezing, tightness, or heaviness about the chest; no palpitations.    RESPIRATORY:  Per HPI    GASTROINTESTINAL:  No abdominal pain, nausea, vomiting or diarrhea.    GENITOURINARY:  No dysuria, frequency or urgency.    NEUROLOGIC:  No paresthesias, fasciculations, seizures or weakness.    PSYCHIATRIC:  No disorder of thought or mood.      PHYSICAL EXAM:    Constitutional: Well developed and nourished  Eyes:Perrla  ENMT: normal  Neck:supple  Respiratory: Decreased BS on left with crepitations  Cardiovascular: S1 S2 regular  Gastrointestinal: Soft, Non tender  Extremities: No edema  Vascular:normal  Neurological:Awake, alert,Ox3  Musculoskeletal:Normal      MEDICATIONS  (STANDING):  aspirin  chewable 81 milliGRAM(s) Oral daily  carvedilol 12.5 milliGRAM(s) Oral every 12 hours  enoxaparin Injectable 40 milliGRAM(s) SubCutaneous daily  melatonin 3 milliGRAM(s) Oral at bedtime  tamsulosin 0.4 milliGRAM(s) Oral at bedtime  valsartan 160 milliGRAM(s) Oral daily    MEDICATIONS  (PRN):  acetaminophen   Tablet 650 milliGRAM(s) Oral every 6 hours PRN For Temp greater than 38 C (100.4 F) and/or mild pain (1-3)  fentaNYL    Injectable 25 MICROGram(s) IV Push every 10 minutes PRN Moderate Pain (4 - 6)  oxyCODONE    5 mG/acetaminophen 325 mG 2 Tablet(s) Oral every 6 hours PRN Severe Pain (7 - 10)  oxyCODONE    5 mG/acetaminophen 325 mG 1 Tablet(s) Oral every 6 hours PRN Moderate Pain (4 - 6)  zolpidem 5 milliGRAM(s) Oral at bedtime PRN Insomnia      Allergies    No Known Allergies    Intolerances        LABS:                        11.4   13.0  )-----------( CLUMPED    ( 11 Dec 2017 06:27 )             33.7     12-11    127<L>  |  91<L>  |  22<H>  ----------------------------<  136<H>  4.1   |  24  |  0.96    Ca    7.8<L>      11 Dec 2017 06:27    TPro  6.3  /  Alb  1.9<L>  /  TBili  1.1  /  DBili  x   /  AST  24  /  ALT  40  /  AlkPhos  54  12-11              CAPILLARY BLOOD GLUCOSE            RADIOLOGY & ADDITIONAL TESTS:    CXR:    Ct scan chest:    ekg;    echo:

## 2017-12-11 NOTE — PROGRESS NOTE ADULT - SUBJECTIVE AND OBJECTIVE BOX
PRE-OP NOTE    Procedure: EBUS with possible Mediastinoscopy  Surgeon: Dr. Piper    patient will need medical optimization  informed consent to be obtained  PRE-OP for 12/12 between 11am-12pm

## 2017-12-11 NOTE — PROGRESS NOTE ADULT - SUBJECTIVE AND OBJECTIVE BOX
72y Male with no overnight complaints. Tolerating reg diet.   L CxT to LWS    Vital Signs:  T(C): 37 (12-11-17 @ 05:26), Max: 39.3 (12-10-17 @ 15:18)  HR: 89 (12-11-17 @ 05:26) (78 - 89)  BP: 138/66 (12-11-17 @ 05:26) (133/65 - 165/76)  RR: 16 (12-11-17 @ 05:26) (16 - 17)  SpO2: 95% (12-11-17 @ 05:26) (95% - 97%)  Wt(kg): --    Physical Exam:  General: NAD, comfortable  Thorax: L CxT to LWS, output serosang. no air leak. subcu emphysema improving    Ins/Outs:  OUT:    Chest Tube: 110 mL                          11.4   13.0  )-----------( x        ( 11 Dec 2017 06:27 )             33.7   < from: Xray Chest 1 View AP/PA (12.10.17 @ 16:38) >    Impression    Left chest tube in place with no pneumothorax, evaluation markedly   limited by overlying soft tissue emphysema.    Left perihilar mass, better characterized on recent CT    < end of copied text >

## 2017-12-11 NOTE — PROGRESS NOTE ADULT - ASSESSMENT
72 former smoker 20 pack year M PMH HTN and BPH p/w pneumothorax s/p diagnostic bronchoscopy for left lobe lesions found on CT imaging - chest tube placed in ICU 12/6 on suction - admitted for urgent CT placement and monitoring. Patient has chronic complaints of decreased appetite and voice weakness. Downgraded from ICU dec 7.      Left Pneumothorax   - consulted cardio thoracic surgery for mediastinoscopy as cytopathology is negative   -S/P Left chest tube  - Crepitus along neck and L anterior chest wall  - CXR shows small pneumomediastinum, overall improvement  - Afebrile, WBC improved to 12K from 19K    Lung mass  - S/p bronchoscopy biopsy  -F/u pathology is negative     Hypertension.  Plan: BP acceptable on home medications.     BPH (benign prostatic hyperplasia)  - continue  Tamsulosin    Prophylactic measure  - IMPROVE score 1 2/2 immobilization;  - GI stress ulcer PPx w/ Protonix.

## 2017-12-12 NOTE — CONSULT NOTE ADULT - ASSESSMENT
A 72 y old  former smoker 20 PPD with Hx HTN BPH p/w Male who p/w generalized weakness, voice changes with imaging showing left hilar mass in CT scan , s/p bronch and biopsy on 12/06 , admitted to ICU on 12/06 for pneumothorax s/p diagnostic bronchoscopy for left lobe lesions found on CT imaging - chest tube placed in ICU 12/6 on suction - admitted for urgent CT placement and monitoring. pt was downgraded on Lung biopsy from 12/07. Lung biopsy did not show any malignancy. s/p transbronchial lung biopsy , s/p endobronchial lung biopsy, s/p flexible bronchoscopy today by Dr. Piper.   ROS - unable to get as pt is intubated    admit to ICU post op monitoring s/p intubation POD - 0 ,s/p chest tube , hypotensive       POD -0 s/p EBUS  monitor u/o  -c/w chest tube to suction  -c/w post op abx px - zosyn  -s/p intubation , c/w mechanical ventilation  -c/w sedation  -hypotension post anesthesia , c/w IV fluids and c/w pressor   -f/u CXR post op  -f/u repeat labs  -c/w DVT ppx       Pneumothorax   Crepitus along neck and L anterior chest wall  -chest tube to suction  -f/u CXR    Lung mass  -c/w post op prophylaxis  -Bronch path negative for Ca.  -s/p EBUS VS mediastinoscopy  -Awaiting cyto report from [facility].    Hypertension.    -hypotension , holding home BP meds    BPH (benign prostatic hyperplasia)  - monitor U/o  -resume PO tamsulosin with NG tube    Prophylactic measure  - IMPROVE score 2   - c/w heparin S.C  - GI stress ulcer PPx w/ Protonix.

## 2017-12-12 NOTE — BRIEF OPERATIVE NOTE - POST-OP DX
Mediastinal (thymic) large B-cell lymphoma, unspecified body region  12/12/2017    Active  Catie Mathews

## 2017-12-12 NOTE — PROGRESS NOTE ADULT - SUBJECTIVE AND OBJECTIVE BOX
Patient is a 72y old  Male who presents with a chief complaint of weakness, chest discomfort and voice and appetite changes.  Awake, alert, comfortable in NAD. Still with some subcutaneous emphysema and small PTX on CXR. Feels well with no chest pain or sob. Awaiting path report of lung biopsy.    INTERVAL HPI/OVERNIGHT EVENTS:      VITAL SIGNS:  T(F): 98.3 (12-12-17 @ 05:20)  HR: 116 (12-12-17 @ 05:20)  BP: 130/64 (12-12-17 @ 05:20)  RR: 16 (12-12-17 @ 05:20)  SpO2: 95% (12-12-17 @ 05:20)  Wt(kg): --  I&O's Detail    11 Dec 2017 07:01  -  12 Dec 2017 07:00  --------------------------------------------------------  IN:  Total IN: 0 mL    OUT:    Chest Tube: 170 mL  Total OUT: 170 mL    Total NET: -170 mL              REVIEW OF SYSTEMS:    CONSTITUTIONAL:  No fevers, chills, sweats    HEENT:  Eyes:  No diplopia or blurred vision. ENT:  No earache, sore throat or runny nose.    CARDIOVASCULAR:  No pressure, squeezing, tightness, or heaviness about the chest; no palpitations.    RESPIRATORY:  Per HPI    GASTROINTESTINAL:  No abdominal pain, nausea, vomiting or diarrhea.    GENITOURINARY:  No dysuria, frequency or urgency.    NEUROLOGIC:  No paresthesias, fasciculations, seizures or weakness.    PSYCHIATRIC:  No disorder of thought or mood.      PHYSICAL EXAM:    Constitutional: Well developed and nourished  Eyes:Perrla  ENMT: normal  Neck:supple  Respiratory: good air entry  Cardiovascular: S1 S2 regular  Gastrointestinal: Soft, Non tender  Extremities: No edema  Vascular:normal  Neurological:Awake, alert,Ox3  Musculoskeletal:Normal      MEDICATIONS  (STANDING):  aspirin  chewable 81 milliGRAM(s) Oral daily  carvedilol 12.5 milliGRAM(s) Oral every 12 hours  enoxaparin Injectable 40 milliGRAM(s) SubCutaneous daily  melatonin 3 milliGRAM(s) Oral at bedtime  sodium chloride 0.9%. 1000 milliLiter(s) (60 mL/Hr) IV Continuous <Continuous>  tamsulosin 0.4 milliGRAM(s) Oral at bedtime  valsartan 160 milliGRAM(s) Oral daily    MEDICATIONS  (PRN):  acetaminophen   Tablet 650 milliGRAM(s) Oral every 6 hours PRN For Temp greater than 38 C (100.4 F) and/or mild pain (1-3)  fentaNYL    Injectable 25 MICROGram(s) IV Push every 10 minutes PRN Moderate Pain (4 - 6)  oxyCODONE    5 mG/acetaminophen 325 mG 2 Tablet(s) Oral every 6 hours PRN Severe Pain (7 - 10)  oxyCODONE    5 mG/acetaminophen 325 mG 1 Tablet(s) Oral every 6 hours PRN Moderate Pain (4 - 6)  zolpidem 5 milliGRAM(s) Oral at bedtime PRN Insomnia      Allergies    No Known Allergies    Intolerances        LABS:                        11.0   11.8  )-----------( x        ( 12 Dec 2017 07:51 )             31.7     12-12    125<L>  |  90<L>  |  34<H>  ----------------------------<  118<H>  4.4   |  23  |  1.19    Ca    7.9<L>      12 Dec 2017 07:51  Phos  4.0     12-12  Mg     1.9     12-12    TPro  6.3  /  Alb  1.9<L>  /  TBili  1.1  /  DBili  x   /  AST  24  /  ALT  40  /  AlkPhos  54  12-11    PT/INR - ( 12 Dec 2017 07:51 )   PT: 14.0 sec;   INR: 1.28 ratio         PTT - ( 12 Dec 2017 07:51 )  PTT:27.5 sec          CAPILLARY BLOOD GLUCOSE            RADIOLOGY & ADDITIONAL TESTS:    CXR:  < from: Xray Chest 1 View AP/PA (12.10.17 @ 16:38) >  Left chest tube in place with no pneumothorax, evaluation markedly   limited by overlying soft tissue emphysema.    Left perihilar mass, better characterized on recent CT      < end of copied text >    Ct scan chest:  < from: CT Chest w/ IV Cont (12.05.17 @ 10:00) >  7.6 cm necrotic left lower lobe mass consistent with primary   neoplasm.  Mediastinal adenopathy and destruction of adjacent osseous structures.  Hepatic and splenic lesions, likely metastatic.  Upper abdominal adenopathy.  1.0 cm indeterminate exophytic left renal lesion.      < end of copied text >    ekg;    echo: Patient is a 72y old  Male who presents with a chief complaint of weakness, chest discomfort and voice and appetite changes.  Awake, alert, comfortable in NAD. Still with some subcutaneous emphysema and small PTX on CXR. Feels well with no chest pain or sob. Lung biopsy did not show any malignancy. Scheduled for EBUS today.  Has cough but no sob. Still with some subcutaneous emphysema.  INTERVAL HPI/OVERNIGHT EVENTS:      VITAL SIGNS:  T(F): 98.3 (12-12-17 @ 05:20)  HR: 116 (12-12-17 @ 05:20)  BP: 130/64 (12-12-17 @ 05:20)  RR: 16 (12-12-17 @ 05:20)  SpO2: 95% (12-12-17 @ 05:20)  Wt(kg): --  I&O's Detail    11 Dec 2017 07:01  -  12 Dec 2017 07:00  --------------------------------------------------------  IN:  Total IN: 0 mL    OUT:    Chest Tube: 170 mL  Total OUT: 170 mL    Total NET: -170 mL              REVIEW OF SYSTEMS:    CONSTITUTIONAL:  No fevers, chills, sweats    HEENT:  Eyes:  No diplopia or blurred vision. ENT:  No earache, sore throat or runny nose.    CARDIOVASCULAR:  No pressure, squeezing, tightness, or heaviness about the chest; no palpitations.    RESPIRATORY:  Per HPI    GASTROINTESTINAL:  No abdominal pain, nausea, vomiting or diarrhea.    GENITOURINARY:  No dysuria, frequency or urgency.    NEUROLOGIC:  No paresthesias, fasciculations, seizures or weakness.    PSYCHIATRIC:  No disorder of thought or mood.      PHYSICAL EXAM:    Constitutional: Well developed and nourished  Eyes:Perrla  ENMT: normal  Neck:supple  Respiratory: decreased BS on left  Cardiovascular: S1 S2 regular  Gastrointestinal: Soft, Non tender  Extremities: No edema  Vascular:normal  Neurological:Awake, alert,Ox3  Musculoskeletal:Normal      MEDICATIONS  (STANDING):  aspirin  chewable 81 milliGRAM(s) Oral daily  carvedilol 12.5 milliGRAM(s) Oral every 12 hours  enoxaparin Injectable 40 milliGRAM(s) SubCutaneous daily  melatonin 3 milliGRAM(s) Oral at bedtime  sodium chloride 0.9%. 1000 milliLiter(s) (60 mL/Hr) IV Continuous <Continuous>  tamsulosin 0.4 milliGRAM(s) Oral at bedtime  valsartan 160 milliGRAM(s) Oral daily    MEDICATIONS  (PRN):  acetaminophen   Tablet 650 milliGRAM(s) Oral every 6 hours PRN For Temp greater than 38 C (100.4 F) and/or mild pain (1-3)  fentaNYL    Injectable 25 MICROGram(s) IV Push every 10 minutes PRN Moderate Pain (4 - 6)  oxyCODONE    5 mG/acetaminophen 325 mG 2 Tablet(s) Oral every 6 hours PRN Severe Pain (7 - 10)  oxyCODONE    5 mG/acetaminophen 325 mG 1 Tablet(s) Oral every 6 hours PRN Moderate Pain (4 - 6)  zolpidem 5 milliGRAM(s) Oral at bedtime PRN Insomnia      Allergies    No Known Allergies    Intolerances        LABS:                        11.0   11.8  )-----------( x        ( 12 Dec 2017 07:51 )             31.7     12-12    125<L>  |  90<L>  |  34<H>  ----------------------------<  118<H>  4.4   |  23  |  1.19    Ca    7.9<L>      12 Dec 2017 07:51  Phos  4.0     12-12  Mg     1.9     12-12    TPro  6.3  /  Alb  1.9<L>  /  TBili  1.1  /  DBili  x   /  AST  24  /  ALT  40  /  AlkPhos  54  12-11    PT/INR - ( 12 Dec 2017 07:51 )   PT: 14.0 sec;   INR: 1.28 ratio         PTT - ( 12 Dec 2017 07:51 )  PTT:27.5 sec          CAPILLARY BLOOD GLUCOSE            RADIOLOGY & ADDITIONAL TESTS:    CXR:  < from: Xray Chest 1 View AP/PA (12.10.17 @ 16:38) >  Left chest tube in place with no pneumothorax, evaluation markedly   limited by overlying soft tissue emphysema.    Left perihilar mass, better characterized on recent CT      < end of copied text >    Ct scan chest:  < from: CT Chest w/ IV Cont (12.05.17 @ 10:00) >  7.6 cm necrotic left lower lobe mass consistent with primary   neoplasm.  Mediastinal adenopathy and destruction of adjacent osseous structures.  Hepatic and splenic lesions, likely metastatic.  Upper abdominal adenopathy.  1.0 cm indeterminate exophytic left renal lesion.      < end of copied text >    ekg;    echo:

## 2017-12-12 NOTE — PROGRESS NOTE ADULT - SUBJECTIVE AND OBJECTIVE BOX
Patient is a 72y old  Male who presents with a chief complaint of weakness, chest discomfort and voice and appetite changes (05 Dec 2017 01:22)      pt seen in icu [  ], reg med floor [ x ], bed [ x ], chair at bedside [   ], a+o x3 [x  ], lethargic [  ],    nad [x  ], left chest to pleurovac [x],        Allergies    No Known Allergies        Vitals    T(F): 98.3 (12-12-17 @ 05:20), Max: 99.7 (12-11-17 @ 14:30)  HR: 116 (12-12-17 @ 05:20) (85 - 116)  BP: 130/64 (12-12-17 @ 05:20) (123/61 - 130/64)  RR: 16 (12-12-17 @ 05:20) (16 - 18)  SpO2: 95% (12-12-17 @ 05:20) (94% - 95%)  Wt(kg): --  CAPILLARY BLOOD GLUCOSE          Labs                          11.4   13.0  )-----------( CLUMPED    ( 11 Dec 2017 06:27 )             33.7       12-11    127<L>  |  91<L>  |  22<H>  ----------------------------<  136<H>  4.1   |  24  |  0.96    Ca    7.8<L>      11 Dec 2017 06:27    TPro  6.3  /  Alb  1.9<L>  /  TBili  1.1  /  DBili  x   /  AST  24  /  ALT  40  /  AlkPhos  54  12-11            .Broncial Other, bronchioalveolar lavage  12-06 @ 22:28 --  --  --      .Blood Blood-Peripheral  12-05 @ 10:57   No growth at 5 days.  --  --          Radiology Results      Meds    MEDICATIONS  (STANDING):  aspirin  chewable 81 milliGRAM(s) Oral daily  carvedilol 12.5 milliGRAM(s) Oral every 12 hours  enoxaparin Injectable 40 milliGRAM(s) SubCutaneous daily  melatonin 3 milliGRAM(s) Oral at bedtime  sodium chloride 0.9%. 1000 milliLiter(s) (60 mL/Hr) IV Continuous <Continuous>  tamsulosin 0.4 milliGRAM(s) Oral at bedtime  valsartan 160 milliGRAM(s) Oral daily      MEDICATIONS  (PRN):  acetaminophen   Tablet 650 milliGRAM(s) Oral every 6 hours PRN For Temp greater than 38 C (100.4 F) and/or mild pain (1-3)  fentaNYL    Injectable 25 MICROGram(s) IV Push every 10 minutes PRN Moderate Pain (4 - 6)  oxyCODONE    5 mG/acetaminophen 325 mG 2 Tablet(s) Oral every 6 hours PRN Severe Pain (7 - 10)  oxyCODONE    5 mG/acetaminophen 325 mG 1 Tablet(s) Oral every 6 hours PRN Moderate Pain (4 - 6)  zolpidem 5 milliGRAM(s) Oral at bedtime PRN Insomnia      Physical Exam    Neuro :  no focal deficits  Respiratory: CTA B/L, left subcutaneous emphysema, left chest tube to pleurovac  CV: RRR, S1S2, no murmurs,   Abdominal: Soft, NT, ND +BS,  Extremities: No edema, + peripheral pulses    ASSESSMENT    perihilar mass,   hepatic and splenic lesions,   r/o malig,   copd   destruction of left 6th and 7th ribs and left T6 transverse process   exophytic left renal lesion  subcutaneous emphysema.  h/o BPH (benign prostatic hyperplasia)  Hypertension        PLAN        cont atrov and prov nebs,   cont supplemental oxygen,  ct chest-abd-pelv result noted  pulm f/u noted  s/p bronch with bx   cytopath of bronch neg for malig noted.   pt Will need mediastinoscopy  Awaiting cyto report from [facility].   monitor chest tube  thoracic surg f/u noted  heme onc eval  urology cons  cont current meds Patient is a 72y old  Male who presents with a chief complaint of weakness, chest discomfort and voice and appetite changes (05 Dec 2017 01:22)      pt seen in icu [  ], reg med floor [ x ], bed [ x ], chair at bedside [   ], a+o x3 [x  ], lethargic [  ],    nad [x  ], left chest to pleurovac [x],        Allergies    No Known Allergies        Vitals    T(F): 98.3 (12-12-17 @ 05:20), Max: 99.7 (12-11-17 @ 14:30)  HR: 116 (12-12-17 @ 05:20) (85 - 116)  BP: 130/64 (12-12-17 @ 05:20) (123/61 - 130/64)  RR: 16 (12-12-17 @ 05:20) (16 - 18)  SpO2: 95% (12-12-17 @ 05:20) (94% - 95%)  Wt(kg): --  CAPILLARY BLOOD GLUCOSE          Labs                          11.4   13.0  )-----------( CLUMPED    ( 11 Dec 2017 06:27 )             33.7       12-11    127<L>  |  91<L>  |  22<H>  ----------------------------<  136<H>  4.1   |  24  |  0.96    Ca    7.8<L>      11 Dec 2017 06:27    TPro  6.3  /  Alb  1.9<L>  /  TBili  1.1  /  DBili  x   /  AST  24  /  ALT  40  /  AlkPhos  54  12-11            .Broncial Other, bronchioalveolar lavage  12-06 @ 22:28 --  --  --      .Blood Blood-Peripheral  12-05 @ 10:57   No growth at 5 days.  --  --          Radiology Results      Meds    MEDICATIONS  (STANDING):  aspirin  chewable 81 milliGRAM(s) Oral daily  carvedilol 12.5 milliGRAM(s) Oral every 12 hours  enoxaparin Injectable 40 milliGRAM(s) SubCutaneous daily  melatonin 3 milliGRAM(s) Oral at bedtime  sodium chloride 0.9%. 1000 milliLiter(s) (60 mL/Hr) IV Continuous <Continuous>  tamsulosin 0.4 milliGRAM(s) Oral at bedtime  valsartan 160 milliGRAM(s) Oral daily      MEDICATIONS  (PRN):  acetaminophen   Tablet 650 milliGRAM(s) Oral every 6 hours PRN For Temp greater than 38 C (100.4 F) and/or mild pain (1-3)  fentaNYL    Injectable 25 MICROGram(s) IV Push every 10 minutes PRN Moderate Pain (4 - 6)  oxyCODONE    5 mG/acetaminophen 325 mG 2 Tablet(s) Oral every 6 hours PRN Severe Pain (7 - 10)  oxyCODONE    5 mG/acetaminophen 325 mG 1 Tablet(s) Oral every 6 hours PRN Moderate Pain (4 - 6)  zolpidem 5 milliGRAM(s) Oral at bedtime PRN Insomnia      Physical Exam    Neuro :  no focal deficits  Respiratory: CTA B/L, left subcutaneous emphysema, left chest tube to pleurovac  CV: RRR, S1S2, no murmurs,   Abdominal: Soft, NT, ND +BS,  Extremities: No edema, + peripheral pulses    ASSESSMENT    perihilar mass,   hepatic and splenic lesions,   r/o malig,   copd   destruction of left 6th and 7th ribs and left T6 transverse process   exophytic left renal lesion  subcutaneous emphysema.  hyponatremia  h/o BPH (benign prostatic hyperplasia)  Hypertension        PLAN        cont atrov and prov nebs,   cont supplemental oxygen,  ct chest-abd-pelv result noted  pulm f/u noted  s/p bronch with bx   cytopath of bronch neg for malig noted.   pt for ebus and poss mediastinoscopy today  Awaiting cyto report from [facility].   monitor chest tube  thoracic surg f/u noted  heme onc eval  urology conscont ivf  renal cons  f/u urine lytes and osm  cont current meds

## 2017-12-12 NOTE — PROGRESS NOTE ADULT - SUBJECTIVE AND OBJECTIVE BOX
Post Op    Pt remains intubated.    PE: Intubated  ICU Vital Signs Last 24 Hrs  T(C): 35.7 (12 Dec 2017 16:00), Max: 37.3 (12 Dec 2017 12:21)  T(F): 96.3 (12 Dec 2017 16:00), Max: 99.2 (12 Dec 2017 12:21)  HR: 118 (12 Dec 2017 23:00) (56 - 138)  BP: 93/69 (12 Dec 2017 22:00) (76/50 - 130/64)  BP(mean): 75 (12 Dec 2017 22:00) (47 - 88)  ABP: 84/55 (12 Dec 2017 23:00) (78/42 - 102/44)  ABP(mean): 65 (12 Dec 2017 23:00) (52 - 72)  RR: 30 (12 Dec 2017 23:00) (16 - 32)  SpO2: 99% (12 Dec 2017 23:00) (93% - 100%)      Chest: B/L BS, +crepitus, no ET  tube bleeding upon suctioning  CVS: S1S2, tachycardic    I&O's Detail    11 Dec 2017 07:01  -  12 Dec 2017 07:00  --------------------------------------------------------  IN:  Total IN: 0 mL    OUT:    Chest Tube: 170 mL  Total OUT: 170 mL    Total NET: -170 mL      12 Dec 2017 07:01  -  12 Dec 2017 23:02  --------------------------------------------------------  IN:    lactated ringers.: 2000 mL    sodium chloride 0.9%.: 200 mL  Total IN: 2200 mL    OUT:    Indwelling Catheter - Urethral: 130 mL  Total OUT: 130 mL    Total NET: 2070 mL      MEDICATIONS  (STANDING):  heparin  Injectable 5000 Unit(s) SubCutaneous every 8 hours  lactated ringers. 1000 milliLiter(s) (2000 mL/Hr) IV Continuous <Continuous>  pantoprazole  Injectable 40 milliGRAM(s) IV Push daily  phenylephrine    Infusion 0.5 MICROgram(s)/kG/Min (6.563 mL/Hr) IV Continuous <Continuous>  piperacillin/tazobactam IVPB. 3.375 Gram(s) IV Intermittent every 8 hours  propofol Infusion 5 MICROgram(s)/kG/Min (2.292 mL/Hr) IV Continuous <Continuous>  sodium chloride 0.9%. 1000 milliLiter(s) (100 mL/Hr) IV Continuous <Continuous>    MEDICATIONS  (PRN):

## 2017-12-12 NOTE — PROGRESS NOTE ADULT - SUBJECTIVE AND OBJECTIVE BOX
HPI:  72 former smoker 20 pack year M PMH HTN and BPH p/w pneumothorax s/p diagnostic bronchoscopy for left lobe lesions found on CT imaging - chest tube placed in ICU 12/6 on suction. Bronchoscopy washing negative for malignant cells.    Interval history:  Patient was afebrile, vitals are stable. Patient denies chest pain, nausea, vomiting, shortness of breath, rash.    INTERVAL HPI/OVERNIGHT EVENTS:  T(C): 37.3 (12-12-17 @ 12:21), Max: 37.4 (12-11-17 @ 21:25)  HR: 80 (12-12-17 @ 12:21) (80 - 116)  BP: 114/56 (12-12-17 @ 12:21) (114/56 - 130/64)  RR: 17 (12-12-17 @ 12:21) (16 - 18)  SpO2: 95% (12-12-17 @ 12:21) (94% - 95%)    11 Dec 2017 07:01  -  12 Dec 2017 07:00  --------------------------------------------------------  IN: 0 mL / OUT: 170 mL / NET: -170 mL        REVIEW OF SYSTEMS: denies fever, chills, SOB, palpitations, chest pain, abdominal pain, nausea, vomitting, diarrhea, constipation, dizziness    MEDICATIONS  (STANDING):  sodium chloride 0.9%. 1000 milliLiter(s) (60 mL/Hr) IV Continuous <Continuous>    MEDICATIONS  (PRN):      PHYSICAL EXAM:  Neuro :  no focal deficits  Respiratory: CTA B/L, left subcutaneous emphysema, left chest tube to pleurovac  CV: RRR, S1S2, no murmurs,   Abdominal: Soft, NT, ND +BS,  Extremities: No edema, + peripheral pulses    LABS:                        11.0   11.8  )-----------( CLUMPED    ( 12 Dec 2017 07:51 )             31.7     12-12    125<L>  |  90<L>  |  34<H>  ----------------------------<  118<H>  4.4   |  23  |  1.19    Ca    7.9<L>      12 Dec 2017 07:51  Phos  4.0     12-12  Mg     1.9     12-12    TPro  6.3  /  Alb  1.9<L>  /  TBili  1.1  /  DBili  x   /  AST  24  /  ALT  40  /  AlkPhos  54  12-11    PT/INR - ( 12 Dec 2017 07:51 )   PT: 14.0 sec;   INR: 1.28 ratio         PTT - ( 12 Dec 2017 07:51 )  PTT:27.5 sec    CAPILLARY BLOOD GLUCOSE HPI:  72yr old M PMH HTN BPH S/P bronchoscopy and biopsy today for evaluation of L esme hilar mass   Post procedural PTHx noted on CXR with SQ emphysema now s/p L chest tube placement and almost near resolution of Pthx, sq emphysema improving.    Interval history:  Patient was afebrile, vitals are stable. Patient denies chest pain, nausea, vomiting, shortness of breath, rash.    INTERVAL HPI/OVERNIGHT EVENTS:  T(C): 37.3 (12-12-17 @ 12:21), Max: 37.4 (12-11-17 @ 21:25)  HR: 80 (12-12-17 @ 12:21) (80 - 116)  BP: 114/56 (12-12-17 @ 12:21) (114/56 - 130/64)  RR: 17 (12-12-17 @ 12:21) (16 - 18)  SpO2: 95% (12-12-17 @ 12:21) (94% - 95%)    11 Dec 2017 07:01  -  12 Dec 2017 07:00  --------------------------------------------------------  IN: 0 mL / OUT: 170 mL / NET: -170 mL        REVIEW OF SYSTEMS: denies fever, chills, SOB, palpitations, chest pain, abdominal pain, nausea, vomitting, diarrhea, constipation, dizziness    MEDICATIONS  (STANDING):  sodium chloride 0.9%. 1000 milliLiter(s) (60 mL/Hr) IV Continuous <Continuous>    MEDICATIONS  (PRN):      PHYSICAL EXAM:  Neuro :  no focal deficits  Respiratory: CTA B/L, left subcutaneous emphysema, left chest tube to pleurovac  CV: RRR, S1S2, no murmurs,   Abdominal: Soft, NT, ND +BS,  Extremities: No edema, + peripheral pulses    LABS:                        11.0   11.8  )-----------( CLUMPED    ( 12 Dec 2017 07:51 )             31.7     12-12    125<L>  |  90<L>  |  34<H>  ----------------------------<  118<H>  4.4   |  23  |  1.19    Ca    7.9<L>      12 Dec 2017 07:51  Phos  4.0     12-12  Mg     1.9     12-12    TPro  6.3  /  Alb  1.9<L>  /  TBili  1.1  /  DBili  x   /  AST  24  /  ALT  40  /  AlkPhos  54  12-11    PT/INR - ( 12 Dec 2017 07:51 )   PT: 14.0 sec;   INR: 1.28 ratio         PTT - ( 12 Dec 2017 07:51 )  PTT:27.5 sec    CAPILLARY BLOOD GLUCOSE

## 2017-12-12 NOTE — PROCEDURE NOTE - NSPOSTCAREGUIDE_GEN_A_CORE
Instructed patient/caregiver regarding signs and symptoms of infection
Verbal/written post procedure instructions were given to patient/caregiver

## 2017-12-12 NOTE — PROGRESS NOTE ADULT - ASSESSMENT
Large B-cell lymphoma  S/P flexible bronchoscopy, transbronchial lung bx, EBU bx    -Vent support  -F/U pathology/microbiology  -management as per ICU

## 2017-12-12 NOTE — PROGRESS NOTE ADULT - ASSESSMENT
72 former smoker came with generalized weakness, left side pneumothorax, and hilar mass noted s/p Chest tube placement attached to suction.      Left Pneumothorax   - consulted cardio thoracic surgery for mediastinoscopy as cytopathology is negative   -S/P Left chest tube  - Crepitus along neck and L anterior chest wall  - Plan for mediastinoscopy    Lung mass  - Bronchoscopy negative for malignant cells    Hyponatremia:  - F/u urine lytes  - Nephrology consult Dr. Reagan  - On fluid restriction    Hypertension.  Plan: BP acceptable on home medications.     BPH (benign prostatic hyperplasia)  - continue  Tamsulosin    Prophylactic measure  - IMPROVE score 2, c/w lovenox;

## 2017-12-12 NOTE — PROGRESS NOTE ADULT - PROBLEM SELECTOR PLAN 1
Quantiferon Gold TB test  Empiric antibiotics  Bronch path negative for Ca.  Will need mediastinoscopy  Awaiting cyto report from [facility] Quantiferon Gold TB test  Empiric antibiotics  Bronch path negative for Ca.  Will need EBUS VS mediastinoscopy  Awaiting cyto report from [facility]

## 2017-12-12 NOTE — CONSULT NOTE ADULT - SUBJECTIVE AND OBJECTIVE BOX
A 72 y old  former smoker 20 PPD with Hx HTN BPH p/w Male who p/w generalized weakness, voice changes with imaging showing left hilar mass in CT scan , s/p bronch and biopsy on 12/06 , admitted to ICU on 12/06 for pneumothorax s/p diagnostic bronchoscopy for left lobe lesions found on CT imaging - chest tube placed in ICU 12/6 on suction - admitted for urgent CT placement and monitoring. pt was downgraded on Lung biopsy from 12/07. Lung biopsy did not show any malignancy. s/p transbronchial lung biopsy , s/p endobronchial lung biopsy, s/p flexible bronchoscopy today by Dr. Piper.   ROS - unable to get as pt is intubated     ICU was reconsulted for post op monitoring s/p intubation POD - 0 ,s/p chest tube , hypotensive post op        PAST MEDICAL & SURGICAL HISTORY:  BPH (benign prostatic hyperplasia)  Hypertension  No significant past surgical history    Allergies    No Known Allergies    Intolerances      FAMILY HISTORY:  Family history of diabetes mellitus (Mother)    Social history reviewed: ***      Medications:  heparin  Injectable 5000 Unit(s) SubCutaneous every 8 hours  lactated ringers. 1000 milliLiter(s) IV Continuous <Continuous>  pantoprazole  Injectable 40 milliGRAM(s) IV Push daily  phenylephrine    Infusion 0.5 MICROgram(s)/kG/Min IV Continuous <Continuous>  piperacillin/tazobactam IVPB. 3.375 Gram(s) IV Intermittent once  piperacillin/tazobactam IVPB. 3.375 Gram(s) IV Intermittent every 8 hours  propofol Infusion 5 MICROgram(s)/kG/Min IV Continuous <Continuous>  sodium chloride 0.9%. 1000 milliLiter(s) IV Continuous <Continuous>      vent settings      Vital Signs Last 24 Hrs  T(C): 35.7 (12 Dec 2017 16:00), Max: 37.4 (11 Dec 2017 21:25)  T(F): 96.3 (12 Dec 2017 16:00), Max: 99.4 (11 Dec 2017 21:25)  HR: 64 (12 Dec 2017 16:00) (64 - 116)  BP: 83/37 (12 Dec 2017 16:00) (83/37 - 130/64)  BP(mean): 47 (12 Dec 2017 16:00) (47 - 47)  RR: 16 (12 Dec 2017 16:00) (16 - 18)  SpO2: 98% (12 Dec 2017 16:00) (94% - 98%)          12-11 @ 07:01  -  12-12 @ 07:00  --------------------------------------------------------  IN: 0 mL / OUT: 170 mL / NET: -170 mL          LABS:                        11.0   11.8  )-----------( CLUMPED    ( 12 Dec 2017 07:51 )             31.7     12-12    125<L>  |  90<L>  |  34<H>  ----------------------------<  118<H>  4.4   |  23  |  1.19    Ca    7.9<L>      12 Dec 2017 07:51  Phos  4.0     12-12  Mg     1.9     12-12    TPro  6.3  /  Alb  1.9<L>  /  TBili  1.1  /  DBili  x   /  AST  24  /  ALT  40  /  AlkPhos  54  12-11          CAPILLARY BLOOD GLUCOSE        PT/INR - ( 12 Dec 2017 07:51 )   PT: 14.0 sec;   INR: 1.28 ratio         PTT - ( 12 Dec 2017 07:51 )  PTT:27.5 sec    CULTURES:  Culture Results:   No growth to date. (12-11 @ 11:22)  Culture Results:   No growth to date. (12-11 @ 11:22)        Physical Examination:    General: sedated     HEENT: Pupils equal, reactive to light.  Symmetric.    PULM: intubated , assisted ventilation crepitus on left upper chest area    CVS: Regular rate and rhythm, no murmurs, rubs, or gallops    ABD: Soft, nondistended, nontender, normoactive bowel sounds, no masses    EXT: No edema, nontender    SKIN: Warm and well perfused, no rashes noted.    NEURO: sedated , intubated

## 2017-12-12 NOTE — BRIEF OPERATIVE NOTE - PROCEDURE
<<-----Click on this checkbox to enter Procedure Transbronchial lung biopsy  12/12/2017    Active  [facility]  Endobronchial ultrasound with biopsy  12/12/2017    Active  [facility]  Endobronchial ultrasound  12/12/2017    Active  [facility]  Flexible bronchoscopy  12/12/2017    Active  [facility]

## 2017-12-13 NOTE — PROGRESS NOTE ADULT - ASSESSMENT
s/p bronchoscopy EBUS bx pod #1  hx of SQ emphysema post bronchoscopy 12/6 with persistent sq emphysema.  sepsis    f/u am CXR  CT to pleurovac suction  pt on iv abx  Et management per ICU team

## 2017-12-13 NOTE — PROGRESS NOTE ADULT - SUBJECTIVE AND OBJECTIVE BOX
Patient is a 72y old Male who presents with a chief complaint of weakness, chest discomfort and voice and appetite changes.  Awake, alert, comfortable in NAD. Small PTX on CXR. Lung biopsy did not show any malignancy. Follow up EBUS.  Has cough but no sob. Still with some subcutaneous emphysema.        INTERVAL HPI/OVERNIGHT EVENTS:      VITAL SIGNS:  T(F): 99.8 (17 @ 09:30)  HR: 114 (17 @ 10:00)  BP: 102/64 (17 @ 10:00)  RR: 36 (17 @ 10:00)  SpO2: 97% (17 @ 10:00)  Wt(kg): --  I&O's Detail    12 Dec 2017 07:  -  13 Dec 2017 07:00  --------------------------------------------------------  IN:    IV PiggyBack: 100 mL    Lactated Ringers IV Bolus: 1000 mL    lactated ringers.: 2500 mL    phenylephrine   Infusion: 1088 mL    propofol Infusion: 171.2 mL    sodium chloride 0.9%.: 300 mL  Total IN: 5159.2 mL    OUT:    Chest Tube: 40 mL    Indwelling Catheter - Urethral: 760 mL  Total OUT: 800 mL    Total NET: 4359.2 mL      13 Dec 2017 07:  -  13 Dec 2017 10:44  --------------------------------------------------------  IN:    propofol Infusion: 16 mL    sodium chloride 0.9%.: 100 mL  Total IN: 116 mL    OUT:    Indwelling Catheter - Urethral: 35 mL  Total OUT: 35 mL    Total NET: 81 mL        Mode: AC/ CMV (Assist Control/ Continuous Mandatory Ventilation)  RR (machine): 16  TV (machine): 450  FiO2: 50  PEEP: 5  ITime: 1  MAP: 11  PIP: 18        REVIEW OF SYSTEMS:    CONSTITUTIONAL:  No fevers, chills, sweats    HEENT:  Eyes:  No diplopia or blurred vision. ENT:  No earache, sore throat or runny nose.    CARDIOVASCULAR:  No pressure, squeezing, tightness, or heaviness about the chest; no palpitations.    RESPIRATORY:  Per HPI    GASTROINTESTINAL:  No abdominal pain, nausea, vomiting or diarrhea.    GENITOURINARY:  No dysuria, frequency or urgency.    NEUROLOGIC:  No paresthesias, fasciculations, seizures or weakness.    PSYCHIATRIC:  No disorder of thought or mood.      PHYSICAL EXAM:    Constitutional: Well developed and nourished  Eyes:Perrla  ENMT: normal  Neck:supple  Respiratory: good air entry  Cardiovascular: S1 S2 regular  Gastrointestinal: Soft, Non tender  Extremities: No edema  Vascular:normal  Neurological:Awake, alert,Ox3  Musculoskeletal:Normal      MEDICATIONS  (STANDING):  fentaNYL   Infusion 4 MICROgram(s)/kG/Hr (30.56 mL/Hr) IV Continuous <Continuous>  heparin  Injectable 5000 Unit(s) SubCutaneous every 8 hours  pantoprazole  Injectable 40 milliGRAM(s) IV Push daily  phenylephrine    Infusion 0.5 MICROgram(s)/kG/Min (6.563 mL/Hr) IV Continuous <Continuous>  piperacillin/tazobactam IVPB. 3.375 Gram(s) IV Intermittent every 8 hours  propofol Infusion 5 MICROgram(s)/kG/Min (2.292 mL/Hr) IV Continuous <Continuous>  sodium chloride 0.9%. 1000 milliLiter(s) (100 mL/Hr) IV Continuous <Continuous>  vancomycin  IVPB 1000 milliGRAM(s) IV Intermittent every 24 hours    MEDICATIONS  (PRN):      Allergies    No Known Allergies    Intolerances        LABS:                        12.9   28.6  )-----------( CLUMPED    ( 13 Dec 2017 06:32 )             37.6     12-13    130<L>  |  97  |  45<H>  ----------------------------<  70  4.4   |  23  |  1.53<H>    Ca    7.3<L>      13 Dec 2017 06:32  Phos  4.4     12-13  Mg     1.8     12-13    TPro  5.2<L>  /  Alb  1.3<L>  /  TBili  1.2  /  DBili  x   /  AST  79<H>  /  ALT  93<H>  /  AlkPhos  44  12-12    PT/INR - ( 12 Dec 2017 07:51 )   PT: 14.0 sec;   INR: 1.28 ratio         PTT - ( 12 Dec 2017 07:51 )  PTT:27.5 sec  Urinalysis Basic - ( 13 Dec 2017 06:40 )    Color: Yellow / Appearance: Clear / S.020 / pH: x  Gluc: x / Ketone: Negative  / Bili: Negative / Urobili: 1   Blood: x / Protein: 30 mg/dL / Nitrite: Negative   Leuk Esterase: Negative / RBC: 2-5 /HPF / WBC 0-2 /HPF   Sq Epi: x / Non Sq Epi: Negative /HPF / Bacteria: Few /HPF      ABG - ( 13 Dec 2017 04:57 )  pH: 7.42  /  pCO2: 36    /  pO2: 77    / HCO3: 23    / Base Excess: -0.7  /  SaO2: 94                    CAPILLARY BLOOD GLUCOSE            RADIOLOGY & ADDITIONAL TESTS:    CXR:  < from: Xray Chest 1 View AP- PORTABLE-Urgent (17 @ 17:39) >  There is no visualized pneumothorax on either side. There is   moderately extensive soft tissue emphysema on the left. There is mild   right basilar atelectasis or trace intrafissural effusion.    < end of copied text >    Ct scan chest:  < from: CT Chest w/ IV Cont (17 @ 10:00) >  3 mm right lower lobe nodules noted    There is right dependent subsegmental atelectasis and/or trace pleural   effusion. Mild bilateral upper lobe centrilobular emphysema.  Moderate diffuse airspace opacity is noted on the left    < end of copied text >    ekg;    echo: Patient is a 72y old Male who presents with a chief complaint of weakness, chest discomfort and voice and appetite changes.  Awake, alert, comfortable in NAD. Small PTX on CXR. Lung biopsy did not show any malignancy. S/p EBUS.  Currently intubated on pressors meds because of hypotension.        INTERVAL HPI/OVERNIGHT EVENTS:      VITAL SIGNS:  T(F): 99.8 (17 @ 09:30)  HR: 114 (17 @ 10:00)  BP: 102/64 (17 @ 10:00)  RR: 36 (17 @ 10:00)  SpO2: 97% (17 @ 10:00)  Wt(kg): --  I&O's Detail    12 Dec 2017 07:  -  13 Dec 2017 07:00  --------------------------------------------------------  IN:    IV PiggyBack: 100 mL    Lactated Ringers IV Bolus: 1000 mL    lactated ringers.: 2500 mL    phenylephrine   Infusion: 1088 mL    propofol Infusion: 171.2 mL    sodium chloride 0.9%.: 300 mL  Total IN: 5159.2 mL    OUT:    Chest Tube: 40 mL    Indwelling Catheter - Urethral: 760 mL  Total OUT: 800 mL    Total NET: 4359.2 mL      13 Dec 2017 07:  -  13 Dec 2017 10:44  --------------------------------------------------------  IN:    propofol Infusion: 16 mL    sodium chloride 0.9%.: 100 mL  Total IN: 116 mL    OUT:    Indwelling Catheter - Urethral: 35 mL  Total OUT: 35 mL    Total NET: 81 mL        Mode: AC/ CMV (Assist Control/ Continuous Mandatory Ventilation)  RR (machine): 16  TV (machine): 450  FiO2: 50  PEEP: 5  ITime: 1  MAP: 11  PIP: 18        REVIEW OF SYSTEMS:    CONSTITUTIONAL:  No fevers, chills, sweats    HEENT:  Eyes:  No diplopia or blurred vision. ENT:  No earache, sore throat or runny nose.    CARDIOVASCULAR:  No pressure, squeezing, tightness, or heaviness about the chest; no palpitations.    RESPIRATORY:  Per HPI    GASTROINTESTINAL:  No abdominal pain, nausea, vomiting or diarrhea.    GENITOURINARY:  No dysuria, frequency or urgency.    NEUROLOGIC:  No paresthesias, fasciculations, seizures or weakness.    PSYCHIATRIC:  No disorder of thought or mood.      PHYSICAL EXAM:    Constitutional: Well developed and nourished  Eyes:Perrla  ENMT: normal  Neck:supple  Respiratory: Crepitus on left side of chest, neck and face  Cardiovascular: S1 S2 regular  Gastrointestinal: Soft, Non tender  Extremities: No edema  Vascular:normal  Neurological:Awake, alert,Ox3  Musculoskeletal:Normal      MEDICATIONS  (STANDING):  fentaNYL   Infusion 4 MICROgram(s)/kG/Hr (30.56 mL/Hr) IV Continuous <Continuous>  heparin  Injectable 5000 Unit(s) SubCutaneous every 8 hours  pantoprazole  Injectable 40 milliGRAM(s) IV Push daily  phenylephrine    Infusion 0.5 MICROgram(s)/kG/Min (6.563 mL/Hr) IV Continuous <Continuous>  piperacillin/tazobactam IVPB. 3.375 Gram(s) IV Intermittent every 8 hours  propofol Infusion 5 MICROgram(s)/kG/Min (2.292 mL/Hr) IV Continuous <Continuous>  sodium chloride 0.9%. 1000 milliLiter(s) (100 mL/Hr) IV Continuous <Continuous>  vancomycin  IVPB 1000 milliGRAM(s) IV Intermittent every 24 hours    MEDICATIONS  (PRN):      Allergies    No Known Allergies    Intolerances        LABS:                        12.9   28.6  )-----------( CLUMPED    ( 13 Dec 2017 06:32 )             37.6     12-13    130<L>  |  97  |  45<H>  ----------------------------<  70  4.4   |  23  |  1.53<H>    Ca    7.3<L>      13 Dec 2017 06:32  Phos  4.4     12-13  Mg     1.8     12-13    TPro  5.2<L>  /  Alb  1.3<L>  /  TBili  1.2  /  DBili  x   /  AST  79<H>  /  ALT  93<H>  /  AlkPhos  44  12-12    PT/INR - ( 12 Dec 2017 07:51 )   PT: 14.0 sec;   INR: 1.28 ratio         PTT - ( 12 Dec 2017 07:51 )  PTT:27.5 sec  Urinalysis Basic - ( 13 Dec 2017 06:40 )    Color: Yellow / Appearance: Clear / S.020 / pH: x  Gluc: x / Ketone: Negative  / Bili: Negative / Urobili: 1   Blood: x / Protein: 30 mg/dL / Nitrite: Negative   Leuk Esterase: Negative / RBC: 2-5 /HPF / WBC 0-2 /HPF   Sq Epi: x / Non Sq Epi: Negative /HPF / Bacteria: Few /HPF      ABG - ( 13 Dec 2017 04:57 )  pH: 7.42  /  pCO2: 36    /  pO2: 77    / HCO3: 23    / Base Excess: -0.7  /  SaO2: 94                    CAPILLARY BLOOD GLUCOSE            RADIOLOGY & ADDITIONAL TESTS:    CXR:  < from: Xray Chest 1 View AP- PORTABLE-Urgent (17 @ 17:39) >  There is no visualized pneumothorax on either side. There is   moderately extensive soft tissue emphysema on the left. There is mild   right basilar atelectasis or trace intrafissural effusion.    < end of copied text >    Ct scan chest:  < from: CT Chest w/ IV Cont (17 @ 10:00) >  3 mm right lower lobe nodules noted    There is right dependent subsegmental atelectasis and/or trace pleural   effusion. Mild bilateral upper lobe centrilobular emphysema.  Moderate diffuse airspace opacity is noted on the left    < end of copied text >    ekg;    echo:

## 2017-12-13 NOTE — PROGRESS NOTE ADULT - PROBLEM SELECTOR PLAN 1
Quantiferon Gold TB test  Empiric antibiotics  Bronch path negative for Ca.  Follow up EBUS VS mediastinoscopy  Awaiting cyto report from [facility] Quantiferon Gold TB test  Empiric antibiotics  Bronch path negative for Ca.  Follow up EBUS bx report  Awaiting cyto report from [facility]

## 2017-12-13 NOTE — CONSULT NOTE ADULT - ATTENDING COMMENTS
[facility] NEPHROLOGY  Antony Reagan M.D.  Malachi Reyes D.O.  Silvia Arnold M.D.  Jyotsna Fiore, MSN, ANP-C  [phone]    [id number] [place] [address]
72 male former smoker with lung mass, underwent diagnostic bronchoscopy with transbronchial lung biopsies several days ago with resultant pneumothorax and subcutaneous emphysema, s/p chest tube.  Today underwent repeat bronch with EBUS, returned from OR intubated with acute resp failure, hypotensive as well.    DDX for lung mass includes lung CA vs necrotizing pneumonia/lung abscess.    Plan:  - keep intubated overnight, try to wean from vent in AM  - central line for pressors  - ABX  - await pathology  Total CC time 35 min.
72yr old M PMH HTN BPH S/P bronchoscopy and biopsy today for evaluation of L esme hilar mass   Post procedural PTHx noted on CXR with SQ emphysema now s/p L chest tube placement and almost near resolution of Pthx but extension of SQ emphysema to Rt side and possible pneumomediastinum      Assessment   PTHx L   Pnemomedisatinum   BL SQ emphysema L >R      Plan   Continue L chest tube to suction   Supplemental oxygen as needed to keep Sats >92   Start feeding in am   DVT prophylaxis   Pain control   HOB at 30   Fall and aspiration precautions   Glycemic control  FU Biopsy results

## 2017-12-13 NOTE — CONSULT NOTE ADULT - SUBJECTIVE AND OBJECTIVE BOX
[facility] NEPHROLOGY- CONSULTATION NOTE    Patient is a 72y Male with PMH as below presents with       PAST MEDICAL & SURGICAL HISTORY:  BPH (benign prostatic hyperplasia)  Hypertension  No significant past surgical history    No Known Allergies    Home Medications Reviewed  Hospital Medications:   MEDICATIONS  (STANDING):  fentaNYL   Infusion 2 MICROgram(s)/kG/Hr (15.28 mL/Hr) IV Continuous <Continuous>  heparin  Injectable 5000 Unit(s) SubCutaneous every 8 hours  pantoprazole  Injectable 40 milliGRAM(s) IV Push daily  phenylephrine    Infusion 0.5 MICROgram(s)/kG/Min (6.563 mL/Hr) IV Continuous <Continuous>  piperacillin/tazobactam IVPB. 3.375 Gram(s) IV Intermittent every 8 hours  propofol Infusion 5 MICROgram(s)/kG/Min (2.292 mL/Hr) IV Continuous <Continuous>  sodium chloride 0.9%. 1000 milliLiter(s) (100 mL/Hr) IV Continuous <Continuous>  vancomycin  IVPB 1000 milliGRAM(s) IV Intermittent every 24 hours  vasopressin Infusion 0.04 Unit(s)/Min (2.4 mL/Hr) IV Continuous <Continuous>      FAMILY HISTORY:  Family history of diabetes mellitus (Mother)      REVIEW OF SYSTEMS: Unable to obtain. Pt sedated/ intubated    VITALS:  T(F): 99.8 (17 @ 09:30), Max: 100.5 (17 @ 07:30)  HR: 120 (17 @ 11:30)  BP: 89/50 (17 @ 11:00)  RR: 24 (17 @ 11:30)  SpO2: 98% (17 @ 11:30)  Wt(kg): --     @ 07:01  -   @ 07:00  --------------------------------------------------------  IN: 5159.2 mL / OUT: 800 mL / NET: 4359.2 mL     @ 07:01  -   @ 11:41  --------------------------------------------------------  IN: 116 mL / OUT: 35 mL / NET: 81 mL      Height (cm): 165 ( @ 13:14)  Weight (kg): 76.4 ( @ 16:00)  BMI (kg/m2): 28.1 ( @ 16:00)  BSA (m2): 1.84 ( @ 16:00)    PHYSICAL EXAM:  Gen: Sedated   HEENT: intubated  Neck: no JVD  Cards: Irregular, +S1/S2, no M/G/R  Resp: left sided crepitus, intubated  GI: soft, NT/ND, NABS  : +frias  Extremities: no LE edema B/L  Derm: no rashes  Neuro: medically sedated    LABS:    130 <--, 128 <--, 125 <--, 127 <--, 132 <--, 133 <--, 131 <--  130<L>  |  97  |  45<H>  ----------------------------<  70  4.4   |  23  |  1.53<H>    Ca    7.3<L>      13 Dec 2017 06:32  Phos  4.4     12  Mg     1.8         TPro  5.2<L>  /  Alb  1.3<L>  /  TBili  1.2  /  DBili      /  AST  79<H>  /  ALT  93<H>  /  AlkPhos  44  1212    Creatinine Trend: 1.53 <--, 1.56 <--, 1.19 <--, 0.96 <--, 0.81 <--, 0.86 <--, 0.94 <--                        12.9   28.6  )-----------( CLUMPED    ( 13 Dec 2017 06:32 )             37.6     Urine Studies:  Urinalysis Basic - ( 13 Dec 2017 06:40 )    Color: Yellow / Appearance: Clear / S.020 / pH:   Gluc:  / Ketone: Negative  / Bili: Negative / Urobili: 1   Blood:  / Protein: 30 mg/dL / Nitrite: Negative   Leuk Esterase: Negative / RBC: 2-5 /HPF / WBC 0-2 /HPF   Sq Epi:  / Non Sq Epi: Negative /HPF / Bacteria: Few /HPF      Osmolality, Random Urine: 448 mos/kg ( @ 06:40)  Sodium, Random Urine: 6 mmol/L ( @ 06:40)  Sodium, Random Urine: 6 mmol/L ( @ 06:40)  Creatinine, Random Urine: 102 mg/dL ( @ 06:40)    RADIOLOGY & ADDITIONAL STUDIES: [facility] NEPHROLOGY- CONSULTATION NOTE    Unable to obtain history. History taken from chart  73 yo M with HTN and BPH sent by PMD p/w  L-sided chest pain radiating to the back, weakness for 6 months and decreased PO intake x 2 weeks. Pt recently found to have suprahilar lung mass and hepatic Rt lobe lesion with large necrotic macie mass, and emphysema. Pt was sent by PMD to the [facility]. Pt found to have hyponatremia on admission.   +former smoker, +NSAID use   Hosp course: s/p [id number] for biopsy of mass, path neg for malignancy.  Pt with subcutaneous emphysema s/p Left chest tube  s/p flexible bronchoscopy  with endobronchial lung biopsy consistent with possible  +Large B-cell lymphoma. Pt intubated post bronch, with septic shock, hypotension on pressors, with ZENY and hyponatremia.   Renal consulted for hyponatremia and ZENY.         PAST MEDICAL & SURGICAL HISTORY:  BPH (benign prostatic hyperplasia)  Hypertension  No significant past surgical history    No Known Allergies    Home Medications Reviewed  Hospital Medications:   MEDICATIONS  (STANDING):  fentaNYL   Infusion 2 MICROgram(s)/kG/Hr (15.28 mL/Hr) IV Continuous <Continuous>  heparin  Injectable 5000 Unit(s) SubCutaneous every 8 hours  pantoprazole  Injectable 40 milliGRAM(s) IV Push daily  phenylephrine    Infusion 0.5 MICROgram(s)/kG/Min (6.563 mL/Hr) IV Continuous <Continuous>  piperacillin/tazobactam IVPB. 3.375 Gram(s) IV Intermittent every 8 hours  propofol Infusion 5 MICROgram(s)/kG/Min (2.292 mL/Hr) IV Continuous <Continuous>  sodium chloride 0.9%. 1000 milliLiter(s) (100 mL/Hr) IV Continuous <Continuous>  vancomycin  IVPB 1000 milliGRAM(s) IV Intermittent every 24 hours  vasopressin Infusion 0.04 Unit(s)/Min (2.4 mL/Hr) IV Continuous <Continuous>      FAMILY HISTORY:  Family history of diabetes mellitus (Mother)      REVIEW OF SYSTEMS: Unable to obtain. Pt sedated/ intubated    VITALS:  T(F): 99.8 (17 @ 09:30), Max: 100.5 (17 @ 07:30)  HR: 120 (17 @ 11:30)  BP: 89/50 (17 @ 11:00)  RR: 24 (17 @ 11:30)  SpO2: 98% (17 @ 11:30)  Wt(kg): --     @ 07:01  -   @ 07:00  --------------------------------------------------------  IN: 5159.2 mL / OUT: 800 mL / NET: 4359.2 mL     @ 07:01  -   @ 11:41  --------------------------------------------------------  IN: 116 mL / OUT: 35 mL / NET: 81 mL      Height (cm): 165 ( @ :14)  Weight (kg): 76.4 ( @ 16:00)  BMI (kg/m2): 28.1 ( @ 16:00)  BSA (m2): 1.84 ( @ 16:00)    PHYSICAL EXAM:  Gen: Sedated   HEENT: intubated  Neck: no JVD  Cards: Irregular, +S1/S2, no M/G/R  Resp: left sided crepitus, intubated with brown foul odor from tubing  GI: soft, NT/ND, NABS  : +frias  Extremities: no LE edema B/L  Derm: no rashes  Neuro: medically sedated    LABS:    130 <--, 128 <--, 125 <--, 127 <--, 132 <--, 133 <--, 131 <--  130<L>  |  97  |  45<H>  ----------------------------<  70  4.4   |  23  |  1.53<H>    Ca    7.3<L>      13 Dec 2017 06:32  Phos  4.4       Mg     1.8         TPro  5.2<L>  /  Alb  1.3<L>  /  TBili  1.2  /  DBili      /  AST  79<H>  /  ALT  93<H>  /  AlkPhos  44      Creatinine Trend: 1.53 <--, 1.56 <--, 1.19 <--, 0.96 <--, 0.81 <--, 0.86 <--, 0.94 <--                        12.9   28.6  )-----------( CLUMPED    ( 13 Dec 2017 06:32 )             37.6     Urine Studies:  Urinalysis Basic - ( 13 Dec 2017 06:40 )    Color: Yellow / Appearance: Clear / S.020 / pH:   Gluc:  / Ketone: Negative  / Bili: Negative / Urobili: 1   Blood:  / Protein: 30 mg/dL / Nitrite: Negative   Leuk Esterase: Negative / RBC: 2-5 /HPF / WBC 0-2 /HPF   Sq Epi:  / Non Sq Epi: Negative /HPF / Bacteria: Few /HPF      Osmolality, Random Urine: 448 mos/kg ( @ 06:40)  Sodium, Random Urine: 6 mmol/L ( @ 06:40)  Sodium, Random Urine: 6 mmol/L ( @ 06:40)  Creatinine, Random Urine: 102 mg/dL ( @ 06:40)    RADIOLOGY & ADDITIONAL STUDIES:

## 2017-12-13 NOTE — PROGRESS NOTE ADULT - SUBJECTIVE AND OBJECTIVE BOX
pt intubated, on pressor, arousable,  97/56 p116  o2sat 99%  tmax 100.5  Left Ct in place. straw colored drainage. 40cc  no air leak  +crepitus noted left chest wall and neck and upper shoulder. small crepitus left face.  good air entry bilaterally  abd: soft nd                          12.9   28.6  )-----------( x        ( 13 Dec 2017 06:32 )             37.6   12-13    130<L>  |  97  |  45<H>  ----------------------------<  70  4.4   |  23  |  1.53<H>    Ca    7.3<L>      13 Dec 2017 06:32  Phos  4.4     12-13  Mg     1.8     12-13    TPro  5.2<L>  /  Alb  1.3<L>  /  TBili  1.2  /  DBili  x   /  AST  79<H>  /  ALT  93<H>  /  AlkPhos  44  12-12    < from: Xray Chest 1 View AP- PORTABLE-Urgent (12.12.17 @ 17:39) >    PROCEDURE DATE:  12/12/2017          INTERPRETATION:  Single view chest    History tube placement    Comparison 2 days prior    A new endotracheal tube and right jugular central venous catheter are in   radiographically satisfactory position. The left-sided chest tube remains   in place. There is no visualized pneumothorax on either side. There is   moderately extensive soft tissue emphysema on the left. There is mild   right basilar atelectasis or trace intrafissural effusion. The heart is   normal in size. Moderate diffuse airspace opacity is noted on the left,   worse since the prior exam limitations secondary to soft tissue emphysema.    < end of copied text >

## 2017-12-13 NOTE — CONSULT NOTE ADULT - PROBLEM SELECTOR RECOMMENDATION 9
Non-oliguric ZENY likely hemodynamically mediated in the setting of septic shock/ hypotension. UA with 30 protein and mod blood in the setting of infection. Recc to repeat UA once infection cleared. FeNa 0.07%- agree with IVF.  c/w antibiotics/ pressors as per MICU. Monitor Vanco trough.   CT abd/ pelvis 12/5 1.0 cm indeterminate exophytic lesion arising from the   lower pole of left kidney. No hydronephrosis. Can check Renal US when stable. Consider outpt Urology f/u.   Strict I/Os. Avoid nephrotoxins/ NSAIDs/ RCA. Monitor BMP.

## 2017-12-13 NOTE — CONSULT NOTE ADULT - ASSESSMENT
71 yo M with HTN, BPH a/w chest discomfort, suprahilar/ hepatic mass and hyponatremia. Hosp course cb subcutaneous emphysema s/p bronch with Left chest tube placement. 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 Now intubated with septic shock, hypotension on pressors, with ZENY and hyponatremia.   Renal consulted for hyponatremia and ZENY.

## 2017-12-13 NOTE — PROGRESS NOTE ADULT - SUBJECTIVE AND OBJECTIVE BOX
INTERVAL /OVERNIGHT EVENTS: EKg showed atrial fibrillation, not responsive to Digoxin. Pt given lopressor 2.5mg x2 doses and 1 L LR bolus. Tissue culture results came back as gram negative rods.     PRESSORS: [x ] YES [ ] NO  WHICH: phenylephrine    ANTIBIOTICS:  Zosyn                DATE STARTED:     Antimicrobial:  piperacillin/tazobactam IVPB. 3.375 Gram(s) IV Intermittent every 8 hours    Cardiovascular:  phenylephrine    Infusion 0.5 MICROgram(s)/kG/Min IV Continuous <Continuous>    Pulmonary: N/A    Hematalogic:  heparin  Injectable 5000 Unit(s) SubCutaneous every 8 hours    Other:  pantoprazole  Injectable 40 milliGRAM(s) IV Push daily  propofol Infusion 5 MICROgram(s)/kG/Min IV Continuous <Continuous>  sodium chloride 0.9%. 1000 milliLiter(s) IV Continuous <Continuous>    heparin  Injectable 5000 Unit(s) SubCutaneous every 8 hours  pantoprazole  Injectable 40 milliGRAM(s) IV Push daily  phenylephrine    Infusion 0.5 MICROgram(s)/kG/Min IV Continuous <Continuous>  piperacillin/tazobactam IVPB. 3.375 Gram(s) IV Intermittent every 8 hours  propofol Infusion 5 MICROgram(s)/kG/Min IV Continuous <Continuous>  sodium chloride 0.9%. 1000 milliLiter(s) IV Continuous <Continuous>    Drug Dosing Weight  Height (cm): 165 (12 Dec 2017 13:14)  Weight (kg): 76.4 (12 Dec 2017 16:00)  BMI (kg/m2): 28.1 (12 Dec 2017 16:00)  BSA (m2): 1.84 (12 Dec 2017 16:00)    CENTRAL LINE: [ ] YES [ x] NO  LOCATION: [facility]  DATE INSERTED:   REMOVE: [ ] YES [x ] NO  EXPLAIN: requiring pressors    FRIAS: [x ] YES [ ] NO    DATE INSERTED:   REMOVE:  [ ] YES [x ] NO  EXPLAIN: urine output monitoring    A-LINE:  [ ] YES [x ] NO      ICU Vital Signs Last 24 Hrs  T(C): 38.1 (13 Dec 2017 07:30), Max: 38.1 (13 Dec 2017 07:30)  T(F): 100.5 (13 Dec 2017 07:30), Max: 100.5 (13 Dec 2017 07:30)  HR: 141 (13 Dec 2017 07:30) (56 - 141)  BP: 80/40 (13 Dec 2017 07:00) (76/50 - 114/56)  BP(mean): 51 (13 Dec 2017 07:00) (47 - 90)  ABP: 93/56 (13 Dec 2017 07:30) (78/42 - 103/58)  ABP(mean): 68 (13 Dec 2017 07:30) (52 - 78)  RR: 20 (13 Dec 2017 07:30) (16 - 39)  SpO2: 96% (13 Dec 2017 07:30) (93% - 100%)      ABG - ( 13 Dec 2017 04:57 )  pH: 7.42  /  pCO2: 36    /  pO2: 77    / HCO3: 23    / Base Excess: -0.7  /  SaO2: 94                     @ 07:01  -   @ 07:00  --------------------------------------------------------  IN: 5159.2 mL / OUT: 800 mL / NET: 4359.2 mL        Mode: AC/ CMV (Assist Control/ Continuous Mandatory Ventilation)  RR (machine): 16  TV (machine): 450  FiO2: 50  PEEP: 5  ITime: 1  MAP: 9  PIP: 16      PHYSICAL EXAM:    GENERAL:   HEAD: atraumatic, normocephalic   EYES: PERRLA, white sclera.   ENMT: nasal mucosa- Oral cavity-   NECK: supple, JVD/ no JVD, thyroid-   LYMPH: no palpable lymph nodes     SKIN: warm, dry   CHEST/LUNG: ET tube: size        cm at lip. No Chest deformity , no chest tenderness. bilateral breath sounds,no  adventitious sounds  HEART: RRR, no m/r/g   ABDOMEN: soft, nontender, nondistended; bowel sounds.  :frias catheter.  EXTREMITIES: +1 non-pitting edema, no cyanosis, no clubbing.  NEURO: AA0X , mood/ affect-, no focal neuro deficits        LABS:  CBC Full  -  ( 13 Dec 2017 06:32 )  WBC Count : 28.6 K/uL  Hemoglobin : 12.9 g/dL  Hematocrit : 37.6 %  Platelet Count - Automated : x  Mean Cell Volume : 94.6 fl  Mean Cell Hemoglobin : 32.6 pg  Mean Cell Hemoglobin Concentration : 34.4 gm/dL  Auto Neutrophil # : x  Auto Lymphocyte # : x  Auto Monocyte # : x  Auto Eosinophil # : x  Auto Basophil # : x  Auto Neutrophil % : x  Auto Lymphocyte % : x  Auto Monocyte % : x  Auto Eosinophil % : x  Auto Basophil % : x        130<L>  |  97  |  45<H>  ----------------------------<  70  4.4   |  23  |  1.53<H>    Ca    7.3<L>      13 Dec 2017 06:32  Phos  4.4     12  Mg     1.8         TPro  5.2<L>  /  Alb  1.3<L>  /  TBili  1.2  /  DBili  x   /  AST  79<H>  /  ALT  93<H>  /  AlkPhos  44  12-12    PT/INR - ( 12 Dec 2017 07:51 )   PT: 14.0 sec;   INR: 1.28 ratio         PTT - ( 12 Dec 2017 07:51 )  PTT:27.5 sec  Urinalysis Basic - ( 13 Dec 2017 06:40 )    Color: Yellow / Appearance: Clear / S.020 / pH: x  Gluc: x / Ketone: Negative  / Bili: Negative / Urobili: 1   Blood: x / Protein: 30 mg/dL / Nitrite: Negative   Leuk Esterase: Negative / RBC: 2-5 /HPF / WBC 0-2 /HPF   Sq Epi: x / Non Sq Epi: Negative /HPF / Bacteria: Few /HPF      Culture Results:   No growth to date. ( @ 11:22)  Culture Results:   No growth to date. ( @ 11:22)      RADIOLOGY & ADDITIONAL STUDIES REVIEWED:     [ ]GOALS OF CARE DISCUSSION WITH PATIENT/FAMILY/PROXY:    CRITICAL CARE TIME SPENT: 35 minutes INTERVAL /OVERNIGHT EVENTS: EKg showed atrial fibrillation, not responsive to Digoxin. Pt given lopressor 2.5mg x2 doses and 1 L LR bolus. Tissue culture results came back as gram negative rods.     PRESSORS: [x ] YES [ ] NO  WHICH: phenylephrine    ANTIBIOTICS:  Zosyn                DATE STARTED:     Antimicrobial:  piperacillin/tazobactam IVPB. 3.375 Gram(s) IV Intermittent every 8 hours    Cardiovascular:  phenylephrine    Infusion 0.5 MICROgram(s)/kG/Min IV Continuous <Continuous>    Pulmonary: N/A    Hematalogic:  heparin  Injectable 5000 Unit(s) SubCutaneous every 8 hours    Other:  pantoprazole  Injectable 40 milliGRAM(s) IV Push daily  propofol Infusion 5 MICROgram(s)/kG/Min IV Continuous <Continuous>  sodium chloride 0.9%. 1000 milliLiter(s) IV Continuous <Continuous>    heparin  Injectable 5000 Unit(s) SubCutaneous every 8 hours  pantoprazole  Injectable 40 milliGRAM(s) IV Push daily  phenylephrine    Infusion 0.5 MICROgram(s)/kG/Min IV Continuous <Continuous>  piperacillin/tazobactam IVPB. 3.375 Gram(s) IV Intermittent every 8 hours  propofol Infusion 5 MICROgram(s)/kG/Min IV Continuous <Continuous>  sodium chloride 0.9%. 1000 milliLiter(s) IV Continuous <Continuous>    Drug Dosing Weight  Height (cm): 165 (12 Dec 2017 13:14)  Weight (kg): 76.4 (12 Dec 2017 16:00)  BMI (kg/m2): 28.1 (12 Dec 2017 16:00)  BSA (m2): 1.84 (12 Dec 2017 16:00)    CENTRAL LINE: [ ] YES [ x] NO  LOCATION: [facility]  DATE INSERTED:   REMOVE: [ ] YES [x ] NO  EXPLAIN: requiring pressors    FRIAS: [x ] YES [ ] NO    DATE INSERTED:   REMOVE:  [ ] YES [x ] NO  EXPLAIN: urine output monitoring    A-LINE:  [ ] YES [x ] NO      ICU Vital Signs Last 24 Hrs  T(C): 38.1 (13 Dec 2017 07:30), Max: 38.1 (13 Dec 2017 07:30)  T(F): 100.5 (13 Dec 2017 07:30), Max: 100.5 (13 Dec 2017 07:30)  HR: 141 (13 Dec 2017 07:30) (56 - 141)  BP: 80/40 (13 Dec 2017 07:00) (76/50 - 114/56)  BP(mean): 51 (13 Dec 2017 07:00) (47 - 90)  ABP: 93/56 (13 Dec 2017 07:30) (78/42 - 103/58)  ABP(mean): 68 (13 Dec 2017 07:30) (52 - 78)  RR: 20 (13 Dec 2017 07:30) (16 - 39)  SpO2: 96% (13 Dec 2017 07:30) (93% - 100%)      ABG - ( 13 Dec 2017 04:57 )  pH: 7.42  /  pCO2: 36    /  pO2: 77    / HCO3: 23    / Base Excess: -0.7  /  SaO2: 94                     @ 07:01  -   @ 07:00  --------------------------------------------------------  IN: 5159.2 mL / OUT: 800 mL / NET: 4359.2 mL        Mode: AC/ CMV (Assist Control/ Continuous Mandatory Ventilation)  RR (machine): 16  TV (machine): 450  FiO2: 50  PEEP: 5  ITime: 1  MAP: 9  PIP: 16      PHYSICAL EXAM:    GENERAL: no acute distress  HEAD: atraumatic, normocephalic   EYES: PERRL, white sclera.   ENMT: oral cavity moist  NECK: supple, no JVD,  LYMPH: no palpable lymph nodes     SKIN: warm, dry   CHEST/LUNG: ET tube: size        cm at lip. No Chest deformity , no chest tenderness. bilateral breath sounds,no  adventitious sounds, chest tube in place, no air leak  HEART: RRR, no m/r/g   ABDOMEN: soft, nontender, nondistended; bowel sounds.  :frias catheter.  EXTREMITIES: no edema, no cyanosis, no clubbing.  NEURO: sedated, nonverbal, noncommunicative        LABS:  CBC Full  -  ( 13 Dec 2017 06:32 )  WBC Count : 28.6 K/uL  Hemoglobin : 12.9 g/dL  Hematocrit : 37.6 %  Platelet Count - Automated : x  Mean Cell Volume : 94.6 fl  Mean Cell Hemoglobin : 32.6 pg  Mean Cell Hemoglobin Concentration : 34.4 gm/dL  Auto Neutrophil # : x  Auto Lymphocyte # : x  Auto Monocyte # : x  Auto Eosinophil # : x  Auto Basophil # : x  Auto Neutrophil % : x  Auto Lymphocyte % : x  Auto Monocyte % : x  Auto Eosinophil % : x  Auto Basophil % : x    12    130<L>  |  97  |  45<H>  ----------------------------<  70  4.4   |  23  |  1.53<H>    Ca    7.3<L>      13 Dec 2017 06:32  Phos  4.4     12  Mg     1.8         TPro  5.2<L>  /  Alb  1.3<L>  /  TBili  1.2  /  DBili  x   /  AST  79<H>  /  ALT  93<H>  /  AlkPhos  44  12-12    PT/INR - ( 12 Dec 2017 07:51 )   PT: 14.0 sec;   INR: 1.28 ratio         PTT - ( 12 Dec 2017 07:51 )  PTT:27.5 sec  Urinalysis Basic - ( 13 Dec 2017 06:40 )    Color: Yellow / Appearance: Clear / S.020 / pH: x  Gluc: x / Ketone: Negative  / Bili: Negative / Urobili: 1   Blood: x / Protein: 30 mg/dL / Nitrite: Negative   Leuk Esterase: Negative / RBC: 2-5 /HPF / WBC 0-2 /HPF   Sq Epi: x / Non Sq Epi: Negative /HPF / Bacteria: Few /HPF      Culture Results:   No growth to date. ( @ 11:22)  Culture Results:   No growth to date. ( @ 11:22)      RADIOLOGY & ADDITIONAL STUDIES REVIEWED:     [ ]GOALS OF CARE DISCUSSION WITH PATIENT/FAMILY/PROXY:    CRITICAL CARE TIME SPENT: 35 minutes INTERVAL /OVERNIGHT EVENTS: EKg showed atrial fibrillation, not responsive to Digoxin. Pt given lopressor 2.5mg x2 doses and 1 L LR bolus. Tissue culture results came back as gram negative rods.     PRESSORS: [x ] YES [ ] NO  WHICH: phenylephrine    ANTIBIOTICS:  Zosyn                DATE STARTED:     Antimicrobial:  piperacillin/tazobactam IVPB. 3.375 Gram(s) IV Intermittent every 8 hours    Cardiovascular:  phenylephrine    Infusion 0.5 MICROgram(s)/kG/Min IV Continuous <Continuous>    Pulmonary: N/A    Hematalogic:  heparin  Injectable 5000 Unit(s) SubCutaneous every 8 hours    Other:  pantoprazole  Injectable 40 milliGRAM(s) IV Push daily  propofol Infusion 5 MICROgram(s)/kG/Min IV Continuous <Continuous>  sodium chloride 0.9%. 1000 milliLiter(s) IV Continuous <Continuous>    heparin  Injectable 5000 Unit(s) SubCutaneous every 8 hours  pantoprazole  Injectable 40 milliGRAM(s) IV Push daily  phenylephrine    Infusion 0.5 MICROgram(s)/kG/Min IV Continuous <Continuous>  piperacillin/tazobactam IVPB. 3.375 Gram(s) IV Intermittent every 8 hours  propofol Infusion 5 MICROgram(s)/kG/Min IV Continuous <Continuous>  sodium chloride 0.9%. 1000 milliLiter(s) IV Continuous <Continuous>    Drug Dosing Weight  Height (cm): 165 (12 Dec 2017 13:14)  Weight (kg): 76.4 (12 Dec 2017 16:00)  BMI (kg/m2): 28.1 (12 Dec 2017 16:00)  BSA (m2): 1.84 (12 Dec 2017 16:00)    CENTRAL LINE: [ ] YES [ x] NO  LOCATION: [facility]  DATE INSERTED:   REMOVE: [ ] YES [x ] NO  EXPLAIN: requiring pressors    FRIAS: [x ] YES [ ] NO    DATE INSERTED:   REMOVE:  [ ] YES [x ] NO  EXPLAIN: urine output monitoring    A-LINE:  [ ] YES [x ] NO      ICU Vital Signs Last 24 Hrs  T(C): 38.1 (13 Dec 2017 07:30), Max: 38.1 (13 Dec 2017 07:30)  T(F): 100.5 (13 Dec 2017 07:30), Max: 100.5 (13 Dec 2017 07:30)  HR: 141 (13 Dec 2017 07:30) (56 - 141)  BP: 80/40 (13 Dec 2017 07:00) (76/50 - 114/56)  BP(mean): 51 (13 Dec 2017 07:00) (47 - 90)  ABP: 93/56 (13 Dec 2017 07:30) (78/42 - 103/58)  ABP(mean): 68 (13 Dec 2017 07:30) (52 - 78)  RR: 20 (13 Dec 2017 07:30) (16 - 39)  SpO2: 96% (13 Dec 2017 07:30) (93% - 100%)      ABG - ( 13 Dec 2017 04:57 )  pH: 7.42  /  pCO2: 36    /  pO2: 77    / HCO3: 23    / Base Excess: -0.7  /  SaO2: 94                     @ 07:01  -   @ 07:00  --------------------------------------------------------  IN: 5159.2 mL / OUT: 800 mL / NET: 4359.2 mL        Mode: AC/ CMV (Assist Control/ Continuous Mandatory Ventilation)  RR (machine): 16  TV (machine): 450  FiO2: 50  PEEP: 5  ITime: 1  MAP: 9  PIP: 16      PHYSICAL EXAM:    GENERAL: no acute distress  HEAD: atraumatic, normocephalic   EYES: PERRL, white sclera.   ENMT: oral cavity moist  NECK: supple, no JVD,  LYMPH: no palpable lymph nodes     SKIN: warm, dry   CHEST/LUNG: ET tube in place. No Chest deformity , no chest tenderness. bilateral breath sounds,no  adventitious sounds, chest tube in place, no air leak  HEART: RRR, no m/r/g   ABDOMEN: soft, nontender, nondistended; bowel sounds.  :frias catheter.  EXTREMITIES: no edema, no cyanosis, no clubbing.  NEURO: sedated, nonverbal, noncommunicative        LABS:  CBC Full  -  ( 13 Dec 2017 06:32 )  WBC Count : 28.6 K/uL  Hemoglobin : 12.9 g/dL  Hematocrit : 37.6 %  Platelet Count - Automated : x  Mean Cell Volume : 94.6 fl  Mean Cell Hemoglobin : 32.6 pg  Mean Cell Hemoglobin Concentration : 34.4 gm/dL  Auto Neutrophil # : x  Auto Lymphocyte # : x  Auto Monocyte # : x  Auto Eosinophil # : x  Auto Basophil # : x  Auto Neutrophil % : x  Auto Lymphocyte % : x  Auto Monocyte % : x  Auto Eosinophil % : x  Auto Basophil % : x        130<L>  |  97  |  45<H>  ----------------------------<  70  4.4   |  23  |  1.53<H>    Ca    7.3<L>      13 Dec 2017 06:32  Phos  4.4       Mg     1.8         TPro  5.2<L>  /  Alb  1.3<L>  /  TBili  1.2  /  DBili  x   /  AST  79<H>  /  ALT  93<H>  /  AlkPhos  44      PT/INR - ( 12 Dec 2017 07:51 )   PT: 14.0 sec;   INR: 1.28 ratio         PTT - ( 12 Dec 2017 07:51 )  PTT:27.5 sec  Urinalysis Basic - ( 13 Dec 2017 06:40 )    Color: Yellow / Appearance: Clear / S.020 / pH: x  Gluc: x / Ketone: Negative  / Bili: Negative / Urobili: 1   Blood: x / Protein: 30 mg/dL / Nitrite: Negative   Leuk Esterase: Negative / RBC: 2-5 /HPF / WBC 0-2 /HPF   Sq Epi: x / Non Sq Epi: Negative /HPF / Bacteria: Few /HPF      Culture Results:   No growth to date. ( @ 11:22)  Culture Results:   No growth to date. ( @ 11:22)      RADIOLOGY & ADDITIONAL STUDIES REVIEWED:     [ ]GOALS OF CARE DISCUSSION WITH PATIENT/FAMILY/PROXY:    CRITICAL CARE TIME SPENT: 35 minutes INTERVAL /OVERNIGHT EVENTS: EKg showed atrial fibrillation, not responsive to Digoxin. Pt given lopressor 2.5mg x2 doses and 1 L LR bolus. Tissue culture results came back as gram negative rods.     PRESSORS: [x ] YES [ ] NO  WHICH: phenylephrine    ANTIBIOTICS:  Zosyn                DATE STARTED:     Antimicrobial:  piperacillin/tazobactam IVPB. 3.375 Gram(s) IV Intermittent every 8 hours    Cardiovascular:  phenylephrine    Infusion 0.5 MICROgram(s)/kG/Min IV Continuous <Continuous>    Pulmonary: N/A    Hematalogic:  heparin  Injectable 5000 Unit(s) SubCutaneous every 8 hours    Other:  pantoprazole  Injectable 40 milliGRAM(s) IV Push daily  propofol Infusion 5 MICROgram(s)/kG/Min IV Continuous <Continuous>  sodium chloride 0.9%. 1000 milliLiter(s) IV Continuous <Continuous>    heparin  Injectable 5000 Unit(s) SubCutaneous every 8 hours  pantoprazole  Injectable 40 milliGRAM(s) IV Push daily  phenylephrine    Infusion 0.5 MICROgram(s)/kG/Min IV Continuous <Continuous>  piperacillin/tazobactam IVPB. 3.375 Gram(s) IV Intermittent every 8 hours  propofol Infusion 5 MICROgram(s)/kG/Min IV Continuous <Continuous>  sodium chloride 0.9%. 1000 milliLiter(s) IV Continuous <Continuous>    Drug Dosing Weight  Height (cm): 165 (12 Dec 2017 13:14)  Weight (kg): 76.4 (12 Dec 2017 16:00)  BMI (kg/m2): 28.1 (12 Dec 2017 16:00)  BSA (m2): 1.84 (12 Dec 2017 16:00)    CENTRAL LINE: [ ] YES [ x] NO  LOCATION: [facility]  DATE INSERTED:   REMOVE: [ ] YES [x ] NO  EXPLAIN: requiring pressors    FRIAS: [x ] YES [ ] NO    DATE INSERTED:   REMOVE:  [ ] YES [x ] NO  EXPLAIN: urine output monitoring    A-LINE:  [ ] YES [x ] NO      ICU Vital Signs Last 24 Hrs  T(C): 38.1 (13 Dec 2017 07:30), Max: 38.1 (13 Dec 2017 07:30)  T(F): 100.5 (13 Dec 2017 07:30), Max: 100.5 (13 Dec 2017 07:30)  HR: 141 (13 Dec 2017 07:30) (56 - 141)  BP: 80/40 (13 Dec 2017 07:00) (76/50 - 114/56)  BP(mean): 51 (13 Dec 2017 07:00) (47 - 90)  ABP: 93/56 (13 Dec 2017 07:30) (78/42 - 103/58)  ABP(mean): 68 (13 Dec 2017 07:30) (52 - 78)  RR: 20 (13 Dec 2017 07:30) (16 - 39)  SpO2: 96% (13 Dec 2017 07:30) (93% - 100%)      ABG - ( 13 Dec 2017 04:57 )  pH: 7.42  /  pCO2: 36    /  pO2: 77    / HCO3: 23    / Base Excess: -0.7  /  SaO2: 94                     @ 07:01  -   @ 07:00  --------------------------------------------------------  IN: 5159.2 mL / OUT: 800 mL / NET: 4359.2 mL        Mode: AC/ CMV (Assist Control/ Continuous Mandatory Ventilation)  RR (machine): 16  TV (machine): 450  FiO2: 50  PEEP: 5  ITime: 1  MAP: 9  PIP: 16      PHYSICAL EXAM:    GENERAL: no acute distress  HEAD: atraumatic, normocephalic   EYES: PERRL, white sclera.   ENMT: oral cavity moist  NECK: supple, no JVD,  LYMPH: no palpable lymph nodes     SKIN: warm, dry   CHEST/LUNG: ET tube in place. No Chest deformity , no chest tenderness. bilateral breath sounds,no  adventitious sounds, chest tube in place, no air leak, 40cc drainage overnight  HEART: RRR, no m/r/g   ABDOMEN: soft, nontender, nondistended; bowel sounds.  :frias catheter.  EXTREMITIES: no edema, no cyanosis, no clubbing.  NEURO: sedated, nonverbal, noncommunicative        LABS:  CBC Full  -  ( 13 Dec 2017 06:32 )  WBC Count : 28.6 K/uL  Hemoglobin : 12.9 g/dL  Hematocrit : 37.6 %  Platelet Count - Automated : x  Mean Cell Volume : 94.6 fl  Mean Cell Hemoglobin : 32.6 pg  Mean Cell Hemoglobin Concentration : 34.4 gm/dL  Auto Neutrophil # : x  Auto Lymphocyte # : x  Auto Monocyte # : x  Auto Eosinophil # : x  Auto Basophil # : x  Auto Neutrophil % : x  Auto Lymphocyte % : x  Auto Monocyte % : x  Auto Eosinophil % : x  Auto Basophil % : x    12    130<L>  |  97  |  45<H>  ----------------------------<  70  4.4   |  23  |  1.53<H>    Ca    7.3<L>      13 Dec 2017 06:32  Phos  4.4     12  Mg     1.8         TPro  5.2<L>  /  Alb  1.3<L>  /  TBili  1.2  /  DBili  x   /  AST  79<H>  /  ALT  93<H>  /  AlkPhos  44  12-12    PT/INR - ( 12 Dec 2017 07:51 )   PT: 14.0 sec;   INR: 1.28 ratio         PTT - ( 12 Dec 2017 07:51 )  PTT:27.5 sec  Urinalysis Basic - ( 13 Dec 2017 06:40 )    Color: Yellow / Appearance: Clear / S.020 / pH: x  Gluc: x / Ketone: Negative  / Bili: Negative / Urobili: 1   Blood: x / Protein: 30 mg/dL / Nitrite: Negative   Leuk Esterase: Negative / RBC: 2-5 /HPF / WBC 0-2 /HPF   Sq Epi: x / Non Sq Epi: Negative /HPF / Bacteria: Few /HPF      Culture Results:   No growth to date. ( @ 11:22)  Culture Results:   No growth to date. ( @ 11:22)      RADIOLOGY & ADDITIONAL STUDIES REVIEWED:     [ ]GOALS OF CARE DISCUSSION WITH PATIENT/FAMILY/PROXY:    CRITICAL CARE TIME SPENT: 35 minutes

## 2017-12-13 NOTE — CHART NOTE - NSCHARTNOTEFT_GEN_A_CORE
pt given one dose amiodarone, however heart rate still elevated  pt given 1 L LR for FeNa <1  started on Fentanyl drip  continue to titrate phenylephrine for MAP of 65, may need to add on vasopressin if necessary pt given one dose amiodarone, however heart rate still elevated  pt given 1 L LR for FeNa <1  started on Fentanyl drip  continue to titrate phenylephrine for MAP of 65, may need to add on vasopressin if necessary  NGT placed later in the day with PO amiodarone administered and tube feedings started

## 2017-12-13 NOTE — PROGRESS NOTE ADULT - ASSESSMENT
A 72 y old  former smoker 20 PPD with Hx HTN BPH p/w Male who p/w generalized weakness, voice changes with imaging showing left hilar mass in CT scan , s/p bronch and biopsy on 12/06 , admitted to ICU on 12/06 for pneumothorax s/p diagnostic bronchoscopy for left lobe lesions found on CT imaging - chest tube placed in ICU 12/6 on suction - admitted for urgent CT placement and monitoring. pt was downgraded on Lung biopsy from 12/07. Lung biopsy did not show any malignancy. s/p transbronchial lung biopsy , s/p endobronchial lung biopsy, s/p flexible bronchoscopy today by Dr. Piper.   ROS - unable to get as pt is intubated    admit to ICU post op monitoring s/p intubation POD - 1 ,s/p chest tube , hypotensive 2/2 septic shock, requiring pressors, on phenylephrine      POD -1 s/p EBUS  monitor u/o - approx 50ml/hr s/p 4 L LR bolus  -c/w chest tube to suction  -c/w post op abx px - zosyn  -s/p intubation , c/w mechanical ventilation  -c/w sedation  -hypotension post anesthesia , c/w IV fluids and c/w pressor   - increased HR with low dose of levophed, discontinued and restarted phenylephrine  - pt on propofol for sedation  -increased white count, tissue culture growing gram negative rods, BCx negative to date  - UA negative    New onset Atrial Fibrillation  -increased HR after given low dose levophed, continued after d/c of levophed  - EKG shows Atrial fibrillation (new onset)  - HR not responsive to digoxin, given 2 doses of Lopressor 2.5 mg IV and 1 LR bolus    Pneumothorax   Crepitus along neck and L anterior chest wall  -chest tube to suction  -f/u CXR    Lung mass  -c/w post op prophylaxis  -Bronch path negative for Ca.  -s/p EBUS VS mediastinoscopy  -Awaiting cyto report from [facility] and [facility].    Hypertension.    -hypotension , holding home BP meds    BPH (benign prostatic hyperplasia)  - monitor U/o  -resume PO tamsulosin with NG tube    Prophylactic measure  - IMPROVE score 2   - c/w heparin S.C for VTE prophylaxis  - GI stress ulcer PPx w/ Protonix.

## 2017-12-13 NOTE — PROGRESS NOTE ADULT - SUBJECTIVE AND OBJECTIVE BOX
Patient is a 72y old  Male who presents with a chief complaint of weakness, chest discomfort and voice and appetite changes (05 Dec 2017 01:22)    pt seen in icu [  ], reg med floor [ x ], bed [ x ], chair at bedside [   ], a+o x3 [x  ], lethargic [  ],    nad [x  ], left chest to pleurovac [x],        Allergies    No Known Allergies        Vitals    T(F): 98.4 (12-13-17 @ 04:00), Max: 99.2 (12-12-17 @ 12:21)  HR: 137 (12-13-17 @ 06:30) (56 - 138)  BP: 109/84 (12-13-17 @ 06:00) (76/50 - 114/56)  RR: 21 (12-13-17 @ 06:30) (16 - 39)  SpO2: 94% (12-13-17 @ 06:30) (93% - 100%)  Wt(kg): --  CAPILLARY BLOOD GLUCOSE          Labs                          12.9   28.6  )-----------( x        ( 13 Dec 2017 06:32 )             37.6       12-13    130<L>  |  97  |  45<H>  ----------------------------<  70  4.4   |  23  |  1.53<H>    Ca    7.3<L>      13 Dec 2017 06:32  Phos  4.4     12-13  Mg     1.8     12-13    TPro  5.2<L>  /  Alb  1.3<L>  /  TBili  1.2  /  DBili  x   /  AST  79<H>  /  ALT  93<H>  /  AlkPhos  44  12-12            .Tissue Left Lower Lobe  12-12 @ 22:45 --  --    Rare polymorphonuclear leukocytes seen per low power field  Numerous Gram Negative Rods per oil power field      .Blood Blood  12-11 @ 11:22   No growth to date.  --  --      .Broncial Other, bronchioalveolar lavage  12-06 @ 22:28 --  --  --      .Blood Blood-Peripheral  12-05 @ 10:57   No growth at 5 days.  --  --          Radiology Results      Meds    MEDICATIONS  (STANDING):  heparin  Injectable 5000 Unit(s) SubCutaneous every 8 hours  pantoprazole  Injectable 40 milliGRAM(s) IV Push daily  phenylephrine    Infusion 0.5 MICROgram(s)/kG/Min (6.563 mL/Hr) IV Continuous <Continuous>  piperacillin/tazobactam IVPB. 3.375 Gram(s) IV Intermittent every 8 hours  propofol Infusion 5 MICROgram(s)/kG/Min (2.292 mL/Hr) IV Continuous <Continuous>  sodium chloride 0.9%. 1000 milliLiter(s) (100 mL/Hr) IV Continuous <Continuous>      MEDICATIONS  (PRN):      Physical Exam    Neuro :  no focal deficits  Respiratory: CTA B/L, left subcutaneous emphysema, left chest tube to pleurovac  CV: RRR, S1S2, no murmurs,   Abdominal: Soft, NT, ND +BS,  Extremities: No edema, + peripheral pulses    ASSESSMENT    perihilar mass,   hepatic and splenic lesions,   r/o malig,   copd   destruction of left 6th and 7th ribs and left T6 transverse process   exophytic left renal lesion  subcutaneous emphysema.  hyponatremia  h/o BPH (benign prostatic hyperplasia)  Hypertension        PLAN        cont atrov and prov nebs,   cont supplemental oxygen,  ct chest-abd-pelv result noted  pulm f/u noted  s/p bronch with bx   cytopath of bronch neg for malig noted.   pt for ebus and poss mediastinoscopy today  Awaiting cyto report from [facility].   monitor chest tube  thoracic surg f/u noted  heme onc eval  urology conscont ivf  renal cons  f/u urine lytes and osm  cont current meds Patient is a 72y old  Male who presents with a chief complaint of weakness, chest discomfort and voice and appetite changes (05 Dec 2017 01:22)    pt seen in icu [ x ], reg med floor [ ], bed [ x ], chair at bedside [   ], a+o x3 [  ],sedated [ x ],    nad [x  ], left chest to pleurovac [x], et tube [x], frias to bsd [x], right ij cent line [x], propofol and phenylepherine drip [x]        Allergies    No Known Allergies        Vitals    T(F): 98.4 (12-13-17 @ 04:00), Max: 99.2 (12-12-17 @ 12:21)  HR: 137 (12-13-17 @ 06:30) (56 - 138)  BP: 109/84 (12-13-17 @ 06:00) (76/50 - 114/56)  RR: 21 (12-13-17 @ 06:30) (16 - 39)  SpO2: 94% (12-13-17 @ 06:30) (93% - 100%)  Wt(kg): --  CAPILLARY BLOOD GLUCOSE          Labs                          12.9   28.6  )-----------( x        ( 13 Dec 2017 06:32 )             37.6       12-13    130<L>  |  97  |  45<H>  ----------------------------<  70  4.4   |  23  |  1.53<H>    Ca    7.3<L>      13 Dec 2017 06:32  Phos  4.4     12-13  Mg     1.8     12-13    TPro  5.2<L>  /  Alb  1.3<L>  /  TBili  1.2  /  DBili  x   /  AST  79<H>  /  ALT  93<H>  /  AlkPhos  44  12-12            .Tissue Left Lower Lobe  12-12 @ 22:45 --  --    Rare polymorphonuclear leukocytes seen per low power field  Numerous Gram Negative Rods per oil power field      .Blood Blood  12-11 @ 11:22   No growth to date.  --  --      .Broncial Other, bronchioalveolar lavage  12-06 @ 22:28 --  --  --      .Blood Blood-Peripheral  12-05 @ 10:57   No growth at 5 days.  --  --          Radiology Results      Meds    MEDICATIONS  (STANDING):  heparin  Injectable 5000 Unit(s) SubCutaneous every 8 hours  pantoprazole  Injectable 40 milliGRAM(s) IV Push daily  phenylephrine    Infusion 0.5 MICROgram(s)/kG/Min (6.563 mL/Hr) IV Continuous <Continuous>  piperacillin/tazobactam IVPB. 3.375 Gram(s) IV Intermittent every 8 hours  propofol Infusion 5 MICROgram(s)/kG/Min (2.292 mL/Hr) IV Continuous <Continuous>  sodium chloride 0.9%. 1000 milliLiter(s) (100 mL/Hr) IV Continuous <Continuous>      MEDICATIONS  (PRN):      Physical Exam    Neuro :  no focal deficits  Respiratory: CTA B/L, left subcutaneous emphysema, left chest tube to pleurovac  CV: RRR, S1S2, no murmurs,   Abdominal: Soft, NT, ND +BS,  Extremities: No edema, + peripheral pulses    ASSESSMENT    perihilar mass,   hepatic and splenic lesions,   r/o malig,   copd   destruction of left 6th and 7th ribs and left T6 transverse process   exophytic left renal lesion  subcutaneous emphysema.  hyponatremia  silverio  h/o BPH (benign prostatic hyperplasia)  Hypertension        PLAN        S/P flexible bronchoscopy showing possible Large B-cell lymphoma,   s/p transbronchial lung bx and EBU bx   f/u cytopatho rept  thoracic surg f/u  monitor chest tube  heme onc cons  vent mgmt  cont atrov and prov nebs,   cont supplemental oxygen,  ct chest-abd-pelv result noted  pulm f/u   s/p bronch with bx   cytopath of bronch neg for malig noted.   Awaiting cytopath report from [facility].   urology cons for renal lesion  cont ivf  renal cons  f/u urine lytes and osm  cont current meds  mgmt as per icu

## 2017-12-13 NOTE — CONSULT NOTE ADULT - PROBLEM SELECTOR RECOMMENDATION 2
?hypovolemic hyponatremia in the setting of dec PO intake. serum osm 280, urine osm 448 with  Maria Eugenia 6 on IVF.   Serum sodium improving appropriately with IVF. Avoid rapid rise in Serum Na goal 6-8 meq over 24 hrs.   Monitor serum sodium.

## 2017-12-14 NOTE — PROGRESS NOTE ADULT - PROBLEM SELECTOR PLAN 1
Non-oliguric ZENY likely hemodynamically mediated in the setting of septic shock/ hypotension; ATN   UA with 30 protein and mod blood in the setting of infection. Recc to repeat UA once infection cleared. FeNa 0.07%- agree with IVF.  c/w antibiotics/ pressors as per MICU. Monitor Vanco trough.   CT abd/ pelvis 12/5 1.0 cm indeterminate exophytic lesion arising from the   lower pole of left kidney. No hydronephrosis. Can check Renal US when stable. Consider outpt Urology f/u.   Strict I/Os. Avoid nephrotoxins/ NSAIDs/ RCA. Monitor BMP.

## 2017-12-14 NOTE — PROGRESS NOTE ADULT - PROBLEM SELECTOR PLAN 2
Cont chest tube to suction  Thoracic surgery follow up Quantiferon Gold TB test  Empiric antibiotics  Bronch path negative for Ca.  Follow up EBUS bx report  Awaiting cyto report from [facility]

## 2017-12-14 NOTE — CONSULT NOTE ADULT - PROBLEM SELECTOR RECOMMENDATION 2
1- Bronchodilators.  2- O2 as needed.  3- Pulmonary evaluation and management.  4- Steroids as per primary, and pulmonary team

## 2017-12-14 NOTE — PROGRESS NOTE ADULT - ASSESSMENT
s/p flex bronch & EBUS 12/12  LCxT in place    1- care as per ICU  2- keep LCxT to LWS  3- daily CXR  4- will continue to follow

## 2017-12-14 NOTE — PROGRESS NOTE ADULT - PROBLEM SELECTOR PLAN 1
Quantiferon Gold TB test  Empiric antibiotics  Bronch path negative for Ca.  Follow up EBUS bx report  Awaiting cyto report from [facility] Bronchodilators neb  Oxygen supp  [facility]

## 2017-12-14 NOTE — PROGRESS NOTE ADULT - ASSESSMENT
A 72 y old  former smoker 20 PPD with Hx HTN BPH p/w Male who p/w generalized weakness, voice changes with imaging showing left hilar mass in CT scan , s/p bronch and biopsy on 12/06 , admitted to ICU on 12/06 for pneumothorax s/p diagnostic bronchoscopy for left lobe lesions found on CT imaging - chest tube placed in ICU 12/6 on suction - admitted for urgent CT placement and monitoring. pt was downgraded on Lung biopsy from 12/07. Lung biopsy did not show any malignancy. s/p transbronchial lung biopsy , s/p endobronchial lung biopsy, s/p flexible bronchoscopy today by Dr. Piper.   ROS - unable to get as pt is intubated    admit to ICU post op monitoring s/p intubation POD - 1 ,s/p chest tube , hypotensive 2/2 septic shock, requiring pressors, on phenylephrine      POD -1 s/p EBUS  monitor u/o - approx 50ml/hr s/p 4 L LR bolus  -c/w chest tube to suction  -c/w post op abx px - zosyn  -s/p intubation , c/w mechanical ventilation  -c/w sedation  -hypotension post anesthesia , c/w IV fluids and c/w pressor   - increased HR with low dose of levophed, discontinued and restarted phenylephrine  - pt on propofol for sedation  -increased white count, tissue culture growing gram negative rods, BCx negative to date  - UA negative    New onset Atrial Fibrillation  -increased HR after given low dose levophed, continued after d/c of levophed  - EKG shows Atrial fibrillation (new onset)  - HR not responsive to digoxin, given 2 doses of Lopressor 2.5 mg IV and 1 LR bolus    Pneumothorax   Crepitus along neck and L anterior chest wall  -chest tube to suction  -f/u CXR    Lung mass  -c/w post op prophylaxis  -Bronch path negative for Ca.  -s/p EBUS VS mediastinoscopy  -Awaiting cyto report from [facility].    Hypertension.    -hypotension , holding home BP meds    BPH (benign prostatic hyperplasia)  - monitor U/o  -resume PO tamsulosin with NG tube    Prophylactic measure  - IMPROVE score 2   - c/w heparin S.C for VTE prophylaxis  - GI stress ulcer PPx w/ Protonix. A 72 y old  former smoker 20 PPD with Hx HTN BPH p/w Male who p/w generalized weakness, voice changes with imaging showing left hilar mass in CT scan , s/p bronch and biopsy on 12/06 , admitted to ICU on 12/06 for pneumothorax s/p diagnostic bronchoscopy for left lobe lesions found on CT imaging - chest tube placed in ICU 12/6 on suction - admitted for urgent CT placement and monitoring. pt was downgraded on Lung biopsy from 12/07. Lung biopsy did not show any malignancy. s/p transbronchial lung biopsy , s/p endobronchial lung biopsy, s/p flexible bronchoscopy today by Dr. Piper.   ROS - unable to get as pt is intubated    admit to ICU post op monitoring s/p intubation POD - 1 ,s/p chest tube , hypotensive 2/2 septic shock, requiring pressors, on phenylephrine      POD -1 s/p EBUS  monitor u/o - approx 50ml/hr s/p 4 L LR bolus  -c/w chest tube to suction  -c/w post op abx px - zosyn  -s/p intubation , c/w mechanical ventilation  -c/w sedation  -hypotension post anesthesia , c/w IV fluids and c/w pressor   - increased HR with low dose of levophed, discontinued and restarted phenylephrine  - pt on propofol for sedation  -increased white count, tissue culture growing gram negative rods rare neisseria, haemophils, BCx negative to date  - UA negative  -f/u ID recommendations - Dr. Nicholas    New onset Atrial Fibrillation  -increased HR after given low dose levophed, continued after d/c of levophed  - EKG shows Atrial fibrillation (new onset)  - HR not responsive to digoxin, given 2 doses of Lopressor 2.5 mg IV and 1 LR bolus  -loaded amiodarone  - f/u cardiology - Dr. Jackson    Pneumothorax   Crepitus along neck and L anterior chest wall  -chest tube to suction  -f/u CXR    Lung mass  -c/w post op prophylaxis  -Bronch path negative for Ca.  -s/p EBUS VS mediastinoscopy  -Awaiting cyto report from [facility].    Hypertension.    -hypotension , holding home BP meds    BPH (benign prostatic hyperplasia)  - monitor U/o  -resume PO tamsulosin with NG tube    Prophylactic measure  - IMPROVE score 2   - c/w heparin S.C for VTE prophylaxis  - GI stress ulcer PPx w/ Protonix.

## 2017-12-14 NOTE — PROGRESS NOTE ADULT - SUBJECTIVE AND OBJECTIVE BOX
POD#2 s/p flex bronch and EBUS  Pt intubated/sedated/on pressors  Lcxt to LWS    Vital Signs:  T(C): 38.5 (12-14-17 @ 06:00), Max: 38.7 (12-13-17 @ 15:44)  HR: 107 (12-14-17 @ 07:00) (92 - 136)  BP: 88/52 (12-14-17 @ 07:00) (86/53 - 125/50)  RR: 15 (12-14-17 @ 07:00) (13 - 45)  SpO2: 97% (12-14-17 @ 04:30) (88% - 99%)  Wt(kg): --    Physical Exam:  General: intubated/sedated  LCxT: Dressing C/D/I. chest tube connected to LWS. no Air Leak noted. output 40cc in 24 hrs, mostly serous in color  subcu emphysema improved    Ins/Outs:    12-13 @ 07:01  -  12-14 @ 07:00  --------------------------------------------------------  IN:    0.9% NaCl: 1000 mL    fentaNYL  Infusion: 349.9 mL    IV PiggyBack: 300 mL    phenylephrine   Infusion: 2300 mL    propofol Infusion: 362 mL    sodium chloride 0.9%.: 2300 mL    vasopressin Infusion: 50.4 mL  Total IN: 6662.3 mL    OUT:    Chest Tube: 40 mL    Indwelling Catheter - Urethral: 775 mL  Total OUT: 815 mL    Total NET: 5847.3 mL                          10.9   21.0  )-----------( x        ( 14 Dec 2017 06:29 )             34.1

## 2017-12-14 NOTE — CONSULT NOTE ADULT - SUBJECTIVE AND OBJECTIVE BOX
CHIEF COMPLAINT:Patient is a 72y old  Male who presents with a chief complaint of weakness, chest discomfort and voice and appetite changes.    HPI:  72 years old male from home with PMHx of HTN and BPH and no PSH presents to ED c/o L-sided chest pain radiating to the back with associated general weakness x6 months. Patient also reports loss of voice x6 months, in addition to decreased appetite and weight loss x2 weeks, only been able to consume x3 Ensure today. Patient reports visiting PMD on multiple occasions; patient has been worked up for strep throat and laryngitis in the past, and has undergone multiple tests. Patient states he has been taking Ibuprofen for pain relief, and has also been on Abx's treatments recently. Patient had a CXR today by his PMD, which showed a 5.2x6.7cm L suprahilar mass, as well as a CT Chest showing a lesion in the R hepatic lobe, a large necrotic macie mass, and emphysema, prompting patient to be sent to the ED for treatment including fluids and pain medication. Patient had bronchoscopy  and subsequent pneumothorax, s/p CT placement. He had  gone to OR yesterday and had subsequent respiratory failure and intubation. While in ICU had afib with RVR.    PAST MEDICAL & SURGICAL HISTORY:  BPH (benign prostatic hyperplasia)  Hypertension        MEDICATIONS  (STANDING):  amiodarone    Tablet 200 milliGRAM(s) Oral daily  aspirin  chewable 81 milliGRAM(s) Oral daily  fentaNYL   Infusion 2 MICROgram(s)/kG/Hr (15.28 mL/Hr) IV Continuous <Continuous>  heparin  Injectable 5000 Unit(s) SubCutaneous every 8 hours  pantoprazole  Injectable 40 milliGRAM(s) IV Push daily  phenylephrine    Infusion 0.5 MICROgram(s)/kG/Min (6.563 mL/Hr) IV Continuous <Continuous>  piperacillin/tazobactam IVPB. 3.375 Gram(s) IV Intermittent every 8 hours  propofol Infusion 5 MICROgram(s)/kG/Min (2.292 mL/Hr) IV Continuous <Continuous>  sodium chloride 0.9%. 1000 milliLiter(s) (100 mL/Hr) IV Continuous <Continuous>  vasopressin Infusion 0.04 Unit(s)/Min (2.4 mL/Hr) IV Continuous <Continuous>    MEDICATIONS  (PRN):  acetaminophen  Suppository 650 milliGRAM(s) Rectal every 6 hours PRN For Temp greater than 38 C (100.4 F)      FAMILY HISTORY:  Family history of diabetes mellitus (Mother)      SOCIAL HISTORY:  former smoker, and quit smoking x20 years ago.      Allergies    No Known Allergies    Intolerances    	    REVIEW OF SYSTEMS:  [X ] Unable to obtain    PHYSICAL EXAM:  T(C): 38.1 (12-14-17 @ 08:00), Max: 38.7 (12-13-17 @ 15:44)  HR: 95 (12-14-17 @ 09:00) (95 - 126)  BP: 91/59 (12-14-17 @ 09:00) (86/53 - 105/60)  RR: 20 (12-14-17 @ 09:00) (13 - 38)  SpO2: 100% (12-14-17 @ 08:33) (88% - 100%)  Wt(kg): --  I&O's Summary    13 Dec 2017 07:01  -  14 Dec 2017 07:00  --------------------------------------------------------  IN: 6772.3 mL / OUT: 815 mL / NET: 5957.3 mL    14 Dec 2017 07:01  -  14 Dec 2017 11:19  --------------------------------------------------------  IN: 475.4 mL / OUT: 50 mL / NET: 425.4 mL        Appearance: Normal	  HEENT:   Normal oral mucosa, PERRL, EOMI	  Lymphatic: No lymphadenopathy  Cardiovascular: Normal S1 S2, No JVD, No murmurs, No edema  Respiratory: B/L ronchi  Gastrointestinal:  Soft, Non-tender, + BS	  Skin: No rashes, No ecchymoses, No cyanosis	  Extremities: Normal range of motion, No clubbing, cyanosis or edema  Vascular: Peripheral pulses palpable 2+ bilaterally    	    ECG:  	Atrial fibrillation with rapid ventricular response      	  LABS:	 	                       10.9   21.0  )-----------( CLUMPED    ( 14 Dec 2017 06:29 )             34.1     12-14    134<L>  |  103  |  60<H>  ----------------------------<  107<H>  4.8   |  20<L>  |  1.87<H>    Ca    7.0<L>      14 Dec 2017 06:29  Phos  6.5     12-14  Mg     2.3     12-14    TPro  5.3<L>  /  Alb  1.2<L>  /  TBili  1.9<H>  /  DBili  x   /  AST  63<H>  /  ALT  69<H>  /  AlkPhos  51  12-14    Echocardiogram    OBSERVATIONS:  Aortic Valve: Aortic valve not well visualized. No elevated  gradients were noted across the aortic valve.  Left Ventricle: Endocardium not well visualized; unable to  evaluate left ventricular function.  Right Heart: Normal right atrium. Right ventricle not well  visualized. Probably normal right ventricular size and  systolic function. There is mild-moderate tricuspid  regurgitation.  Pericardium/PleuraTrace pericardial effusion. No  echocardiographic evidence of pericardial tamponade.  Hemodynamic: RA Pressure is 10 mm Hg. RV systolic pressure  is 51 mm Hg. Moderate pulmonary hypertension.    CHEST:     LUNGS AND LARGE AIRWAYS: Patent central airways. 7.6 x 6.2 x 8.3 cm   cavitary mass in the left lower lobe. There is encasement and narrowing   of the left lower lobe pulmonary artery and bronchus. The mass also abuts   the left inferior pulmonary vein. Debris is present within this mass.   There is destruction of the adjacent left posterior sixth and seventh   ribs and left T6 transverse process.    3 mm right lower lobe nodules noted (series 4, image 350).    There is right dependent subsegmental atelectasis and/or trace pleural   effusion. Mild bilateral upper lobe centrilobular emphysema.    PLEURA: There is a moderate loculated left-sided pleural effusion likely   secondary to malignancy.  VESSELS: There are mild atherosclerotic changes seen throughout the   aortic vessels.  HEART: Heart size is normal. No pericardial effusion.  MEDIASTINUM AND ALPHONSE: Precarinal and AP window adenopathy, the largest   measuring 3.8 x 2.9 cm and AP window.  CHEST WALL AND LOWER NECK: 2.9 x 2.1 cm intramuscular lipoma in left   lateral chest wall.    ABDOMEN AND PELVIS:    LIVER: 1.5 x 1.4 cm right hepatic lesion, concerning for metastasis.  BILE DUCTS: Normal caliber.  GALLBLADDER: Within normal limits.  SPLEEN: Enlarged measuring 19 cm in craniocaudad dimension. Indeterminate   6.3 x 5.5 cm central low density lesion with associated peripheral   infarct.  PANCREAS: Within normal limits.  ADRENALS: Within normal limits.  KIDNEYS/URETERS: 1.0 cm indeterminate exophytic lesion arising from the

## 2017-12-14 NOTE — PROGRESS NOTE ADULT - PROBLEM SELECTOR PLAN 3
Resolving after chest tube placement  Follow up CXR  Oxygen supp Cont chest tube to suction  Thoracic surgery follow up

## 2017-12-14 NOTE — PROGRESS NOTE ADULT - PROBLEM SELECTOR PLAN 2
?hypovolemic hyponatremia in the setting of dec PO intake. serum osm 280, urine osm 448 with  Maria Eugenia 6 on IVF.   Serum sodium improving appropriately with IVF.  Monitor serum sodium.

## 2017-12-14 NOTE — PROGRESS NOTE ADULT - SUBJECTIVE AND OBJECTIVE BOX
INTERVAL /OVERNIGHT EVENTS:     PRESSORS: [ ] YES [ ] NO  WHICH:    ANTIBIOTICS:                  DATE STARTED:  ANTIBIOTICS:                  DATE STARTED:  ANTIBIOTICS:                  DATE STARTED:    Antimicrobial:  piperacillin/tazobactam IVPB. 3.375 Gram(s) IV Intermittent every 8 hours    Cardiovascular:  amiodarone    Tablet 200 milliGRAM(s) Oral daily  phenylephrine    Infusion 0.5 MICROgram(s)/kG/Min IV Continuous <Continuous>    Pulmonary:    Hematalogic:  aspirin  chewable 81 milliGRAM(s) Oral daily  heparin  Injectable 5000 Unit(s) SubCutaneous every 8 hours    Other:  acetaminophen  Suppository 650 milliGRAM(s) Rectal every 6 hours PRN  fentaNYL   Infusion 2 MICROgram(s)/kG/Hr IV Continuous <Continuous>  pantoprazole  Injectable 40 milliGRAM(s) IV Push daily  propofol Infusion 5 MICROgram(s)/kG/Min IV Continuous <Continuous>  sodium chloride 0.9%. 1000 milliLiter(s) IV Continuous <Continuous>  vasopressin Infusion 0.04 Unit(s)/Min IV Continuous <Continuous>    acetaminophen  Suppository 650 milliGRAM(s) Rectal every 6 hours PRN  amiodarone    Tablet 200 milliGRAM(s) Oral daily  aspirin  chewable 81 milliGRAM(s) Oral daily  fentaNYL   Infusion 2 MICROgram(s)/kG/Hr IV Continuous <Continuous>  heparin  Injectable 5000 Unit(s) SubCutaneous every 8 hours  pantoprazole  Injectable 40 milliGRAM(s) IV Push daily  phenylephrine    Infusion 0.5 MICROgram(s)/kG/Min IV Continuous <Continuous>  piperacillin/tazobactam IVPB. 3.375 Gram(s) IV Intermittent every 8 hours  propofol Infusion 5 MICROgram(s)/kG/Min IV Continuous <Continuous>  sodium chloride 0.9%. 1000 milliLiter(s) IV Continuous <Continuous>  vasopressin Infusion 0.04 Unit(s)/Min IV Continuous <Continuous>    Drug Dosing Weight  Height (cm): 165 (12 Dec 2017 13:14)  Weight (kg): 76.4 (12 Dec 2017 16:00)  BMI (kg/m2): 28.1 (12 Dec 2017 16:00)  BSA (m2): 1.84 (12 Dec 2017 16:00)    CENTRAL LINE: [ ] YES [ ] NO  LOCATION:   DATE INSERTED:  REMOVE: [ ] YES [ ] NO  EXPLAIN:    FRIAS: [ ] YES [ ] NO    DATE INSERTED:  REMOVE:  [ ] YES [ ] NO  EXPLAIN:    A-LINE:  [ ] YES [ ] NO  LOCATION:   DATE INSERTED:  REMOVE:  [ ] YES [ ] NO  EXPLAIN:    PMH -reviewed admission note, no change since admission  Heart faliure: acute [ ] chronic [ ] acute or chronic [ ] diastolic [ ] systolic [ ] combied systolic and diastolic[ ]  ZENY: ATN[ ] renal medullary necrosis [ ] CKD I [ ]CKDII [ ]CKD III [ ]CKD IV [ ]CKD V [ ]Other pathological lesions [ ]  Abdominal Nutrition Status: malnutrition [ ] cachexia [ ] morbid obesity/BMI=40 [ ] Supplement ordered [___________]     ICU Vital Signs Last 24 Hrs  T(C): 38.1 (14 Dec 2017 08:00), Max: 38.7 (13 Dec 2017 15:44)  T(F): 100.6 (14 Dec 2017 08:00), Max: 101.7 (13 Dec 2017 15:44)  HR: 95 (14 Dec 2017 09:00) (95 - 126)  BP: 91/59 (14 Dec 2017 09:00) (86/53 - 105/60)  BP(mean): 66 (14 Dec 2017 09:00) (59 - 78)  ABP: 96/55 (14 Dec 2017 09:00) (78/49 - 101/61)  ABP(mean): 69 (14 Dec 2017 09:00) (60 - 75)  RR: 20 (14 Dec 2017 09:00) (13 - 38)  SpO2: 100% (14 Dec 2017 08:33) (88% - 100%)      ABG - ( 13 Dec 2017 04:57 )  pH: 7.42  /  pCO2: 36    /  pO2: 77    / HCO3: 23    / Base Excess: -0.7  /  SaO2: 94                     @ 07:01  -   @ 07:00  --------------------------------------------------------  IN: 6772.3 mL / OUT: 815 mL / NET: 5957.3 mL        Mode: AC/ CMV (Assist Control/ Continuous Mandatory Ventilation)  RR (machine): 16  TV (machine): 450  FiO2: 50  PEEP: 5  ITime: 1  MAP: 8.4  PIP: 21      PHYSICAL EXAM:    GENERAL:   HEAD: atraumatic, normocephalic   EYES: PERRLA, white sclera.   ENMT: nasal mucosa- Oral cavity-   NECK: supple, JVD/ no JVD, thyroid-   LYMPH: no palpable lymph nodes     SKIN: warm, dry   CHEST/LUNG: ET tube: size        cm at lip. No Chest deformity , no chest tenderness. bilateral breath sounds,no  adventitious sounds  HEART: RRR, no m/r/g   ABDOMEN: soft, nontender, nondistended; bowel sounds.  :frias catheter.  EXTREMITIES: +1 non-pitting edema, no cyanosis, no clubbing.  NEURO: AA0X , mood/ affect-, no focal neuro deficits        LABS:  CBC Full  -  ( 14 Dec 2017 06:29 )  WBC Count : 21.0 K/uL  Hemoglobin : 10.9 g/dL  Hematocrit : 34.1 %  Platelet Count - Automated : CLUMPED  Mean Cell Volume : 98.8 fl  Mean Cell Hemoglobin : 31.7 pg  Mean Cell Hemoglobin Concentration : 32.0 gm/dL  Auto Neutrophil # : x  Auto Lymphocyte # : x  Auto Monocyte # : x  Auto Eosinophil # : x  Auto Basophil # : x  Auto Neutrophil % : x  Auto Lymphocyte % : x  Auto Monocyte % : x  Auto Eosinophil % : x  Auto Basophil % : x    12-14    134<L>  |  103  |  60<H>  ----------------------------<  107<H>  4.8   |  20<L>  |  1.87<H>    Ca    7.0<L>      14 Dec 2017 06:29  Phos  6.5     -  Mg     2.3     -14    TPro  5.3<L>  /  Alb  1.2<L>  /  TBili  1.9<H>  /  DBili  x   /  AST  63<H>  /  ALT  69<H>  /  AlkPhos  51  -14      Urinalysis Basic - ( 13 Dec 2017 19:36 )    Color: Yellow / Appearance: Clear / S.025 / pH: x  Gluc: x / Ketone: Negative  / Bili: Small / Urobili: 4   Blood: x / Protein: 30 mg/dL / Nitrite: Negative   Leuk Esterase: Negative / RBC: 0-2 /HPF / WBC 0-2 /HPF   Sq Epi: x / Non Sq Epi: Few /HPF / Bacteria: Few /HPF      Culture Results:   Normal Respiratory Lissy present ( @ 08:06)  Culture Results:   Few Normal Respiratory Lissy present ( @ 22:52)  Culture Results:   Rare Haemophilus parainfluenzae  Rare Neisseria species, not gonorrhoeae or meningitidis ( @ 22:45)  Culture Results:   No growth to date. ( @ 11:22)  Culture Results:   No growth to date. ( @ 11:22)      RADIOLOGY & ADDITIONAL STUDIES REVIEWED:     [ ]GOALS OF CARE DISCUSSION WITH PATIENT/FAMILY/PROXY:    CRITICAL CARE TIME SPENT: 35 minutes INTERVAL /OVERNIGHT EVENTS: NGT placed overnight, given amiodarone loading dose and aspiring 81mg, began tube feeds with vital, poor urine output    PRESSORS: [ x] YES [ ] NO  WHICH: phenylephrine, levophed    ANTIBIOTICS:   Zosyn               DATE STARTED:     Antimicrobial:  piperacillin/tazobactam IVPB. 3.375 Gram(s) IV Intermittent every 8 hours    Cardiovascular:  amiodarone    Tablet 200 milliGRAM(s) Oral daily  phenylephrine    Infusion 0.5 MICROgram(s)/kG/Min IV Continuous <Continuous>    Pulmonary: N/A    Hematalogic:  aspirin  chewable 81 milliGRAM(s) Oral daily  heparin  Injectable 5000 Unit(s) SubCutaneous every 8 hours    Other:  acetaminophen  Suppository 650 milliGRAM(s) Rectal every 6 hours PRN  fentaNYL   Infusion 2 MICROgram(s)/kG/Hr IV Continuous <Continuous>  pantoprazole  Injectable 40 milliGRAM(s) IV Push daily  propofol Infusion 5 MICROgram(s)/kG/Min IV Continuous <Continuous>  sodium chloride 0.9%. 1000 milliLiter(s) IV Continuous <Continuous>  vasopressin Infusion 0.04 Unit(s)/Min IV Continuous <Continuous>    acetaminophen  Suppository 650 milliGRAM(s) Rectal every 6 hours PRN  amiodarone    Tablet 200 milliGRAM(s) Oral daily  aspirin  chewable 81 milliGRAM(s) Oral daily  fentaNYL   Infusion 2 MICROgram(s)/kG/Hr IV Continuous <Continuous>  heparin  Injectable 5000 Unit(s) SubCutaneous every 8 hours  pantoprazole  Injectable 40 milliGRAM(s) IV Push daily  phenylephrine    Infusion 0.5 MICROgram(s)/kG/Min IV Continuous <Continuous>  piperacillin/tazobactam IVPB. 3.375 Gram(s) IV Intermittent every 8 hours  propofol Infusion 5 MICROgram(s)/kG/Min IV Continuous <Continuous>  sodium chloride 0.9%. 1000 milliLiter(s) IV Continuous <Continuous>  vasopressin Infusion 0.04 Unit(s)/Min IV Continuous <Continuous>    Drug Dosing Weight  Height (cm): 165 (12 Dec 2017 13:14)  Weight (kg): 76.4 (12 Dec 2017 16:00)  BMI (kg/m2): 28.1 (12 Dec 2017 16:00)  BSA (m2): 1.84 (12 Dec 2017 16:00)    CENTRAL LINE: [x ] YES [ ] NO  LOCATION:  [facility] DATE INSERTED:   REMOVE: [ ] YES [x ] NO  EXPLAIN: requiring pressors    FRIAS: [x ] YES [ ] NO    DATE INSERTED:   REMOVE:  [ ] YES [x ] NO  EXPLAIN: urine output monitoring    A-LINE:  [ ] YES [x ] NO        ICU Vital Signs Last 24 Hrs  T(C): 38.1 (14 Dec 2017 08:00), Max: 38.7 (13 Dec 2017 15:44)  T(F): 100.6 (14 Dec 2017 08:00), Max: 101.7 (13 Dec 2017 15:44)  HR: 95 (14 Dec 2017 09:00) (95 - 126)  BP: 91/59 (14 Dec 2017 09:00) (86/53 - 105/60)  BP(mean): 66 (14 Dec 2017 09:00) (59 - 78)  ABP: 96/55 (14 Dec 2017 09:00) (78/49 - 101/61)  ABP(mean): 69 (14 Dec 2017 09:00) (60 - 75)  RR: 20 (14 Dec 2017 09:00) (13 - 38)  SpO2: 100% (14 Dec 2017 08:33) (88% - 100%)      ABG - ( 13 Dec 2017 04:57 )  pH: 7.42  /  pCO2: 36    /  pO2: 77    / HCO3: 23    / Base Excess: -0.7  /  SaO2: 94                     @ 07:01  -  14 @ 07:00  --------------------------------------------------------  IN: 6772.3 mL / OUT: 815 mL / NET: 5957.3 mL        Mode: AC/ CMV (Assist Control/ Continuous Mandatory Ventilation)  RR (machine): 16  TV (machine): 450  FiO2: 50  PEEP: 5  ITime: 1  MAP: 8.4  PIP: 21      PHYSICAL EXAM:    GENERAL: no acute distress  HEAD: atraumatic, normocephalic   EYES: minimally reactive pupisl, white sclera.   ENMT: ora cavity moist  NECK: supple,  no JVD,   LYMPH: no palpable lymph nodes     SKIN: warm, dry   CHEST/LUNG:  No Chest deformity , no chest tenderness. bilateral breath sounds,no  adventitious sounds  HEART: RRR, no m/r/g   ABDOMEN: soft, nontender, nondistended; bowel sounds.  : frias catheter.  EXTREMITIES: no edema, no cyanosis, no clubbing.  NEURO: sedated, noncommunicative        LABS:  CBC Full  -  ( 14 Dec 2017 06:29 )  WBC Count : 21.0 K/uL  Hemoglobin : 10.9 g/dL  Hematocrit : 34.1 %  Platelet Count - Automated : CLUMPED  Mean Cell Volume : 98.8 fl  Mean Cell Hemoglobin : 31.7 pg  Mean Cell Hemoglobin Concentration : 32.0 gm/dL  Auto Neutrophil # : x  Auto Lymphocyte # : x  Auto Monocyte # : x  Auto Eosinophil # : x  Auto Basophil # : x  Auto Neutrophil % : x  Auto Lymphocyte % : x  Auto Monocyte % : x  Auto Eosinophil % : x  Auto Basophil % : x    12-    134<L>  |  103  |  60<H>  ----------------------------<  107<H>  4.8   |  20<L>  |  1.87<H>    Ca    7.0<L>      14 Dec 2017 06:29  Phos  6.5     12-  Mg     2.3     12-14    TPro  5.3<L>  /  Alb  1.2<L>  /  TBili  1.9<H>  /  DBili  x   /  AST  63<H>  /  ALT  69<H>  /  AlkPhos  51  12-14      Urinalysis Basic - ( 13 Dec 2017 19:36 )    Color: Yellow / Appearance: Clear / S.025 / pH: x  Gluc: x / Ketone: Negative  / Bili: Small / Urobili: 4   Blood: x / Protein: 30 mg/dL / Nitrite: Negative   Leuk Esterase: Negative / RBC: 0-2 /HPF / WBC 0-2 /HPF   Sq Epi: x / Non Sq Epi: Few /HPF / Bacteria: Few /HPF      Culture Results:   Normal Respiratory Lissy present ( @ 08:06)  Culture Results:   Few Normal Respiratory Lissy present ( @ 22:52)  Culture Results:   Rare Haemophilus parainfluenzae  Rare Neisseria species, not gonorrhoeae or meningitidis ( @ 22:45)  Culture Results:   No growth to date. ( @ 11:22)  Culture Results:   No growth to date. ( @ 11:22)      RADIOLOGY & ADDITIONAL STUDIES REVIEWED:     [ ]GOALS OF CARE DISCUSSION WITH PATIENT/FAMILY/PROXY:    CRITICAL CARE TIME SPENT: 35 minutes

## 2017-12-14 NOTE — CONSULT NOTE ADULT - PROBLEM SELECTOR RECOMMENDATION 9
1- UA & CS.  2- Follow Blood culture to final report.  3- Start Vancomycin 1 gm IVPB q day, and closely follow trough, and renal functions.  4- Continue IV Zosyn, but decrease the dose to 3.375 gm IVPB q 12 hours.  5- Fluid and electrolytes management.  6- CBC and BMP follow up.   7- Follow up CXR.  8- Respirator and Chest tube management.

## 2017-12-14 NOTE — CONSULT NOTE ADULT - ASSESSMENT
72 years old male from home with PMHx of HTN and BPH and no PSH presents to ED c/o L-sided chest pain ,lung and hepatic mass, s/p PTHX,now respiratory failure and afib with RVR.  1.Vent support as per ICU.  2.Amiodarone loading.  3.Continue ASA for stroke prevention.  4.Given suspected metastatic disease ,high risk for bleeding with full AC.  5.ABX.  6.Pressor support as per ICU.  7.Dopplers-R/O DVT.  8.GI and DVT prophylaxis.

## 2017-12-14 NOTE — PROGRESS NOTE ADULT - PROBLEM SELECTOR PLAN 4
Cont meds  Monitor BP  low salt diet Resolving after chest tube placement  Follow up CXR  Oxygen supp

## 2017-12-14 NOTE — CONSULT NOTE ADULT - ASSESSMENT
72 years old male from home with PMHx of HTN and BPH and no PSH presents to ED c/o L-sided chest pain radiating to the back with associated general weakness x6 months. Patient also reports loss of voice x6 months, in addition to decreased appetite and weight loss x2 weeks, only been able to consume x3 Ensure today.   10/6/17 patient had bronchoscopy and subsequent pneumothorax and chest tube insertion.  Patient was initially transferred from ICU to regular floor, then upgraded to the icu after condition became unstable.  Patient was started on IV Zosyn on 12/12/17.

## 2017-12-14 NOTE — CONSULT NOTE ADULT - SUBJECTIVE AND OBJECTIVE BOX
HPI:  73 yo M with HTN and BPH sent by PMD p/w  L-sided chest pain radiating to the back, weakness for 6 months and decreased PO intake x 2 weeks. Pt recently found to have suprahilar lung mass and hepatic Rt lobe lesion with large necrotic macie mass, and emphysema. Pt was sent by PMD to the [facility]. Pt found to have hyponatremia on admission.     Hosp course: s/p [id number] for biopsy of mass, path neg for malignancy.  Pt with subcutaneous emphysema s/p Left chest tube. s/p flexible bronchoscopy  with endobronchial lung biopsy consistent with possible  +Large B-cell lymphoma. Pt intubated post bronch, with septic shock, hypotension on pressors, with ZENY and hyponatremia. Full code on file.     PAST MEDICAL & SURGICAL HISTORY:  BPH (benign prostatic hyperplasia)  Hypertension  No significant past surgical history      SOCIAL HISTORY:    Admitted from:  home    Tobacco hx:   former                         Home Opioid hx: none  Preferred Language: English    Surrogate/HCP/Guardian: Adolph Bales (friend/surrogate): [phone]    FAMILY HISTORY: noncontributory to current condition  Family history of diabetes mellitus (Mother)    Baseline ADLs (prior to admission): ambulatory, independent of ADLs.     No Known Allergies    Present Symptoms:   Review of Systems: Unable to obtain - pt sedated/intubated     MEDICATIONS  (STANDING):  amiodarone    Tablet 400 milliGRAM(s) Oral three times a day  aspirin  chewable 81 milliGRAM(s) Oral daily  fentaNYL   Infusion 2 MICROgram(s)/kG/Hr (15.28 mL/Hr) IV Continuous <Continuous>  heparin  Injectable 5000 Unit(s) SubCutaneous every 8 hours  pantoprazole  Injectable 40 milliGRAM(s) IV Push daily  phenylephrine    Infusion 0.5 MICROgram(s)/kG/Min (6.563 mL/Hr) IV Continuous <Continuous>  piperacillin/tazobactam IVPB. 3.375 Gram(s) IV Intermittent every 8 hours  propofol Infusion 5 MICROgram(s)/kG/Min (2.292 mL/Hr) IV Continuous <Continuous>  sodium chloride 0.9%. 1000 milliLiter(s) (100 mL/Hr) IV Continuous <Continuous>  vasopressin Infusion 0.04 Unit(s)/Min (2.4 mL/Hr) IV Continuous <Continuous>    MEDICATIONS  (PRN):  acetaminophen  Suppository 650 milliGRAM(s) Rectal every 6 hours PRN For Temp greater than 38 C (100.4 F)      PHYSICAL EXAM:    Vital Signs Last 24 Hrs  T(C): 38 (14 Dec 2017 10:00), Max: 38.7 (13 Dec 2017 15:44)  T(F): 100.4 (14 Dec 2017 10:00), Max: 101.7 (13 Dec 2017 15:44)  HR: 110 (14 Dec 2017 12:24) (95 - 126)  BP: 96/63 (14 Dec 2017 12:00) (86/53 - 106/62)  BP(mean): 70 (14 Dec 2017 12:00) (60 - 78)  RR: 12 (14 Dec 2017 12:00) (12 - 33)  SpO2: 97% (14 Dec 2017 12:24) (88% - 100%)    General: pt sedated/intubated, nonverbal, not responsive  Karnofsky Performance Score/Palliative Performance Status Version2:   20  %    HEENT: dry lips, ET tube, NGT   Lungs: left sided crepitus, intubated  with left Chest tube  CV: tachycardia  GI:   incontinent, NGT  : frias  Musculoskeletal: bedbound, unable to follow commands  Skin: normal    Neuro: pt sedated/intubated, nonverbal, unable to follow commands  Oral intake ability: unable/only mouth care   Diet: NPO-NGT    LABS:                        10.9   21.0  )-----------( CLUMPED    ( 14 Dec 2017 06:29 )             34.1     12-14    134<L>  |  103  |  60<H>  ----------------------------<  107<H>  4.8   |  20<L>  |  1.87<H>    Ca    7.0<L>      14 Dec 2017 06:29  Phos  6.5     12-14  Mg     2.3     12-14    TPro  5.3<L>  /  Alb  1.2<L>  /  TBili  1.9<H>  /  DBili  x   /  AST  63<H>  /  ALT  69<H>  /  AlkPhos  51  12-14    Urinalysis Basic - ( 13 Dec 2017 19:36 )    Color: Yellow / Appearance: Clear / S.025 / pH: x  Gluc: x / Ketone: Negative  / Bili: Small / Urobili: 4   Blood: x / Protein: 30 mg/dL / Nitrite: Negative   Leuk Esterase: Negative / RBC: 0-2 /HPF / WBC 0-2 /HPF   Sq Epi: x / Non Sq Epi: Few /HPF / Bacteria: Few /HPF        RADIOLOGY & ADDITIONAL STUDIES:  < from: Xray Chest 1 View AP-PORTABLE IMMEDIATE (17 @ 21:46) >  EXAM:  CHEST-PORTABLE STAT                        PROCEDURE DATE:  2017    INTERPRETATION:  PORTABLE CHEST X-RAY  HISTORY: s/p NGT.  COMPARISON: 2017.  FINDINGS:  Interval enteric tube placement, below the left hemidiaphragm, tip off   the field-of-view. ET tube and right IJ catheter remains in place. Left   chest tube remains in place. No pneumothorax. Extensive subcutaneous   emphysema, left worse than right, not significantly changed, which limits   evaluation.    Redemonstration of a left perihilar mass, which on recent CT correlates   with a 7.6 cm left lower lobe mass concerning for malignancy. Bony   destruction of the left posterior sixth and seventh ribs is better seen   on CT.    Patchy left lung opacities and small left pleural effusion are similar to   the prior study.    The cardiac silhouette is within normal limits in size.    IMPRESSION:  Interval enteric tube placement. Lungs are unchanged in appearance. No   pneumothorax. Left chest tube in place.    < end of copied text >    < from: CT Chest w/ IV Cont (17 @ 10:00) >  EXAM:  CT ABDOMEN AND PELVIS IC                        EXAM:  CT CHEST IC                        PROCEDURE DATE:  2017    INTERPRETATION:  CLINICAL INFORMATION: 6 month history of weight loss;   weakness; lung mass on chest x-ray  COMPARISON: Chest x-ray obtained 2017  PROCEDURE:   CT of the Chest, Abdomen and Pelvis was performed with intravenous   contrast.   Intravenous contrast: 90 ml Omnipaque 350. 10 ml discarded.  Oral contrast: None.  Sagittal and coronal reformats were performed.  FINDINGS:  CHEST:   LUNGS AND LARGE AIRWAYS: Patent central airways. 7.6 x 6.2 x 8.3 cm   cavitary mass in the left lower lobe. There is encasement and narrowing   of the left lower lobe pulmonary artery and bronchus. The mass also abuts   the left inferior pulmonary vein. Debris is present within this mass.   There is destruction of the adjacent left posterior sixth and seventh   ribs and left T6 transverse process.    3 mm right lower lobe nodules noted (series 4, image 350).    There is right dependent subsegmental atelectasis and/or trace pleural   effusion. Mild bilateral upper lobe centrilobular emphysema.    PLEURA: There is a moderate loculated left-sided pleural effusion likely   secondary to malignancy.  VESSELS: There are mild atherosclerotic changes seen throughout the   aortic vessels.  HEART: Heart size is normal. No pericardial effusion.  MEDIASTINUM AND ALPHONSE: Precarinal and AP window adenopathy, the largest   measuring 3.8 x 2.9 cm and AP window.  CHEST WALL AND LOWER NECK: 2.9 x 2.1 cm intramuscular lipoma in left   lateral chest wall.    ABDOMEN AND PELVIS:    LIVER: 1.5 x 1.4 cm right hepatic lesion, concerning for metastasis.  BILE DUCTS: Normal caliber.  GALLBLADDER: Within normal limits.  SPLEEN: Enlarged measuring 19 cm in craniocaudad dimension. Indeterminate   6.3 x 5.5 cm central low density lesion with associated peripheral   infarct.  PANCREAS: Within normal limits.  ADRENALS: Within normal limits.  KIDNEYS/URETERS: 1.0 cm indeterminate exophytic lesion arising from the   lower pole of left kidney. No hydronephrosis.    BLADDER: Within normal limits.  REPRODUCTIVE ORGANS: The prostate is within normal limits.    BOWEL: No bowel obstruction. Sigmoid diverticulosis without active   diverticulitis.   PERITONEUM: 2.2 x 2.1 cm necrotic implant in the right paracolic gutter.   No ascites.  VESSELS: Atherosclerotic calcifications.  RETROPERITONEUM: 4.0 x 3.2 cm necrotic portacaval adenopathy.    ABDOMINAL WALL: Within normal limits.  BONES: Destruction of the left 6th and 7th ribs and left T6 transverse   process as described above. No additional destructive osseous lesions.    IMPRESSION: 7.6 cm necrotic left lower lobe mass consistent with primary   neoplasm.  Mediastinal adenopathy and destruction of adjacent osseous structures.  Hepatic and splenic lesions, likely metastatic.  Upper abdominal adenopathy.  1.0 cm indeterminate exophytic left renal lesion.  < end of copied text >        ADVANCE DIRECTIVES: Full code HPI:  71 yo M with HTN and BPH sent by PMD p/w  L-sided chest pain radiating to the back, weakness for 6 months and decreased PO intake x 2 weeks. Pt recently found to have suprahilar lung mass and hepatic Rt lobe lesion with large necrotic macie mass, and emphysema. Pt was sent by PMD to the [facility]. Pt found to have hyponatremia on admission. Pt s/p [id number] for biopsy of mass, path neg for malignancy.  Pt with subcutaneous emphysema s/p Left chest tube - no leak in chest tube. s/p flexible bronchoscopy  with endobronchial lung biopsy consistent with possible  +Large B-cell lymphoma. Pt remains intubated post bronch, now with septic shock, requiring pressors. Full code on file.     PAST MEDICAL & SURGICAL HISTORY:  BPH (benign prostatic hyperplasia)  Hypertension  No significant past surgical history    SOCIAL HISTORY:    Admitted from:  home    Tobacco hx:   former                         Home Opioid hx: none  Preferred Language: English    Surrogate/HCP/Guardian: Adolph Bales (friend/surrogate): [phone]    FAMILY HISTORY: noncontributory to current condition  Family history of diabetes mellitus (Mother)    Baseline ADLs (prior to admission): ambulatory, independent of ADLs.     No Known Allergies    Present Symptoms:   Review of Systems: Unable to obtain - pt sedated/intubated     MEDICATIONS  (STANDING):  amiodarone    Tablet 400 milliGRAM(s) Oral three times a day  aspirin  chewable 81 milliGRAM(s) Oral daily  fentaNYL   Infusion 2 MICROgram(s)/kG/Hr (15.28 mL/Hr) IV Continuous <Continuous>  heparin  Injectable 5000 Unit(s) SubCutaneous every 8 hours  pantoprazole  Injectable 40 milliGRAM(s) IV Push daily  phenylephrine    Infusion 0.5 MICROgram(s)/kG/Min (6.563 mL/Hr) IV Continuous <Continuous>  piperacillin/tazobactam IVPB. 3.375 Gram(s) IV Intermittent every 8 hours  propofol Infusion 5 MICROgram(s)/kG/Min (2.292 mL/Hr) IV Continuous <Continuous>  sodium chloride 0.9%. 1000 milliLiter(s) (100 mL/Hr) IV Continuous <Continuous>  vasopressin Infusion 0.04 Unit(s)/Min (2.4 mL/Hr) IV Continuous <Continuous>    MEDICATIONS  (PRN):  acetaminophen  Suppository 650 milliGRAM(s) Rectal every 6 hours PRN For Temp greater than 38 C (100.4 F)      PHYSICAL EXAM:    Vital Signs Last 24 Hrs  T(C): 38 (14 Dec 2017 10:00), Max: 38.7 (13 Dec 2017 15:44)  T(F): 100.4 (14 Dec 2017 10:00), Max: 101.7 (13 Dec 2017 15:44)  HR: 110 (14 Dec 2017 12:24) (95 - 126)  BP: 96/63 (14 Dec 2017 12:00) (86/53 - 106/62)  BP(mean): 70 (14 Dec 2017 12:00) (60 - 78)  RR: 12 (14 Dec 2017 12:00) (12 - 33)  SpO2: 97% (14 Dec 2017 12:24) (88% - 100%)    General: pt sedated/intubated, nonverbal, not responsive  Karnofsky Performance Score/Palliative Performance Status Version2:   20  %    HEENT: dry lips, ET tube, NGT   Lungs: left sided crepitus, intubated  with left Chest tube  CV: tachycardia  GI:   incontinent, NGT  : frias  Musculoskeletal: bedbound, unable to follow commands  Skin: normal    Neuro: pt sedated/intubated, nonverbal, unable to follow commands  Oral intake ability: unable/only mouth care   Diet: NPO-NGT    LABS:                        10.9   21.0  )-----------( CLUMPED    ( 14 Dec 2017 06:29 )             34.1     12-14    134<L>  |  103  |  60<H>  ----------------------------<  107<H>  4.8   |  20<L>  |  1.87<H>    Ca    7.0<L>      14 Dec 2017 06:29  Phos  6.5     12-14  Mg     2.3     12-14    TPro  5.3<L>  /  Alb  1.2<L>  /  TBili  1.9<H>  /  DBili  x   /  AST  63<H>  /  ALT  69<H>  /  AlkPhos  51  12-14    Urinalysis Basic - ( 13 Dec 2017 19:36 )    Color: Yellow / Appearance: Clear / S.025 / pH: x  Gluc: x / Ketone: Negative  / Bili: Small / Urobili: 4   Blood: x / Protein: 30 mg/dL / Nitrite: Negative   Leuk Esterase: Negative / RBC: 0-2 /HPF / WBC 0-2 /HPF   Sq Epi: x / Non Sq Epi: Few /HPF / Bacteria: Few /HPF        RADIOLOGY & ADDITIONAL STUDIES:  < from: Xray Chest 1 View AP-PORTABLE IMMEDIATE (17 @ 21:46) >  EXAM:  CHEST-PORTABLE STAT                        PROCEDURE DATE:  2017    INTERPRETATION:  PORTABLE CHEST X-RAY  HISTORY: s/p NGT.  COMPARISON: 2017.  FINDINGS:  Interval enteric tube placement, below the left hemidiaphragm, tip off   the field-of-view. ET tube and right IJ catheter remains in place. Left   chest tube remains in place. No pneumothorax. Extensive subcutaneous   emphysema, left worse than right, not significantly changed, which limits   evaluation.    Redemonstration of a left perihilar mass, which on recent CT correlates   with a 7.6 cm left lower lobe mass concerning for malignancy. Bony   destruction of the left posterior sixth and seventh ribs is better seen   on CT.    Patchy left lung opacities and small left pleural effusion are similar to   the prior study.    The cardiac silhouette is within normal limits in size.    IMPRESSION:  Interval enteric tube placement. Lungs are unchanged in appearance. No   pneumothorax. Left chest tube in place.    < end of copied text >    < from: CT Chest w/ IV Cont (17 @ 10:00) >  EXAM:  CT ABDOMEN AND PELVIS IC                        EXAM:  CT CHEST IC                        PROCEDURE DATE:  2017    INTERPRETATION:  CLINICAL INFORMATION: 6 month history of weight loss;   weakness; lung mass on chest x-ray  COMPARISON: Chest x-ray obtained 2017  PROCEDURE:   CT of the Chest, Abdomen and Pelvis was performed with intravenous   contrast.   Intravenous contrast: 90 ml Omnipaque 350. 10 ml discarded.  Oral contrast: None.  Sagittal and coronal reformats were performed.  FINDINGS:  CHEST:   LUNGS AND LARGE AIRWAYS: Patent central airways. 7.6 x 6.2 x 8.3 cm   cavitary mass in the left lower lobe. There is encasement and narrowing   of the left lower lobe pulmonary artery and bronchus. The mass also abuts   the left inferior pulmonary vein. Debris is present within this mass.   There is destruction of the adjacent left posterior sixth and seventh   ribs and left T6 transverse process.    3 mm right lower lobe nodules noted (series 4, image 350).    There is right dependent subsegmental atelectasis and/or trace pleural   effusion. Mild bilateral upper lobe centrilobular emphysema.    PLEURA: There is a moderate loculated left-sided pleural effusion likely   secondary to malignancy.  VESSELS: There are mild atherosclerotic changes seen throughout the   aortic vessels.  HEART: Heart size is normal. No pericardial effusion.  MEDIASTINUM AND ALPHONSE: Precarinal and AP window adenopathy, the largest   measuring 3.8 x 2.9 cm and AP window.  CHEST WALL AND LOWER NECK: 2.9 x 2.1 cm intramuscular lipoma in left   lateral chest wall.    ABDOMEN AND PELVIS:    LIVER: 1.5 x 1.4 cm right hepatic lesion, concerning for metastasis.  BILE DUCTS: Normal caliber.  GALLBLADDER: Within normal limits.  SPLEEN: Enlarged measuring 19 cm in craniocaudad dimension. Indeterminate   6.3 x 5.5 cm central low density lesion with associated peripheral   infarct.  PANCREAS: Within normal limits.  ADRENALS: Within normal limits.  KIDNEYS/URETERS: 1.0 cm indeterminate exophytic lesion arising from the   lower pole of left kidney. No hydronephrosis.    BLADDER: Within normal limits.  REPRODUCTIVE ORGANS: The prostate is within normal limits.    BOWEL: No bowel obstruction. Sigmoid diverticulosis without active   diverticulitis.   PERITONEUM: 2.2 x 2.1 cm necrotic implant in the right paracolic gutter.   No ascites.  VESSELS: Atherosclerotic calcifications.  RETROPERITONEUM: 4.0 x 3.2 cm necrotic portacaval adenopathy.    ABDOMINAL WALL: Within normal limits.  BONES: Destruction of the left 6th and 7th ribs and left T6 transverse   process as described above. No additional destructive osseous lesions.    IMPRESSION: 7.6 cm necrotic left lower lobe mass consistent with primary   neoplasm.  Mediastinal adenopathy and destruction of adjacent osseous structures.  Hepatic and splenic lesions, likely metastatic.  Upper abdominal adenopathy.  1.0 cm indeterminate exophytic left renal lesion.  < end of copied text >        ADVANCE DIRECTIVES: Full code

## 2017-12-14 NOTE — CONSULT NOTE ADULT - SUBJECTIVE AND OBJECTIVE BOX
HPI:  72 years old male from home with PMHx of HTN and BPH and no PSH presents to ED c/o L-sided chest pain radiating to the back with associated general weakness x6 months. Patient also reports loss of voice x6 months, in addition to decreased appetite and weight loss x2 weeks, only been able to consume x3 Ensure today. Patient reports visiting PMD on multiple occasions; patient has been worked up for strep throat and laryngitis in the past, and has undergone multiple tests. Patient states he has been taking Ibuprofen for pain relief, and has also been on Abx's treatments recently. Patient had a CXR today by his PMD, which showed a 5.2x6.7cm L suprahilar mass, as well as a CT Chest showing a lesion in the R hepatic lobe, a large necrotic macie mass, and emphysema, prompting patient to be sent to the ED for treatment including fluids and pain medication. Patient denies fever, chills, abdominal pain, nausea, vomiting or any other complaints. Patient reports he is a former smoker, and quit smoking x20 years ago. (05 Dec 2017 01:22)      PAST MEDICAL & SURGICAL HISTORY:  BPH (benign prostatic hyperplasia)  Hypertension  No significant past surgical history      No Known Allergies      acetaminophen  Suppository 650 milliGRAM(s) Rectal every 6 hours PRN  amiodarone    Tablet 200 milliGRAM(s) Oral daily  aspirin  chewable 81 milliGRAM(s) Oral daily  fentaNYL   Infusion 2 MICROgram(s)/kG/Hr IV Continuous <Continuous>  heparin  Injectable 5000 Unit(s) SubCutaneous every 8 hours  pantoprazole  Injectable 40 milliGRAM(s) IV Push daily  phenylephrine    Infusion 0.5 MICROgram(s)/kG/Min IV Continuous <Continuous>  piperacillin/tazobactam IVPB. 3.375 Gram(s) IV Intermittent every 8 hours  propofol Infusion 5 MICROgram(s)/kG/Min IV Continuous <Continuous>  sodium chloride 0.9%. 1000 milliLiter(s) IV Continuous <Continuous>  vasopressin Infusion 0.04 Unit(s)/Min IV Continuous <Continuous>      Social Hx:    FAMILY HISTORY:  Family history of diabetes mellitus (Mother)        ROS  [  ] UNABLE TO ELICIT    General:  [  ] None  [  ] Fever  [  ] Chills  [  ] Malaise    Skin:  [  ] None [  ] Rash  [  ] Wound  [  ] Ulcer    HEENT:  [  ] None  [  ] Sore Throat  [  ] Nasal congestion/ runny nose  [  ] Photophobia [  ] Neck pain      Chest:  [  ] None   [  ] SOB  [  ] Cough  [  ] None    Cardiovascular:   [  ] None  [  ] CP  [  ] Palpitation    Gastrointestinal:  [  ] None  [  ] Abd pain   [  ] Nausea    [  ] Vomiting   [  ] Diarrhea	     Genitourinary:  [  ] None [  ] Polyuria   [  ] Urgency  [  ] Frequency  [  ] Dysuria    [  ]  Hematuria       Musculoskeletal:  [  ] None [  ] Back Pain	[  ] Body aches  [  ] Joint pain    Neurological: [  ] None [  ]Dizziness  [  ]Visual Disturbance  [  ]Headaches   [  ] Weakness      PHYSICAL EXAM:    Vital Signs Last 24 Hrs  T(C): 38.1 (14 Dec 2017 08:00), Max: 38.7 (13 Dec 2017 15:44)  T(F): 100.6 (14 Dec 2017 08:00), Max: 101.7 (13 Dec 2017 15:44)  HR: 95 (14 Dec 2017 09:00) (95 - 126)  BP: 91/59 (14 Dec 2017 09:00) (86/53 - 105/60)  BP(mean): 66 (14 Dec 2017 09:00) (59 - 78)  RR: 20 (14 Dec 2017 09:00) (13 - 38)  SpO2: 100% (14 Dec 2017 08:33) (88% - 100%)    Constitutional:    HEENT: [  ] Wnl  [  ] Pharyngeal congestion    Neck:  [  ] Supple  [  ]Lymphadenopathy  [  ] No JVD   [  ] JVD  [  ] Masses   [  ] WNL    CHEST/Respiratory:  [  ]Clear to auscultation  [  ] Rales   [  ] Rhonchi   [  ] Wheezing     [  ] Chest Tenderness      Cardiovascular:  [  ] Reg S1 S2   [  ] Irreg S1 S2   [  ]No Murmur  [  ] +ve Murmurs  [  ]Systolic [  ]Diastolic      Abdomen:  [  ] Soft  [  ] No tendrerness  [  ] Tenderness  [  ] Organomegaly  [  ] ABD Distention  [  ] Rigidity                       [  ] No Regidity                       [  ] No Rebound Tenderness  [  ] No Guarding Rigidity  [  ] Rebound Tenderness[  ] Guarding Rigidity                          [  ]  +ve Bowel Sounds  [  ] Decreased Bowel Sounds    [  ] Absent Bowel Sounds                            Extremities: [  ] No edema [  ] Edema  [  ] Clubbing   [  ] Cyanosis                         [  ] No Tender Calf muscles  [  ] Tender Calf muscles                        [  ] Palpable peripheral pulses    Neurological: [  ] Awake  [  ] Alert  [  ] Oriented  x                             [  ] Confused  [  ] Drowzy  [  ] repond to painful stimuli  [  ] Unresponsive    Skin:  [  ] Intact [  ] Redness [  ] Thrombophlebitis  [  ] Rashes  [  ] Dry  [  ] Ulcers    Ortho:  [  ] Joint Swelling  [  ] Joint erythema [  ] Joint tenderness                [  ] Increased temp. to touch  [  ] DJD [  ] WNL      LABS/DIAGNOSTIC TESTS                          10.9   21.0  )-----------( CLUMPED    ( 14 Dec 2017 06:29 )             34.1     WBC Count: 21.0 K/uL ( @ 06:29)  WBC Count: 28.6 K/uL ( @ 06:32)  WBC Count: 14.0 K/uL ( @ 18:07)  WBC Count: 11.8 K/uL ( @ 07:51)          134<L>  |  103  |  60<H>  ----------------------------<  107<H>  4.8   |  20<L>  |  1.87<H>    Ca    7.0<L>      14 Dec 2017 06:29  Phos  6.5       Mg     2.3         TPro  5.3<L>  /  Alb  1.2<L>  /  TBili  1.9<H>  /  DBili  x   /  AST  63<H>  /  ALT  69<H>  /  AlkPhos  51        Urinalysis Basic - ( 13 Dec 2017 19:36 )    Color: Yellow / Appearance: Clear / S.025 / pH: x  Gluc: x / Ketone: Negative  / Bili: Small / Urobili: 4   Blood: x / Protein: 30 mg/dL / Nitrite: Negative   Leuk Esterase: Negative / RBC: 0-2 /HPF / WBC 0-2 /HPF   Sq Epi: x / Non Sq Epi: Few /HPF / Bacteria: Few /HPF        LIVER FUNCTIONS - ( 14 Dec 2017 06:29 )  Alb: 1.2 g/dL / Pro: 5.3 g/dL / ALK PHOS: 51 U/L / ALT: 69 U/L DA / AST: 63 U/L / GGT: x                 LACTATE:      CULTURES:       RADIOLOGY    CXR: HPI:  72 years old male from home with PMHx of HTN and BPH and no PSH presents to ED c/o L-sided chest pain radiating to the back with associated general weakness x6 months. Patient also reports loss of voice x6 months, in addition to decreased appetite and weight loss x2 weeks, only been able to consume x3 Ensure today. Patient reports visiting PMD on multiple occasions; patient has been worked up for strep throat and laryngitis in the past, and has undergone multiple tests. Patient states he has been taking Ibuprofen for pain relief, and has also been on Abx's treatments recently. Patient had a CXR today by his PMD, which showed a 5.2x6.7cm L suprahilar mass, as well as a CT Chest showing a lesion in the R hepatic lobe, a large necrotic macie mass, and emphysema, prompting patient to be sent to the ED for treatment including fluids and pain medication. Patient denies fever, chills, abdominal pain, nausea, vomiting or any other complaints. Patient reports he is a former smoker, and quit smoking x20 years ago. (05 Dec 2017 01:22)      PAST MEDICAL & SURGICAL HISTORY:  BPH (benign prostatic hyperplasia)  Hypertension  No significant past surgical history      No Known Allergies      acetaminophen  Suppository 650 milliGRAM(s) Rectal every 6 hours PRN  amiodarone    Tablet 200 milliGRAM(s) Oral daily  aspirin  chewable 81 milliGRAM(s) Oral daily  fentaNYL   Infusion 2 MICROgram(s)/kG/Hr IV Continuous <Continuous>  heparin  Injectable 5000 Unit(s) SubCutaneous every 8 hours  pantoprazole  Injectable 40 milliGRAM(s) IV Push daily  phenylephrine    Infusion 0.5 MICROgram(s)/kG/Min IV Continuous <Continuous>  piperacillin/tazobactam IVPB. 3.375 Gram(s) IV Intermittent every 8 hours  propofol Infusion 5 MICROgram(s)/kG/Min IV Continuous <Continuous>  sodium chloride 0.9%. 1000 milliLiter(s) IV Continuous <Continuous>  vasopressin Infusion 0.04 Unit(s)/Min IV Continuous <Continuous>      Social Hx:    FAMILY HISTORY:  Family history of diabetes mellitus (Mother)        ROS  [ x ] UNABLE TO ELICIT    General:  [  ] None  [  ] Fever  [  ] Chills  [  ] Malaise    Skin:  [  ] None [  ] Rash  [  ] Wound  [  ] Ulcer    HEENT:  [  ] None  [  ] Sore Throat  [  ] Nasal congestion/ runny nose  [  ] Photophobia [  ] Neck pain      Chest:  [  ] None   [  ] SOB  [  ] Cough  [  ] None    Cardiovascular:   [  ] None  [  ] CP  [  ] Palpitation    Gastrointestinal:  [  ] None  [  ] Abd pain   [  ] Nausea    [  ] Vomiting   [  ] Diarrhea	     Genitourinary:  [  ] None [  ] Polyuria   [  ] Urgency  [  ] Frequency  [  ] Dysuria    [  ]  Hematuria       Musculoskeletal:  [  ] None [  ] Back Pain	[  ] Body aches  [  ] Joint pain    Neurological: [  ] None [  ]Dizziness  [  ]Visual Disturbance  [  ]Headaches   [  ] Weakness      PHYSICAL EXAM:    Vital Signs Last 24 Hrs  T(C): 38.1 (14 Dec 2017 08:00), Max: 38.7 (13 Dec 2017 15:44)  T(F): 100.6 (14 Dec 2017 08:00), Max: 101.7 (13 Dec 2017 15:44)  HR: 95 (14 Dec 2017 09:00) (95 - 126)  BP: 91/59 (14 Dec 2017 09:00) (86/53 - 105/60)  BP(mean): 66 (14 Dec 2017 09:00) (59 - 78)  RR: 20 (14 Dec 2017 09:00) (13 - 38)  SpO2: 100% (14 Dec 2017 08:33) (88% - 100%)    Constitutional:    HEENT: [ x ] Wnl  [ x ] ET and NGT in place    Neck:  [ x ] Supple  [  ]Lymphadenopathy  [  ] No JVD   [  ] JVD  [  ] Masses   [  ] WNL    CHEST/Respiratory:  [  ]Clear to auscultation  [ x ] Rales   [ x ] Rhonchi   [  ] Wheezing     [ x ] Left side Chest tube      Cardiovascular:  [ x ] Reg S1 S2   [  ] Irreg S1 S2   [ x ]No Murmur  [  ] +ve Murmurs  [  ]Systolic [  ]Diastolic      Abdomen:  [ x ] Soft  [ x ] No tendrerness  [  ] Tenderness  [  ] Organomegaly  [  ] ABD Distention  [  ] Rigidity                       [ x ] No Regidity                       [ x ] No Rebound Tenderness  [ x ] No Guarding Rigidity  [  ] Rebound Tenderness[  ] Guarding Rigidity                          [ x ]  +ve Bowel Sounds  [  ] Decreased Bowel Sounds    [  ] Absent Bowel Sounds                            Extremities: [ x ] No edema [  ] Edema  [  ] Clubbing   [  ] Cyanosis                         [ x ] No Tender Calf muscles  [  ] Tender Calf muscles                        [ x ] Palpable peripheral pulses    Neurological: [  ] Awake  [  ] Alert  [  ] Oriented  x                             [  ] Confused  [  ] Drowzy  [ x ] repond to painful stimuli  [  ] Unresponsive    Skin:  [ x ] Intact [  ] Redness [  ] Thrombophlebitis  [  ] Rashes  [  ] Dry  [  ] Ulcers    Ortho:  [  ] Joint Swelling  [  ] Joint erythema [  ] Joint tenderness                [  ] Increased temp. to touch  [  ] DJD [ x ] WNL      LABS/DIAGNOSTIC TESTS                          10.9   21.0  )-----------( CLUMPED    ( 14 Dec 2017 06:29 )             34.1     WBC Count: 21.0 K/uL ( @ 06:29)  WBC Count: 28.6 K/uL ( @ 06:32)  WBC Count: 14.0 K/uL ( @ 18:07)  WBC Count: 11.8 K/uL ( @ 07:51)          134<L>  |  103  |  60<H>  ----------------------------<  107<H>  4.8   |  20<L>  |  1.87<H>    Ca    7.0<L>      14 Dec 2017 06:29  Phos  6.5       Mg     2.3         TPro  5.3<L>  /  Alb  1.2<L>  /  TBili  1.9<H>  /  DBili  x   /  AST  63<H>  /  ALT  69<H>  /  AlkPhos  51        Urinalysis Basic - ( 13 Dec 2017 19:36 )    Color: Yellow / Appearance: Clear / S.025 / pH: x  Gluc: x / Ketone: Negative  / Bili: Small / Urobili: 4   Blood: x / Protein: 30 mg/dL / Nitrite: Negative   Leuk Esterase: Negative / RBC: 0-2 /HPF / WBC 0-2 /HPF   Sq Epi: x / Non Sq Epi: Few /HPF / Bacteria: Few /HPF        LIVER FUNCTIONS - ( 14 Dec 2017 06:29 )  Alb: 1.2 g/dL / Pro: 5.3 g/dL / ALK PHOS: 51 U/L / ALT: 69 U/L DA / AST: 63 U/L / GGT: x             CULTURES:   Culture - Bronchial (17 @ 08:06)    Gram Stain:   No polymorphonuclear cells seen per low power field  No squamous epithelial cells per low power field  Moderate Gram Positive Cocci in Pairs and Chains per oil power field  Few-moderate Gram Negative Coccobacilli per oil power field    Specimen Source: .Broncial received in trap    Culture Results:   Normal Respiratory Lissy present    Culture - Surgical Swab (17 @ 22:52)    Specimen Source: .Surgical Swab Bronchial Alveolar Lavage    Culture Results:   Few Normal Respiratory Lissy present    Culture - Tissue with Gram Stain (17 @ 22:45)    Gram Stain:   Rare polymorphonuclear leukocytes seen per low power field  Numerous Gram Negative Rods per oil power field    Specimen Source: .Tissue Left Lower Lobe    Culture Results:   Rare Haemophilus parainfluenzae  Rare Neisseria species, not gonorrhoeae or meningitidis    Culture - Blood (17 @ 11:22)    Specimen Source: .Blood Blood    Culture Results:   No growth to date.    Culture - Blood (17 @ 11:22)    Specimen Source: .Blood Blood    Culture Results:   No growth to date.    Culture - Blood (17 @ 11:22)    Specimen Source: .Blood Blood    Culture Results:   No growth to date.            RADIOLOGY    CXR:    EXAM:  CHEST-PORTABLE URGENT                            PROCEDURE DATE:  2017          INTERPRETATION:  INDICATION: Effusion, follow-up assessment    PRIORS: 2017    VIEWS: Portable AP radiography of the chest performed.    FINDINGS: Since prior evaluation there is no significant interval change   in position of an indwelling ETT, NGT, right IJ CVC or left thoracostomy   catheter. Extensive subcutaneous emphysema remains on the left. Airspace   opacity and consolidation within the left lung parenchyma, with   obscuration of the left hemidiaphragm is unchanged. Patchy airspace   opacity within the right lower lung field is unchanged. There is no   evidence for right-sided pleural effusion or right pleural air. No   definite left-sided pleural air is evident. No mediastinal shift is   noted. Degenerative changes of the thoracic spine noted.    IMPRESSION: No significant interval change. See full discussion.

## 2017-12-14 NOTE — CONSULT NOTE ADULT - PROBLEM SELECTOR RECOMMENDATION 5
Patient with no HCP/advance directives on file. Pt previously identified friend, Adolph Bales ([phone]) as emergency contact. Left message for Adolph with request for meeting to discuss patient's status, prognosis, goals of care/advance directives. Patient remains a full code at this time. Patient with no HCP/advance directives on file. Pt previously identified friend, Adolph Bales ([phone]) as emergency contact. Meeting tomorrow 11:30AM to discuss patient's status, prognosis, goals of care/advance directives. Patient remains a full code at this time.

## 2017-12-14 NOTE — PROGRESS NOTE ADULT - SUBJECTIVE AND OBJECTIVE BOX
Patient is a 72y old  Male who presents with a chief complaint of weakness, chest discomfort and voice and appetite changes (05 Dec 2017 01:22)    pt seen in icu [ x ], reg med floor [ ], bed [ x ], chair at bedside [   ], a+o x3 [  ],sedated [ x ], nad [x  ], left chest to pleurovac [x],   et tube [x],  ngt feed [ x ], frias to bsd [x], right ij cent line [x], fentanyl, propofol and phenylepherine drip [x],    Allergies    No Known Allergies        Vitals    T(F): 99.4 (12-14-17 @ 12:00), Max: 101.7 (12-13-17 @ 15:44)  HR: 119 (12-14-17 @ 13:00) (95 - 126)  BP: 99/45 (12-14-17 @ 13:00) (86/53 - 106/62)  RR: 19 (12-14-17 @ 13:00) (12 - 30)  SpO2: 94% (12-14-17 @ 13:00) (88% - 100%)  Wt(kg): --  CAPILLARY BLOOD GLUCOSE          Labs                          10.9   21.0  )-----------( CLUMPED    ( 14 Dec 2017 06:29 )             34.1       12-14    134<L>  |  103  |  60<H>  ----------------------------<  107<H>  4.8   |  20<L>  |  1.87<H>    Ca    7.0<L>      14 Dec 2017 06:29  Phos  6.5     12-14  Mg     2.3     12-14    TPro  5.3<L>  /  Alb  1.2<L>  /  TBili  1.9<H>  /  DBili  x   /  AST  63<H>  /  ALT  69<H>  /  AlkPhos  51  12-14      Surgical Pathology Final Report -  (12.06.17 @ 11:50)    Surgical Pathology Final Report - :   ACCESSION No:  [id number]    FRANCISCO DUKE                       1        Surgical Final Report    Final Diagnosis    Lung, left upper lobe, transbronchial biopsy  - Interstitial fibrosis and intra-alveolar smoker's  macrophages    Comment: Negative for malignancy. The histologic findings are  most likely smoking related.    MAAME SHARMA M.D.  (Electronic Signature)  Reported on: 12/11/17    Synoptic Summary  _.    Clinical Information  COPD  Left upper lobe mass  Transbronchial biopsy    Specimen Description  Left upper lobe biopsy    Gross Description  The specimen is received in formalin and the specimen container  is labeled: Left upper lobe biopsy.  It  consists of five  fragments of soft, tan-pink tissue ranging from less than 0.1 cm  to 0.1 cm in maximum dimension.  Entirely submitted.  One  cassette.    In addition to other data that may appear on the specimen  container, the label has been inspected to confirm the presence  of the patient's name and date of birth.    Histological processing performed at [facility], [place] [address].  [facility] Medical Director:  Nilson France M.D.   12/06/17 14:22          .Broncial received in trap  12-13 @ 08:06   Normal Respiratory Lissy present  --    No polymorphonuclear cells seen per low power field  No squamous epithelial cells per low power field  Moderate Gram Positive Cocci in Pairs and Chains per oil power field  Few-moderate Gram Negative Coccobacilli per oil power field      .Surgical Swab Bronchial Alveolar Lavage  12-12 @ 22:52   Few Normal Respiratory Lissy present  --  --      .Tissue Left Lower Lobe  12-12 @ 22:45   Rare Haemophilus parainfluenzae  Rare Neisseria species, not gonorrhoeae or meningitidis  --    Rare polymorphonuclear leukocytes seen per low power field  Numerous Gram Negative Rods per oil power field      .Blood Blood  12-11 @ 11:22   No growth to date.  --  --      .Broncial Other, bronchioalveolar lavage  12-06 @ 22:28 --  --  --      .Blood Blood-Peripheral  12-05 @ 10:57   No growth at 5 days.  --  --          Radiology Results      Meds    MEDICATIONS  (STANDING):  amiodarone    Tablet 400 milliGRAM(s) Oral three times a day  aspirin  chewable 81 milliGRAM(s) Oral daily  fentaNYL   Infusion 2 MICROgram(s)/kG/Hr (15.28 mL/Hr) IV Continuous <Continuous>  heparin  Injectable 5000 Unit(s) SubCutaneous every 8 hours  pantoprazole  Injectable 40 milliGRAM(s) IV Push daily  phenylephrine    Infusion 0.5 MICROgram(s)/kG/Min (6.563 mL/Hr) IV Continuous <Continuous>  piperacillin/tazobactam IVPB. 3.375 Gram(s) IV Intermittent every 8 hours  propofol Infusion 5 MICROgram(s)/kG/Min (2.292 mL/Hr) IV Continuous <Continuous>  sodium chloride 0.9%. 1000 milliLiter(s) (100 mL/Hr) IV Continuous <Continuous>  vasopressin Infusion 0.04 Unit(s)/Min (2.4 mL/Hr) IV Continuous <Continuous>      MEDICATIONS  (PRN):  acetaminophen  Suppository 650 milliGRAM(s) Rectal every 6 hours PRN For Temp greater than 38 C (100.4 F)      Physical Exam      Neuro :  no focal deficits  Respiratory: CTA B/L, left subcutaneous emphysema, left chest tube to pleurovac  CV: RRR, S1S2, no murmurs,   Abdominal: Soft, NT, ND +BS,  Extremities: No edema, + peripheral pulses    ASSESSMENT    perihilar mass,   hepatic and splenic lesions,   r/o malig,   copd   destruction of left 6th and 7th ribs and left T6 transverse process   exophytic left renal lesion  subcutaneous emphysema.  hyponatremia  silverio  new afib  h/o BPH (benign prostatic hyperplasia)  Hypertension        PLAN        S/P flexible bronchoscopy showing possible Large B-cell lymphoma,   s/p flex bronch transbronchial lung bx and EBU bx 12/12  f/u cytopath of flex bronch and ebus  cx from flex bronch with Few Normal Respiratory Lissy present and Rare Haemophilus parainfluenzae  Rare Neisseria species, not gonorrhoeae or meningitidis  -- Rare polymorphonuclear leukocytes seen per low power field  Numerous Gram Negative Rods per oil power field  noted above   thoracic surg f/u noted  monitor chest tube  heme onc cons  vent mgmt  cont atrov and prov nebs,   cont supplemental oxygen,  ct chest-abd-pelv result noted  pulm f/u noted  s/p bronch with bx   cytopath of bronch neg for malig noted  cytopatho transbronchial bx with Interstitial fibrosis and intra-alveolar smoker's  macrophages noted above.   urology cons for renal lesion  cont ivf  renal f/u noted  ?hypovolemic hyponatremia in the setting of dec PO intake. serum osm 280, urine osm 448 with  Maria Eugenia 6 on IVF.   Serum sodium improving appropriately with IVF.  Monitor serum sodium.   cardio cons noted  start amiodarone loading day #1  cont current meds  id cons  cont zosyn  paliative eval noted  mgmt as per icu

## 2017-12-14 NOTE — CONSULT NOTE ADULT - PROBLEM SELECTOR RECOMMENDATION 4
Dvt Ppx.
in the setting of sepsis. c/w pressors as per MICU.
Pt verbal, ambulatory and independent of ADLs at baseline per report. Now pt bedbound, sedated/intubated - unable to follow commands.

## 2017-12-14 NOTE — PROGRESS NOTE ADULT - SUBJECTIVE AND OBJECTIVE BOX
Patient is a 72y old  Male who presents with a chief complaint of weakness, chest discomfort, voice and appetite changes.  Awake, alert, comfortable in NAD. Small PTX on CXR. Lung biopsy did not show any malignancy. S/p EBUS.  Currently intubated on pressors meds because of hypotension.    INTERVAL HPI/OVERNIGHT EVENTS:      VITAL SIGNS:  T(F): 100.6 (17 @ 08:00)  HR: 95 (17 @ 09:00)  BP: 91/59 (17 @ 09:00)  RR: 20 (17 @ 09:00)  SpO2: 100% (17 @ 08:33)  Wt(kg): --  I&O's Detail    13 Dec 2017 07:  -  14 Dec 2017 07:00  --------------------------------------------------------  IN:    0.9% NaCl: 1000 mL    fentaNYL  Infusion: 349.9 mL    IV PiggyBack: 300 mL    phenylephrine   Infusion: 2300 mL    propofol Infusion: 362 mL    sodium chloride 0.9%.: 2400 mL    vasopressin Infusion: 50.4 mL    Vital HN: 10 mL  Total IN: 6772.3 mL    OUT:    Chest Tube: 40 mL    Indwelling Catheter - Urethral: 775 mL  Total OUT: 815 mL    Total NET: 5957.3 mL      14 Dec 2017 07:  -  14 Dec 2017 09:42  --------------------------------------------------------  IN:    fentaNYL  Infusion: 30.6 mL    phenylephrine   Infusion: 200 mL    propofol Infusion: 30 mL    sodium chloride 0.9%.: 200 mL    vasopressin Infusion: 4.8 mL    Vital HN: 10 mL  Total IN: 475.4 mL    OUT:    Indwelling Catheter - Urethral: 50 mL  Total OUT: 50 mL    Total NET: 425.4 mL        Mode: AC/ CMV (Assist Control/ Continuous Mandatory Ventilation)  RR (machine): 16  TV (machine): 450  FiO2: 50  PEEP: 5  ITime: 1  MAP: 8.4  PIP: 21        REVIEW OF SYSTEMS:    CONSTITUTIONAL:  No fevers, chills, sweats    HEENT:  Eyes:  No diplopia or blurred vision. ENT:  No earache, sore throat or runny nose.    CARDIOVASCULAR:  No pressure, squeezing, tightness, or heaviness about the chest; no palpitations.    RESPIRATORY:  Per HPI    GASTROINTESTINAL:  No abdominal pain, nausea, vomiting or diarrhea.    GENITOURINARY:  No dysuria, frequency or urgency.    NEUROLOGIC:  No paresthesias, fasciculations, seizures or weakness.    PSYCHIATRIC:  No disorder of thought or mood.      PHYSICAL EXAM:    Constitutional: Well developed and nourished  Eyes:Perrla  ENMT: normal  Neck:supple  Respiratory: good air entry  Cardiovascular: S1 S2 regular  Gastrointestinal: Soft, Non tender  Extremities: No edema  Vascular:normal  Neurological:Awake, alert,Ox3  Musculoskeletal:Normal      MEDICATIONS  (STANDING):  amiodarone    Tablet 200 milliGRAM(s) Oral daily  aspirin  chewable 81 milliGRAM(s) Oral daily  fentaNYL   Infusion 2 MICROgram(s)/kG/Hr (15.28 mL/Hr) IV Continuous <Continuous>  heparin  Injectable 5000 Unit(s) SubCutaneous every 8 hours  pantoprazole  Injectable 40 milliGRAM(s) IV Push daily  phenylephrine    Infusion 0.5 MICROgram(s)/kG/Min (6.563 mL/Hr) IV Continuous <Continuous>  piperacillin/tazobactam IVPB. 3.375 Gram(s) IV Intermittent every 8 hours  propofol Infusion 5 MICROgram(s)/kG/Min (2.292 mL/Hr) IV Continuous <Continuous>  sodium chloride 0.9%. 1000 milliLiter(s) (100 mL/Hr) IV Continuous <Continuous>  vasopressin Infusion 0.04 Unit(s)/Min (2.4 mL/Hr) IV Continuous <Continuous>    MEDICATIONS  (PRN):  acetaminophen  Suppository 650 milliGRAM(s) Rectal every 6 hours PRN For Temp greater than 38 C (100.4 F)      Allergies    No Known Allergies    Intolerances        LABS:                        10.9   21.0  )-----------( CLUMPED    ( 14 Dec 2017 06:29 )             34.1     12-14    134<L>  |  103  |  60<H>  ----------------------------<  107<H>  4.8   |  20<L>  |  1.87<H>    Ca    7.0<L>      14 Dec 2017 06:29  Phos  6.5     12-  Mg     2.3     12-    TPro  5.3<L>  /  Alb  1.2<L>  /  TBili  1.9<H>  /  DBili  x   /  AST  63<H>  /  ALT  69<H>  /  AlkPhos  51  12-14      Urinalysis Basic - ( 13 Dec 2017 19:36 )    Color: Yellow / Appearance: Clear / S.025 / pH: x  Gluc: x / Ketone: Negative  / Bili: Small / Urobili: 4   Blood: x / Protein: 30 mg/dL / Nitrite: Negative   Leuk Esterase: Negative / RBC: 0-2 /HPF / WBC 0-2 /HPF   Sq Epi: x / Non Sq Epi: Few /HPF / Bacteria: Few /HPF      ABG - ( 13 Dec 2017 04:57 )  pH: 7.42  /  pCO2: 36    /  pO2: 77    / HCO3: 23    / Base Excess: -0.7  /  SaO2: 94                    CAPILLARY BLOOD GLUCOSE            RADIOLOGY & ADDITIONAL TESTS:    CXR:  < from: Xray Chest 1 View AP-PORTABLE IMMEDIATE (17 @ 21:46) >  Interval enteric tube placement, below the left hemidiaphragm, tip off   the field-of-view. ET tube and right IJ catheter remains in place. Left   chest tube remains in place. No pneumothorax. Extensive subcutaneous   emphysema, left worse than right, not significantly changed, which limits   evaluation.    Redemonstration of a left perihilar mass, which on recent CT correlates   with a 7.6 cm left lower lobe mass concerning for malignancy. Bony   destruction of the left posterior sixth and seventh ribs is better seen   on CT.    Patchy left lung opacities and small left pleural effusion are similar to   the prior study.    The cardiac silhouette is within normal limits in size.    < end of copied text >    Ct scan chest:  < from: CT Chest w/ IV Cont (17 @ 10:00) >   7.6 cm necrotic left lower lobe mass consistent with primary   neoplasm.  Mediastinal adenopathy and destruction of adjacent osseous structures.  Hepatic and splenic lesions, likely metastatic.  Upper abdominal adenopathy.  1.0 cm indeterminate exophytic left renal lesion.    < end of copied text >    ekg;  < from: 12 Lead ECG (17 @ 17:44) >  Normal sinus rhythm with sinus arrhythmia  Normal ECG    < end of copied text >    echo:  < from: Transthoracic Echocardiogram (17 @ 07:19) >  1. Endocardium not well visualized; unable to evaluate left  ventricular function.  2. Right ventricle not well visualized. Probably normal  right ventricular size and systolic function.  3. RV systolic pressure is 51 mm Hg. Moderatepulmonary  hypertension.  4. Trace pericardial effusion. No echocardiographic  evidence of pericardial tamponade.    < end of copied text > Patient is a 72y old  Male who presents with a chief complaint of weakness, chest discomfort, voice and appetite changes.  Awake, alert, comfortable in NAD. Small PTX on CXR. Lung biopsy did not show any malignancy. S/p EBUS.  Currently intubated on pressors meds because of hypotension.  Clinically unchanged. Awaiting path results.  INTERVAL HPI/OVERNIGHT EVENTS:      VITAL SIGNS:  T(F): 100.6 (17 @ 08:00)  HR: 95 (17 @ 09:00)  BP: 91/59 (17 @ 09:00)  RR: 20 (17 @ 09:00)  SpO2: 100% (17 @ 08:33)  Wt(kg): --  I&O's Detail    13 Dec 2017 07:  -  14 Dec 2017 07:00  --------------------------------------------------------  IN:    0.9% NaCl: 1000 mL    fentaNYL  Infusion: 349.9 mL    IV PiggyBack: 300 mL    phenylephrine   Infusion: 2300 mL    propofol Infusion: 362 mL    sodium chloride 0.9%.: 2400 mL    vasopressin Infusion: 50.4 mL    Vital HN: 10 mL  Total IN: 6772.3 mL    OUT:    Chest Tube: 40 mL    Indwelling Catheter - Urethral: 775 mL  Total OUT: 815 mL    Total NET: 5957.3 mL      14 Dec 2017 07:  -  14 Dec 2017 09:42  --------------------------------------------------------  IN:    fentaNYL  Infusion: 30.6 mL    phenylephrine   Infusion: 200 mL    propofol Infusion: 30 mL    sodium chloride 0.9%.: 200 mL    vasopressin Infusion: 4.8 mL    Vital HN: 10 mL  Total IN: 475.4 mL    OUT:    Indwelling Catheter - Urethral: 50 mL  Total OUT: 50 mL    Total NET: 425.4 mL        Mode: AC/ CMV (Assist Control/ Continuous Mandatory Ventilation)  RR (machine): 16  TV (machine): 450  FiO2: 50  PEEP: 5  ITime: 1  MAP: 8.4  PIP: 21        REVIEW OF SYSTEMS:    CONSTITUTIONAL:  No fevers, chills, sweats    HEENT:  Eyes:  No diplopia or blurred vision. ENT:  No earache, sore throat or runny nose.    CARDIOVASCULAR:  No pressure, squeezing, tightness, or heaviness about the chest; no palpitations.    RESPIRATORY:  Per HPI    GASTROINTESTINAL:  No abdominal pain, nausea, vomiting or diarrhea.    GENITOURINARY:  No dysuria, frequency or urgency.    NEUROLOGIC:  No paresthesias, fasciculations, seizures or weakness.    PSYCHIATRIC:  No disorder of thought or mood.      PHYSICAL EXAM:    Constitutional: Well developed and nourished  Eyes:Perrla  ENMT: normal  Neck:supple  Respiratory: Left decreased BS. +crepitus on left upper chest and neck  Cardiovascular: S1 S2 regular  Gastrointestinal: Soft, Non tender  Extremities: No edema  Vascular:normal  Neurologic: Sedated  Musculoskeletal:Normal      MEDICATIONS  (STANDING):  amiodarone    Tablet 200 milliGRAM(s) Oral daily  aspirin  chewable 81 milliGRAM(s) Oral daily  fentaNYL   Infusion 2 MICROgram(s)/kG/Hr (15.28 mL/Hr) IV Continuous <Continuous>  heparin  Injectable 5000 Unit(s) SubCutaneous every 8 hours  pantoprazole  Injectable 40 milliGRAM(s) IV Push daily  phenylephrine    Infusion 0.5 MICROgram(s)/kG/Min (6.563 mL/Hr) IV Continuous <Continuous>  piperacillin/tazobactam IVPB. 3.375 Gram(s) IV Intermittent every 8 hours  propofol Infusion 5 MICROgram(s)/kG/Min (2.292 mL/Hr) IV Continuous <Continuous>  sodium chloride 0.9%. 1000 milliLiter(s) (100 mL/Hr) IV Continuous <Continuous>  vasopressin Infusion 0.04 Unit(s)/Min (2.4 mL/Hr) IV Continuous <Continuous>    MEDICATIONS  (PRN):  acetaminophen  Suppository 650 milliGRAM(s) Rectal every 6 hours PRN For Temp greater than 38 C (100.4 F)      Allergies    No Known Allergies    Intolerances        LABS:                        10.9   21.0  )-----------( CLUMPED    ( 14 Dec 2017 06:29 )             34.1     12-14    134<L>  |  103  |  60<H>  ----------------------------<  107<H>  4.8   |  20<L>  |  1.87<H>    Ca    7.0<L>      14 Dec 2017 06:29  Phos  6.5     12-  Mg     2.3     12-14    TPro  5.3<L>  /  Alb  1.2<L>  /  TBili  1.9<H>  /  DBili  x   /  AST  63<H>  /  ALT  69<H>  /  AlkPhos  51  12-14      Urinalysis Basic - ( 13 Dec 2017 19:36 )    Color: Yellow / Appearance: Clear / S.025 / pH: x  Gluc: x / Ketone: Negative  / Bili: Small / Urobili: 4   Blood: x / Protein: 30 mg/dL / Nitrite: Negative   Leuk Esterase: Negative / RBC: 0-2 /HPF / WBC 0-2 /HPF   Sq Epi: x / Non Sq Epi: Few /HPF / Bacteria: Few /HPF      ABG - ( 13 Dec 2017 04:57 )  pH: 7.42  /  pCO2: 36    /  pO2: 77    / HCO3: 23    / Base Excess: -0.7  /  SaO2: 94                    CAPILLARY BLOOD GLUCOSE            RADIOLOGY & ADDITIONAL TESTS:    CXR:  < from: Xray Chest 1 View AP-PORTABLE IMMEDIATE (17 @ 21:46) >  Interval enteric tube placement, below the left hemidiaphragm, tip off   the field-of-view. ET tube and right IJ catheter remains in place. Left   chest tube remains in place. No pneumothorax. Extensive subcutaneous   emphysema, left worse than right, not significantly changed, which limits   evaluation.    Redemonstration of a left perihilar mass, which on recent CT correlates   with a 7.6 cm left lower lobe mass concerning for malignancy. Bony   destruction of the left posterior sixth and seventh ribs is better seen   on CT.    Patchy left lung opacities and small left pleural effusion are similar to   the prior study.    The cardiac silhouette is within normal limits in size.    < end of copied text >    Ct scan chest:  < from: CT Chest w/ IV Cont (17 @ 10:00) >   7.6 cm necrotic left lower lobe mass consistent with primary   neoplasm.  Mediastinal adenopathy and destruction of adjacent osseous structures.  Hepatic and splenic lesions, likely metastatic.  Upper abdominal adenopathy.  1.0 cm indeterminate exophytic left renal lesion.    < end of copied text >    ekg;  < from: 12 Lead ECG (17 @ 17:44) >  Normal sinus rhythm with sinus arrhythmia  Normal ECG    < end of copied text >    echo:  < from: Transthoracic Echocardiogram (17 @ 07:19) >  1. Endocardium not well visualized; unable to evaluate left  ventricular function.  2. Right ventricle not well visualized. Probably normal  right ventricular size and systolic function.  3. RV systolic pressure is 51 mm Hg. Moderate pulmonary  hypertension.  4. Trace pericardial effusion. No echocardiographic  evidence of pericardial tamponade.    < end of copied text >

## 2017-12-14 NOTE — CONSULT NOTE ADULT - PROBLEM SELECTOR RECOMMENDATION 3
Cont med.  Urology eval.
1- Renal management.  2- Fluid and electrolytes management and follow up.
c/w antibiotics/ pressors as per MICI
Per preliminary post-op report. CT indicates necrotic left lower lobe mass, mediastinal adenopathy and destruction of adjacent osseous structures; hepatic and splenic lesions, likely metastatic. Bronch path negative for CA. Repeat CT pending; awaiting final path results.

## 2017-12-14 NOTE — CONSULT NOTE ADULT - PROBLEM SELECTOR PROBLEM 2
Mediastinal (thymic) large B-cell lymphoma, unspecified body region Acute respiratory failure with hypoxia

## 2017-12-14 NOTE — PROGRESS NOTE ADULT - SUBJECTIVE AND OBJECTIVE BOX
[facility] NEPHROLOGY- PROGRESS NOTE    71 yo M with HTN, BPH a/w chest discomfort, suprahilar/ hepatic mass and hyponatremia. Hosp course cb subcutaneous emphysema s/p bronch with Left chest tube placement. s/p EBUS  with  endobronchial lung biopsy consistent with possible  +Large B-cell lymphoma. Now intubated with septic shock 2/2 ?PNA, hypotension on pressors, with ZENY and hyponatremia.   Renal consulted for hyponatremia and ZNEY.     Hospital Medications: Medications reviewed.  REVIEW OF SYSTEMS: Unable to obtain    VITALS:  T(F): 100.4 (17 @ 10:00), Max: 101.7 (17 @ 15:44)  HR: 110 (17 @ 12:24)  BP: 96/63 (17 @ 12:00)  RR: 12 (17 @ 12:00)  SpO2: 97% (17 @ 12:24)  Wt(kg): --  Height (cm): 165 (:14)  Weight (kg): 76.4 ( @ 16:00)  BMI (kg/m2): 28.1 ( @ 16:00)  BSA (m2): 1.84 ( @ 16:00)     @ 07:01  -   @ 07:00  --------------------------------------------------------  IN: 6772.3 mL / OUT: 815 mL / NET: 5957.3 mL     @ 07:01  -   @ 13:15  --------------------------------------------------------  IN: 1113.5 mL / OUT: 180 mL / NET: 933.5 mL      PHYSICAL EXAM:  Gen: Sedated   HEENT: intubated  Cards: Irregular, +S1/S2, no M/G/R  Resp: left sided crepitus, intubated  with left Chest tube  GI: soft, NT/ND, NABS  : +frias  Extremities: no LE edema B/L      LABS:    134 <--, 130 <--, 128 <--, 125 <--, 127 <--, 132 <--  134<L>  |  103  |  60<H>  ----------------------------<  107<H>  4.8   |  20<L>  |  1.87<H>    Ca    7.0<L>      14 Dec 2017 06:29  Phos  6.5       Mg     2.3         TPro  5.3<L>  /  Alb  1.2<L>  /  TBili  1.9<H>  /  DBili      /  AST  63<H>  /  ALT  69<H>  /  AlkPhos  51      Creatinine Trend: 1.87 <--, 1.53 <--, 1.56 <--, 1.19 <--, 0.96 <--, 0.81 <--                        10.9   21.0  )-----------( CLUMPED    ( 14 Dec 2017 06:29 )             34.1     Urine Studies:  Urinalysis Basic - ( 13 Dec 2017 19:36 )    Color: Yellow / Appearance: Clear / S.025 / pH:   Gluc:  / Ketone: Negative  / Bili: Small / Urobili: 4   Blood:  / Protein: 30 mg/dL / Nitrite: Negative   Leuk Esterase: Negative / RBC: 0-2 /HPF / WBC 0-2 /HPF   Sq Epi:  / Non Sq Epi: Few /HPF / Bacteria: Few /HPF      Chloride, Random Urine: <10 mmol/L ( @ 13:22)  Osmolality, Random Urine: 427 mos/kg ( @ 13:22)  Sodium, Random Urine: 10 mmol/L ( @ :22)  Potassium, Random Urine: 58 mmol/L ( @ 13:22)  Creatinine, Random Urine: 173 mg/dL ( @ 13:22)  Osmolality, Random Urine: 448 mos/kg ( @ 06:40)  Sodium, Random Urine: 6 mmol/L ( @ 06:40)  Sodium, Random Urine: 6 mmol/L ( @ 06:40)  Creatinine, Random Urine: 102 mg/dL ( @ 06:40)    RADIOLOGY & ADDITIONAL STUDIES:

## 2017-12-14 NOTE — CONSULT NOTE ADULT - PROBLEM SELECTOR RECOMMENDATION 2
Per post-op report. CT indicates necrotic left lower lobe mass, mediastinal adenopathy and destruction of adjacent osseous structures; hepatic and splenic lesions, likely metastatic. Bronch path negative for CA. 2/2 septic shock, s/p EBUS. Patient remains sedated/intubated.

## 2017-12-15 NOTE — PROGRESS NOTE ADULT - SUBJECTIVE AND OBJECTIVE BOX
Patient is a 72y old  Male who presents with a chief complaint of weakness, chest discomfort and voice and appetite changes (05 Dec 2017 01:22)  Still intubated on mechanical ventilation. S/p EBUS with FNA of several mediastinal LNs. Results of FNA negative for malignancy. Biopsy still pending. Patient has subcutaneous emphysema but slightly improved. Remains with chest tube on left side. Still on pressors meds because of hypotension secondary to sepsis. sedated.    INTERVAL HPI/OVERNIGHT EVENTS:      VITAL SIGNS:  T(F): 98.8 (12-15-17 @ 07:00)  HR: 108 (12-15-17 @ 10:30)  BP: 92/35 (12-15-17 @ 09:00)  RR: 21 (12-15-17 @ 10:30)  SpO2: 95% (12-15-17 @ 10:30)  Wt(kg): --  I&O's Detail    14 Dec 2017 07:01  -  15 Dec 2017 07:00  --------------------------------------------------------  IN:    fentaNYL  Infusion: 367.2 mL    phenylephrine   Infusion: 2400 mL    propofol Infusion: 51.8 mL    sodium chloride 0.9%.: 2400 mL    vasopressin Infusion: 57.6 mL    Vital HN: 10 mL  Total IN: 5286.6 mL    OUT:    Chest Tube: 50 mL    Indwelling Catheter - Urethral: 815 mL  Total OUT: 865 mL    Total NET: 4421.6 mL      15 Dec 2017 07:  -  15 Dec 2017 11:12  --------------------------------------------------------  IN:    fentaNYL  Infusion: 45.9 mL    phenylephrine   Infusion: 300 mL    propofol Infusion: 20.7 mL    sodium chloride 0.9%.: 300 mL    vasopressin Infusion: 7.2 mL  Total IN: 673.8 mL    OUT:    Indwelling Catheter - Urethral: 75 mL  Total OUT: 75 mL    Total NET: 598.8 mL        Mode: AC/ CMV (Assist Control/ Continuous Mandatory Ventilation)  RR (machine): 16  TV (machine): 450  FiO2: 50  PEEP: 5  ITime: 1  MAP: 5  PIP: 17        REVIEW OF SYSTEMS:    CONSTITUTIONAL:  No fevers, chills, sweats    HEENT:  Eyes:  No diplopia or blurred vision. ENT:  No earache, sore throat or runny nose.    CARDIOVASCULAR:  No pressure, squeezing, tightness, or heaviness about the chest; no palpitations.    RESPIRATORY:  Per HPI    GASTROINTESTINAL:  No abdominal pain, nausea, vomiting or diarrhea.    GENITOURINARY:  No dysuria, frequency or urgency.    NEUROLOGIC:  No paresthesias, fasciculations, seizures or weakness.    PSYCHIATRIC:  No disorder of thought or mood.      PHYSICAL EXAM:    Constitutional: Well developed and nourished  Eyes:Perrla  ENMT: normal  Neck:supple  Respiratory: good air entry  Cardiovascular: S1 S2 regular  Gastrointestinal: Soft, Non tender  Extremities: No edema  Vascular:normal  Neurological:Awake, alert,Ox3  Musculoskeletal:Normal      MEDICATIONS  (STANDING):  amiodarone    Tablet 400 milliGRAM(s) Oral three times a day  aspirin  chewable 81 milliGRAM(s) Oral daily  fentaNYL   Infusion 2 MICROgram(s)/kG/Hr (15.28 mL/Hr) IV Continuous <Continuous>  heparin  Injectable 5000 Unit(s) SubCutaneous every 8 hours  pantoprazole  Injectable 40 milliGRAM(s) IV Push daily  phenylephrine    Infusion 0.5 MICROgram(s)/kG/Min (6.563 mL/Hr) IV Continuous <Continuous>  piperacillin/tazobactam IVPB. 3.375 Gram(s) IV Intermittent every 12 hours  propofol Infusion 5 MICROgram(s)/kG/Min (2.292 mL/Hr) IV Continuous <Continuous>  sodium chloride 0.9%. 1000 milliLiter(s) (100 mL/Hr) IV Continuous <Continuous>  vancomycin  IVPB 1000 milliGRAM(s) IV Intermittent every 24 hours    MEDICATIONS  (PRN):  acetaminophen  Suppository 650 milliGRAM(s) Rectal every 6 hours PRN For Temp greater than 38 C (100.4 F)      Allergies    No Known Allergies    Intolerances        LABS:                        10.9   19.0  )-----------( 128      ( 15 Dec 2017 05:55 )             34.8     12-15    135  |  107  |  71<H>  ----------------------------<  76  5.1   |  16<L>  |  2.17<H>    Ca    6.9<L>      15 Dec 2017 05:55  Phos  7.1     12-15  Mg     2.4     12-15    TPro  5.3<L>  /  Alb  1.2<L>  /  TBili  1.9<H>  /  DBili  x   /  AST  63<H>  /  ALT  69<H>  /  AlkPhos  51  12-14      Urinalysis Basic - ( 13 Dec 2017 19:36 )    Color: Yellow / Appearance: Clear / S.025 / pH: x  Gluc: x / Ketone: Negative  / Bili: Small / Urobili: 4   Blood: x / Protein: 30 mg/dL / Nitrite: Negative   Leuk Esterase: Negative / RBC: 0-2 /HPF / WBC 0-2 /HPF   Sq Epi: x / Non Sq Epi: Few /HPF / Bacteria: Few /HPF      ABG - ( 15 Dec 2017 05:15 )  pH: 7.18  /  pCO2: 45    /  pO2: 77    / HCO3: 16    / Base Excess: -11.4 /  SaO2: 93                    CAPILLARY BLOOD GLUCOSE            RADIOLOGY & ADDITIONAL TESTS:    CXR:    Ct scan chest:    ekg;    echo:

## 2017-12-15 NOTE — PROGRESS NOTE ADULT - PROBLEM SELECTOR PLAN 3
Per preliminary report. CT indicates necrotic left lower lobe mass, mediastinal adenopathy and destruction of adjacent osseous structures; hepatic and splenic lesions, likely metastatic. Bronch path negative for CA. Repeat CT pending; awaiting final path results.

## 2017-12-15 NOTE — PROGRESS NOTE ADULT - SUBJECTIVE AND OBJECTIVE BOX
POD#2 s/p flex bronch and EBUS  Pt intubated/sedated/on pressors  Lcxt to LWS    Vital Signs:  T(C): 37.1 (12-15-17 @ 07:00), Max: 38.2 (12-15-17 @ 06:30)  HR: 106 (12-15-17 @ 09:30) (92 - 125)  BP: 92/35 (12-15-17 @ 09:00) (92/35 - 120/60)  RR: 23 (12-15-17 @ 09:30) (12 - 26)  SpO2: 92% (12-15-17 @ 09:30) (88% - 100%)  Wt(kg): --    Physical Exam:  General: intubated/sedated  LCxt: chest tube to lws, output serous. no air leak, subcu emphysema improved  dressing c/d/i    Ins/Outs:    12-14 @ 07:01  -  12-15 @ 07:00  --------------------------------------------------------  IN:    fentaNYL  Infusion: 367.2 mL    phenylephrine   Infusion: 2400 mL    propofol Infusion: 51.8 mL    sodium chloride 0.9%.: 2400 mL    vasopressin Infusion: 57.6 mL    Vital HN: 10 mL  Total IN: 5286.6 mL    OUT:    Chest Tube: 50 mL    Indwelling Catheter - Urethral: 815 mL  Total OUT: 865 mL    Total NET: 4421.6 mL                          10.9   19.0  )-----------( x        ( 15 Dec 2017 05:55 )             34.8

## 2017-12-15 NOTE — PROGRESS NOTE ADULT - ASSESSMENT
73 yo M with HTN, BPH a/w chest discomfort, suprahilar/ hepatic mass and hyponatremia. Hosp course cb subcutaneous emphysema s/p bronch with Left chest tube placement. 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 Now intubated with septic shock, hypotension on pressors, with ZENY and hyponatremia.   Renal consulted for hyponatremia and ZENY.

## 2017-12-15 NOTE — PROGRESS NOTE ADULT - PROBLEM SELECTOR PLAN 5
Met with patient's surrogate, Adolph Prabhakar ([phone]) to discuss advanced directives and goals of care, including benefits vs burdens of resuscitation, intubation. Patient was full code; now DNR per surrogate's request. Met with patient's surrogate, Adolph Celayayuedanie ([phone]), face-to-face. Discussed patient's status. Discussed advance directives and goals of care, including benefits vs burdens of resuscitation, intubation. Patient was full code; now DNR per surrogate's request. Ongoing discussions needed pending diagnostics.

## 2017-12-15 NOTE — PROGRESS NOTE ADULT - SUBJECTIVE AND OBJECTIVE BOX
CHIEF COMPLAINT:Patient is a 72y old  Male who presents with a chief complaint of weakness, chest discomfort and voice and appetite changes. pt remains intubated.    	  REVIEW OF SYSTEMS:  [ X] Unable to obtain    PHYSICAL EXAM:  T(C): 37.1 (12-15-17 @ 07:00), Max: 38.2 (12-15-17 @ 06:30)  HR: 106 (12-15-17 @ 09:30) (92 - 125)  BP: 92/35 (12-15-17 @ 09:00) (92/35 - 120/60)  RR: 23 (12-15-17 @ 09:30) (12 - 26)  SpO2: 92% (12-15-17 @ 09:30) (88% - 100%)    I&O's Summary    14 Dec 2017 07:01  -  15 Dec 2017 07:00  --------------------------------------------------------  IN: 5286.6 mL / OUT: 865 mL / NET: 4421.6 mL        Appearance: Normal	  HEENT:   Normal oral mucosa, PERRL, EOMI	  Lymphatic: No lymphadenopathy  Cardiovascular: Normal S1 S2, No JVD, No murmurs, No edema  Respiratory: B/L ronchi  Gastrointestinal:  Soft, Non-tender, + BS	  Skin: No rashes, No ecchymoses, No cyanosis	  Extremities: Normal range of motion, No clubbing, cyanosis or edema  Vascular: Peripheral pulses palpable 2+ bilaterally    MEDICATIONS  (STANDING):  amiodarone    Tablet 400 milliGRAM(s) Oral three times a day  aspirin  chewable 81 milliGRAM(s) Oral daily  fentaNYL   Infusion 2 MICROgram(s)/kG/Hr (15.28 mL/Hr) IV Continuous <Continuous>  heparin  Injectable 5000 Unit(s) SubCutaneous every 8 hours  pantoprazole  Injectable 40 milliGRAM(s) IV Push daily  phenylephrine    Infusion 0.5 MICROgram(s)/kG/Min (6.563 mL/Hr) IV Continuous <Continuous>  propofol Infusion 5 MICROgram(s)/kG/Min (2.292 mL/Hr) IV Continuous <Continuous>  sodium chloride 0.9%. 1000 milliLiter(s) (100 mL/Hr) IV Continuous <Continuous>  vasopressin Infusion 0.04 Unit(s)/Min (2.4 mL/Hr) IV Continuous <Continuous>      TELEMETRY: 	afib      	  LABS:	 	                        10.9   19.0  )-----------( x        ( 15 Dec 2017 05:55 )             34.8     12-15    135  |  107  |  71<H>  ----------------------------<  76  5.1   |  16<L>  |  2.17<H>    Ca    6.9<L>      15 Dec 2017 05:55  Phos  7.1     12-15  Mg     2.4     12-15    TPro  5.3<L>  /  Alb  1.2<L>  /  TBili  1.9<H>  /  DBili  x   /  AST  63<H>  /  ALT  69<H>  /  AlkPhos  51  12-14      Lipid Profile: Cholesterol 64  LDL 20  HDL 26  TG 88    HgA1c: Hemoglobin A1C, Whole Blood: 6.2 % (12-05 @ 09:32)    TSH: Thyroid Stimulating Hormone, Serum: 0.45 uU/mL (12-05 @ 07:14)      CXR:  	  IMPRESSION:   Left sided pleural-parenchymal changes not significantly changed.  Interval development of mid right lung atelectasis and/or pneumonia.  Interval decrease in left chest wall subcutaneous emphysema.  Life supporting devices in appropriate position.

## 2017-12-15 NOTE — PROGRESS NOTE ADULT - PROBLEM SELECTOR PLAN 6
in the setting of renal failure. Lactate 1.5 with ph 7.18. Can consider Sodium bicarb 650mg PO bid. Monitor ph.

## 2017-12-15 NOTE — PHYSICAL THERAPY INITIAL EVALUATION ADULT - DIAGNOSIS, PT EVAL
Pt. presents w/ dec. endurance; dec. ms. strength and limited mobility.
Decreased endurance, strength, functional mobility

## 2017-12-15 NOTE — PROGRESS NOTE ADULT - PROBLEM SELECTOR PLAN 2
Quantiferon Gold TB test  Empiric antibiotics  Bronch path negative for Ca.  Follow up EBUS bx report  Cyto from [place] and BAL negative for CA  May need liver Bx of suspicious liver lesions

## 2017-12-15 NOTE — PHYSICAL THERAPY INITIAL EVALUATION ADULT - PERTINENT HX OF CURRENT PROBLEM, REHAB EVAL
Pt. admitted from home due to (L) sided chest pain, radiating to back and general weakness x 6 mos. Pt. also lost his voice and apetite. 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
Weakness

## 2017-12-15 NOTE — PHYSICAL THERAPY INITIAL EVALUATION ADULT - IMPAIRMENTS FOUND, PT EVAL
tone/aerobic capacity/endurance/gait, locomotion, and balance/muscle strength
aerobic capacity/endurance

## 2017-12-15 NOTE — PROGRESS NOTE ADULT - SUBJECTIVE AND OBJECTIVE BOX
[facility] NEPHROLOGY- PROGRESS NOTE    71 yo M with HTN, BPH a/w chest discomfort, suprahilar/ hepatic mass and hyponatremia. Hosp course cb subcutaneous emphysema s/p bronch with Left chest tube placement. s/p EBUS  with  endobronchial lung biopsy neg for malignancy however mediastinal biopsy indeterminate. Now intubated with septic shock 2/2 ?PNA, hypotension on pressors, with ZENY and hyponatremia.   Renal consulted for hyponatremia and ZENY. Pt for possible liver mass bx.     Hospital Medications: Medications reviewed.  REVIEW OF SYSTEMS: Unable to obtain    VITALS:  T(F): 98.8 (12-15-17 @ 07:00), Max: 100.8 (12-15-17 @ 06:30)  HR: 117 (12-15-17 @ 12:30)  BP: 106/64 (12-15-17 @ 12:00)  RR: 19 (12-15-17 @ 12:30)  SpO2: 96% (12-15-17 @ 12:30)  Wt(kg): --     @ 07:01  -  12-15 @ 07:00  --------------------------------------------------------  IN: 5286.6 mL / OUT: 865 mL / NET: 4421.6 mL    12-15 @ 07:01  -  12-15 @ 12:36  --------------------------------------------------------  IN: 1123 mL / OUT: 75 mL / NET: 1048 mL      PHYSICAL EXAM:  Gen: Sedated   HEENT: intubated  Cards: Irregular, +S1/S2, no M/G/R  Resp: left sided crepitus, intubated  with left Chest tube  GI: soft, NT/ND, NABS  : +frias  Extremities: no LE edema B/L    LABS:  12-15  135 <--, 134 <--, 130 <--, 128 <--, 125 <--, 127 <--  135  |  107  |  71<H>  ----------------------------<  76  5.1   |  16<L>  |  2.17<H>    Ca    6.9<L>      15 Dec 2017 05:55  Phos  7.1     12-15  Mg     2.4     12-15    TPro  5.3<L>  /  Alb  1.2<L>  /  TBili  1.9<H>  /  DBili      /  AST  63<H>  /  ALT  69<H>  /  AlkPhos  51  12-14    Creatinine Trend: 2.17 <--, 1.87 <--, 1.53 <--, 1.56 <--, 1.19 <--, 0.96 <--                        10.9   19.0  )-----------( 128      ( 15 Dec 2017 05:55 )             34.8     Urine Studies:  Urinalysis Basic - ( 13 Dec 2017 19:36 )    Color: Yellow / Appearance: Clear / S.025 / pH:   Gluc:  / Ketone: Negative  / Bili: Small / Urobili: 4   Blood:  / Protein: 30 mg/dL / Nitrite: Negative   Leuk Esterase: Negative / RBC: 0-2 /HPF / WBC 0-2 /HPF   Sq Epi:  / Non Sq Epi: Few /HPF / Bacteria: Few /HPF      Chloride, Random Urine: <10 mmol/L ( @ 13:22)  Osmolality, Random Urine: 427 mos/kg ( @ 13:22)  Sodium, Random Urine: 10 mmol/L ( @ :22)  Potassium, Random Urine: 58 mmol/L ( @ 13:22)  Creatinine, Random Urine: 173 mg/dL ( @ 13:22)  Osmolality, Random Urine: 448 mos/kg ( @ 06:40)  Sodium, Random Urine: 6 mmol/L ( @ 06:40)  Sodium, Random Urine: 6 mmol/L ( @ 06:40)  Creatinine, Random Urine: 102 mg/dL ( @ 06:40)

## 2017-12-15 NOTE — PROGRESS NOTE ADULT - SUBJECTIVE AND OBJECTIVE BOX
INTERVAL /OVERNIGHT EVENTS: poor urine output overnight, multiple events of Vtach as per telemetry,     PRESSORS: [x] YES [ ] NO  WHICH: phenylephrine    ANTIBIOTICS:                  DATE STARTED:  ANTIBIOTICS:                  DATE STARTED:  ANTIBIOTICS:                  DATE STARTED:    Antimicrobial:    Cardiovascular:  amiodarone    Tablet 400 milliGRAM(s) Oral three times a day  phenylephrine    Infusion 0.5 MICROgram(s)/kG/Min IV Continuous <Continuous>    Pulmonary:    Hematalogic:  aspirin  chewable 81 milliGRAM(s) Oral daily  heparin  Injectable 5000 Unit(s) SubCutaneous every 8 hours    Other:  acetaminophen  Suppository 650 milliGRAM(s) Rectal every 6 hours PRN  fentaNYL   Infusion 2 MICROgram(s)/kG/Hr IV Continuous <Continuous>  pantoprazole  Injectable 40 milliGRAM(s) IV Push daily  propofol Infusion 5 MICROgram(s)/kG/Min IV Continuous <Continuous>  sodium chloride 0.9%. 1000 milliLiter(s) IV Continuous <Continuous>  vasopressin Infusion 0.04 Unit(s)/Min IV Continuous <Continuous>    acetaminophen  Suppository 650 milliGRAM(s) Rectal every 6 hours PRN  amiodarone    Tablet 400 milliGRAM(s) Oral three times a day  aspirin  chewable 81 milliGRAM(s) Oral daily  fentaNYL   Infusion 2 MICROgram(s)/kG/Hr IV Continuous <Continuous>  heparin  Injectable 5000 Unit(s) SubCutaneous every 8 hours  pantoprazole  Injectable 40 milliGRAM(s) IV Push daily  phenylephrine    Infusion 0.5 MICROgram(s)/kG/Min IV Continuous <Continuous>  propofol Infusion 5 MICROgram(s)/kG/Min IV Continuous <Continuous>  sodium chloride 0.9%. 1000 milliLiter(s) IV Continuous <Continuous>  vasopressin Infusion 0.04 Unit(s)/Min IV Continuous <Continuous>    Drug Dosing Weight  Height (cm): 165 (12 Dec 2017 13:14)  Weight (kg): 76.4 (12 Dec 2017 16:00)  BMI (kg/m2): 28.1 (12 Dec 2017 16:00)  BSA (m2): 1.84 (12 Dec 2017 16:00)    CENTRAL LINE: [ x] YES [ ] NO  LOCATION: [facility]  DATE INSERTED:   REMOVE: [ ] YES [ x] NO  EXPLAIN: requiring pressors    FRIAS: [x ] YES [ ] NO    DATE INSERTED:   REMOVE:  [ ] YES [ x] NO  EXPLAIN: urine output monitoring    A-LINE:  [ ] YES [x ] NO        ICU Vital Signs Last 24 Hrs  T(C): 37.1 (15 Dec 2017 07:00), Max: 38.2 (15 Dec 2017 06:30)  T(F): 98.8 (15 Dec 2017 07:00), Max: 100.8 (15 Dec 2017 06:30)  HR: 98 (15 Dec 2017 08:16) (92 - 125)  BP: 108/57 (15 Dec 2017 07:30) (91/59 - 120/60)  BP(mean): 69 (15 Dec 2017 07:30) (56 - 91)  ABP: 91/44 (15 Dec 2017 07:30) (74/41 - 107/50)  ABP(mean): 60 (15 Dec 2017 07:30) (54 - 91)  RR: 16 (15 Dec 2017 07:30) (12 - 26)  SpO2: 98% (15 Dec 2017 08:16) (88% - 100%)      ABG - ( 15 Dec 2017 05:15 )  pH: 7.18  /  pCO2: 45    /  pO2: 77    / HCO3: 16    / Base Excess: -11.4 /  SaO2: 93                    14 @ 07:01  -  15 @ 07:00  --------------------------------------------------------  IN: 5286.6 mL / OUT: 865 mL / NET: 4421.6 mL        Mode: AC/ CMV (Assist Control/ Continuous Mandatory Ventilation)  RR (machine): 16  TV (machine): 450  FiO2: 50  PEEP: 5  ITime: 1  MAP: 5  PIP: 17      PHYSICAL EXAM:    GENERAL: no acute distress  HEAD: atraumatic, normocephalic   EYES: PERRL, white sclera.   ENMT: oral cavity moist  NECK: supple, no JVD  LYMPH: no palpable lymph nodes     SKIN: warm, dry   CHEST/LUNG: ET tube in place. No Chest deformity , no chest tenderness. bilateral breath sounds, no  adventitious sounds  HEART: RRR, no m/r/g   ABDOMEN: distended; bowel sounds present  : frias catheter.  EXTREMITIES: no edema, no cyanosis, no clubbing.  NEURO: sedated, nonverbal, noncommunicative        LABS:  CBC Full  -  ( 15 Dec 2017 05:55 )  WBC Count : 19.0 K/uL  Hemoglobin : 10.9 g/dL  Hematocrit : 34.8 %  Platelet Count - Automated : x  Mean Cell Volume : 99.7 fl  Mean Cell Hemoglobin : 31.3 pg  Mean Cell Hemoglobin Concentration : 31.4 gm/dL  Auto Neutrophil # : 16.3 K/uL  Auto Lymphocyte # : 1.3 K/uL  Auto Monocyte # : 0.5 K/uL  Auto Eosinophil # : 0.8 K/uL  Auto Basophil # : 0.1 K/uL  Auto Neutrophil % : 86.1 %  Auto Lymphocyte % : 6.7 %  Auto Monocyte % : 2.4 %  Auto Eosinophil % : 4.2 %  Auto Basophil % : 0.5 %    12-15    135  |  107  |  71<H>  ----------------------------<  76  5.1   |  16<L>  |  2.17<H>    Ca    6.9<L>      15 Dec 2017 05:55  Phos  7.1     12-15  Mg     2.4     12-15    TPro  5.3<L>  /  Alb  1.2<L>  /  TBili  1.9<H>  /  DBili  x   /  AST  63<H>  /  ALT  69<H>  /  AlkPhos  51        Urinalysis Basic - ( 13 Dec 2017 19:36 )    Color: Yellow / Appearance: Clear / S.025 / pH: x  Gluc: x / Ketone: Negative  / Bili: Small / Urobili: 4   Blood: x / Protein: 30 mg/dL / Nitrite: Negative   Leuk Esterase: Negative / RBC: 0-2 /HPF / WBC 0-2 /HPF   Sq Epi: x / Non Sq Epi: Few /HPF / Bacteria: Few /HPF      Culture Results:   No growth to date. ( @ 00:30)  Culture Results:   No growth to date. ( @ 00:30)  Culture Results:   Normal Respiratory Lissy present ( @ 08:06)  Culture Results:   Few Normal Respiratory Lissy present ( @ 22:52)  Culture Results:   Rare Haemophilus parainfluenzae  Rare Neisseria species, not gonorrhoeae or meningitidis  Rare Alpha hemolytic strep (12-12 @ 22:45)      RADIOLOGY & ADDITIONAL STUDIES REVIEWED:     [ ]GOALS OF CARE DISCUSSION WITH PATIENT/FAMILY/PROXY:    CRITICAL CARE TIME SPENT: 35 minutes INTERVAL /OVERNIGHT EVENTS: poor urine output overnight, multiple events of Vtach as per telemetry,     PRESSORS: [x] YES [ ] NO  WHICH: phenylephrine    ANTIBIOTICS: Zosyn                  DATE STARTED:       Antimicrobial: piperacillin/tazobactam IV 3.375 milligrams every 12 hours    Cardiovascular:  amiodarone    Tablet 400 milliGRAM(s) Oral three times a day  phenylephrine    Infusion 0.5 MICROgram(s)/kG/Min IV Continuous <Continuous>    Pulmonary: N/A    Hematalogic:  aspirin  chewable 81 milliGRAM(s) Oral daily  heparin  Injectable 5000 Unit(s) SubCutaneous every 8 hours    Other:  acetaminophen  Suppository 650 milliGRAM(s) Rectal every 6 hours PRN  fentaNYL   Infusion 2 MICROgram(s)/kG/Hr IV Continuous <Continuous>  pantoprazole  Injectable 40 milliGRAM(s) IV Push daily  propofol Infusion 5 MICROgram(s)/kG/Min IV Continuous <Continuous>  sodium chloride 0.9%. 1000 milliLiter(s) IV Continuous <Continuous>  vasopressin Infusion 0.04 Unit(s)/Min IV Continuous <Continuous>    acetaminophen  Suppository 650 milliGRAM(s) Rectal every 6 hours PRN  amiodarone    Tablet 400 milliGRAM(s) Oral three times a day  aspirin  chewable 81 milliGRAM(s) Oral daily  fentaNYL   Infusion 2 MICROgram(s)/kG/Hr IV Continuous <Continuous>  heparin  Injectable 5000 Unit(s) SubCutaneous every 8 hours  pantoprazole  Injectable 40 milliGRAM(s) IV Push daily  phenylephrine    Infusion 0.5 MICROgram(s)/kG/Min IV Continuous <Continuous>  propofol Infusion 5 MICROgram(s)/kG/Min IV Continuous <Continuous>  sodium chloride 0.9%. 1000 milliLiter(s) IV Continuous <Continuous>  vasopressin Infusion 0.04 Unit(s)/Min IV Continuous <Continuous>    Drug Dosing Weight  Height (cm): 165 (12 Dec 2017 13:14)  Weight (kg): 76.4 (12 Dec 2017 16:00)  BMI (kg/m2): 28.1 (12 Dec 2017 16:00)  BSA (m2): 1.84 (12 Dec 2017 16:00)    CENTRAL LINE: [ x] YES [ ] NO  LOCATION: [facility]  DATE INSERTED:   REMOVE: [ ] YES [ x] NO  EXPLAIN: requiring pressors    FRIAS: [x ] YES [ ] NO    DATE INSERTED:   REMOVE:  [ ] YES [ x] NO  EXPLAIN: urine output monitoring    A-LINE:  [ ] YES [x ] NO        ICU Vital Signs Last 24 Hrs  T(C): 37.1 (15 Dec 2017 07:00), Max: 38.2 (15 Dec 2017 06:30)  T(F): 98.8 (15 Dec 2017 07:00), Max: 100.8 (15 Dec 2017 06:30)  HR: 98 (15 Dec 2017 08:16) (92 - 125)  BP: 108/57 (15 Dec 2017 07:30) (91/59 - 120/60)  BP(mean): 69 (15 Dec 2017 07:30) (56 - 91)  ABP: 91/44 (15 Dec 2017 07:30) (74/41 - 107/50)  ABP(mean): 60 (15 Dec 2017 07:30) (54 - 91)  RR: 16 (15 Dec 2017 07:30) (12 - 26)  SpO2: 98% (15 Dec 2017 08:16) (88% - 100%)      ABG - ( 15 Dec 2017 05:15 )  pH: 7.18  /  pCO2: 45    /  pO2: 77    / HCO3: 16    / Base Excess: -11.4 /  SaO2: 93                    12-14 @ 07:01  -  15 @ 07:00  --------------------------------------------------------  IN: 5286.6 mL / OUT: 865 mL / NET: 4421.6 mL        Mode: AC/ CMV (Assist Control/ Continuous Mandatory Ventilation)  RR (machine): 16  TV (machine): 450  FiO2: 50  PEEP: 5  ITime: 1  MAP: 5  PIP: 17      PHYSICAL EXAM:    GENERAL: no acute distress  HEAD: atraumatic, normocephalic   EYES: PERRL, white sclera.   ENMT: oral cavity moist  NECK: supple, no JVD  LYMPH: no palpable lymph nodes     SKIN: warm, dry   CHEST/LUNG: ET tube in place. No Chest deformity , no chest tenderness. bilateral breath sounds, no  adventitious sounds  HEART: RRR, no m/r/g   ABDOMEN: distended; bowel sounds present  : frias catheter.  EXTREMITIES: no edema, no cyanosis, no clubbing.  NEURO: sedated, nonverbal, noncommunicative        LABS:  CBC Full  -  ( 15 Dec 2017 05:55 )  WBC Count : 19.0 K/uL  Hemoglobin : 10.9 g/dL  Hematocrit : 34.8 %  Platelet Count - Automated : x  Mean Cell Volume : 99.7 fl  Mean Cell Hemoglobin : 31.3 pg  Mean Cell Hemoglobin Concentration : 31.4 gm/dL  Auto Neutrophil # : 16.3 K/uL  Auto Lymphocyte # : 1.3 K/uL  Auto Monocyte # : 0.5 K/uL  Auto Eosinophil # : 0.8 K/uL  Auto Basophil # : 0.1 K/uL  Auto Neutrophil % : 86.1 %  Auto Lymphocyte % : 6.7 %  Auto Monocyte % : 2.4 %  Auto Eosinophil % : 4.2 %  Auto Basophil % : 0.5 %    12-15    135  |  107  |  71<H>  ----------------------------<  76  5.1   |  16<L>  |  2.17<H>    Ca    6.9<L>      15 Dec 2017 05:55  Phos  7.1     12-15  Mg     2.4     12-15    TPro  5.3<L>  /  Alb  1.2<L>  /  TBili  1.9<H>  /  DBili  x   /  AST  63<H>  /  ALT  69<H>  /  AlkPhos  51        Urinalysis Basic - ( 13 Dec 2017 19:36 )    Color: Yellow / Appearance: Clear / S.025 / pH: x  Gluc: x / Ketone: Negative  / Bili: Small / Urobili: 4   Blood: x / Protein: 30 mg/dL / Nitrite: Negative   Leuk Esterase: Negative / RBC: 0-2 /HPF / WBC 0-2 /HPF   Sq Epi: x / Non Sq Epi: Few /HPF / Bacteria: Few /HPF      Culture Results:   No growth to date. ( @ 00:30)  Culture Results:   No growth to date. ( @ 00:30)  Culture Results:   Normal Respiratory Lissy present ( @ 08:06)  Culture Results:   Few Normal Respiratory Lissy present ( @ 22:52)  Culture Results:   Rare Haemophilus parainfluenzae  Rare Neisseria species, not gonorrhoeae or meningitidis  Rare Alpha hemolytic strep (12-12 @ 22:45)      RADIOLOGY & ADDITIONAL STUDIES REVIEWED:     [ ]GOALS OF CARE DISCUSSION WITH PATIENT/FAMILY/PROXY:    CRITICAL CARE TIME SPENT: 35 minutes INTERVAL /OVERNIGHT EVENTS: poor urine output overnight, no other events except for intervals of increased tachycardia    PRESSORS: [x] YES [ ] NO  WHICH: phenylephrine    ANTIBIOTICS: Zosyn                  DATE STARTED:       Antimicrobial: piperacillin/tazobactam IV 3.375 milligrams every 12 hours    Cardiovascular:  amiodarone    Tablet 400 milliGRAM(s) Oral three times a day  phenylephrine    Infusion 0.5 MICROgram(s)/kG/Min IV Continuous <Continuous>    Pulmonary: N/A    Hematalogic:  aspirin  chewable 81 milliGRAM(s) Oral daily  heparin  Injectable 5000 Unit(s) SubCutaneous every 8 hours    Other:  acetaminophen  Suppository 650 milliGRAM(s) Rectal every 6 hours PRN  fentaNYL   Infusion 2 MICROgram(s)/kG/Hr IV Continuous <Continuous>  pantoprazole  Injectable 40 milliGRAM(s) IV Push daily  propofol Infusion 5 MICROgram(s)/kG/Min IV Continuous <Continuous>  sodium chloride 0.9%. 1000 milliLiter(s) IV Continuous <Continuous>  vasopressin Infusion 0.04 Unit(s)/Min IV Continuous <Continuous>    acetaminophen  Suppository 650 milliGRAM(s) Rectal every 6 hours PRN  amiodarone    Tablet 400 milliGRAM(s) Oral three times a day  aspirin  chewable 81 milliGRAM(s) Oral daily  fentaNYL   Infusion 2 MICROgram(s)/kG/Hr IV Continuous <Continuous>  heparin  Injectable 5000 Unit(s) SubCutaneous every 8 hours  pantoprazole  Injectable 40 milliGRAM(s) IV Push daily  phenylephrine    Infusion 0.5 MICROgram(s)/kG/Min IV Continuous <Continuous>  propofol Infusion 5 MICROgram(s)/kG/Min IV Continuous <Continuous>  sodium chloride 0.9%. 1000 milliLiter(s) IV Continuous <Continuous>  vasopressin Infusion 0.04 Unit(s)/Min IV Continuous <Continuous>    Drug Dosing Weight  Height (cm): 165 (12 Dec 2017 13:14)  Weight (kg): 76.4 (12 Dec 2017 16:00)  BMI (kg/m2): 28.1 (12 Dec 2017 16:00)  BSA (m2): 1.84 (12 Dec 2017 16:00)    CENTRAL LINE: [ x] YES [ ] NO  LOCATION: [facility]  DATE INSERTED:   REMOVE: [ ] YES [ x] NO  EXPLAIN: requiring pressors    FRIAS: [x ] YES [ ] NO    DATE INSERTED:   REMOVE:  [ ] YES [ x] NO  EXPLAIN: urine output monitoring    A-LINE:  [ ] YES [x ] NO        ICU Vital Signs Last 24 Hrs  T(C): 37.1 (15 Dec 2017 07:00), Max: 38.2 (15 Dec 2017 06:30)  T(F): 98.8 (15 Dec 2017 07:00), Max: 100.8 (15 Dec 2017 06:30)  HR: 98 (15 Dec 2017 08:16) (92 - 125)  BP: 108/57 (15 Dec 2017 07:30) (91/59 - 120/60)  BP(mean): 69 (15 Dec 2017 07:30) (56 - 91)  ABP: 91/44 (15 Dec 2017 07:30) (74/41 - 107/50)  ABP(mean): 60 (15 Dec 2017 07:30) (54 - 91)  RR: 16 (15 Dec 2017 07:30) (12 - 26)  SpO2: 98% (15 Dec 2017 08:16) (88% - 100%)      ABG - ( 15 Dec 2017 05:15 )  pH: 7.18  /  pCO2: 45    /  pO2: 77    / HCO3: 16    / Base Excess: -11.4 /  SaO2: 93                    12-14 @ 07:01  -  15 @ 07:00  --------------------------------------------------------  IN: 5286.6 mL / OUT: 865 mL / NET: 4421.6 mL        Mode: AC/ CMV (Assist Control/ Continuous Mandatory Ventilation)  RR (machine): 16  TV (machine): 450  FiO2: 50  PEEP: 5  ITime: 1  MAP: 5  PIP: 17      PHYSICAL EXAM:    GENERAL: no acute distress  HEAD: atraumatic, normocephalic   EYES: PERRL, white sclera.   ENMT: oral cavity moist  NECK: supple, no JVD  LYMPH: no palpable lymph nodes     SKIN: warm, dry   CHEST/LUNG: ET tube in place. No Chest deformity , no chest tenderness. bilateral breath sounds, no  adventitious sounds  HEART: RRR, no m/r/g   ABDOMEN: distended; bowel sounds present  : frias catheter.  EXTREMITIES: no edema, no cyanosis, no clubbing.  NEURO: sedated, nonverbal, noncommunicative        LABS:  CBC Full  -  ( 15 Dec 2017 05:55 )  WBC Count : 19.0 K/uL  Hemoglobin : 10.9 g/dL  Hematocrit : 34.8 %  Platelet Count - Automated : x  Mean Cell Volume : 99.7 fl  Mean Cell Hemoglobin : 31.3 pg  Mean Cell Hemoglobin Concentration : 31.4 gm/dL  Auto Neutrophil # : 16.3 K/uL  Auto Lymphocyte # : 1.3 K/uL  Auto Monocyte # : 0.5 K/uL  Auto Eosinophil # : 0.8 K/uL  Auto Basophil # : 0.1 K/uL  Auto Neutrophil % : 86.1 %  Auto Lymphocyte % : 6.7 %  Auto Monocyte % : 2.4 %  Auto Eosinophil % : 4.2 %  Auto Basophil % : 0.5 %    12-15    135  |  107  |  71<H>  ----------------------------<  76  5.1   |  16<L>  |  2.17<H>    Ca    6.9<L>      15 Dec 2017 05:55  Phos  7.1     12-15  Mg     2.4     12-15    TPro  5.3<L>  /  Alb  1.2<L>  /  TBili  1.9<H>  /  DBili  x   /  AST  63<H>  /  ALT  69<H>  /  AlkPhos  51        Urinalysis Basic - ( 13 Dec 2017 19:36 )    Color: Yellow / Appearance: Clear / S.025 / pH: x  Gluc: x / Ketone: Negative  / Bili: Small / Urobili: 4   Blood: x / Protein: 30 mg/dL / Nitrite: Negative   Leuk Esterase: Negative / RBC: 0-2 /HPF / WBC 0-2 /HPF   Sq Epi: x / Non Sq Epi: Few /HPF / Bacteria: Few /HPF      Culture Results:   No growth to date. ( @ 00:30)  Culture Results:   No growth to date. ( @ 00:30)  Culture Results:   Normal Respiratory Lissy present ( @ 08:06)  Culture Results:   Few Normal Respiratory Lissy present ( @ 22:52)  Culture Results:   Rare Haemophilus parainfluenzae  Rare Neisseria species, not gonorrhoeae or meningitidis  Rare Alpha hemolytic strep (12-12 @ 22:45)      RADIOLOGY & ADDITIONAL STUDIES REVIEWED:     [ ]GOALS OF CARE DISCUSSION WITH PATIENT/FAMILY/PROXY:    CRITICAL CARE TIME SPENT: 35 minutes INTERVAL /OVERNIGHT EVENTS: poor urine output overnight, no other events except for intervals of increased tachycardia    PRESSORS: [x] YES [ ] NO  WHICH: phenylephrine    ANTIBIOTICS: Zosyn                  DATE STARTED:     Antimicrobial: piperacillin/tazobactam IV 3.375 milligrams every 12 hours    Cardiovascular:  amiodarone    Tablet 400 milliGRAM(s) Oral three times a day  phenylephrine    Infusion 0.5 MICROgram(s)/kG/Min IV Continuous <Continuous>    Pulmonary: N/A    Hematalogic:  aspirin  chewable 81 milliGRAM(s) Oral daily  heparin  Injectable 5000 Unit(s) SubCutaneous every 8 hours    Other:  acetaminophen  Suppository 650 milliGRAM(s) Rectal every 6 hours PRN  fentaNYL   Infusion 2 MICROgram(s)/kG/Hr IV Continuous <Continuous>  pantoprazole  Injectable 40 milliGRAM(s) IV Push daily  propofol Infusion 5 MICROgram(s)/kG/Min IV Continuous <Continuous>  sodium chloride 0.9%. 1000 milliLiter(s) IV Continuous <Continuous>  vasopressin Infusion 0.04 Unit(s)/Min IV Continuous <Continuous>    acetaminophen  Suppository 650 milliGRAM(s) Rectal every 6 hours PRN  amiodarone    Tablet 400 milliGRAM(s) Oral three times a day  aspirin  chewable 81 milliGRAM(s) Oral daily  fentaNYL   Infusion 2 MICROgram(s)/kG/Hr IV Continuous <Continuous>  heparin  Injectable 5000 Unit(s) SubCutaneous every 8 hours  pantoprazole  Injectable 40 milliGRAM(s) IV Push daily  phenylephrine    Infusion 0.5 MICROgram(s)/kG/Min IV Continuous <Continuous>  propofol Infusion 5 MICROgram(s)/kG/Min IV Continuous <Continuous>  sodium chloride 0.9%. 1000 milliLiter(s) IV Continuous <Continuous>  vasopressin Infusion 0.04 Unit(s)/Min IV Continuous <Continuous>    Drug Dosing Weight  Height (cm): 165 (12 Dec 2017 13:14)  Weight (kg): 76.4 (12 Dec 2017 16:00)  BMI (kg/m2): 28.1 (12 Dec 2017 16:00)  BSA (m2): 1.84 (12 Dec 2017 16:00)    CENTRAL LINE: [ x] YES [ ] NO  LOCATION: [facility]  DATE INSERTED:   REMOVE: [ ] YES [ x] NO  EXPLAIN: requiring pressors    FRIAS: [x ] YES [ ] NO    DATE INSERTED:   REMOVE:  [ ] YES [ x] NO  EXPLAIN: urine output monitoring    A-LINE:  [ ] YES [x ] NO        ICU Vital Signs Last 24 Hrs  T(C): 37.1 (15 Dec 2017 07:00), Max: 38.2 (15 Dec 2017 06:30)  T(F): 98.8 (15 Dec 2017 07:00), Max: 100.8 (15 Dec 2017 06:30)  HR: 98 (15 Dec 2017 08:16) (92 - 125)  BP: 108/57 (15 Dec 2017 07:30) (91/59 - 120/60)  BP(mean): 69 (15 Dec 2017 07:30) (56 - 91)  ABP: 91/44 (15 Dec 2017 07:30) (74/41 - 107/50)  ABP(mean): 60 (15 Dec 2017 07:30) (54 - 91)  RR: 16 (15 Dec 2017 07:30) (12 - 26)  SpO2: 98% (15 Dec 2017 08:16) (88% - 100%)      ABG - ( 15 Dec 2017 05:15 )  pH: 7.18  /  pCO2: 45    /  pO2: 77    / HCO3: 16    / Base Excess: -11.4 /  SaO2: 93                    12-14 @ 07:01  -  15 @ 07:00  --------------------------------------------------------  IN: 5286.6 mL / OUT: 865 mL / NET: 4421.6 mL        Mode: AC/ CMV (Assist Control/ Continuous Mandatory Ventilation)  RR (machine): 16  TV (machine): 450  FiO2: 50  PEEP: 5  ITime: 1  MAP: 5  PIP: 17      PHYSICAL EXAM:    GENERAL: no acute distress  HEAD: atraumatic, normocephalic   EYES: PERRL, white sclera.   ENMT: oral cavity moist  NECK: supple, no JVD  LYMPH: no palpable lymph nodes     SKIN: warm, dry   CHEST/LUNG: ET tube in place. No Chest deformity , no chest tenderness. bilateral breath sounds, no  adventitious sounds  HEART: RRR, no m/r/g   ABDOMEN: distended; bowel sounds present  : frias catheter.  EXTREMITIES: no edema, no cyanosis, no clubbing.  NEURO: sedated, nonverbal, noncommunicative        LABS:  CBC Full  -  ( 15 Dec 2017 05:55 )  WBC Count : 19.0 K/uL  Hemoglobin : 10.9 g/dL  Hematocrit : 34.8 %  Platelet Count - Automated : x  Mean Cell Volume : 99.7 fl  Mean Cell Hemoglobin : 31.3 pg  Mean Cell Hemoglobin Concentration : 31.4 gm/dL  Auto Neutrophil # : 16.3 K/uL  Auto Lymphocyte # : 1.3 K/uL  Auto Monocyte # : 0.5 K/uL  Auto Eosinophil # : 0.8 K/uL  Auto Basophil # : 0.1 K/uL  Auto Neutrophil % : 86.1 %  Auto Lymphocyte % : 6.7 %  Auto Monocyte % : 2.4 %  Auto Eosinophil % : 4.2 %  Auto Basophil % : 0.5 %    12-15    135  |  107  |  71<H>  ----------------------------<  76  5.1   |  16<L>  |  2.17<H>    Ca    6.9<L>      15 Dec 2017 05:55  Phos  7.1     12-15  Mg     2.4     12-15    TPro  5.3<L>  /  Alb  1.2<L>  /  TBili  1.9<H>  /  DBili  x   /  AST  63<H>  /  ALT  69<H>  /  AlkPhos  51        Urinalysis Basic - ( 13 Dec 2017 19:36 )    Color: Yellow / Appearance: Clear / S.025 / pH: x  Gluc: x / Ketone: Negative  / Bili: Small / Urobili: 4   Blood: x / Protein: 30 mg/dL / Nitrite: Negative   Leuk Esterase: Negative / RBC: 0-2 /HPF / WBC 0-2 /HPF   Sq Epi: x / Non Sq Epi: Few /HPF / Bacteria: Few /HPF      Culture Results:   No growth to date. ( @ 00:30)  Culture Results:   No growth to date. ( @ 00:30)  Culture Results:   Normal Respiratory Lissy present ( @ 08:06)  Culture Results:   Few Normal Respiratory Lissy present ( @ 22:52)  Culture Results:   Rare Haemophilus parainfluenzae  Rare Neisseria species, not gonorrhoeae or meningitidis  Rare Alpha hemolytic strep (12-12 @ 22:45)      RADIOLOGY & ADDITIONAL STUDIES REVIEWED:     [ ]GOALS OF CARE DISCUSSION WITH PATIENT/FAMILY/PROXY:    CRITICAL CARE TIME SPENT: 35 minutes

## 2017-12-15 NOTE — PHYSICAL THERAPY INITIAL EVALUATION ADULT - DID THE PATIENT HAVE SURGERY?
n/a Transbronchial lung biopsy  12/12/2017; Endobronchial ultrasound with biopsy; Endobronchial ultrasound  12/12/2017; Flexible bronchoscopy 12/12/2017/yes

## 2017-12-15 NOTE — PROGRESS NOTE ADULT - SUBJECTIVE AND OBJECTIVE BOX
Meds:    Allergies:  Allergies    No Known Allergies    Intolerances  ROS  [ x ] UNABLE TO ELICIT    General:  [  ] None  [  ] Fever  [  ] Chills  [  ] Malaise    Skin:  [  ] None [  ] Rash  [  ] Wound  [  ] Ulcer    HEENT:  [  ] None  [  ] Sore Throat  [  ] Nasal congestion/ runny nose  [  ] Photophobia [  ] Neck pain      Chest:  [  ] None   [  ] SOB  [  ] Cough  [  ] None    Cardiovascular:   [  ] None  [  ] CP  [  ] Palpitation    Gastrointestinal:  [  ] None  [  ] Abd pain   [  ] Nausea    [  ] Vomiting   [  ] Diarrhea	     Genitourinary:  [  ] None [  ] Polyuria   [  ] Urgency  [  ] Frequency  [  ] Dysuria    [  ]  Hematuria       Musculoskeletal:  [  ] None [  ] Back Pain	[  ] Body aches  [  ] Joint pain    Neurological: [  ] None [  ]Dizziness  [  ]Visual Disturbance  [  ]Headaches   [  ] Weakness        PHYSICAL EXAM:    Vital Signs Last 24 Hrs  T(C): 37.7 (15 Dec 2017 00:00), Max: 38.5 (14 Dec 2017 06:00)  T(F): 99.8 (15 Dec 2017 00:00), Max: 101.3 (14 Dec 2017 06:00)  HR: 107 (15 Dec 2017 04:30) (92 - 123)  BP: 106/52 (15 Dec 2017 04:30) (88/52 - 108/59)  BP(mean): 64 (15 Dec 2017 04:30) (56 - 91)  RR: 18 (15 Dec 2017 04:30) (12 - 22)  SpO2: 95% (15 Dec 2017 04:30) (94% - 100%)    Constitutional:    HEENT: [ x ] Wnl  [ x ] ET and NGT in place    Neck:  [ x ] Supple  [  ]Lymphadenopathy  [  ] No JVD   [  ] JVD  [  ] Masses   [  ] WNL    CHEST/Respiratory:  [  ]Clear to auscultation  [ x ] Rales   [ x ] Rhonchi   [  ] Wheezing     [ x ] Left side Chest tube      Cardiovascular:  [ x ] Reg S1 S2   [  ] Irreg S1 S2   [ x ]No Murmur  [  ] +ve Murmurs  [  ]Systolic [  ]Diastolic      Abdomen:  [ x ] Soft  [ x ] No tendrerness  [  ] Tenderness  [  ] Organomegaly  [  ] ABD Distention  [  ] Rigidity                       [ x ] No Regidity                       [ x ] No Rebound Tenderness  [ x ] No Guarding Rigidity  [  ] Rebound Tenderness[  ] Guarding Rigidity                          [ x ]  +ve Bowel Sounds  [  ] Decreased Bowel Sounds    [  ] Absent Bowel Sounds                            Extremities: [ x ] No edema [  ] Edema  [  ] Clubbing   [  ] Cyanosis                         [ x ] No Tender Calf muscles  [  ] Tender Calf muscles                        [ x ] Palpable peripheral pulses    Neurological: [  ] Awake  [  ] Alert  [  ] Oriented  x                             [  ] Confused  [  ] Drowzy  [ x ] repond to painful stimuli  [  ] Unresponsive    Skin:  [ x ] Intact [  ] Redness [  ] Thrombophlebitis  [  ] Rashes  [  ] Dry  [  ] Ulcers    Ortho:  [  ] Joint Swelling  [  ] Joint erythema [  ] Joint tenderness                [  ] Increased temp. to touch  [  ] DJD [ x ] WNL            LABS/DIAGNOSTIC TESTS                          10.9   21.0  )-----------( CLUMPED    ( 14 Dec 2017 06:29 )             34.1         12-14    134<L>  |  103  |  60<H>  ----------------------------<  107<H>  4.8   |  20<L>  |  1.87<H>    Ca    7.0<L>      14 Dec 2017 06:29  Phos  6.5     12-14  Mg     2.3     12-14    TPro  5.3<L>  /  Alb  1.2<L>  /  TBili  1.9<H>  /  DBili  x   /  AST  63<H>  /  ALT  69<H>  /  AlkPhos  51  12-14      LIVER FUNCTIONS - ( 14 Dec 2017 06:29 )  Alb: 1.2 g/dL / Pro: 5.3 g/dL / ALK PHOS: 51 U/L / ALT: 69 U/L DA / AST: 63 U/L / GGT: x           ABG - ( 15 Dec 2017 05:15 )  pH: 7.18  /  pCO2: 45    /  pO2: 77    / HCO3: 16    / Base Excess: -11.4 /  SaO2: 93        CULTURES:   Culture - Bronchial (12.13.17 @ 08:06)    Gram Stain:   No polymorphonuclear cells seen per low power field  No squamous epithelial cells per low power field  Moderate Gram Positive Cocci in Pairs and Chains per oil power field  Few-moderate Gram Negative Coccobacilli per oil power field    Specimen Source: .Broncial received in trap    Culture Results:   Normal Respiratory Lissy present    Culture - Surgical Swab (12.12.17 @ 22:52)    Specimen Source: .Surgical Swab Bronchial Alveolar Lavage    Culture Results:   Few Normal Respiratory Lissy present    Culture - Tissue with Gram Stain (12.12.17 @ 22:45)    Gram Stain:   Rare polymorphonuclear leukocytes seen per low power field  Numerous Gram Negative Rods per oil power field    Specimen Source: .Tissue Left Lower Lobe    Culture Results:   Rare Haemophilus parainfluenzae  Rare Neisseria species, not gonorrhoeae or meningitidis    Culture - Blood (12.11.17 @ 11:22)    Specimen Source: .Blood Blood    Culture Results:   No growth to date.    Culture - Blood (12.11.17 @ 11:22)    Specimen Source: .Blood Blood    Culture Results:   No growth to date.    Culture - Blood (12.11.17 @ 11:22)    Specimen Source: .Blood Blood    Culture Results:   No growth to date.            RADIOLOGY    CXR:    EXAM:  CHEST-PORTABLE URGENT                            PROCEDURE DATE:  12/14/2017          INTERPRETATION:  INDICATION: Effusion, follow-up assessment    PRIORS: December 13, 2017    VIEWS: Portable AP radiography of the chest performed.    FINDINGS: Since prior evaluation there is no significant interval change   in position of an indwelling ETT, NGT, right IJ CVC or left thoracostomy   catheter. Extensive subcutaneous emphysema remains on the left. Airspace   opacity and consolidation within the left lung parenchyma, with   obscuration of the left hemidiaphragm is unchanged. Patchy airspace   opacity within the right lower lung field is unchanged. There is no   evidence for right-sided pleural effusion or right pleural air. No   definite left-sided pleural air is evident. No mediastinal shift is   noted. Degenerative changes of the thoracic spine noted.    IMPRESSION: No significant interval change. See full discussion.      Assessment and Recommendation:   72 years old male from home with PMHx of HTN and BPH and no PSH presents to ED c/o L-sided chest pain radiating to the back with associated general weakness x6 months. Patient also reports loss of voice x6 months, in addition to decreased appetite and weight loss x2 weeks, only been able to consume x3 Ensure today.   10/6/17 patient had bronchoscopy and subsequent pneumothorax and chest tube insertion.  Patient was initially transferred from ICU to regular floor, then upgraded to the icu after condition became unstable.  Patient was started on IV Zosyn on 12/12/17.      Problem/Recommendation - 1:  Problem: Pneumonia, bacterial.   Recommendation:   1- UA & CS.  2- Follow Blood culture to final report.  3- Start Vancomycin 1 gm IVPB q day, and closely follow trough, and renal functions.  4- Continue IV Zosyn, but decrease the dose to 3.375 gm IVPB q 12 hours.  5- Fluid and electrolytes management.  6- CBC and BMP follow up.   7- Follow up CXR.  8- Respirator and Chest tube management.  9- Procalcitonin level.  10- Chest tube management.    Problem/Recommendation - 2:  ·  Problem: COPD (chronic obstructive pulmonary disease).    Recommendation:   1- Bronchodilators.  2- O2 as needed.  3- Pulmonary evaluation and management.  4- Steroids as per primary, and pulmonary team.     Problem/Recommendation - 3:  ·  Problem: ZENY (acute kidney injury).    Recommendation:   1- Renal management.  2- Fluid and electrolytes management and follow up.     Discussed with medical resident.

## 2017-12-15 NOTE — PROGRESS NOTE ADULT - SUBJECTIVE AND OBJECTIVE BOX
Patient is a 72y old  Male who presents with a chief complaint of weakness, chest discomfort and voice and appetite changes (05 Dec 2017 01:22)    pt seen in icu [ x ], reg med floor [ ], bed [ x ], chair at bedside [   ], a+o x3 [  ],sedated [ x ], nad [x  ],   left chest to pleurovac [x],   et tube [x],  ngt feed [ x ], frias to bsd [x], right ij cent line [x], fentanyl, propofol and  phenylepherine drip [x],      Allergies    No Known Allergies        Vitals    T(F): 100.8 (12-15-17 @ 06:30), Max: 100.8 (12-15-17 @ 06:30)  HR: 111 (12-15-17 @ 06:30) (92 - 123)  BP: 103/59 (12-15-17 @ 06:30) (91/59 - 108/59)  RR: 18 (12-15-17 @ 06:30) (12 - 22)  SpO2: 100% (12-15-17 @ 06:30) (94% - 100%)  Wt(kg): --  CAPILLARY BLOOD GLUCOSE          Labs                          10.9   19.0  )-----------( x        ( 15 Dec 2017 05:55 )             34.8       12-15    135  |  107  |  71<H>  ----------------------------<  76  5.1   |  16<L>  |  2.17<H>    Ca    6.9<L>      15 Dec 2017 05:55  Phos  7.1     12-15  Mg     2.4     12-15    TPro  5.3<L>  /  Alb  1.2<L>  /  TBili  1.9<H>  /  DBili  x   /  AST  63<H>  /  ALT  69<H>  /  AlkPhos  51  12-14            .Blood Blood  12-14 @ 00:30   No growth to date.  --  --      .Broncial received in trap  12-13 @ 08:06   Normal Respiratory Lissy present  --    No polymorphonuclear cells seen per low power field  No squamous epithelial cells per low power field  Moderate Gram Positive Cocci in Pairs and Chains per oil power field  Few-moderate Gram Negative Coccobacilli per oil power field      .Surgical Swab Bronchial Alveolar Lavage  12-12 @ 22:52   Few Normal Respiratory Lissy present  --  --      .Tissue Left Lower Lobe  12-12 @ 22:45   Rare Haemophilus parainfluenzae  Rare Neisseria species, not gonorrhoeae or meningitidis  Rare Alpha hemolytic strep  --    Rare polymorphonuclear leukocytes seen per low power field  Numerous Gram Negative Rods per oil power field      .Blood Blood  12-11 @ 11:22   No growth to date.  --  --      .Broncial Other, bronchioalveolar lavage  12-06 @ 22:28 --  --  --      .Blood Blood-Peripheral  12-05 @ 10:57   No growth at 5 days.  --  --          Radiology Results      Meds    MEDICATIONS  (STANDING):  amiodarone    Tablet 400 milliGRAM(s) Oral three times a day  aspirin  chewable 81 milliGRAM(s) Oral daily  fentaNYL   Infusion 2 MICROgram(s)/kG/Hr (15.28 mL/Hr) IV Continuous <Continuous>  heparin  Injectable 5000 Unit(s) SubCutaneous every 8 hours  pantoprazole  Injectable 40 milliGRAM(s) IV Push daily  phenylephrine    Infusion 0.5 MICROgram(s)/kG/Min (6.563 mL/Hr) IV Continuous <Continuous>  propofol Infusion 5 MICROgram(s)/kG/Min (2.292 mL/Hr) IV Continuous <Continuous>  sodium chloride 0.9%. 1000 milliLiter(s) (100 mL/Hr) IV Continuous <Continuous>  vasopressin Infusion 0.04 Unit(s)/Min (2.4 mL/Hr) IV Continuous <Continuous>      MEDICATIONS  (PRN):  acetaminophen  Suppository 650 milliGRAM(s) Rectal every 6 hours PRN For Temp greater than 38 C (100.4 F)      Physical Exam    Neuro :  no focal deficits  Respiratory: CTA B/L, left subcutaneous emphysema, left chest tube to pleurovac  CV: RRR, S1S2, no murmurs,   Abdominal: Soft, NT, ND +BS,  Extremities: No edema, + peripheral pulses    ASSESSMENT    perihilar mass,   hepatic and splenic lesions,   r/o malig,   copd   destruction of left 6th and 7th ribs and left T6 transverse process   exophytic left renal lesion  subcutaneous emphysema.  hyponatremia  silverio  new afib  h/o BPH (benign prostatic hyperplasia)  Hypertension        PLAN        S/P flexible bronchoscopy showing possible Large B-cell lymphoma,   s/p flex bronch transbronchial lung bx and EBU bx 12/12  f/u cytopath of flex bronch and ebus  cx from flex bronch with Few Normal Respiratory Lissy present and Rare Haemophilus parainfluenzae  Rare Neisseria species, not gonorrhoeae or meningitidis  -- Rare polymorphonuclear leukocytes seen per low power field  Numerous Gram Negative Rods per oil power field  noted above   thoracic surg f/u noted  monitor chest tube  heme onc cons  vent mgmt  cont atrov and prov nebs,   cont supplemental oxygen,  ct chest-abd-pelv result noted  pulm f/u noted  s/p bronch with bx   cytopath of bronch neg for malig noted  cytopatho transbronchial bx with Interstitial fibrosis and intra-alveolar smoker's  macrophages noted above.   urology cons for renal lesion  cont ivf  renal f/u noted  ?hypovolemic hyponatremia in the setting of dec PO intake. serum osm 280, urine osm 448 with  Maria Eugenia 6 on IVF.   Serum sodium improving appropriately with IVF.  Monitor serum sodium.   cardio cons noted  start amiodarone loading day #1  cont current meds  id cons  cont zosyn  paliative eval noted  mgmt as per icu Patient is a 72y old  Male who presents with a chief complaint of weakness, chest discomfort and voice and appetite changes (05 Dec 2017 01:22)    pt seen in icu [ x ], reg med floor [ ], bed [ x ], chair at bedside [   ], a+o x3 [  ],sedated [ x ], nad [x  ],   left chest to pleurovac [x],   et tube [x],  ngt feed [ x ], frias to bsd [x], right ij cent line [x], fentanyl, propofol, norepi and  phenylepherine drip [x],      Allergies    No Known Allergies        Vitals    T(F): 100.8 (12-15-17 @ 06:30), Max: 100.8 (12-15-17 @ 06:30)  HR: 111 (12-15-17 @ 06:30) (92 - 123)  BP: 103/59 (12-15-17 @ 06:30) (91/59 - 108/59)  RR: 18 (12-15-17 @ 06:30) (12 - 22)  SpO2: 100% (12-15-17 @ 06:30) (94% - 100%)  Wt(kg): --  CAPILLARY BLOOD GLUCOSE          Labs                          10.9   19.0  )-----------( x        ( 15 Dec 2017 05:55 )             34.8       12-15    135  |  107  |  71<H>  ----------------------------<  76  5.1   |  16<L>  |  2.17<H>    Ca    6.9<L>      15 Dec 2017 05:55  Phos  7.1     12-15  Mg     2.4     12-15    TPro  5.3<L>  /  Alb  1.2<L>  /  TBili  1.9<H>  /  DBili  x   /  AST  63<H>  /  ALT  69<H>  /  AlkPhos  51  12-14      Cytopathology - Non Gyn Report (12.12.17 @ 15:52)    Cytopathology - Non Gyn Report:   ACCESSION No:  [id number]    FRANCISCO DUKE                       1        Cytopathology Report            Specimen Description  LYMPH NODE, 10L, TRANSBRONCHIAL AND ENDOBRONCHIAL FNA      Clinical Information  Mediastinal lymphadenopathy      Gross Description  Immediate specimen adequacy assessment x 1 by Dr. ANN Lemon  Acceptable.  Needle rinses collected to be processed.  Received: Needle rinses  in cytolyt. 30 ml of red fluid.  6 Smear received ( 3 air dried and 3 fixed in  alcohol)  Prepared:  1 cell block      Final Diagnosis  LYMPH NODE, 10L, TRANSBRONCHIAL AND ENDOBRONCHIAL FNA  NEGATIVE FOR MALIGNANT CELLS.    Evaluation is limited due to paucity of cells.  Cytology slides show few scattered lymphocytes.  Cell block show polymorphus population of lymphocytes and  macrophages.  No Andrade-Ally cells, granuloma are present.  Recommend tissue examination if lymphadenopathy persist and if  clinically indicated.    Please see concurrent Cytology specimen report: 06-FI-,  45-QN-, 54-KN- and surgical 70-  S-    Screened by:PROMISE Mandel([facility])  Verified by:JOHN Mercedes  (Electronically Signed Out)  _________________________________________________________      Cytopathology - Non Gyn Report (12.12.17 @ 15:52)    Cytopathology - Non Gyn Report:   ACCESSION No:  [id number]    FRANCISCO DUKE                       1        Cytopathology Report            Specimen Description  LYMPH NODE, 7L, EBUS TRANSBRONCHIAL AND ENDOBRONCHIAL FNA      Clinical Information  Mediastinal lymphadenopathy      Gross Description  Immediate specimen adequacy assessment x 1 by Dr. ANN Lemon  Acceptable.  Needle rinses collected to be processed.  Received: Needle rinses  in cytolyt. 30 ml of pink fluid.  10 Smear received ( 4 air dried and 6 fixed in  alcohol)  Prepared:  1 cell block_      Final Diagnosis  LYMPH NODE, 7L, EBUS TRANSBRONCHIAL AND ENDOBRONCHIAL FNA  NEGATIVE FOR MALIGNANT CELLS.    Evaluation is limited due to paucicellularity and air-drying  artifact.  Hypocellular specimen composed of scant lymphocytes and bronchial  epithelial cells.  No Andrade-Ally cells, granuloma are present.  Recommend tissue examination if lymphadenopathy persist and if  clinically indicated.    Please see concurrent Cytology specimen report:  05-LV-,  00-WR-, 70-EL- and surgical 70-  S-    Screened by:PROMISE Mandel([facility])  Verified by:JOHN Mercedes  (Electronically Signed Out)  Cytopathology - Non Gyn Report (12.12.17 @ 15:50)    Cytopathology - Non Gyn Report:   ACCESSION No:  [id number]    FRANCISCO DUKE                       1        Cytopathology Report            Specimen Description  LYMPH NODE, 4L, EBUS TRANSBRONCHIAL AND ENDOBRONCHIAL FNA      Clinical Information  Mediastinal lymphadenopathy      Gross Description  Immediate specimen adequacy assessment x 1 by Dr. ANN Lemon.  Acceptable.  Needle rinses collected to be processed.  Received: Needle rinses  in cytolyt. 30 ml of clear fluid with  tiny particles  3 Smear received ( 3 air dried )  1 ThinPrep slide      Final Diagnosis  LYMPH NODE, 4L, EBUS TRANSBRONCHIAL AND ENDOBRONCHIAL FNA  NON DIAGNOSTIC.    Specimen show scattered bronchial cells and rare lymphocytes.  Lymph node is not adequately represented.    Please see concurrent Cytology specimen report:  40-XR-,  39-PF-, 28-HM-, 70CN-17-61  and surgical 82-A-    Screened by:PROMISE Mandel([facility])  Verified by:JOHN Mercedes  (Electronically Signed Out)  _________________________________________________________    _________________________________________________________      Cytopathology - Non Gyn Report (12.12.17 @ 15:49)    Cytopathology - Non Gyn Report:   ACCESSION No:  [id number]    FRANCISCO DUKE                       1        Cytopathology Report            Specimen Description  BRONCHOALVEOLAR LAVAGE      Clinical Information  Mediastinal lymphadenopathy      Gross Description  Received: 15  ml of brownish fluid in unfixed  Prepared: 1 ThinPrep slide, 1 cell block      Final Diagnosis  BRONCHOALVEOLAR LAVAGE  NON DIAGNOSTIC.  The ThinPrep slide and cell block reveal bacteria, red blood  cells and degenerated material    Please see concurrent Cytology specimen reports: 55-PT-,  36-EQ-, 78-GF- and concurrent  biopsy specimen  report 05-Y-.    Screened by:PROMISE Gonzalez([facility])  Verified by:JOHN Mercedes  (Electronically Signed Out)  _________________________________________________________          .Blood Blood  12-14 @ 00:30   No growth to date.  --  --      .Broncial received in trap  12-13 @ 08:06   Normal Respiratory Lissy present  --    No polymorphonuclear cells seen per low power field  No squamous epithelial cells per low power field  Moderate Gram Positive Cocci in Pairs and Chains per oil power field  Few-moderate Gram Negative Coccobacilli per oil power field      .Surgical Swab Bronchial Alveolar Lavage  12-12 @ 22:52   Few Normal Respiratory Lissy present  --  --      .Tissue Left Lower Lobe  12-12 @ 22:45   Rare Haemophilus parainfluenzae  Rare Neisseria species, not gonorrhoeae or meningitidis  Rare Alpha hemolytic strep  --    Rare polymorphonuclear leukocytes seen per low power field  Numerous Gram Negative Rods per oil power field      .Blood Blood  12-11 @ 11:22   No growth to date.  --  --      .Broncial Other, bronchioalveolar lavage  12-06 @ 22:28 --  --  --      .Blood Blood-Peripheral  12-05 @ 10:57   No growth at 5 days.  --  --          Radiology Results      Meds    MEDICATIONS  (STANDING):  amiodarone    Tablet 400 milliGRAM(s) Oral three times a day  aspirin  chewable 81 milliGRAM(s) Oral daily  fentaNYL   Infusion 2 MICROgram(s)/kG/Hr (15.28 mL/Hr) IV Continuous <Continuous>  heparin  Injectable 5000 Unit(s) SubCutaneous every 8 hours  pantoprazole  Injectable 40 milliGRAM(s) IV Push daily  phenylephrine    Infusion 0.5 MICROgram(s)/kG/Min (6.563 mL/Hr) IV Continuous <Continuous>  propofol Infusion 5 MICROgram(s)/kG/Min (2.292 mL/Hr) IV Continuous <Continuous>  sodium chloride 0.9%. 1000 milliLiter(s) (100 mL/Hr) IV Continuous <Continuous>  vasopressin Infusion 0.04 Unit(s)/Min (2.4 mL/Hr) IV Continuous <Continuous>      MEDICATIONS  (PRN):  acetaminophen  Suppository 650 milliGRAM(s) Rectal every 6 hours PRN For Temp greater than 38 C (100.4 F)      Physical Exam    Neuro :  no focal deficits  Respiratory: CTA B/L, left subcutaneous emphysema, left chest tube to pleurovac  CV: RRR, S1S2, no murmurs,   Abdominal: Soft, NT, ND +BS,  Extremities: No edema, + peripheral pulses    ASSESSMENT    perihilar mass,   hepatic and splenic lesions,   r/o malig,   copd   destruction of left 6th and 7th ribs and left T6 transverse process   exophytic left renal lesion  subcutaneous emphysema  pneumothorax  hyponatremia  silverio  new afib  h/o BPH (benign prostatic hyperplasia)  Hypertension        PLAN        S/P flexible bronchoscopy showing possible Large B-cell lymphoma,   s/p flex bronch transbronchial lung bx and EBU bx 12/12  cytopath of flex bronch and ebus neg for malig  cx from flex bronch with Few Normal Respiratory Lissy present and Rare Haemophilus parainfluenzae  Rare Neisseria species, not gonorrhoeae or meningitidis  -- Rare polymorphonuclear leukocytes seen per low power field  Numerous Gram Negative Rods per oil power field  noted above   thoracic surg f/u noted  monitor chest tube  heme onc cons  vent mgmt  cont atrov and prov nebs,   cont supplemental oxygen,  ct chest-abd-pelv result noted  pulm f/u noted  s/p bronch with bx   cytopath of bronch neg for malig noted  cytopatho transbronchial bx with Interstitial fibrosis and intra-alveolar smoker's  macrophages noted above.   urology cons for renal lesion  cont ivf  renal f/u noted  ?hypovolemic hyponatremia in the setting of dec PO intake. serum osm 280, urine osm 448 with  Maria Eugenia 6 on IVF.   Serum sodium improving appropriately with IVF.  Monitor serum sodium.   cardio cons noted  start amiodarone loading day #2  id f/u noted  add vanco with adj renally  f/u vanco t  cont zosyn  paliative eval noted  mgmt as per icu

## 2017-12-15 NOTE — PROGRESS NOTE ADULT - PROBLEM SELECTOR PLAN 2
?hypovolemic hyponatremia in the setting of dec PO intake. serum osm 280, urine osm 448 with  Maria Eugenia 6 on IVF.   Serum sodium improving with IVF.  Monitor serum sodium.

## 2017-12-15 NOTE — PROGRESS NOTE ADULT - PROBLEM SELECTOR PLAN 1
s/p EBUS. Pt with elevated WBC, tissue culture growing gram negative rods rare neisseria, haemophilus; BCx negative to date. Pt remains sedated/intubated; requiring pressor. Continue IV abx. Pt was a full code; now DNR.

## 2017-12-15 NOTE — PROGRESS NOTE ADULT - ASSESSMENT
72 years old male from home with PMHx of HTN and BPH and no PSH presents to ED c/o L-sided chest pain ,lung and hepatic mass, s/p PTHX,now respiratory failure and afib with RVR.  1.Vent support as per ICU.  2.Amiodarone loading D#2.  3.Continue ASA for stroke prevention.  4.Given suspected metastatic disease ,high risk for bleeding with full AC.  5.ABX.  6.Pressor support as per ICU.  7.GI and DVT prophylaxis.  8.Overall prognosis is poor.

## 2017-12-15 NOTE — PROGRESS NOTE ADULT - PROBLEM SELECTOR PROBLEM 3
Severe protein-calorie malnutrition Mediastinal (thymic) large B-cell lymphoma, unspecified body region

## 2017-12-15 NOTE — PROGRESS NOTE ADULT - SUBJECTIVE AND OBJECTIVE BOX
OVERNIGHT EVENTS: poor urine output overnight, multiple events of Vtach as per telemetry,       Present Symptoms:   Review of Systems: Patient sedated/intubated - unable to obtain     MEDICATIONS  (STANDING):  amiodarone    Tablet 400 milliGRAM(s) Oral three times a day  fentaNYL   Infusion 2 MICROgram(s)/kG/Hr (15.28 mL/Hr) IV Continuous <Continuous>  pantoprazole  Injectable 40 milliGRAM(s) IV Push daily  phenylephrine    Infusion 0.5 MICROgram(s)/kG/Min (6.563 mL/Hr) IV Continuous <Continuous>  piperacillin/tazobactam IVPB. 3.375 Gram(s) IV Intermittent every 12 hours  propofol Infusion 5 MICROgram(s)/kG/Min (2.292 mL/Hr) IV Continuous <Continuous>  sodium chloride 0.9%. 1000 milliLiter(s) (100 mL/Hr) IV Continuous <Continuous>  vancomycin  IVPB 1000 milliGRAM(s) IV Intermittent every 24 hours  vasopressin Infusion 0.04 Unit(s)/Min (2.4 mL/Hr) IV Continuous <Continuous>    MEDICATIONS  (PRN):  acetaminophen  Suppository 650 milliGRAM(s) Rectal every 6 hours PRN For Temp greater than 38 C (100.4 F)      PHYSICAL EXAM:  Vital Signs Last 24 Hrs  T(C): 37.1 (15 Dec 2017 07:00), Max: 38.2 (15 Dec 2017 06:30)  T(F): 98.8 (15 Dec 2017 07:00), Max: 100.8 (15 Dec 2017 06:30)  HR: 108 (15 Dec 2017 11:30) (92 - 125)  BP: 96/48 (15 Dec 2017 11:15) (92/35 - 120/60)  BP(mean): 59 (15 Dec 2017 11:15) (47 - 91)  RR: 18 (15 Dec 2017 11:30) (12 - 26)  SpO2: 94% (15 Dec 2017 11:30) (88% - 100%)  General: pt sedated/intubated, nonverbal, unable to follow commands, unarousable   Karnofsky Performance Score/Palliative Performance Status Version2:   20 %    HEENT: dry mouth, ET tube, NGT   Lungs: on vent, left Chest tube  CV: tachycardia  GI: incontinent  :   frias  Musculoskeletal: bedbound, dependent on all ADLs  Skin: normal  pressure ulcers: stage: edema: other:  Neuro: pt sedated/intubated, unarousable, unable to follow commands   Oral intake ability: unable/only mouth care   Diet: NPO    LABS:                          10.9   19.0  )-----------( 128      ( 15 Dec 2017 05:55 )             34.8     12-15    135  |  107  |  71<H>  ----------------------------<  76  5.1   |  16<L>  |  2.17<H>    Ca    6.9<L>      15 Dec 2017 05:55  Phos  7.1     12-15  Mg     2.4     12-15    TPro  5.3<L>  /  Alb  1.2<L>  /  TBili  1.9<H>  /  DBili  x   /  AST  63<H>  /  ALT  69<H>  /  AlkPhos  51  12-14    Urinalysis Basic - ( 13 Dec 2017 19:36 )    Color: Yellow / Appearance: Clear / S.025 / pH: x  Gluc: x / Ketone: Negative  / Bili: Small / Urobili: 4   Blood: x / Protein: 30 mg/dL / Nitrite: Negative   Leuk Esterase: Negative / RBC: 0-2 /HPF / WBC 0-2 /HPF   Sq Epi: x / Non Sq Epi: Few /HPF / Bacteria: Few /HPF        RADIOLOGY & ADDITIONAL STUDIES:    ADVANCE DIRECTIVES:  Advanced Care Planning discussion total time spent: OVERNIGHT EVENTS: poor urine output overnight, multiple events of Vtach as per telemetry,       Present Symptoms:   Review of Systems: Patient sedated/intubated - unable to obtain     MEDICATIONS  (STANDING):  amiodarone    Tablet 400 milliGRAM(s) Oral three times a day  fentaNYL   Infusion 2 MICROgram(s)/kG/Hr (15.28 mL/Hr) IV Continuous <Continuous>  pantoprazole  Injectable 40 milliGRAM(s) IV Push daily  phenylephrine    Infusion 0.5 MICROgram(s)/kG/Min (6.563 mL/Hr) IV Continuous <Continuous>  piperacillin/tazobactam IVPB. 3.375 Gram(s) IV Intermittent every 12 hours  propofol Infusion 5 MICROgram(s)/kG/Min (2.292 mL/Hr) IV Continuous <Continuous>  sodium chloride 0.9%. 1000 milliLiter(s) (100 mL/Hr) IV Continuous <Continuous>  vancomycin  IVPB 1000 milliGRAM(s) IV Intermittent every 24 hours  vasopressin Infusion 0.04 Unit(s)/Min (2.4 mL/Hr) IV Continuous <Continuous>    MEDICATIONS  (PRN):  acetaminophen  Suppository 650 milliGRAM(s) Rectal every 6 hours PRN For Temp greater than 38 C (100.4 F)      PHYSICAL EXAM:  Vital Signs Last 24 Hrs  T(C): 37.1 (15 Dec 2017 07:00), Max: 38.2 (15 Dec 2017 06:30)  T(F): 98.8 (15 Dec 2017 07:00), Max: 100.8 (15 Dec 2017 06:30)  HR: 108 (15 Dec 2017 11:30) (92 - 125)  BP: 96/48 (15 Dec 2017 11:15) (92/35 - 120/60)  BP(mean): 59 (15 Dec 2017 11:15) (47 - 91)  RR: 18 (15 Dec 2017 11:30) (12 - 26)  SpO2: 94% (15 Dec 2017 11:30) (88% - 100%)  General: pt sedated/intubated, nonverbal, unable to follow commands, unarousable   Karnofsky Performance Score/Palliative Performance Status Version2:   20 %    HEENT: dry mouth, ET tube, NGT   Lungs: on vent, left Chest tube  CV: tachycardia  GI: incontinent  :   frias  Musculoskeletal: bedbound, dependent on all ADLs  Neuro: pt sedated/intubated, unarousable, unable to follow commands   Oral intake ability: unable/only mouth care   Diet: NPO    LABS:                          10.9   19.0  )-----------( 128      ( 15 Dec 2017 05:55 )             34.8     -15    135  |  107  |  71<H>  ----------------------------<  76  5.1   |  16<L>  |  2.17<H>    Ca    6.9<L>      15 Dec 2017 05:55  Phos  7.1     12-15  Mg     2.4     12-15    TPro  5.3<L>  /  Alb  1.2<L>  /  TBili  1.9<H>  /  DBili  x   /  AST  63<H>  /  ALT  69<H>  /  AlkPhos  51      Urinalysis Basic - ( 13 Dec 2017 19:36 )    Color: Yellow / Appearance: Clear / S.025 / pH: x  Gluc: x / Ketone: Negative  / Bili: Small / Urobili: 4   Blood: x / Protein: 30 mg/dL / Nitrite: Negative   Leuk Esterase: Negative / RBC: 0-2 /HPF / WBC 0-2 /HPF   Sq Epi: x / Non Sq Epi: Few /HPF / Bacteria: Few /HPF        RADIOLOGY & ADDITIONAL STUDIES:  < from: Xray Chest 1 View AP -PORTABLE-Routine (12.15.17 @ 07:49) >  EXAM:  CHEST PORTABLE ROUTINE                            PROCEDURE DATE:  12/15/2017          INTERPRETATION:    DATE OF STUDY: 12/15/17.    PRIOR: 17.    CLINICAL INDICATION: 72-yo-male patient with ETT tube; sepsis.    TECHNIQUE: portable chest.    FINDINGS:   ET tube is 2.0cm above the jules.  NG tube is in stomach  Right IJ CVC tip in SVC.  The heart is magnified by projection.  Left thoracostomy remains unchanged.  Interval improvement/decrease in left chest wall subcutaneous emphysema.  Persistent airspace consolidation in left lung - most pronounced in lower   left lung - not significantly changed c/w 17 exam.  Persistent lower right lung airspace opacity.  Mid lung airspace haziness may be due to atelectasis - but underlying   pneumonia not excluded in the right clinical setting.  Significant bilateral pleural effusions.  No pneumothorax.  No acute bony abnormalities seen.    IMPRESSION:   Left sided pleural-parenchymal changes not significantly changed.  Interval development of mid right lung atelectasis and/or pneumonia.  Interval decrease in left chest wall subcutaneous emphysema.  Life supporting devices in appropriate position.      < end of copied text >      ADVANCE DIRECTIVES: now DNR  Advanced Care Planning discussion total time spent:  30 minutes. Met with patient's surrogate, Adolph Prabhakar ([phone]) to discuss advanced directives and goals of care, including benefits vs burdens of resuscitation, intubation. Patient was full code; now DNR per surrogate's request. OVERNIGHT EVENTS: poor urine output overnight, intervals of increased tachycardia    Present Symptoms:   Review of Systems: Patient sedated/intubated - unable to obtain     MEDICATIONS  (STANDING):  amiodarone    Tablet 400 milliGRAM(s) Oral three times a day  fentaNYL   Infusion 2 MICROgram(s)/kG/Hr (15.28 mL/Hr) IV Continuous <Continuous>  pantoprazole  Injectable 40 milliGRAM(s) IV Push daily  phenylephrine    Infusion 0.5 MICROgram(s)/kG/Min (6.563 mL/Hr) IV Continuous <Continuous>  piperacillin/tazobactam IVPB. 3.375 Gram(s) IV Intermittent every 12 hours  propofol Infusion 5 MICROgram(s)/kG/Min (2.292 mL/Hr) IV Continuous <Continuous>  sodium chloride 0.9%. 1000 milliLiter(s) (100 mL/Hr) IV Continuous <Continuous>  vancomycin  IVPB 1000 milliGRAM(s) IV Intermittent every 24 hours  vasopressin Infusion 0.04 Unit(s)/Min (2.4 mL/Hr) IV Continuous <Continuous>    MEDICATIONS  (PRN):  acetaminophen  Suppository 650 milliGRAM(s) Rectal every 6 hours PRN For Temp greater than 38 C (100.4 F)      PHYSICAL EXAM:  Vital Signs Last 24 Hrs  T(C): 37.1 (15 Dec 2017 07:00), Max: 38.2 (15 Dec 2017 06:30)  T(F): 98.8 (15 Dec 2017 07:00), Max: 100.8 (15 Dec 2017 06:30)  HR: 108 (15 Dec 2017 11:30) (92 - 125)  BP: 96/48 (15 Dec 2017 11:15) (92/35 - 120/60)  BP(mean): 59 (15 Dec 2017 11:15) (47 - 91)  RR: 18 (15 Dec 2017 11:30) (12 - 26)  SpO2: 94% (15 Dec 2017 11:30) (88% - 100%)  General: pt sedated/intubated, nonverbal, unable to follow commands, unarousable   Karnofsky Performance Score/Palliative Performance Status Version2:   20 %    HEENT: dry mouth, ET tube, NGT   Lungs: on vent, left Chest tube  CV: tachycardia  GI: incontinent, + distended  :   frias  Musculoskeletal: bedbound, dependent on all ADLs  Neuro: pt sedated/intubated, unarousable, unable to follow commands   Oral intake ability: unable/only mouth care   Diet: NPO    LABS:                          10.9   19.0  )-----------( 128      ( 15 Dec 2017 05:55 )             34.8     12-15    135  |  107  |  71<H>  ----------------------------<  76  5.1   |  16<L>  |  2.17<H>    Ca    6.9<L>      15 Dec 2017 05:55  Phos  7.1     12-15  Mg     2.4     12-15    TPro  5.3<L>  /  Alb  1.2<L>  /  TBili  1.9<H>  /  DBili  x   /  AST  63<H>  /  ALT  69<H>  /  AlkPhos  51      Urinalysis Basic - ( 13 Dec 2017 19:36 )    Color: Yellow / Appearance: Clear / S.025 / pH: x  Gluc: x / Ketone: Negative  / Bili: Small / Urobili: 4   Blood: x / Protein: 30 mg/dL / Nitrite: Negative   Leuk Esterase: Negative / RBC: 0-2 /HPF / WBC 0-2 /HPF   Sq Epi: x / Non Sq Epi: Few /HPF / Bacteria: Few /HPF        RADIOLOGY & ADDITIONAL STUDIES:  < from: Xray Chest 1 View AP -PORTABLE-Routine (12.15.17 @ 07:49) >  EXAM:  CHEST PORTABLE ROUTINE                            PROCEDURE DATE:  12/15/2017          INTERPRETATION:    DATE OF STUDY: 12/15/17.    PRIOR: 17.    CLINICAL INDICATION: 72-yo-male patient with ETT tube; sepsis.    TECHNIQUE: portable chest.    FINDINGS:   ET tube is 2.0cm above the jules.  NG tube is in stomach  Right IJ CVC tip in SVC.  The heart is magnified by projection.  Left thoracostomy remains unchanged.  Interval improvement/decrease in left chest wall subcutaneous emphysema.  Persistent airspace consolidation in left lung - most pronounced in lower   left lung - not significantly changed c/w 17 exam.  Persistent lower right lung airspace opacity.  Mid lung airspace haziness may be due to atelectasis - but underlying   pneumonia not excluded in the right clinical setting.  Significant bilateral pleural effusions.  No pneumothorax.  No acute bony abnormalities seen.    IMPRESSION:   Left sided pleural-parenchymal changes not significantly changed.  Interval development of mid right lung atelectasis and/or pneumonia.  Interval decrease in left chest wall subcutaneous emphysema.  Life supporting devices in appropriate position.      < end of copied text >      ADVANCE DIRECTIVES: now DNR  Advanced Care Planning discussion total time spent:  30 minutes. Met with patient's surrogate, Adolph Prabhakar ([phone]) to discuss advanced directives and goals of care, including benefits vs burdens of resuscitation, intubation. Patient was full code; now DNR per surrogate's request.

## 2017-12-15 NOTE — PHYSICAL THERAPY INITIAL EVALUATION ADULT - GENERAL OBSERVATIONS, REHAB EVAL
Pt. received sitting up in bed; AxOX3; O2 @ 2 li NC, Chest tube and IV lines in place.
Pt sedated and intubated on mechanical ventilation

## 2017-12-16 NOTE — CONSULT NOTE ADULT - SUBJECTIVE AND OBJECTIVE BOX
HPI:  72 years old male from home with PMHx of HTN and BPH and no PSH presents to ED c/o L-sided chest pain radiating to the back with associated general weakness x6 months. Patient also reports loss of voice x6 months, in addition to decreased appetite and weight loss x2 weeks, only been able to consume x3 Ensure today. Patient reports visiting PMD on multiple occasions; patient has been worked up for strep throat and laryngitis in the past, and has undergone multiple tests. Patient states he has been taking Ibuprofen for pain relief, and has also been on Abx's treatments recently. Patient had a CXR today by his PMD, which showed a 5.2x6.7cm L suprahilar mass, as well as a CT Chest showing a lesion in the R hepatic lobe, a large necrotic macie mass, and emphysema, prompting patient to be sent to the ED for treatment including fluids and pain medication. Patient denies fever, chills, abdominal pain, nausea, vomiting or any other complaints. Patient reports he is a former smoker, and quit smoking x20 years ago. (05 Dec 2017 01:22)      PAST MEDICAL & SURGICAL HISTORY:  BPH (benign prostatic hyperplasia)  Hypertension  No significant past surgical history      MEDICATIONS  (STANDING):  amiodarone    Tablet 400 milliGRAM(s) Oral three times a day  fentaNYL   Infusion 2 MICROgram(s)/kG/Hr (15.28 mL/Hr) IV Continuous <Continuous>  pantoprazole  Injectable 40 milliGRAM(s) IV Push daily  phenylephrine    Infusion 0.5 MICROgram(s)/kG/Min (6.563 mL/Hr) IV Continuous <Continuous>  piperacillin/tazobactam IVPB. 3.375 Gram(s) IV Intermittent every 12 hours  propofol Infusion 5 MICROgram(s)/kG/Min (2.292 mL/Hr) IV Continuous <Continuous>  sodium bicarbonate 650 milliGRAM(s) Oral two times a day  sodium chloride 0.9%. 1000 milliLiter(s) (100 mL/Hr) IV Continuous <Continuous>  vasopressin Infusion 0.04 Unit(s)/Min (2.4 mL/Hr) IV Continuous <Continuous>    MEDICATIONS  (PRN):  acetaminophen  Suppository 650 milliGRAM(s) Rectal every 6 hours PRN For Temp greater than 38 C (100.4 F)      Allergies    No Known Allergies    Intolerances        SOCIAL HISTORY:  No drug use    FAMILY HISTORY:  Family history of diabetes mellitus (Mother)      REVIEW OF SYSTEMS:  CONSTITUTIONAL: In no acute distress.  EYES: No eye pain, visual disturbances, or discharge  ENMT:  No difficulty hearing, tinnitus, vertigo; No sinus or throat pain  NECK: No pain or stiffness  BREASTS: No pain, masses, or nipple discharge  RESPIRATORY: No cough, wheezing, chills or hemoptysis; No shortness of breath  CARDIOVASCULAR: No chest pain, palpitations, dizziness, or leg swelling  GASTROINTESTINAL: No abdominal or epigastric pain. No nausea, vomiting, or hematemesis; No diarrhea or constipation. No melena or hematochezia.  GENITOURINARY: No dysuria, frequency, hematuria, or incontinence  NEUROLOGICAL: No headaches, memory loss, loss of strength, numbness, or tremors  SKIN: No itching, burning, rashes, or lesions   LYMPH NODES: No enlarged glands  ENDOCRINE: No heat or cold intolerance; No hair loss  MUSCULOSKELETAL: No joint pain or swelling; No muscle, back, or extremity pain  PSYCHIATRIC: No depression, anxiety, mood swings, or difficulty sleeping  HEME/LYMPH: No easy bruising, or bleeding gums  ALLERGY AND IMMUNOLOGIC: No hives or eczema      Vital Signs Last 24 Hrs  T(C): 36.9 (16 Dec 2017 07:30), Max: 37.7 (16 Dec 2017 00:00)  T(F): 98.5 (16 Dec 2017 07:30), Max: 99.9 (16 Dec 2017 00:00)  HR: 102 (16 Dec 2017 10:00) (93 - 121)  BP: 106/58 (16 Dec 2017 10:00) (86/64 - 123/64)  BP(mean): 70 (16 Dec 2017 10:00) (59 - 95)  RR: 21 (16 Dec 2017 10:00) (15 - 27)  SpO2: 97% (16 Dec 2017 10:00) (92% - 100%)    Daily     Daily Weight in k.5 (16 Dec 2017 07:00)    PHYSICAL EXAM:  GENERAL: NAD, well-groomed, well-developed  HEAD:  Atraumatic, Normocephalic  EYES: EOMI, PERRL, conjunctiva and sclera clear  ENMT: Moist mucous membranes, Good dentition, No lesions  NECK: Supple, No JVD  NERVOUS SYSTEM:  Alert & Oriented X3, Good concentration  CHEST/LUNG: Clear to percussion bilaterally; Normal respiratory effort  HEART: Regular rate and rhythm  ABDOMEN: Soft, Nontender, Nondistended; Bowel sounds present  : penile swelling, blisters, erythema.  BREASTS: no breast lumps  EXTREMITIES:  No clubbing, cyanosis, or edema  VASC: equal peripheral pulses  LYMPH: No lymphadenopathy noted  SKIN: No rashes or lesions  PSYCH: normal affect    LABS:                        10.0   18.0  )-----------( 112      ( 16 Dec 2017 06:35 )             32.1     Neutrophil %:   12-    138  |  109<H>  |  82<H>  ----------------------------<  96  4.9   |  17<L>  |  2.61<H>    Ca    7.1<L>      16 Dec 2017 06:35  Phos  6.6     12-  Mg     2.5     -    TPro  5.1<L>  /  Alb  1.0<L>  /  TBili  1.7<H>  /  DBili  x   /  AST  75<H>  /  ALT  40  /  AlkPhos  50  -16          RADIOLOGY & ADDITIONAL STUDIES:

## 2017-12-16 NOTE — PROGRESS NOTE ADULT - PROBLEM SELECTOR PLAN 5
in the setting of ZENY. Vital AF 1.2 has fairly low phos content.  Nepro has ~100mg less phos/24h period at 40ml/hr rate.  Phos high, but stable.  Okay to continue Vital AF 1.2 for now.  Monitor serum phos.

## 2017-12-16 NOTE — PROGRESS NOTE ADULT - SUBJECTIVE AND OBJECTIVE BOX
CHIEF COMPLAINT:Patient is a 72y old  Male who presents with a chief complaint of weakness, chest discomfort and voice and appetite changes .Pt remains intubated.    	  REVIEW OF SYSTEMS:  [X ] Unable to obtain    PHYSICAL EXAM:  T(C): 36.9 (12-16-17 @ 07:30), Max: 37.7 (12-16-17 @ 00:00)  HR: 115 (12-16-17 @ 10:30) (93 - 121)  BP: 108/58 (12-16-17 @ 10:30) (86/64 - 123/64)  RR: 25 (12-16-17 @ 10:30) (15 - 27)  SpO2: 97% (12-16-17 @ 10:30) (92% - 100%)  Wt(kg): --  I&O's Summary    15 Dec 2017 07:01  -  16 Dec 2017 07:00  --------------------------------------------------------  IN: 5277.2 mL / OUT: 585 mL / NET: 4692.2 mL    16 Dec 2017 07:01  -  16 Dec 2017 11:14  --------------------------------------------------------  IN: 649.4 mL / OUT: 70 mL / NET: 579.4 mL        Appearance: Normal	  HEENT:   Normal oral mucosa, PERRL, EOMI	  Lymphatic: No lymphadenopathy  Cardiovascular: Normal S1 S2, No JVD, No murmurs, No edema  Respiratory: B/L ronchi  Gastrointestinal:  Soft, Non-tender, + BS	  Skin: No rashes, No ecchymoses, No cyanosis	  Extremities: Normal range of motion, No clubbing, cyanosis or edema      MEDICATIONS  (STANDING):  amiodarone    Tablet 400 milliGRAM(s) Oral three times a day  fentaNYL   Infusion 2 MICROgram(s)/kG/Hr (15.28 mL/Hr) IV Continuous <Continuous>  pantoprazole  Injectable 40 milliGRAM(s) IV Push daily  phenylephrine    Infusion 0.5 MICROgram(s)/kG/Min (6.563 mL/Hr) IV Continuous <Continuous>  piperacillin/tazobactam IVPB. 3.375 Gram(s) IV Intermittent every 12 hours  propofol Infusion 5 MICROgram(s)/kG/Min (2.292 mL/Hr) IV Continuous <Continuous>  sodium bicarbonate 650 milliGRAM(s) Oral two times a day  sodium chloride 0.9%. 1000 milliLiter(s) (100 mL/Hr) IV Continuous <Continuous>  vasopressin Infusion 0.04 Unit(s)/Min (2.4 mL/Hr) IV Continuous <Continuous>      TELEMETRY: 	afib      	  LABS:	 	                        10.0   18.0  )-----------( 112      ( 16 Dec 2017 06:35 )             32.1     12-16    138  |  109<H>  |  82<H>  ----------------------------<  96  4.9   |  17<L>  |  2.61<H>    Ca    7.1<L>      16 Dec 2017 06:35  Phos  6.6     12-16  Mg     2.5     12-16    TPro  5.1<L>  /  Alb  1.0<L>  /  TBili  1.7<H>  /  DBili  x   /  AST  75<H>  /  ALT  40  /  AlkPhos  50  12-16    proBNP:   Lipid Profile: Cholesterol 64  LDL 20  HDL 26  TG 88    HgA1c: Hemoglobin A1C, Whole Blood: 6.2 % (12-05 @ 09:32)    TSH: Thyroid Stimulating Hormone, Serum: 0.45 uU/mL (12-05 @ 07:14)

## 2017-12-16 NOTE — PROGRESS NOTE ADULT - ASSESSMENT
A 72 y M  former smoker 20 PPD with Hx HTN BPH  p/w generalized weakness, voice changes with imaging showing left hilar mass in CT scan , s/p bronch and biopsy on 12/06 , admitted to ICU on 12/06 for pneumothorax s/p diagnostic bronchoscopy for left lobe lesions found on CT imaging - chest tube placed in ICU 12/6 on suction - admitted for urgent CT placement and monitoring. pt was downgraded on Lung biopsy from 12/07. Lung biopsy did not show any malignancy. s/p transbronchial lung biopsy , s/p endobronchial lung biopsy, s/p flexible bronchoscopy today by Dr. Piper.   ROS - unable to get as pt is intubated    admit to ICU post op monitoring s/p intubation POD - 3 ,s/p chest tube , hypotensive 2/2 septic shock, requiring pressors, on phenylephrine      Septic Shock  -c/w chest tube to suction, no air leak  -c/w  abx - zosyn  -s/p intubation , c/w mechanical ventilation  -c/w sedation  -hypotension post anesthesia , c/w IV fluids and c/w pressor   - c/w phenylephrine, vasopressin  - pt on propofol for sedation, fentanyl for pain  -increased white count, tissue culture growing gram negative rods rare neisseria, haemophilus, BCx negative to date  - UA negative  -f/u ID recommendations - Dr. Nicholas    Acute Kidney Injury  -c/w IVF  -monitor urine output - worsening, approx 25cc/hr  -added sodium bicarb per Nephro recommendations    Hyponatremia.  Plan: ?hypovolemic hyponatremia in the setting of dec PO intake. serum osm 280, urine osm 448 with  Maria Eugenia 6 on IVF.   Serum sodium improving with IVF.  Monitor serum sodium.    New onset Atrial Fibrillation  -increased HR after given low dose levophed, continued despite d/c of levophed  - EKG shows Atrial fibrillation (new onset)  - c/w loading dose of amiodarone (day 2)  - f/u cardiology - Dr. Jackson    Pneumothorax   Crepitus along neck and L anterior chest wall  -chest tube to suction    Lung mass  -c/w post op prophylaxis  -Bronch path negative for Ca.  -s/p EBUS VS mediastinoscopy  -cyto report, negative for  malignancy  - 1 biopsy still pending  -repeat CT chest today - worsening necrotic mass from prior imaging  -holding all anticoagulation for liver mass biopsy with IR, possible for Monday if pt is off of pressors    Hypertension.    -hypotension , holding home BP meds    BPH (benign prostatic hyperplasia)  - monitor U/o  -resume PO tamsulosin with NG tube    Prophylactic measure  - IMPROVE score 2   - c/w heparin S.C for VTE prophylaxis  - GI stress ulcer PPx w/ Protonix. A 72 y M  former smoker 20 PPD with Hx HTN BPH  p/w generalized weakness, voice changes with imaging showing left hilar mass in CT scan , s/p bronch and biopsy on 12/06 , admitted to ICU on 12/06 for pneumothorax s/p diagnostic bronchoscopy for left lobe lesions found on CT imaging - chest tube placed in ICU 12/6 on suction - admitted for urgent CT placement and monitoring. pt was downgraded on Lung biopsy from 12/07. Lung biopsy did not show any malignancy. s/p transbronchial lung biopsy , s/p endobronchial lung biopsy, s/p flexible bronchoscopy today by Dr. Piper.   ROS - unable to get as pt is intubated    admit to ICU post op monitoring s/p intubation POD - 3 ,s/p chest tube , hypotensive 2/2 septic shock, requiring pressors, on phenylephrine      Septic Shock  -c/w chest tube to suction, no air leak  -c/w  abx - zosyn  -s/p intubation , c/w mechanical ventilation  -c/w sedation  -hypotension post anesthesia , c/w IV fluids and c/w pressor   - c/w phenylephrine, vasopressin  - pt on propofol for sedation, fentanyl for pain  -increased white count, tissue culture growing gram negative rods rare neisseria, haemophilus, BCx negative to date  - UA negative  -f/u ID recommendations - Dr. Nicholas    Acute Kidney Injury  -c/w IVF  -monitor urine output - worsening, approx 25cc/hr  -added sodium bicarb per Nephro recommendations    Hyponatremia.  Plan: ?hypovolemic hyponatremia in the setting of dec PO intake. serum osm 280, urine osm 448 with  Maria Eugenia 6 on IVF.   Serum sodium improving with IVF.  Monitor serum sodium.    New onset Atrial Fibrillation  -increased HR after given low dose levophed, continued despite d/c of levophed  - EKG shows Atrial fibrillation (new onset)  - c/w loading dose of amiodarone (day 2)  - f/u cardiology - Dr. Jackson    Pneumothorax   Crepitus along neck and L anterior chest wall  -chest tube to suction    Lung mass  -c/w post op prophylaxis  -Bronch path negative for Ca.  -s/p EBUS VS mediastinoscopy  -cyto report, negative for  malignancy  - 1 biopsy still pending  -repeat CT chest - worsening necrotic mass from prior imaging  -holding all anticoagulation for liver mass biopsy with IR, possible for Monday if pt is off of pressors    Hypertension.    -hypotension , holding home BP meds    BPH (benign prostatic hyperplasia)  - monitor U/o  -resume PO tamsulosin with NG tube    penile swelling: Urology consult requested, cold compresses    Prophylactic measure  - IMPROVE score 2   - c/w heparin S.C for VTE prophylaxis  - GI stress ulcer PPx w/ Protonix. A 72 y M  former smoker 20 PPD with Hx HTN BPH  p/w generalized weakness, voice changes with imaging showing left hilar mass in CT scan , s/p bronch and biopsy on 12/06 , admitted to ICU on 12/06 for pneumothorax s/p diagnostic bronchoscopy for left lobe lesions found on CT imaging - chest tube placed in ICU 12/6 on suction - admitted for urgent CT placement and monitoring. pt was downgraded on Lung biopsy from 12/07. Lung biopsy did not show any malignancy. s/p transbronchial lung biopsy , s/p endobronchial lung biopsy, s/p flexible bronchoscopy today by Dr. Piper.   ROS - unable to get as pt is intubated    admit to ICU post op monitoring s/p intubation s/p chest tube , hypotensive 2/2 septic shock, requiring pressors, on phenylephrine      Septic Shock  -c/w chest tube to suction, no air leak  -c/w  abx - zosyn  -s/p intubation , c/w mechanical ventilation  -c/w sedation  -hypotension post anesthesia , c/w IV fluids and c/w pressor   - c/w phenylephrine, vasopressin  - pt on propofol for sedation, fentanyl for pain  -increased white count, tissue culture growing gram negative rods rare neisseria, haemophilus, BCx negative to date  - UA negative  c/w zosyn    Acute Kidney Injury  -c/w IVF  -monitor urine output - worsening, approx 25cc/hr  -added sodium bicarb per Nephro recommendations    Hyponatremia.  Plan: resolved    New onset Atrial Fibrillation  -increased HR after given low dose levophed, continued despite d/c of levophed  - EKG shows Atrial fibrillation (new onset)  - Patient on amiodarone   - f/u cardiology - Dr. Jackson    Pneumothorax   Crepitus along neck and L anterior chest wall  -chest tube to suction    Lung mass  -c/w post op prophylaxis  -Bronch path negative for Ca.  -s/p EBUS VS mediastinoscopy  -cyto report, negative for  malignancy  - 1 biopsy still pending  -repeat CT chest - worsening necrotic mass from prior imaging  -holding all anticoagulation for liver mass biopsy with IR, possible for Monday if pt is off of pressors    Hypertension.    -hypotension , holding home BP meds    BPH (benign prostatic hyperplasia)  - monitor U/o  -resume PO tamsulosin with NG tube    penile swelling: Urology consult requested, cold compresses    Prophylactic measure  - IMPROVE score 2   - c/w heparin S.C for VTE prophylaxis  - GI stress ulcer PPx w/ Protonix.

## 2017-12-16 NOTE — CONSULT NOTE ADULT - SUBJECTIVE AND OBJECTIVE BOX
71 yo M admitted for large lung mass found on workup for chest pain. Urology consulted for penile edema. Noticed by ICU team starting yesterday and now with some skin breakdown. Patient currently intubated and sedated and unable to answer questions.  Pt s/p [id number] for biopsy of mass, path neg for malignancy.  Pt with subcutaneous emphysema s/p Left chest tube - no leak in chest tube. s/p flexible bronchoscopy 12/12 with endobronchial lung biopsy consistent with possible  +Large B-cell lymphoma. Pt remains intubated post bronch, now with septic shock, requiring pressors. History obtained from chart review.    PAST MEDICAL & SURGICAL HISTORY:  BPH (benign prostatic hyperplasia)  Hypertension    No significant past surgical history    SOCIAL HISTORY:    Admitted from:  home    Tobacco hx:   former                         Home Opioid hx: none    FAMILY HISTORY:   Family history of diabetes mellitus (Mother)    No Known Allergies    Review of Systems: Unable to obtain - pt sedated/intubated     MEDICATIONS  (STANDING):  amiodarone    Tablet 400 milliGRAM(s) Oral three times a day  [id number]   Infusion 2 MICROgram(s)/kG/Hr (15.28 mL/Hr) IV Continuous <Continuous>  pantoprazole  Injectable 40 milliGRAM(s) IV Push daily  phenylephrine    Infusion 0.5 MICROgram(s)/kG/Min (6.563 mL/Hr) IV Continuous <Continuous>  piperacillin/tazobactam IVPB. 3.375 Gram(s) IV Intermittent every 12 hours  propofol Infusion 5 MICROgram(s)/kG/Min (2.292 mL/Hr) IV Continuous <Continuous>  sodium bicarbonate 650 milliGRAM(s) Oral two times a day  sodium chloride 0.9%. 1000 milliLiter(s) (100 mL/Hr) IV Continuous <Continuous>  vasopressin Infusion 0.04 Unit(s)/Min (2.4 mL/Hr) IV Continuous <Continuous>    MEDICATIONS  (PRN):  acetaminophen  Suppository 650 milliGRAM(s) Rectal every 6 hours PRN For Temp greater than 38 C (100.4 F)    ICU Vital Signs Last 24 Hrs  T(C): 37.3 (16 Dec 2017 12:00), Max: 37.7 (16 Dec 2017 00:00)  T(F): 99.2 (16 Dec 2017 12:00), Max: 99.9 (16 Dec 2017 00:00)  HR: 105 (16 Dec 2017 12:00) (93 - 121)  BP: 105/54 (16 Dec 2017 12:00) (86/64 - 123/64)  BP(mean): 65 (16 Dec 2017 12:00) (60 - 95)  ABP: 112/57 (16 Dec 2017 12:00) (81/78 - 129/75)  ABP(mean): 74 (16 Dec 2017 12:00) (66 - 94)  RR: 21 (16 Dec 2017 12:00) (15 - 27)  SpO2: 98% (16 Dec 2017 12:00) (92% - 100%)    : Day in place draining clear urine, uncircumcised penis, foreskin and penile edema with some skin breakdown                          10.0   18.0  )-----------( 112      ( 16 Dec 2017 06:35 )             32.1   12-16    138  |  109<H>  |  82<H>  ----------------------------<  96  4.9   |  17<L>  |  2.61<H>    Ca    7.1<L>      16 Dec 2017 06:35  Phos  6.6     12-16  Mg     2.5     12-16    TPro  5.1<L>  /  Alb  1.0<L>  /  TBili  1.7<H>  /  DBili  x   /  AST  75<H>  /  ALT  40  /  AlkPhos  50  12-16

## 2017-12-16 NOTE — PROGRESS NOTE ADULT - SUBJECTIVE AND OBJECTIVE BOX
Meds:  Zosyn 3.375 gm IVPB q 12 hours.    Allergies:  Allergies    No Known Allergies    Intolerances  ROS  [ x ] UNABLE TO ELICIT    General:  [  ] None  [  ] Fever  [  ] Chills  [  ] Malaise    Skin:  [  ] None [  ] Rash  [  ] Wound  [  ] Ulcer    HEENT:  [  ] None  [  ] Sore Throat  [  ] Nasal congestion/ runny nose  [  ] Photophobia [  ] Neck pain      Chest:  [  ] None   [  ] SOB  [  ] Cough  [  ] None    Cardiovascular:   [  ] None  [  ] CP  [  ] Palpitation    Gastrointestinal:  [  ] None  [  ] Abd pain   [  ] Nausea    [  ] Vomiting   [  ] Diarrhea	     Genitourinary:  [  ] None [  ] Polyuria   [  ] Urgency  [  ] Frequency  [  ] Dysuria    [  ]  Hematuria       Musculoskeletal:  [  ] None [  ] Back Pain	[  ] Body aches  [  ] Joint pain    Neurological: [  ] None [  ]Dizziness  [  ]Visual Disturbance  [  ]Headaches   [  ] Weakness        PHYSICAL EXAM:  Vital Signs Last 24 Hrs  T(C): 37.3 (16 Dec 2017 12:00), Max: 37.7 (16 Dec 2017 00:00)  T(F): 99.2 (16 Dec 2017 12:00), Max: 99.9 (16 Dec 2017 00:00)  HR: 108 (16 Dec 2017 13:30) (93 - 120)  BP: 105/54 (16 Dec 2017 12:00) (86/64 - 123/64)  BP(mean): 65 (16 Dec 2017 12:00) (60 - 95)  RR: 21 (16 Dec 2017 12:30) (15 - 27)  SpO2: 100% (16 Dec 2017 13:30) (92% - 100%)    Constitutional:    HEENT: [ x ] Wnl  [ x ] ET and NGT in place    Neck:  [ x ] Supple  [  ]Lymphadenopathy  [  ] No JVD   [  ] JVD  [  ] Masses   [  ] WNL    CHEST/Respiratory:  [  ]Clear to auscultation  [ x ] Rales   [ x ] Rhonchi   [  ] Wheezing     [ x ] Left side Chest tube      Cardiovascular:  [ x ] Reg S1 S2   [  ] Irreg S1 S2   [ x ]No Murmur  [  ] +ve Murmurs  [  ]Systolic [  ]Diastolic      Abdomen:  [ x ] Soft  [ x ] No tendrerness  [  ] Tenderness  [  ] Organomegaly  [  ] ABD Distention  [  ] Rigidity                       [ x ] No Regidity                       [ x ] No Rebound Tenderness  [ x ] No Guarding Rigidity  [  ] Rebound Tenderness[  ] Guarding Rigidity                          [ x ]  +ve Bowel Sounds  [  ] Decreased Bowel Sounds    [  ] Absent Bowel Sounds                            Extremities: [ x ] No edema [  ] Edema  [  ] Clubbing   [  ] Cyanosis                         [ x ] No Tender Calf muscles  [  ] Tender Calf muscles                        [ x ] Palpable peripheral pulses    Neurological: [  ] Awake  [  ] Alert  [  ] Oriented  x                             [  ] Confused  [  ] Drowzy  [ x ] repond to painful stimuli  [  ] Unresponsive    Skin:  [ x ] Intact [  ] Redness [  ] Thrombophlebitis  [  ] Rashes  [  ] Dry  [  ] Ulcers    Ortho:  [  ] Joint Swelling  [  ] Joint erythema [  ] Joint tenderness                [  ] Increased temp. to touch  [  ] DJD [ x ] WNL            LABS/DIAGNOSTIC TESTS                        10.0   18.0  )-----------( 112      ( 16 Dec 2017 06:35 )             32.1   12-16    138  |  109<H>  |  82<H>  ----------------------------<  96  4.9   |  17<L>  |  2.61<H>    Ca    7.1<L>      16 Dec 2017 06:35  Phos  6.6     12-16  Mg     2.5     12-16    TPro  5.1<L>  /  Alb  1.0<L>  /  TBili  1.7<H>  /  DBili  x   /  AST  75<H>  /  ALT  40  /  AlkPhos  50  12-16        CULTURES:   Culture - Bronchial (12.13.17 @ 08:06)    Gram Stain:   No polymorphonuclear cells seen per low power field  No squamous epithelial cells per low power field  Moderate Gram Positive Cocci in Pairs and Chains per oil power field  Few-moderate Gram Negative Coccobacilli per oil power field    Specimen Source: .Broncial received in trap    Culture Results:   Normal Respiratory Lissy present    Culture - Surgical Swab (12.12.17 @ 22:52)    Specimen Source: .Surgical Swab Bronchial Alveolar Lavage    Culture Results:   Few Normal Respiratory Lissy present    Culture - Tissue with Gram Stain (12.12.17 @ 22:45)    Gram Stain:   Rare polymorphonuclear leukocytes seen per low power field  Numerous Gram Negative Rods per oil power field    Specimen Source: .Tissue Left Lower Lobe    Culture Results:   Rare Haemophilus parainfluenzae  Rare Neisseria species, not gonorrhoeae or meningitidis    Culture - Blood (12.11.17 @ 11:22)    Specimen Source: .Blood Blood    Culture Results:   No growth to date.    Culture - Blood (12.11.17 @ 11:22)    Specimen Source: .Blood Blood    Culture Results:   No growth to date.    Culture - Blood (12.11.17 @ 11:22)    Specimen Source: .Blood Blood    Culture Results:   No growth to date.            RADIOLOGY    EXAM:  CHEST PORTABLE ROUTINE                            PROCEDURE DATE:  12/16/2017          INTERPRETATION:  Chest one view    HISTORY: Follow-up    COMPARISON STUDY: 12/15/2017    Frontal expiratory view of the chest shows the heart to be similar in   size. The lungs show slight clearing of the left lung infiltrates with   progression of right infiltrates. Endotracheal tube, feeding tube and   right jugular line remain present. Left chest tube and subcutaneous   emphysema are again noted. and there is no evidence of pneumothorax nor   definite pleural effusion.    IMPRESSION:  Changing lung infiltrates.      EXAM:  CT CHEST                            PROCEDURE DATE:  12/15/2017          INTERPRETATION:  CLINICAL INFORMATION: Evaluate mid right lung opacity   seen on previous chest radiograph.    COMPARISON: Chest x-ray from 12/15/2017. Chest CT from 12/5/2017.    PROCEDURE:   CT of the Chest was performed without intravenous contrast.  Sagittal and coronal reformats were performed.      FINDINGS:    CHEST:     LINES AND TUBES: An endotracheal tube terminates at the level of the   aortic arch, abovethe jules. An enteric tube terminates in the stomach.   A right IJ central venous catheter terminates in the SVC. A left-sided   chest tube is noted.  LUNGS/LARGE AIRWAYS/PLEURA: Previously seen left lower lobe necrotic mass   now encompasses the entire left lower lobe with internal gas loculations.   Small left apical pneumothorax. Peribronchovascular groundglass opacities   are present in all lobes. Small bilateral pleural effusions with   associated compressive atelectasis.  VESSELS: Atherosclerotic changes of the aorta and coronary arteries.  HEART: Heart size is normal. No pericardial effusion.  MEDIASTINUM AND ALPHONSE: Pneumomediastinum. Redemonstrated 3.8 x 2.1 cm AP   window lymph node.  CHEST WALL AND LOWER NECK: Extensive bilateral subcutaneous emphysema.  VISUALIZED UPPER ABDOMEN: Redemonstrated, unchanged indeterminant central   low density splenic lesion. Small perihepatic ascites  BONES: Redemonstrated destruction of the left sixth and seventh ribs and   left T6 transverse process. Degenerative changes.    IMPRESSION:     Previously seen left lower lobe necrotic mass now encompasses the entire   left lower lobe with internal gas loculations. Destruction of the   posterior sixth and seventh ribs and left sixth transverse process again   noted.    Peribronchovascular groundglass opacity is present in all lobes and has a   broad differential that includes infection, edema or ARDS.     Small left apical pneumothorax with chest tube in place.              Assessment and Recommendation:   72 years old male from home with PMHx of HTN and BPH and no PSH presents to ED c/o L-sided chest pain radiating to the back with associated general weakness x6 months. Patient also reports loss of voice x6 months, in addition to decreased appetite and weight loss x2 weeks, only been able to consume x3 Ensure today.   10/6/17 patient had bronchoscopy and subsequent pneumothorax and chest tube insertion.  Patient was initially transferred from ICU to regular floor, then upgraded to the icu after condition became unstable.  Patient was started on IV Zosyn on 12/12/17.      Problem/Recommendation - 1:  Problem: Pneumonia, bacterial.   Recommendation:   1- UA & CS.  2- Follow Blood culture to final report.  3- Still recommend to Start Vancomycin 1 gm IVPB q day, and closely follow trough, and renal functions.  4- Continue IV Zosyn, but decrease the dose to 3.375 gm IVPB q 12 hours.  5- Fluid and electrolytes management.  6- CBC and BMP follow up.   7- Follow up CXR.  8- Respirator and Chest tube management.  9- Procalcitonin level.  10- Chest tube management.    Problem/Recommendation - 2:  ·  Problem: COPD (chronic obstructive pulmonary disease).    Recommendation:   1- Bronchodilators.  2- O2 as needed.  3- Pulmonary evaluation and management.  4- Steroids as per primary, and pulmonary team.     Problem/Recommendation - 3:  ·  Problem: ZENY (acute kidney injury).    Recommendation:   1- Renal management.  2- Fluid and electrolytes management and follow up.     Discussed with medical resident.

## 2017-12-16 NOTE — PROGRESS NOTE ADULT - SUBJECTIVE AND OBJECTIVE BOX
INTERVAL HPI/OVERNIGHT EVENTS: no acute events overnight, ABG acidosis - increased RR, repeat improved pH to 7.2,    PRESSORS: [x] YES [] NO  WHICH: pheny    Antimicrobial:  piperacillin/tazobactam IVPB. 3.375 Gram(s) IV Intermittent every 12 hours DATE STARTED: 7/15    Cardiovascular:  amiodarone    Tablet 400 milliGRAM(s) Oral three times a day  phenylephrine    Infusion 0.5 MICROgram(s)/kG/Min IV Continuous <Continuous>    Pulmonary:    Hematalogic:    Other:  acetaminophen  Suppository 650 milliGRAM(s) Rectal every 6 hours PRN  fentaNYL   Infusion 2 MICROgram(s)/kG/Hr IV Continuous <Continuous>  pantoprazole  Injectable 40 milliGRAM(s) IV Push daily  propofol Infusion 5 MICROgram(s)/kG/Min IV Continuous <Continuous>  sodium bicarbonate 650 milliGRAM(s) Oral two times a day  sodium chloride 0.9%. 1000 milliLiter(s) IV Continuous <Continuous>  vasopressin Infusion 0.04 Unit(s)/Min IV Continuous <Continuous>    acetaminophen  Suppository 650 milliGRAM(s) Rectal every 6 hours PRN  amiodarone    Tablet 400 milliGRAM(s) Oral three times a day  fentaNYL   Infusion 2 MICROgram(s)/kG/Hr IV Continuous <Continuous>  pantoprazole  Injectable 40 milliGRAM(s) IV Push daily  phenylephrine    Infusion 0.5 MICROgram(s)/kG/Min IV Continuous <Continuous>  piperacillin/tazobactam IVPB. 3.375 Gram(s) IV Intermittent every 12 hours  propofol Infusion 5 MICROgram(s)/kG/Min IV Continuous <Continuous>  sodium bicarbonate 650 milliGRAM(s) Oral two times a day  sodium chloride 0.9%. 1000 milliLiter(s) IV Continuous <Continuous>  vasopressin Infusion 0.04 Unit(s)/Min IV Continuous <Continuous>    Drug Dosing Weight  Height (cm): 165 (12 Dec 2017 13:14)  Weight (kg): 76.4 (12 Dec 2017 16:00)  BMI (kg/m2): 28.1 (12 Dec 2017 16:00)  BSA (m2): 1.84 (12 Dec 2017 16:00)    CENTRAL LINE: [x] YES [] NO  LOCATION: [facility]  DATE INSERTED: 12/12  REMOVE: [] YES [c] NO  EXPLAIN: Pressor    LERMA: [x] YES [] NO    DATE INSERTED: 12/12  REMOVE:  [] YES [x] NO  EXPLAIN: IOs    A-LINE:  [x] YES [] NO  DATE INSERTED:  REMOVE:  [] YES [x] NO  EXPLAIN: ABG    PMH -reviewed admission note, no change since admission  PAST MEDICAL & SURGICAL HISTORY:  BPH (benign prostatic hyperplasia)  Hypertension  No significant past surgical history      ICU Vital Signs Last 24 Hrs  T(C): 37.7 (16 Dec 2017 00:00), Max: 38.2 (15 Dec 2017 06:30)  T(F): 99.9 (16 Dec 2017 00:00), Max: 100.8 (15 Dec 2017 06:30)  HR: 95 (16 Dec 2017 01:30) (93 - 125)  BP: 110/63 (16 Dec 2017 01:30) (86/64 - 120/60)  BP(mean): 75 (16 Dec 2017 01:30) (47 - 95)  ABP: 113/60 (16 Dec 2017 01:30) (79/43 - 129/75)  ABP(mean): 78 (16 Dec 2017 01:30) (3 - 94)  RR: 24 (16 Dec 2017 01:30) (15 - 27)  SpO2: 100% (16 Dec 2017 01:30) (88% - 100%)      ABG - ( 16 Dec 2017 00:19 )  pH: 7.22  /  pCO2: 42    /  pO2: 90    / HCO3: 17    / Base Excess: -10.2 /  SaO2: 96                    12-14 @ 07:01  -  12-15 @ 07:00  --------------------------------------------------------  IN: 5286.6 mL / OUT: 865 mL / NET: 4421.6 mL        Mode: AC/ CMV (Assist Control/ Continuous Mandatory Ventilation)  RR (machine): 24  TV (machine): 450  FiO2: 50  PEEP: 5  ITime: 1  MAP: 9.5  PIP: 18      PHYSICAL EXAM:    GENERAL: [x]NAD, []well-groomed, []well-developed  HEAD:  [x]Atraumatic, []Normocephalic  EYES: []EOMI, []PERRLA, [x]conjunctiva and sclera clear  ENMT: [x]No tonsillar erythema, exudates, or enlargement; []Moist mucous membranes, []Good dentition, []No lesions  NECK: [x]Supple, normal appearance, []No JVD; []Normal thyroid; []Trachea midline  NERVOUS SYSTEM:  [x]Sedated, []Good concentration; []Motor Strength 5/5 B/L upper and lower extremities; []DTRs 2+ intact and symmetric  CHEST/LUNG: [x]No chest deformity; []Normal percussion bilaterally; []No rales, rhonchi, wheezing   HEART: [x]Tachycardia; []No murmurs, rubs, or gallops  ABDOMEN: [x]Soft, Nontender, Nondistended; []Bowel sounds present  EXTREMITIES:  [x]2+ Peripheral Pulses, []No clubbing, cyanosis, or edema  LYMPH: [x]No lymphadenopathy noted  SKIN: [x]No rashes or lesions; []Good capillary refill      LABS:  CBC Full  -  ( 15 Dec 2017 05:55 )  WBC Count : 19.0 K/uL  Hemoglobin : 10.9 g/dL  Hematocrit : 34.8 %  Platelet Count - Automated : 128 K/uL  Mean Cell Volume : 99.7 fl  Mean Cell Hemoglobin : 31.3 pg  Mean Cell Hemoglobin Concentration : 31.4 gm/dL  Auto Neutrophil # : 16.3 K/uL  Auto Lymphocyte # : 1.3 K/uL  Auto Monocyte # : 0.5 K/uL  Auto Eosinophil # : 0.8 K/uL  Auto Basophil # : 0.1 K/uL  Auto Neutrophil % : 86.1 %  Auto Lymphocyte % : 6.7 %  Auto Monocyte % : 2.4 %  Auto Eosinophil % : 4.2 %  Auto Basophil % : 0.5 %    12-15    135  |  107  |  71<H>  ----------------------------<  76  5.1   |  16<L>  |  2.17<H>    Ca    6.9<L>      15 Dec 2017 05:55  Phos  7.1     12-15  Mg     2.4     12-15    TPro  5.3<L>  /  Alb  1.2<L>  /  TBili  1.9<H>  /  DBili  x   /  AST  63<H>  /  ALT  69<H>  /  AlkPhos  51  12-14        Culture Results:   No growth to date. (12-14 @ 00:30)  Culture Results:   No growth to date. (12-14 @ 00:30)  Culture Results:   Normal Respiratory Lissy present (12-13 @ 08:06)      RADIOLOGY & ADDITIONAL STUDIES REVIEWED:  ***    []GOALS OF CARE DISCUSSION WITH PATIENT/FAMILY/PROXY:    CRITICAL CARE TIME SPENT: 35 minutes

## 2017-12-16 NOTE — PROGRESS NOTE ADULT - SUBJECTIVE AND OBJECTIVE BOX
s/p flex bronch and EBUS  Pt intubated/sedated/on pressors  Lcxt to LWS    Vital Signs:  T(C): 36.9 (16 Dec 2017 07:30), Max: 37.7 (16 Dec 2017 00:00)  T(F): 98.5 (16 Dec 2017 07:30), Max: 99.9 (16 Dec 2017 00:00)  HR: 115 (16 Dec 2017 10:30) (93 - 121)  BP: 108/58 (16 Dec 2017 10:30) (86/64 - 123/64)  BP(mean): 68 (16 Dec 2017 10:30) (59 - 95)  ABP: 115/62 (16 Dec 2017 10:30) (81/78 - 129/75)  ABP(mean): 80 (16 Dec 2017 10:30) (62 - 94)  RR: 25 (16 Dec 2017 10:30) (15 - 27)  SpO2: 97% (16 Dec 2017 10:30) (92% - 100%)      Physical Exam:  General: intubated/sedated  LCxt: chest tube to lws, output serous. no air leak, subcu emphysema improved  dressing c/d/i    IN:    fentaNYL  Infusion: 367.2 mL    phenylephrine   Infusion: 2400 mL    propofol Infusion: 51.8 mL    sodium chloride 0.9%.: 2400 mL    vasopressin Infusion: 57.6 mL    Vital HN: 10 mL  Total IN: 5286.6 mL    OUT:    Chest Tube: 70 mL    Indwelling Catheter - Urethral: 815 mL  Total OUT: 865 mL    Total NET: 4421.6 mL                          10.9   19.0  )-----------( x        ( 15 Dec 2017 05:55 )             34.8 s/p flex bronch and EBUS  Pt intubated/sedated/on pressors  Lcxt to LWS    Vital Signs:  T(C): 36.9 (16 Dec 2017 07:30), Max: 37.7 (16 Dec 2017 00:00)  T(F): 98.5 (16 Dec 2017 07:30), Max: 99.9 (16 Dec 2017 00:00)  HR: 115 (16 Dec 2017 10:30) (93 - 121)  BP: 108/58 (16 Dec 2017 10:30) (86/64 - 123/64)  BP(mean): 68 (16 Dec 2017 10:30) (59 - 95)  ABP: 115/62 (16 Dec 2017 10:30) (81/78 - 129/75)  ABP(mean): 80 (16 Dec 2017 10:30) (62 - 94)  RR: 25 (16 Dec 2017 10:30) (15 - 27)  SpO2: 97% (16 Dec 2017 10:30) (92% - 100%)      Physical Exam:  General: intubated/sedated  LCxt: chest tube to lws, output serous. no air leak, subcu emphysema improved  dressing c/d/i    IN:    fentaNYL  Infusion: 367.2 mL    phenylephrine   Infusion: 2400 mL    propofol Infusion: 51.8 mL    sodium chloride 0.9%.: 2400 mL    vasopressin Infusion: 57.6 mL    Vital HN: 10 mL  Total IN: 5286.6 mL    OUT:    Chest Tube: 70 mL    Indwelling Catheter - Urethral: 815 mL  Total OUT: 865 mL    Total NET: 4421.6 mL                                     10.0   18.0  )-----------( 112      ( 16 Dec 2017 06:35 )             32.1   12-16    138  |  109<H>  |  82<H>  ----------------------------<  96  4.9   |  17<L>  |  2.61<H>    Ca    7.1<L>      16 Dec 2017 06:35  Phos  6.6     12-16  Mg     2.5     12-16    TPro  5.1<L>  /  Alb  1.0<L>  /  TBili  1.7<H>  /  DBili  x   /  AST  75<H>  /  ALT  40  /  AlkPhos  50  12-16

## 2017-12-16 NOTE — PROGRESS NOTE ADULT - PROBLEM SELECTOR PLAN 6
Cont med  Urology eval Cont med  Urology follow up  Penile lesions likely herpetic  trial of valacyclovir  Topical antiviral

## 2017-12-16 NOTE — PROGRESS NOTE ADULT - PROBLEM SELECTOR PLAN 7
Due in large part to low oncotic pressure from hypoalbuminemia.  Diuresis is not likely to help.  Continue TFs to improve nutrition and possibly oncotic pressure.

## 2017-12-16 NOTE — PROGRESS NOTE ADULT - SUBJECTIVE AND OBJECTIVE BOX
[facility] NEPHROLOGY- PROGRESS NOTE    73 yo M with HTN, BPH a/w chest discomfort, suprahilar/ hepatic mass and hyponatremia. Hosp course cb subcutaneous emphysema s/p bronch with Left chest tube placement. s/p EBUS  with  endobronchial lung biopsy neg for malignancy however mediastinal biopsy indeterminate. Now intubated with septic shock 2/2 ?PNA, hypotension on pressors, with ZENY and hyponatremia.   Renal consulted for hyponatremia and ZENY. Pt for possible liver mass bx.     Hospital Medications: Medications reviewed.  REVIEW OF SYSTEMS: Unable to obtain    VITALS:  T(F): 99.9 (17 @ 00:00), Max: 99.9 (17 @ 00:00)  HR: 98 (17 @ 04:30)  BP: 120/64 (17 @ 04:30)  RR: 20 (17 @ 04:30)  SpO2: 100% (17 @ 04:13)  Wt(kg): --    12-15 @ 07:01  -   @ 07:00  --------------------------------------------------------  IN: 4459.5 mL / OUT: 375 mL / NET: 4084.5 mL      PHYSICAL EXAM:  Gen: Sedated   HEENT: intubated  Cards: Irregular, +S1/S2, no M/G/R  Resp: left sided crepitus, intubated  with left Chest tube  GI: soft, NT/ND, NABS  : +frias  Extremities: +B LE edema/mild anasarca    LABS:      138  |  109<H>  |  82<H>  ----------------------------<  96  4.9   |  17<L>  |  2.61<H>    Ca    7.1<L>      16 Dec 2017 06:35  Phos  6.6     -  Mg     2.5     -    TPro  5.1<L>  /  Alb  1.0<L>  /  TBili  1.7<H>  /  DBili      /  AST  75<H>  /  ALT  40  /  AlkPhos  50  -    Creatinine Trend: 2.61 <--, 2.17 <--, 1.87 <--, 1.53 <--, 1.56 <--, 1.19 <--, 0.96 <--                        10.9   19.0  )-----------( 128      ( 15 Dec 2017 05:55 )             34.8     Urine Studies:  Urinalysis Basic - ( 13 Dec 2017 19:36 )    Color: Yellow / Appearance: Clear / S.025 / pH:   Gluc:  / Ketone: Negative  / Bili: Small / Urobili: 4   Blood:  / Protein: 30 mg/dL / Nitrite: Negative   Leuk Esterase: Negative / RBC: 0-2 /HPF / WBC 0-2 /HPF   Sq Epi:  / Non Sq Epi: Few /HPF / Bacteria: Few /HPF      Sodium, Random Urine: 9 mmol/L (12-15 @ 18:16)  Chloride, Random Urine: <10 mmol/L (12-15 @ 18:16)  Potassium, Random Urine: 36 mmol/L (12-15 @ 18:16)  Creatinine, Random Urine: 153 mg/dL (12-15 @ 18:16)  Osmolality, Random Urine: 330 mos/kg (12-15 @ 18:16)  FeNa+       Chloride, Random Urine: <10 mmol/L ( @ 13:22)  Osmolality, Random Urine: 427 mos/kg ( @ 13:22)  Sodium, Random Urine: 10 mmol/L ( @ :22)  Potassium, Random Urine: 58 mmol/L ( @ :22)  Creatinine, Random Urine: 173 mg/dL ( @ 13:22)  Osmolality, Random Urine: 448 mos/kg ( @ 06:40)  Sodium, Random Urine: 6 mmol/L ( @ 06:40)  Sodium, Random Urine: 6 mmol/L ( @ 06:40)  Creatinine, Random Urine: 102 mg/dL ( @ 06:40)

## 2017-12-16 NOTE — CONSULT NOTE ADULT - ASSESSMENT
A/P 73 yo M with penile edema    - No urgent surgical intervention  - Supportive measures including bacitracin  - Continue observation

## 2017-12-16 NOTE — PROGRESS NOTE ADULT - PROBLEM SELECTOR PLAN 2
Resolved. Monitor electrolytese    ?hypovolemic hyponatremia in the setting of dec PO intake. serum osm 280, urine osm 448 with  Maria Eugenia 6 on IVF.   Serum sodium improving with IVF.  Monitor serum sodium.

## 2017-12-16 NOTE — PROGRESS NOTE ADULT - SUBJECTIVE AND OBJECTIVE BOX
Patient is a 72y old  Male who presents with a chief complaint of weakness, chest discomfort and voice and appetite changes (05 Dec 2017 01:22)    pt seen in icu [ x ], reg med floor [ ], bed [ x ], chair at bedside [   ], a+o x3 [  ],sedated [ x ], nad [x  ],   left chest to pleurovac [x],   et tube [x],  ngt feed [ x ], frias to bsd [x], right ij cent line [x], fentanyl, propofol, norepi and  phenylepherine drip [x],      Allergies    No Known Allergies        Vitals    T(F): 99.9 (12-16-17 @ 00:00), Max: 99.9 (12-16-17 @ 00:00)  HR: 98 (12-16-17 @ 04:30) (93 - 121)  BP: 120/64 (12-16-17 @ 04:30) (86/64 - 120/64)  RR: 20 (12-16-17 @ 04:30) (15 - 27)  SpO2: 100% (12-16-17 @ 04:13) (92% - 100%)  Wt(kg): --  CAPILLARY BLOOD GLUCOSE          Labs                          10.9   19.0  )-----------( 128      ( 15 Dec 2017 05:55 )             34.8       12-16    138  |  109<H>  |  82<H>  ----------------------------<  96  4.9   |  17<L>  |  2.61<H>    Ca    7.1<L>      16 Dec 2017 06:35  Phos  6.6     12-16  Mg     2.5     12-16    TPro  5.1<L>  /  Alb  1.0<L>  /  TBili  1.7<H>  /  DBili  x   /  AST  75<H>  /  ALT  40  /  AlkPhos  50  12-16            .Blood Blood  12-14 @ 00:30   No growth to date.  --  --      .Broncial received in trap  12-13 @ 08:06   Normal Respiratory Lissy present  --    No polymorphonuclear cells seen per low power field  No squamous epithelial cells per low power field  Moderate Gram Positive Cocci in Pairs and Chains per oil power field  Few-moderate Gram Negative Coccobacilli per oil power field      .Surgical Swab Bronchial Alveolar Lavage  12-12 @ 22:52   Few Normal Respiratory Lissy present  --  --      .Tissue Left Lower Lobe  12-12 @ 22:45   Rare Haemophilus parainfluenzae  Rare Neisseria species, not gonorrhoeae or meningitidis  Rare Alpha hemolytic strep  --    Rare polymorphonuclear leukocytes seen per low power field  Numerous Gram Negative Rods per oil power field      .Blood Blood  12-11 @ 11:22   No growth to date.  --  --      .Broncial Other, bronchioalveolar lavage  12-06 @ 22:28 --  --  --      .Blood Blood-Peripheral  12-05 @ 10:57   No growth at 5 days.  --  --          Radiology Results      Meds    MEDICATIONS  (STANDING):  amiodarone    Tablet 400 milliGRAM(s) Oral three times a day  fentaNYL   Infusion 2 MICROgram(s)/kG/Hr (15.28 mL/Hr) IV Continuous <Continuous>  pantoprazole  Injectable 40 milliGRAM(s) IV Push daily  phenylephrine    Infusion 0.5 MICROgram(s)/kG/Min (6.563 mL/Hr) IV Continuous <Continuous>  piperacillin/tazobactam IVPB. 3.375 Gram(s) IV Intermittent every 12 hours  propofol Infusion 5 MICROgram(s)/kG/Min (2.292 mL/Hr) IV Continuous <Continuous>  sodium bicarbonate 650 milliGRAM(s) Oral two times a day  sodium chloride 0.9%. 1000 milliLiter(s) (100 mL/Hr) IV Continuous <Continuous>  vasopressin Infusion 0.04 Unit(s)/Min (2.4 mL/Hr) IV Continuous <Continuous>      MEDICATIONS  (PRN):  acetaminophen  Suppository 650 milliGRAM(s) Rectal every 6 hours PRN For Temp greater than 38 C (100.4 F)      Physical Exam    Neuro :  no focal deficits  Respiratory: CTA B/L, left subcutaneous emphysema, left chest tube to pleurovac  CV: RRR, S1S2, no murmurs,   Abdominal: Soft, NT, ND +BS,  Extremities: No edema, + peripheral pulses    ASSESSMENT    perihilar mass,   hepatic and splenic lesions,   r/o malig,   copd   destruction of left 6th and 7th ribs and left T6 transverse process   exophytic left renal lesion  subcutaneous emphysema  pneumothorax  hyponatremia  silverio  new afib  h/o BPH (benign prostatic hyperplasia)  Hypertension        PLAN        S/P flexible bronchoscopy showing possible Large B-cell lymphoma,   s/p flex bronch transbronchial lung bx and EBU bx 12/12  cytopath of flex bronch and ebus neg for malig  cx from flex bronch with Few Normal Respiratory Lissy present and Rare Haemophilus parainfluenzae  Rare Neisseria species, not gonorrhoeae or meningitidis  -- Rare polymorphonuclear leukocytes seen per low power field  Numerous Gram Negative Rods per oil power field  noted above   thoracic surg f/u noted  monitor chest tube  heme onc cons  vent mgmt  cont atrov and prov nebs,   cont supplemental oxygen,  ct chest-abd-pelv result noted  pulm f/u noted  s/p bronch with bx   cytopath of bronch neg for malig noted  cytopatho transbronchial bx with Interstitial fibrosis and intra-alveolar smoker's  macrophages noted above.   urology cons for renal lesion  cont ivf  renal f/u noted  ?hypovolemic hyponatremia in the setting of dec PO intake. serum osm 280, urine osm 448 with  Maria Eugenia 6 on IVF.   Serum sodium improving appropriately with IVF.  Monitor serum sodium.   cardio cons noted  start amiodarone loading day #2  id f/u noted  add vanco with adj renally  f/u vanco t  cont zosyn  paliative eval noted  mgmt as per icu Patient is a 72y old  Male who presents with a chief complaint of weakness, chest discomfort and voice and appetite changes (05 Dec 2017 01:22)    pt seen in icu [ x ], reg med floor [ ], bed [ x ], chair at bedside [   ], a+o x3 [  ],sedated [ x ], nad [x  ],   left chest to pleurovac [x],   et tube [x],  ngt feed [ x ], frias to bsd [x], right ij cent line [x], fentanyl, propofol, norepi and  phenylepherine drip [x],      Allergies    No Known Allergies        Vitals    T(F): 99.9 (12-16-17 @ 00:00), Max: 99.9 (12-16-17 @ 00:00)  HR: 98 (12-16-17 @ 04:30) (93 - 121)  BP: 120/64 (12-16-17 @ 04:30) (86/64 - 120/64)  RR: 20 (12-16-17 @ 04:30) (15 - 27)  SpO2: 100% (12-16-17 @ 04:13) (92% - 100%)  Wt(kg): --  CAPILLARY BLOOD GLUCOSE          Labs                          10.9   19.0  )-----------( 128      ( 15 Dec 2017 05:55 )             34.8       12-16    138  |  109<H>  |  82<H>  ----------------------------<  96  4.9   |  17<L>  |  2.61<H>    Ca    7.1<L>      16 Dec 2017 06:35  Phos  6.6     12-16  Mg     2.5     12-16    TPro  5.1<L>  /  Alb  1.0<L>  /  TBili  1.7<H>  /  DBili  x   /  AST  75<H>  /  ALT  40  /  AlkPhos  50  12-16            .Blood Blood  12-14 @ 00:30   No growth to date.  --  --      .Broncial received in trap  12-13 @ 08:06   Normal Respiratory Lissy present  --    No polymorphonuclear cells seen per low power field  No squamous epithelial cells per low power field  Moderate Gram Positive Cocci in Pairs and Chains per oil power field  Few-moderate Gram Negative Coccobacilli per oil power field      .Surgical Swab Bronchial Alveolar Lavage  12-12 @ 22:52   Few Normal Respiratory Lissy present  --  --      .Tissue Left Lower Lobe  12-12 @ 22:45   Rare Haemophilus parainfluenzae  Rare Neisseria species, not gonorrhoeae or meningitidis  Rare Alpha hemolytic strep  --    Rare polymorphonuclear leukocytes seen per low power field  Numerous Gram Negative Rods per oil power field      .Blood Blood  12-11 @ 11:22   No growth to date.  --  --      .Broncial Other, bronchioalveolar lavage  12-06 @ 22:28 --  --  --      .Blood Blood-Peripheral  12-05 @ 10:57   No growth at 5 days.  --  --          Radiology Results      Meds    MEDICATIONS  (STANDING):  amiodarone    Tablet 400 milliGRAM(s) Oral three times a day  fentaNYL   Infusion 2 MICROgram(s)/kG/Hr (15.28 mL/Hr) IV Continuous <Continuous>  pantoprazole  Injectable 40 milliGRAM(s) IV Push daily  phenylephrine    Infusion 0.5 MICROgram(s)/kG/Min (6.563 mL/Hr) IV Continuous <Continuous>  piperacillin/tazobactam IVPB. 3.375 Gram(s) IV Intermittent every 12 hours  propofol Infusion 5 MICROgram(s)/kG/Min (2.292 mL/Hr) IV Continuous <Continuous>  sodium bicarbonate 650 milliGRAM(s) Oral two times a day  sodium chloride 0.9%. 1000 milliLiter(s) (100 mL/Hr) IV Continuous <Continuous>  vasopressin Infusion 0.04 Unit(s)/Min (2.4 mL/Hr) IV Continuous <Continuous>      MEDICATIONS  (PRN):  acetaminophen  Suppository 650 milliGRAM(s) Rectal every 6 hours PRN For Temp greater than 38 C (100.4 F)      Physical Exam    Neuro :  no focal deficits  Respiratory: CTA B/L, left subcutaneous emphysema, left chest tube to pleurovac  CV: RRR, S1S2, no murmurs,   Abdominal: Soft, NT, ND +BS,  Extremities: No edema, + peripheral pulses  genitals: edematous penis with some erythema, frias to bsd    ASSESSMENT    perihilar mass,   hepatic and splenic lesions,   r/o malig,   copd   destruction of left 6th and 7th ribs and left T6 transverse process   exophytic left renal lesion  subcutaneous emphysema  pneumothorax  s/p hyponatremia  silverio  new afib  penile edema  h/o BPH (benign prostatic hyperplasia)  Hypertension        PLAN        S/P flexible bronchoscopy showing possible Large B-cell lymphoma,   s/p flex bronch transbronchial lung bx and EBU bx 12/12  cytopath of flex bronch and ebus neg for malig  cx from flex bronch with Few Normal Respiratory Lissy present and Rare Haemophilus parainfluenzae  Rare Neisseria species, not gonorrhoeae or meningitidis  -- Rare polymorphonuclear leukocytes seen per low power field  Numerous Gram Negative Rods per oil power field  noted above   thoracic surg f/u  monitor chest tube  heme onc cons  vent mgmt  cont atrov and prov nebs,   cont supplemental oxygen,  ct chest-abd-pelv result noted  pulm f/u noted  s/p bronch with bx   cytopath of bronch neg for malig noted  cytopatho transbronchial bx with Interstitial fibrosis and intra-alveolar smoker's  macrophages noted above.   urology cons for renal lesion  cont ivf  renal f/u   cardio cons noted  start amiodarone loading day #3  id f/u   add vanco with adj renally  f/u vanco t  cont zosyn  paliative eval noted  cont current meds  mgmt as per icu

## 2017-12-16 NOTE — PROGRESS NOTE ADULT - SUBJECTIVE AND OBJECTIVE BOX
Patient is a 72y old  Male who presents with a chief complaint of weakness, chest discomfort and voice and appetite changes (05 Dec 2017 01:22)  Still intubated on mechanical ventilation. S/p EBUS with FNA of several mediastinal LNs. Results of FNA negative for malignancy. Biopsy still pending. Patient has subcutaneous emphysema but slightly improved. Remains with chest tube on left side. Still on pressors meds because of hypotension secondary to sepsis. sedated.    awake alert comfortable in NAD    INTERVAL HPI/OVERNIGHT EVENTS:      VITAL SIGNS:  T(F): 98.5 (12-16-17 @ 07:30)  HR: 115 (12-16-17 @ 10:30)  BP: 108/58 (12-16-17 @ 10:30)  RR: 25 (12-16-17 @ 10:30)  SpO2: 97% (12-16-17 @ 10:30)  Wt(kg): --  I&O's Detail    15 Dec 2017 07:01  -  16 Dec 2017 07:00  --------------------------------------------------------  IN:    Enteral Tube Flush: 90 mL    fentaNYL  Infusion: 275.4 mL    phenylephrine   Infusion: 2363.6 mL    propofol Infusion: 165.6 mL    sodium chloride 0.9%.: 1900 mL    Solution: 75 mL    Solution: 250 mL    vasopressin Infusion: 7.2 mL    vasopressin Infusion: 50.4 mL    Vital HN: 100 mL  Total IN: 5277.2 mL    OUT:    Indwelling Catheter - Urethral: 585 mL  Total OUT: 585 mL    Total NET: 4692.2 mL      16 Dec 2017 07:01  -  16 Dec 2017 10:41  --------------------------------------------------------  IN:    fentaNYL  Infusion: 45.9 mL    phenylephrine   Infusion: 249.3 mL    propofol Infusion: 34.5 mL    sodium chloride 0.9%.: 300 mL    Solution: 12.5 mL    vasopressin Infusion: 7.2 mL  Total IN: 649.4 mL    OUT:    Indwelling Catheter - Urethral: 70 mL  Total OUT: 70 mL    Total NET: 579.4 mL        Mode: AC/ CMV (Assist Control/ Continuous Mandatory Ventilation)  RR (machine): 24  TV (machine): 450  FiO2: 50  PEEP: 5  ITime: 1  MAP: 9  PIP: 20        REVIEW OF SYSTEMS:    CONSTITUTIONAL:  No fevers, chills, sweats    HEENT:  Eyes:  No diplopia or blurred vision. ENT:  No earache, sore throat or runny nose.    CARDIOVASCULAR:  No pressure, squeezing, tightness, or heaviness about the chest; no palpitations.    RESPIRATORY:  Per HPI    GASTROINTESTINAL:  No abdominal pain, nausea, vomiting or diarrhea.    GENITOURINARY:  No dysuria, frequency or urgency.    NEUROLOGIC:  No paresthesias, fasciculations, seizures or weakness.    PSYCHIATRIC:  No disorder of thought or mood.      PHYSICAL EXAM:    Constitutional: Well developed and nourished  Eyes:Perrla  ENMT: normal  Neck:supple  Respiratory: good air entry  Cardiovascular: S1 S2 regular  Gastrointestinal: Soft, Non tender  Extremities: No edema  Vascular:normal  Neurological:Awake, alert,Ox3  Musculoskeletal:Normal      MEDICATIONS  (STANDING):  amiodarone    Tablet 400 milliGRAM(s) Oral three times a day  fentaNYL   Infusion 2 MICROgram(s)/kG/Hr (15.28 mL/Hr) IV Continuous <Continuous>  pantoprazole  Injectable 40 milliGRAM(s) IV Push daily  phenylephrine    Infusion 0.5 MICROgram(s)/kG/Min (6.563 mL/Hr) IV Continuous <Continuous>  piperacillin/tazobactam IVPB. 3.375 Gram(s) IV Intermittent every 12 hours  propofol Infusion 5 MICROgram(s)/kG/Min (2.292 mL/Hr) IV Continuous <Continuous>  sodium bicarbonate 650 milliGRAM(s) Oral two times a day  sodium chloride 0.9%. 1000 milliLiter(s) (100 mL/Hr) IV Continuous <Continuous>  vasopressin Infusion 0.04 Unit(s)/Min (2.4 mL/Hr) IV Continuous <Continuous>    MEDICATIONS  (PRN):  acetaminophen  Suppository 650 milliGRAM(s) Rectal every 6 hours PRN For Temp greater than 38 C (100.4 F)      Allergies    No Known Allergies    Intolerances        LABS:                        10.0   18.0  )-----------( 112      ( 16 Dec 2017 06:35 )             32.1     12-16    138  |  109<H>  |  82<H>  ----------------------------<  96  4.9   |  17<L>  |  2.61<H>    Ca    7.1<L>      16 Dec 2017 06:35  Phos  6.6     12-16  Mg     2.5     12-16    TPro  5.1<L>  /  Alb  1.0<L>  /  TBili  1.7<H>  /  DBili  x   /  AST  75<H>  /  ALT  40  /  AlkPhos  50  12-16        ABG - ( 16 Dec 2017 04:52 )  pH: 7.26  /  pCO2: 34    /  pO2: 102   / HCO3: 15    / Base Excess: -11.1 /  SaO2: 97                    CAPILLARY BLOOD GLUCOSE            RADIOLOGY & ADDITIONAL TESTS:    CXR:  < from: Xray Chest 1 View AP -PORTABLE-Routine (12.15.17 @ 07:49) >  IMPRESSION:   Left sided pleural-parenchymal changes not significantly changed.  Interval development of mid right lung atelectasis and/or pneumonia.  Interval decrease in left chest wall subcutaneous emphysema.  Life supporting devices in appropriate position.    < end of copied text >    Ct scan chest:  < from: CT Chest No Cont (12.15.17 @ 14:17) >  IMPRESSION:     Previously seen left lower lobe necrotic mass now encompasses the entire   left lower lobe with internal gas loculations. Destruction of the   posterior sixth and seventh ribs and left sixth transverse process again   noted.    Peribronchovascular groundglass opacity is present in all lobes and has a   broad differential that includes infection, edema or ARDS.     Small left apical pneumothorax with chest tube in place.      < end of copied text >    ekg;    echo: Patient is a 72y old  Male who presents with a chief complaint of weakness, chest discomfort and voice and appetite changes (05 Dec 2017 01:22)  Still intubated on mechanical ventilation. S/p EBUS with FNA of several mediastinal LNs. Results of FNA negative for malignancy. Biopsy still pending. Patient has subcutaneous emphysema but slightly improved. Remains with chest tube on left side. Still on pressors meds because of hypotension secondary to sepsis. sedated. Having vesicular rash on his penis with swelling.      INTERVAL HPI/OVERNIGHT EVENTS:      VITAL SIGNS:  T(F): 98.5 (12-16-17 @ 07:30)  HR: 115 (12-16-17 @ 10:30)  BP: 108/58 (12-16-17 @ 10:30)  RR: 25 (12-16-17 @ 10:30)  SpO2: 97% (12-16-17 @ 10:30)  Wt(kg): --  I&O's Detail    15 Dec 2017 07:01  -  16 Dec 2017 07:00  --------------------------------------------------------  IN:    Enteral Tube Flush: 90 mL    fentaNYL  Infusion: 275.4 mL    phenylephrine   Infusion: 2363.6 mL    propofol Infusion: 165.6 mL    sodium chloride 0.9%.: 1900 mL    Solution: 75 mL    Solution: 250 mL    vasopressin Infusion: 7.2 mL    vasopressin Infusion: 50.4 mL    Vital HN: 100 mL  Total IN: 5277.2 mL    OUT:    Indwelling Catheter - Urethral: 585 mL  Total OUT: 585 mL    Total NET: 4692.2 mL      16 Dec 2017 07:01  -  16 Dec 2017 10:41  --------------------------------------------------------  IN:    fentaNYL  Infusion: 45.9 mL    phenylephrine   Infusion: 249.3 mL    propofol Infusion: 34.5 mL    sodium chloride 0.9%.: 300 mL    Solution: 12.5 mL    vasopressin Infusion: 7.2 mL  Total IN: 649.4 mL    OUT:    Indwelling Catheter - Urethral: 70 mL  Total OUT: 70 mL    Total NET: 579.4 mL        Mode: AC/ CMV (Assist Control/ Continuous Mandatory Ventilation)  RR (machine): 24  TV (machine): 450  FiO2: 50  PEEP: 5  ITime: 1  MAP: 9  PIP: 20        REVIEW OF SYSTEMS:    CONSTITUTIONAL:  No fevers, chills, sweats    HEENT:  Eyes:  No diplopia or blurred vision. ENT:  No earache, sore throat or runny nose.    CARDIOVASCULAR:  No pressure, squeezing, tightness, or heaviness about the chest; no palpitations.    RESPIRATORY:  Per HPI    GASTROINTESTINAL:  No abdominal pain, nausea, vomiting or diarrhea.    GENITOURINARY:  No dysuria, frequency or urgency.    NEUROLOGIC:  No paresthesias, fasciculations, seizures or weakness.    PSYCHIATRIC:  No disorder of thought or mood.      PHYSICAL EXAM:    Constitutional: Well developed and nourished  Eyes:Perrla  ENMT: normal  Neck:supple  Respiratory: good air entry  Cardiovascular: S1 S2 regular  Gastrointestinal: Soft, Non tender  Extremities: + edema  Vascular:normal  Neurological:Sedated  Musculoskeletal:Normal      MEDICATIONS  (STANDING):  amiodarone    Tablet 400 milliGRAM(s) Oral three times a day  fentaNYL   Infusion 2 MICROgram(s)/kG/Hr (15.28 mL/Hr) IV Continuous <Continuous>  pantoprazole  Injectable 40 milliGRAM(s) IV Push daily  phenylephrine    Infusion 0.5 MICROgram(s)/kG/Min (6.563 mL/Hr) IV Continuous <Continuous>  piperacillin/tazobactam IVPB. 3.375 Gram(s) IV Intermittent every 12 hours  propofol Infusion 5 MICROgram(s)/kG/Min (2.292 mL/Hr) IV Continuous <Continuous>  sodium bicarbonate 650 milliGRAM(s) Oral two times a day  sodium chloride 0.9%. 1000 milliLiter(s) (100 mL/Hr) IV Continuous <Continuous>  vasopressin Infusion 0.04 Unit(s)/Min (2.4 mL/Hr) IV Continuous <Continuous>    MEDICATIONS  (PRN):  acetaminophen  Suppository 650 milliGRAM(s) Rectal every 6 hours PRN For Temp greater than 38 C (100.4 F)      Allergies    No Known Allergies    Intolerances        LABS:                        10.0   18.0  )-----------( 112      ( 16 Dec 2017 06:35 )             32.1     12-16    138  |  109<H>  |  82<H>  ----------------------------<  96  4.9   |  17<L>  |  2.61<H>    Ca    7.1<L>      16 Dec 2017 06:35  Phos  6.6     12-16  Mg     2.5     12-16    TPro  5.1<L>  /  Alb  1.0<L>  /  TBili  1.7<H>  /  DBili  x   /  AST  75<H>  /  ALT  40  /  AlkPhos  50  12-16        ABG - ( 16 Dec 2017 04:52 )  pH: 7.26  /  pCO2: 34    /  pO2: 102   / HCO3: 15    / Base Excess: -11.1 /  SaO2: 97                    CAPILLARY BLOOD GLUCOSE            RADIOLOGY & ADDITIONAL TESTS:    CXR:  < from: Xray Chest 1 View AP -PORTABLE-Routine (12.15.17 @ 07:49) >  IMPRESSION:   Left sided pleural-parenchymal changes not significantly changed.  Interval development of mid right lung atelectasis and/or pneumonia.  Interval decrease in left chest wall subcutaneous emphysema.  Life supporting devices in appropriate position.    < end of copied text >    Ct scan chest:  < from: CT Chest No Cont (12.15.17 @ 14:17) >  IMPRESSION:     Previously seen left lower lobe necrotic mass now encompasses the entire   left lower lobe with internal gas loculations. Destruction of the   posterior sixth and seventh ribs and left sixth transverse process again   noted.    Peribronchovascular groundglass opacity is present in all lobes and has a   broad differential that includes infection, edema or ARDS.     Small left apical pneumothorax with chest tube in place.      < end of copied text >    ekg;    echo:

## 2017-12-16 NOTE — PROGRESS NOTE ADULT - ASSESSMENT
72 years old male from home with PMHx of HTN and BPH and no PSH presents to ED c/o L-sided chest pain ,lung and hepatic mass, s/p PTHX,now respiratory failure and afib with RVR.  1.Vent support as per ICU.  2.Amiodarone loading D#3.  3.Continue ASA for stroke prevention.  4.Given suspected metastatic disease ,high risk for bleeding with full AC.  5.ABX.  6.Pressor support as per ICU.  7.GI and DVT prophylaxis.  8.Overall prognosis is poor.

## 2017-12-16 NOTE — PROGRESS NOTE ADULT - PROBLEM SELECTOR PLAN 1
Non-oliguric ZENY likely hemodynamically mediated in the setting of septic shock/ hypotension; ATN.  Renal function worsening.  FeNa is low (0.1%) suggestive of dehydration.  LE edema/anasarca is due in large part to low oncotic pressure due to hypoalbuminemia/PCM in acute illness. Would keep I/O. Strict I/O as well.     UA with 30 protein and mod blood in the setting of infection. Recc to repeat UA once infection cleared. FeNa 0.07%- agree with IVF.  c/w antibiotics/ pressors as per MICU. Monitor Vanco trough.   CT abd/ pelvis 12/5 1.0 cm indeterminate exophytic lesion arising from the   lower pole of left kidney. No hydronephrosis. Can check Renal US when stable. Consider outpt Urology f/u.   Strict I/Os. Avoid nephrotoxins/ NSAIDs/ RCA. Monitor BMP.

## 2017-12-17 NOTE — PROGRESS NOTE ADULT - SUBJECTIVE AND OBJECTIVE BOX
CHIEF COMPLAINT:Patient is a 72y old  Male who presents with a chief complaint of weakness, chest discomfort and voice and appetite changes.Pt remains intubated.    	  REVIEW OF SYSTEMS:  [X ] Unable to obtain    PHYSICAL EXAM:  T(C): 37.3 (12-17-17 @ 08:00), Max: 37.6 (12-16-17 @ 15:42)  HR: 98 (12-17-17 @ 09:30) (83 - 115)  BP: 108/55 (12-17-17 @ 09:30) (93/58 - 116/54)  RR: 24 (12-17-17 @ 09:30) (16 - 29)  SpO2: 98% (12-17-17 @ 09:30) (90% - 100%)  Wt(kg): --  I&O's Summary    16 Dec 2017 07:01  -  17 Dec 2017 07:00  --------------------------------------------------------  IN: 3478.9 mL / OUT: 765 mL / NET: 2713.9 mL    17 Dec 2017 07:01  -  17 Dec 2017 09:43  --------------------------------------------------------  IN: 274.9 mL / OUT: 58 mL / NET: 216.9 mL        Appearance: Normal	  HEENT:   Normal oral mucosa, PERRL, EOMI	  Lymphatic: No lymphadenopathy  Cardiovascular: Normal S1 S2, No JVD, No murmurs, No edema  Respiratory: B/L ronchi  Gastrointestinal:  Soft, Non-tender, + BS	  Skin: No rashes, No ecchymoses, No cyanosis	  Extremities: Normal range of motion, No clubbing, cyanosis or edema  Vascular: Peripheral pulses palpable 2+ bilaterally    MEDICATIONS  (STANDING):  amiodarone    Tablet 400 milliGRAM(s) Oral three times a day  BACItracin   Ointment 1 Application(s) Topical two times a day  fentaNYL   Infusion 2 MICROgram(s)/kG/Hr (15.28 mL/Hr) IV Continuous <Continuous>  pantoprazole  Injectable 40 milliGRAM(s) IV Push daily  phenylephrine    Infusion 0.5 MICROgram(s)/kG/Min (6.563 mL/Hr) IV Continuous <Continuous>  piperacillin/tazobactam IVPB. 3.375 Gram(s) IV Intermittent every 12 hours  propofol Infusion 5 MICROgram(s)/kG/Min (2.292 mL/Hr) IV Continuous <Continuous>  sodium bicarbonate 650 milliGRAM(s) Oral two times a day  vasopressin Infusion 0.04 Unit(s)/Min (2.4 mL/Hr) IV Continuous <Continuous>        	  LABS:	 	                        9.7    17.2  )-----------( CLUMPED    ( 17 Dec 2017 07:09 )             30.6     12-17    140  |  111<H>  |  93<H>  ----------------------------<  104<H>  4.9   |  17<L>  |  2.69<H>    Ca    7.2<L>      17 Dec 2017 07:09  Phos  6.0     12-17  Mg     2.6     12-17    TPro  5.2<L>  /  Alb  1.1<L>  /  TBili  1.5<H>  /  DBili  x   /  AST  43<H>  /  ALT  29  /  AlkPhos  47  12-17      Lipid Profile: Cholesterol 64  LDL 20  HDL 26  TG 88    HgA1c: Hemoglobin A1C, Whole Blood: 6.2 % (12-05 @ 09:32)    TSH: Thyroid Stimulating Hormone, Serum: 0.45 uU/mL (12-05 @ 07:14)      EXAM:  US KIDNEY(S)                            PROCEDURE DATE:  12/16/2017          INTERPRETATION:  History: Worsening renal function. Determining lesion   left kidney.    Bilateral renal ultrasound.  Limited by uncooperative patient as per technologist note.  Right kidney 12.7 left 12.3 cm long dimension. Normal cortical volume   echotexture. No hydronephrosis or calculus noted. The aforementioned   renal cyst identified on CT is not identified on this study. Follow-up   contrast-enhanced CT or MR is suggested for further characterization.    Impression: No hydronephrosis. Left renal lesion noted on CT not   identified on this study. Further evaluation with a contrast-enhanced CT   or MR is suggested.  	    EXAM:  CHEST PORTABLE ROUTINE                            PROCEDURE DATE:  12/16/2017          INTERPRETATION:  Chest one view    HISTORY: Follow-up    COMPARISON STUDY: 12/15/2017    Frontal expiratory view of the chest shows the heart to be similar in   size. The lungs show slight clearing of the left lung infiltrates with   progression of right infiltrates. Endotracheal tube, feeding tube and   right jugular line remain present. Left chest tube and subcutaneous   emphysema are again noted. and there is no evidence of pneumothorax nor   definite pleural effusion.    IMPRESSION:  Changing lung infiltrates.

## 2017-12-17 NOTE — PROGRESS NOTE ADULT - SUBJECTIVE AND OBJECTIVE BOX
Meds:  Zosyn 3.375 gm IVPB q 12 hours.    Allergies:  Allergies    No Known Allergies    Intolerances  ROS  [ x ] UNABLE TO ELICIT    General:  [  ] None  [  ] Fever  [  ] Chills  [  ] Malaise    Skin:  [  ] None [  ] Rash  [  ] Wound  [  ] Ulcer    HEENT:  [  ] None  [  ] Sore Throat  [  ] Nasal congestion/ runny nose  [  ] Photophobia [  ] Neck pain      Chest:  [  ] None   [  ] SOB  [  ] Cough  [  ] None    Cardiovascular:   [  ] None  [  ] CP  [  ] Palpitation    Gastrointestinal:  [  ] None  [  ] Abd pain   [  ] Nausea    [  ] Vomiting   [  ] Diarrhea	     Genitourinary:  [  ] None [  ] Polyuria   [  ] Urgency  [  ] Frequency  [  ] Dysuria    [  ]  Hematuria       Musculoskeletal:  [  ] None [  ] Back Pain	[  ] Body aches  [  ] Joint pain    Neurological: [  ] None [  ]Dizziness  [  ]Visual Disturbance  [  ]Headaches   [  ] Weakness        PHYSICAL EXAM:  Vital Signs Last 24 Hrs  T(C): 37.6 (17 Dec 2017 04:57), Max: 37.6 (16 Dec 2017 15:42)  T(F): 99.6 (17 Dec 2017 04:57), Max: 99.7 (16 Dec 2017 15:42)  HR: 96 (17 Dec 2017 07:30) (83 - 115)  BP: 105/58 (17 Dec 2017 07:00) (93/58 - 116/54)  BP(mean): 69 (17 Dec 2017 07:00) (59 - 85)  RR: 21 (17 Dec 2017 07:30) (16 - 29)  SpO2: 100% (17 Dec 2017 07:30) (90% - 100%)    Constitutional:    HEENT: [ x ] Wnl  [ x ] ET and NGT in place    Neck:  [ x ] Supple  [  ]Lymphadenopathy  [  ] No JVD   [  ] JVD  [  ] Masses   [  ] WNL    CHEST/Respiratory:  [  ]Clear to auscultation  [ x ] Rales   [ x ] Rhonchi   [  ] Wheezing     [ x ] Left side Chest tube      Cardiovascular:  [ x ] Reg S1 S2   [  ] Irreg S1 S2   [ x ]No Murmur  [  ] +ve Murmurs  [  ]Systolic [  ]Diastolic      Abdomen:  [ x ] Soft  [ x ] No tendrerness  [  ] Tenderness  [  ] Organomegaly  [  ] ABD Distention  [  ] Rigidity                       [ x ] No Regidity                       [ x ] No Rebound Tenderness  [ x ] No Guarding Rigidity  [  ] Rebound Tenderness[  ] Guarding Rigidity                          [ x ]  +ve Bowel Sounds  [  ] Decreased Bowel Sounds    [  ] Absent Bowel Sounds                            Extremities: [ x ] No edema [  ] Edema  [  ] Clubbing   [  ] Cyanosis                         [ x ] No Tender Calf muscles  [  ] Tender Calf muscles                        [ x ] Palpable peripheral pulses    Neurological: [  ] Awake  [  ] Alert  [  ] Oriented  x                             [  ] Confused  [  ] Drowzy  [ x ] repond to painful stimuli  [  ] Unresponsive    Skin:  [ x ] Intact [  ] Redness [  ] Thrombophlebitis  [  ] Rashes  [  ] Dry  [  ] Ulcers    Ortho:  [  ] Joint Swelling  [  ] Joint erythema [  ] Joint tenderness                [  ] Increased temp. to touch  [  ] DJD [ x ] WNL            LABS/DIAGNOSTIC TESTS                        10.0   18.0  )-----------( 112      ( 16 Dec 2017 06:35 )             32.1   12-17    140  |  111<H>  |  93<H>  ----------------------------<  104<H>  4.9   |  17<L>  |  2.69<H>    Ca    7.2<L>      17 Dec 2017 07:09  Phos  6.0     12-17  Mg     2.6     12-17    TPro  5.2<L>  /  Alb  1.1<L>  /  TBili  1.5<H>  /  DBili  x   /  AST  43<H>  /  ALT  29  /  AlkPhos  47  12-17  ABG - ( 17 Dec 2017 05:19 )  pH: 7.24  /  pCO2: 28    /  pO2: 123   / HCO3: 12    / Base Excess: -14.3 /  SaO2: 98            CULTURES:   Culture - Bronchial (12.13.17 @ 08:06)    Gram Stain:   No polymorphonuclear cells seen per low power field  No squamous epithelial cells per low power field  Moderate Gram Positive Cocci in Pairs and Chains per oil power field  Few-moderate Gram Negative Coccobacilli per oil power field    Specimen Source: .Broncial received in trap    Culture Results:   Normal Respiratory Lissy present    Culture - Surgical Swab (12.12.17 @ 22:52)    Specimen Source: .Surgical Swab Bronchial Alveolar Lavage    Culture Results:   Few Normal Respiratory Lissy present    Culture - Tissue with Gram Stain (12.12.17 @ 22:45)    Gram Stain:   Rare polymorphonuclear leukocytes seen per low power field  Numerous Gram Negative Rods per oil power field    Specimen Source: .Tissue Left Lower Lobe    Culture Results:   Rare Haemophilus parainfluenzae  Rare Neisseria species, not gonorrhoeae or meningitidis    Culture - Blood (12.11.17 @ 11:22)    Specimen Source: .Blood Blood    Culture Results:   No growth to date.    Culture - Blood (12.11.17 @ 11:22)    Specimen Source: .Blood Blood    Culture Results:   No growth to date.    Culture - Blood (12.11.17 @ 11:22)    Specimen Source: .Blood Blood    Culture Results:   No growth to date.            RADIOLOGY    EXAM:  CHEST PORTABLE ROUTINE                            PROCEDURE DATE:  12/16/2017          INTERPRETATION:  Chest one view    HISTORY: Follow-up    COMPARISON STUDY: 12/15/2017    Frontal expiratory view of the chest shows the heart to be similar in   size. The lungs show slight clearing of the left lung infiltrates with   progression of right infiltrates. Endotracheal tube, feeding tube and   right jugular line remain present. Left chest tube and subcutaneous   emphysema are again noted. and there is no evidence of pneumothorax nor   definite pleural effusion.    IMPRESSION:  Changing lung infiltrates.      EXAM:  CT CHEST                            PROCEDURE DATE:  12/15/2017          INTERPRETATION:  CLINICAL INFORMATION: Evaluate mid right lung opacity   seen on previous chest radiograph.    COMPARISON: Chest x-ray from 12/15/2017. Chest CT from 12/5/2017.    PROCEDURE:   CT of the Chest was performed without intravenous contrast.  Sagittal and coronal reformats were performed.      FINDINGS:    CHEST:     LINES AND TUBES: An endotracheal tube terminates at the level of the   aortic arch, abovethe jules. An enteric tube terminates in the stomach.   A right IJ central venous catheter terminates in the SVC. A left-sided   chest tube is noted.  LUNGS/LARGE AIRWAYS/PLEURA: Previously seen left lower lobe necrotic mass   now encompasses the entire left lower lobe with internal gas loculations.   Small left apical pneumothorax. Peribronchovascular groundglass opacities   are present in all lobes. Small bilateral pleural effusions with   associated compressive atelectasis.  VESSELS: Atherosclerotic changes of the aorta and coronary arteries.  HEART: Heart size is normal. No pericardial effusion.  MEDIASTINUM AND ALPHONSE: Pneumomediastinum. Redemonstrated 3.8 x 2.1 cm AP   window lymph node.  CHEST WALL AND LOWER NECK: Extensive bilateral subcutaneous emphysema.  VISUALIZED UPPER ABDOMEN: Redemonstrated, unchanged indeterminant central   low density splenic lesion. Small perihepatic ascites  BONES: Redemonstrated destruction of the left sixth and seventh ribs and   left T6 transverse process. Degenerative changes.    IMPRESSION:     Previously seen left lower lobe necrotic mass now encompasses the entire   left lower lobe with internal gas loculations. Destruction of the   posterior sixth and seventh ribs and left sixth transverse process again   noted.    Peribronchovascular groundglass opacity is present in all lobes and has a   broad differential that includes infection, edema or ARDS.     Small left apical pneumothorax with chest tube in place.              Assessment and Recommendation:   72 years old male from home with PMHx of HTN and BPH and no PSH presents to ED c/o L-sided chest pain radiating to the back with associated general weakness x6 months. Patient also reports loss of voice x6 months, in addition to decreased appetite and weight loss x2 weeks, only been able to consume x3 Ensure today.   10/6/17 patient had bronchoscopy and subsequent pneumothorax and chest tube insertion.  Patient was initially transferred from ICU to regular floor, then upgraded to the icu after condition became unstable.  Patient was started on IV Zosyn on 12/12/17.      Problem/Recommendation - 1:  Problem: Pneumonia, bacterial.   Recommendation:   1- UA & CS.  2- Follow Blood culture to final report.  3- Still recommend to Start Vancomycin 1 gm IVPB q day, and closely follow trough, and renal functions.  4- Continue IV Zosyn, but decrease the dose to 3.375 gm IVPB q 12 hours.  5- Fluid and electrolytes management.  6- CBC and BMP follow up.   7- Follow up CXR.  8- Respirator and Chest tube management.  9- Procalcitonin level.    Problem/Recommendation - 2:  ·  Problem: COPD (chronic obstructive pulmonary disease).    Recommendation:   1- Bronchodilators.  2- O2 as needed.  3- Pulmonary evaluation and management.  4- Steroids as per primary, and pulmonary team.     Problem/Recommendation - 3:  ·  Problem: ZENY (acute kidney injury).    Recommendation:   1- Renal management.  2- Fluid and electrolytes management and follow up.     Discussed with medical resident.

## 2017-12-17 NOTE — PROGRESS NOTE ADULT - SUBJECTIVE AND OBJECTIVE BOX
[facility] NEPHROLOGY- PROGRESS NOTE    73 yo M with HTN, BPH a/w chest discomfort, suprahilar/ hepatic mass and hyponatremia. Hosp course cb subcutaneous emphysema s/p bronch with Left chest tube placement. s/p EBUS  with  endobronchial lung biopsy neg for malignancy however mediastinal biopsy indeterminate. Now intubated with septic shock 2/2 ?PNA, hypotension on pressors, with ZENY and hyponatremia.   Renal consulted for hyponatremia and ZENY. Pt for possible liver mass bx.     Pt with increasing pressor requirement since yesterday.    Hospital Medications: Medications reviewed.  REVIEW OF SYSTEMS: Unable to obtain    VITALS:  T(F): 99.2 (17 @ 08:00), Max: 99.7 (17 @ 15:42)  HR: 96 (17 @ 09:00)  BP: 110/61 (17 @ 09:00)  RR: 24 (17 @ 09:00)  SpO2: 95% (17 @ 09:00)  Wt(kg): --     @ 07:01  -   @ 07:00  --------------------------------------------------------  IN: 3478.9 mL / OUT: 765 mL / NET: 2713.9 mL     @ 07:01  -   @ 09:23  --------------------------------------------------------  IN: 199.9 mL / OUT: 58 mL / NET: 141.9 mL      PHYSICAL EXAM:  Gen: Sedated   HEENT: intubated  Cards: Irregular, +S1/S2, no M/G/R  Resp: left sided crepitus improving, intubated  with left Chest tube  GI: soft, NT/ND, NABS  : +frias  Extremities: +B LE edema/mild anasarca    LABS:      140  |  111<H>  |  93<H>  ----------------------------<  104<H>  4.9   |  17<L>  |  2.69<H>    Ca    7.2<L>      17 Dec 2017 07:09  Phos  6.0       Mg     2.6         TPro  5.2<L>  /  Alb  1.1<L>  /  TBili  1.5<H>  /  DBili      /  AST  43<H>  /  ALT  29  /  AlkPhos  47      Creatinine Trend: 2.69 <--, 2.61 <--, 2.17 <--, 1.87 <--, 1.53 <--, 1.56 <--, 1.19 <--, 0.96 <--                        9.7    17.2  )-----------( CLUMPED    ( 17 Dec 2017 07:09 )             30.6     Urine Studies:  Urinalysis Basic - ( 13 Dec 2017 19:36 )    Color: Yellow / Appearance: Clear / S.025 / pH:   Gluc:  / Ketone: Negative  / Bili: Small / Urobili: 4   Blood:  / Protein: 30 mg/dL / Nitrite: Negative   Leuk Esterase: Negative / RBC: 0-2 /HPF / WBC 0-2 /HPF   Sq Epi:  / Non Sq Epi: Few /HPF / Bacteria: Few /HPF      Sodium, Random Urine: 9 mmol/L (12-15 @ 18:16)  Chloride, Random Urine: <10 mmol/L (12-15 @ 18:16)  Potassium, Random Urine: 36 mmol/L (12-15 @ 18:16)  Creatinine, Random Urine: 153 mg/dL (12-15 @ 18:16)  Osmolality, Random Urine: 330 mos/kg (12-15 @ 18:16)  Chloride, Random Urine: <10 mmol/L ( @ 13:22)  Osmolality, Random Urine: 427 mos/kg ( @ 13:22)  Sodium, Random Urine: 10 mmol/L ( @ 13:22)  Potassium, Random Urine: 58 mmol/L ( @ 13:22)  Creatinine, Random Urine: 173 mg/dL ( @ 13:22)  Osmolality, Random Urine: 448 mos/kg ( @ 06:40)  Sodium, Random Urine: 6 mmol/L ( @ 06:40)  Sodium, Random Urine: 6 mmol/L ( @ 06:40)  Creatinine, Random Urine: 102 mg/dL ( @ 06:40)

## 2017-12-17 NOTE — PROGRESS NOTE ADULT - PROBLEM SELECTOR PLAN 6
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

## 2017-12-17 NOTE — PROGRESS NOTE ADULT - SUBJECTIVE AND OBJECTIVE BOX
pt intubated  115/57 p100 pressors  Lungs: scattered rhonchi bilat  left Ct in place, no AL. scant aq emphysema left chest wall.  Abd: soft nt nd  penile edema unchanged. no erythema no purulence                        9.7    17.2  )-----------( x        ( 17 Dec 2017 07:09 )             30.6   12-17    140  |  111<H>  |  93<H>  ----------------------------<  104<H>  4.9   |  17<L>  |  2.69<H>    Ca    7.2<L>      17 Dec 2017 07:09  Phos  6.0     12-17  Mg     2.6     12-17    TPro  5.2<L>  /  Alb  1.1<L>  /  TBili  1.5<H>  /  DBili  x   /  AST  43<H>  /  ALT  29  /  AlkPhos  47  12-17

## 2017-12-17 NOTE — PROGRESS NOTE ADULT - ASSESSMENT
Left CT with minimal output, decreased Left SQ emphysema  PNA  hypoalbuminemia  penile swelling  no acute surgical intervention warranted          continue present mngmt  will follow.

## 2017-12-17 NOTE — PROGRESS NOTE ADULT - PROBLEM SELECTOR PLAN 1
Non-oliguric ZENY likely hemodynamically mediated in the setting of septic shock/ hypotension; ATN.  Renal function worsening.      FeNa is low (0.1%) suggestive of component of dehydration.  LE edema/anasarca is due in large part to low oncotic pressure due to hypoalbuminemia/PCM in acute illness. Strict I/O as well.     UA with 30 protein and mod blood in the setting of infection. Recc to repeat UA once infection cleared. FeNa 0.07%- agree with IVF.  c/w antibiotics/ pressors as per MICU. Monitor Vanco trough.   CT abd/ pelvis 12/5 1.0 cm indeterminate exophytic lesion arising from the   lower pole of left kidney. No hydronephrosis. Can check Renal US when stable. Consider outpt Urology f/u.   Strict I/Os. Avoid nephrotoxins/ NSAIDs/ RCA. Monitor BMP.

## 2017-12-17 NOTE — PROGRESS NOTE ADULT - SUBJECTIVE AND OBJECTIVE BOX
INTERVAL HPI/OVERNIGHT EVENTS: Noticed edematous penis with erythmatous demarcation and bullae. Urology was consulted, advised to start bacitracin. Holding off to heparin and aspirin--> plan for liver biopsy on Monday. CT chest shows necrotic mass in left lower lobe. Patient had high residual, will hold off to tube feeds for now.    PRESSORS: [x] YES [] NO  WHICH: pheny    Antimicrobial:  piperacillin/tazobactam IVPB. 3.375 Gram(s) IV Intermittent every 12 hours DATE STARTED: 7/15    Cardiovascular:  amiodarone    Tablet 400 milliGRAM(s) Oral three times a day  phenylephrine    Infusion 0.5 MICROgram(s)/kG/Min IV Continuous <Continuous>    Pulmonary:    Hematalogic:    Other:  acetaminophen  Suppository 650 milliGRAM(s) Rectal every 6 hours PRN  fentaNYL   Infusion 2 MICROgram(s)/kG/Hr IV Continuous <Continuous>  pantoprazole  Injectable 40 milliGRAM(s) IV Push daily  propofol Infusion 5 MICROgram(s)/kG/Min IV Continuous <Continuous>  sodium bicarbonate 650 milliGRAM(s) Oral two times a day  sodium chloride 0.9%. 1000 milliLiter(s) IV Continuous <Continuous>  vasopressin Infusion 0.04 Unit(s)/Min IV Continuous <Continuous>    acetaminophen  Suppository 650 milliGRAM(s) Rectal every 6 hours PRN  amiodarone    Tablet 400 milliGRAM(s) Oral three times a day  fentaNYL   Infusion 2 MICROgram(s)/kG/Hr IV Continuous <Continuous>  pantoprazole  Injectable 40 milliGRAM(s) IV Push daily  phenylephrine    Infusion 0.5 MICROgram(s)/kG/Min IV Continuous <Continuous>  piperacillin/tazobactam IVPB. 3.375 Gram(s) IV Intermittent every 12 hours  propofol Infusion 5 MICROgram(s)/kG/Min IV Continuous <Continuous>  sodium bicarbonate 650 milliGRAM(s) Oral two times a day  sodium chloride 0.9%. 1000 milliLiter(s) IV Continuous <Continuous>  vasopressin Infusion 0.04 Unit(s)/Min IV Continuous <Continuous>    Drug Dosing Weight  Height (cm): 165 (12 Dec 2017 13:14)  Weight (kg): 76.4 (12 Dec 2017 16:00)  BMI (kg/m2): 28.1 (12 Dec 2017 16:00)  BSA (m2): 1.84 (12 Dec 2017 16:00)    CENTRAL LINE: [x] YES [] NO  LOCATION: [facility]  DATE INSERTED: 12/12  REMOVE: [] YES [c] NO  EXPLAIN: Pressor    LERMA: [x] YES [] NO    DATE INSERTED: 12/12  REMOVE:  [] YES [x] NO  EXPLAIN: IOs    A-LINE:  [x] YES [] NO  DATE INSERTED:  REMOVE:  [] YES [x] NO  EXPLAIN: ABG    PMH -reviewed admission note, no change since admission  PAST MEDICAL & SURGICAL HISTORY:  BPH (benign prostatic hyperplasia)  Hypertension  No significant past surgical history      ICU Vital Signs Last 24 Hrs  T(C): 37.7 (16 Dec 2017 00:00), Max: 38.2 (15 Dec 2017 06:30)  T(F): 99.9 (16 Dec 2017 00:00), Max: 100.8 (15 Dec 2017 06:30)  HR: 95 (16 Dec 2017 01:30) (93 - 125)  BP: 110/63 (16 Dec 2017 01:30) (86/64 - 120/60)  BP(mean): 75 (16 Dec 2017 01:30) (47 - 95)  ABP: 113/60 (16 Dec 2017 01:30) (79/43 - 129/75)  ABP(mean): 78 (16 Dec 2017 01:30) (3 - 94)  RR: 24 (16 Dec 2017 01:30) (15 - 27)  SpO2: 100% (16 Dec 2017 01:30) (88% - 100%)      ABG - ( 16 Dec 2017 00:19 )  pH: 7.22  /  pCO2: 42    /  pO2: 90    / HCO3: 17    / Base Excess: -10.2 /  SaO2: 96                    12-14 @ 07:01  -  12-15 @ 07:00  --------------------------------------------------------  IN: 5286.6 mL / OUT: 865 mL / NET: 4421.6 mL        Mode: AC/ CMV (Assist Control/ Continuous Mandatory Ventilation)  RR (machine): 24  TV (machine): 450  FiO2: 50  PEEP: 5  ITime: 1  MAP: 9.5  PIP: 18      PHYSICAL EXAM:    GENERAL: [x]NAD, []well-groomed, []well-developed  HEAD:  [x]Atraumatic, []Normocephalic  EYES: []EOMI, []PERRLA, [x]conjunctiva and sclera clear  ENMT: [x]No tonsillar erythema, exudates, or enlargement; []Moist mucous membranes, []Good dentition, []No lesions  NECK: [x]Supple, normal appearance, []No JVD; []Normal thyroid; []Trachea midline  NERVOUS SYSTEM:  [x]Sedated, []Good concentration; []Motor Strength 5/5 B/L upper and lower extremities; []DTRs 2+ intact and symmetric  CHEST/LUNG: [x]No chest deformity; []Normal percussion bilaterally; []No rales, rhonchi, wheezing   HEART: [x]Tachycardia; []No murmurs, rubs, or gallops  ABDOMEN: [x]Soft, Nontender, Nondistended; []Bowel sounds present  EXTREMITIES:  [x]2+ Peripheral Pulses, []No clubbing, cyanosis, or edema  LYMPH: [x]No lymphadenopathy noted  SKIN: [x]No rashes or lesions; []Good capillary refill      LABS:  CBC Full  -  ( 15 Dec 2017 05:55 )  WBC Count : 19.0 K/uL  Hemoglobin : 10.9 g/dL  Hematocrit : 34.8 %  Platelet Count - Automated : 128 K/uL  Mean Cell Volume : 99.7 fl  Mean Cell Hemoglobin : 31.3 pg  Mean Cell Hemoglobin Concentration : 31.4 gm/dL  Auto Neutrophil # : 16.3 K/uL  Auto Lymphocyte # : 1.3 K/uL  Auto Monocyte # : 0.5 K/uL  Auto Eosinophil # : 0.8 K/uL  Auto Basophil # : 0.1 K/uL  Auto Neutrophil % : 86.1 %  Auto Lymphocyte % : 6.7 %  Auto Monocyte % : 2.4 %  Auto Eosinophil % : 4.2 %  Auto Basophil % : 0.5 %    12-15    135  |  107  |  71<H>  ----------------------------<  76  5.1   |  16<L>  |  2.17<H>    Ca    6.9<L>      15 Dec 2017 05:55  Phos  7.1     12-15  Mg     2.4     12-15    TPro  5.3<L>  /  Alb  1.2<L>  /  TBili  1.9<H>  /  DBili  x   /  AST  63<H>  /  ALT  69<H>  /  AlkPhos  51  12-14        Culture Results:   No growth to date. (12-14 @ 00:30)  Culture Results:   No growth to date. (12-14 @ 00:30)  Culture Results:   Normal Respiratory Lissy present (12-13 @ 08:06)      RADIOLOGY & ADDITIONAL STUDIES REVIEWED:  ***    []GOALS OF CARE DISCUSSION WITH PATIENT/FAMILY/PROXY:    CRITICAL CARE TIME SPENT: 35 minutes INTERVAL HPI/OVERNIGHT EVENTS: Noticed edematous penis with erythmatous demarcation and bullae. Urology was consulted, advised to start bacitracin. Holding off to heparin and aspirin--> plan for liver biopsy on Monday. CT chest shows necrotic mass in left lower lobe. Patient had high residual, will hold off to tube feeds for now.    PRESSORS: [x] YES [] NO  WHICH: pheny    Antimicrobial:  piperacillin/tazobactam IVPB. 3.375 Gram(s) IV Intermittent every 12 hours DATE STARTED: 7/15    Cardiovascular:  amiodarone    Tablet 400 milliGRAM(s) Oral three times a day  phenylephrine    Infusion 0.5 MICROgram(s)/kG/Min IV Continuous <Continuous>    Pulmonary:    Hematalogic:    Other:  acetaminophen  Suppository 650 milliGRAM(s) Rectal every 6 hours PRN  fentaNYL   Infusion 2 MICROgram(s)/kG/Hr IV Continuous <Continuous>  pantoprazole  Injectable 40 milliGRAM(s) IV Push daily  propofol Infusion 5 MICROgram(s)/kG/Min IV Continuous <Continuous>  sodium bicarbonate 650 milliGRAM(s) Oral two times a day  sodium chloride 0.9%. 1000 milliLiter(s) IV Continuous <Continuous>  vasopressin Infusion 0.04 Unit(s)/Min IV Continuous <Continuous>    acetaminophen  Suppository 650 milliGRAM(s) Rectal every 6 hours PRN  amiodarone    Tablet 400 milliGRAM(s) Oral three times a day  fentaNYL   Infusion 2 MICROgram(s)/kG/Hr IV Continuous <Continuous>  pantoprazole  Injectable 40 milliGRAM(s) IV Push daily  phenylephrine    Infusion 0.5 MICROgram(s)/kG/Min IV Continuous <Continuous>  piperacillin/tazobactam IVPB. 3.375 Gram(s) IV Intermittent every 12 hours  propofol Infusion 5 MICROgram(s)/kG/Min IV Continuous <Continuous>  sodium bicarbonate 650 milliGRAM(s) Oral two times a day  sodium chloride 0.9%. 1000 milliLiter(s) IV Continuous <Continuous>  vasopressin Infusion 0.04 Unit(s)/Min IV Continuous <Continuous>    Drug Dosing Weight  Height (cm): 165 (12 Dec 2017 13:14)  Weight (kg): 76.4 (12 Dec 2017 16:00)  BMI (kg/m2): 28.1 (12 Dec 2017 16:00)  BSA (m2): 1.84 (12 Dec 2017 16:00)    CENTRAL LINE: [x] YES [] NO  LOCATION: [facility]  DATE INSERTED: 12/12  REMOVE: [] YES [c] NO  EXPLAIN: Pressor    LERMA: [x] YES [] NO    DATE INSERTED: 12/12  REMOVE:  [] YES [x] NO  EXPLAIN: IOs    A-LINE:  [x] YES [] NO  DATE INSERTED:  REMOVE:  [] YES [x] NO  EXPLAIN: ABG    PMH -reviewed admission note, no change since admission  PAST MEDICAL & SURGICAL HISTORY:  BPH (benign prostatic hyperplasia)  Hypertension  No significant past surgical history      ICU Vital Signs Last 24 Hrs  T(C): 37.7 (16 Dec 2017 00:00), Max: 38.2 (15 Dec 2017 06:30)  T(F): 99.9 (16 Dec 2017 00:00), Max: 100.8 (15 Dec 2017 06:30)  HR: 95 (16 Dec 2017 01:30) (93 - 125)  BP: 110/63 (16 Dec 2017 01:30) (86/64 - 120/60)  BP(mean): 75 (16 Dec 2017 01:30) (47 - 95)  ABP: 113/60 (16 Dec 2017 01:30) (79/43 - 129/75)  ABP(mean): 78 (16 Dec 2017 01:30) (3 - 94)  RR: 24 (16 Dec 2017 01:30) (15 - 27)  SpO2: 100% (16 Dec 2017 01:30) (88% - 100%)      ABG - ( 16 Dec 2017 00:19 )  pH: 7.22  /  pCO2: 42    /  pO2: 90    / HCO3: 17    / Base Excess: -10.2 /  SaO2: 96                    12-14 @ 07:01  -  12-15 @ 07:00  --------------------------------------------------------  IN: 5286.6 mL / OUT: 865 mL / NET: 4421.6 mL        Mode: AC/ CMV (Assist Control/ Continuous Mandatory Ventilation)  RR (machine): 24  TV (machine): 450  FiO2: 50  PEEP: 5  ITime: 1  MAP: 9.5  PIP: 18      PHYSICAL EXAM:    GENERAL: [x]NAD, []well-groomed, []well-developed  HEAD:  [x]Atraumatic, []Normocephalic  EYES:  [x]conjunctiva and sclera clear, limited reactive pupils  ENMT:  [x]Moist mucous membranes,  NECK: [x]Supple, normal appearance, [x]No JVD;   NERVOUS SYSTEM:  [x]Sedated,  CHEST/LUNG: [x]No chest deformity; [x]No rales, rhonchi, wheezing   HEART: [x]Tachycardia; [x]No murmurs, rubs, or gallops  ABDOMEN: [x]Soft, Nontender, Nondistended; [x]Bowel sounds present  EXTREMITIES:  [x]2+ Peripheral Pulses, [x] 1+ non-pitting edema on bilateral upper extremities  LYMPH: [x]No lymphadenopathy noted  SKIN: [x] erythematous bullae on penile skin      LABS:  CBC Full  -  ( 15 Dec 2017 05:55 )  WBC Count : 19.0 K/uL  Hemoglobin : 10.9 g/dL  Hematocrit : 34.8 %  Platelet Count - Automated : 128 K/uL  Mean Cell Volume : 99.7 fl  Mean Cell Hemoglobin : 31.3 pg  Mean Cell Hemoglobin Concentration : 31.4 gm/dL  Auto Neutrophil # : 16.3 K/uL  Auto Lymphocyte # : 1.3 K/uL  Auto Monocyte # : 0.5 K/uL  Auto Eosinophil # : 0.8 K/uL  Auto Basophil # : 0.1 K/uL  Auto Neutrophil % : 86.1 %  Auto Lymphocyte % : 6.7 %  Auto Monocyte % : 2.4 %  Auto Eosinophil % : 4.2 %  Auto Basophil % : 0.5 %    12-15    135  |  107  |  71<H>  ----------------------------<  76  5.1   |  16<L>  |  2.17<H>    Ca    6.9<L>      15 Dec 2017 05:55  Phos  7.1     12-15  Mg     2.4     12-15    TPro  5.3<L>  /  Alb  1.2<L>  /  TBili  1.9<H>  /  DBili  x   /  AST  63<H>  /  ALT  69<H>  /  AlkPhos  51  12-14        Culture Results:   No growth to date. (12-14 @ 00:30)  Culture Results:   No growth to date. (12-14 @ 00:30)  Culture Results:   Normal Respiratory Lissy present (12-13 @ 08:06)      RADIOLOGY & ADDITIONAL STUDIES REVIEWED:  ***    []GOALS OF CARE DISCUSSION WITH PATIENT/FAMILY/PROXY:    CRITICAL CARE TIME SPENT: 35 minutes

## 2017-12-17 NOTE — PROGRESS NOTE ADULT - SUBJECTIVE AND OBJECTIVE BOX
Patient is a 72y old  Male who presents with a chief complaint of weakness, chest discomfort and voice and appetite changes (05 Dec 2017 01:22)  Patient remains sedated, intubated on mechanical ventilation. Still on pressors meds. Biopsy done via EBUS still pending. LNs sampling neg for CA. Remains with chest tube in place. Subcutaneous emphysema slightly reduced.    INTERVAL HPI/OVERNIGHT EVENTS:      VITAL SIGNS:  T(F): 99.2 (12-17-17 @ 08:00)  HR: 94 (12-17-17 @ 10:30)  BP: 117/57 (12-17-17 @ 10:30)  RR: 22 (12-17-17 @ 10:30)  SpO2: 100% (12-17-17 @ 10:30)  Wt(kg): --  I&O's Detail    16 Dec 2017 07:01  -  17 Dec 2017 07:00  --------------------------------------------------------  IN:    Enteral Tube Flush: 30 mL    fentaNYL  Infusion: 367.2 mL    phenylephrine   Infusion: 1652.4 mL    propofol Infusion: 241.5 mL    sodium chloride 0.9%: 1000 mL    Solution: 75 mL    vasopressin Infusion: 52.8 mL    Vital HN: 60 mL  Total IN: 3478.9 mL    OUT:    Chest Tube: 60 mL    Indwelling Catheter - Urethral: 705 mL  Total OUT: 765 mL    Total NET: 2713.9 mL      17 Dec 2017 07:01  -  17 Dec 2017 10:51  --------------------------------------------------------  IN:    Enteral Tube Flush: 75 mL    fentaNYL  Infusion: 45.9 mL    phenylephrine   Infusion: 191 mL    propofol Infusion: 34.5 mL    Solution: 12.5 mL    vasopressin Infusion: 7.2 mL    Vital HN: 10 mL  Total IN: 376.1 mL    OUT:    Indwelling Catheter - Urethral: 95 mL  Total OUT: 95 mL    Total NET: 281.1 mL        Mode: AC/ CMV (Assist Control/ Continuous Mandatory Ventilation)  RR (machine): 24  TV (machine): 450  FiO2: 50  PEEP: 5  ITime: 1  MAP: 9.1  PIP: 22        REVIEW OF SYSTEMS:    CONSTITUTIONAL:  No fevers, chills, sweats    HEENT:  Eyes:  No diplopia or blurred vision. ENT:  No earache, sore throat or runny nose.    CARDIOVASCULAR:  No pressure, squeezing, tightness, or heaviness about the chest; no palpitations.    RESPIRATORY:  Per HPI    GASTROINTESTINAL:  No abdominal pain, nausea, vomiting or diarrhea.    GENITOURINARY:  No dysuria, frequency or urgency.    NEUROLOGIC:  No paresthesias, fasciculations, seizures or weakness.    PSYCHIATRIC:  No disorder of thought or mood.      PHYSICAL EXAM:    Constitutional: Well developed and nourished  Eyes:Perrla  ENMT: normal  Neck:supple  Respiratory: rales bilaterally. + crepitus on left  Cardiovascular: S1 S2 regular  Gastrointestinal: Soft, Non tender, scrotal and penile edema  Extremities: + edema  Vascular:normal  Neurological:Sedated  Musculoskeletal:Normal      MEDICATIONS  (STANDING):  amiodarone    Tablet 400 milliGRAM(s) Oral three times a day  BACItracin   Ointment 1 Application(s) Topical two times a day  fentaNYL   Infusion 2 MICROgram(s)/kG/Hr (15.28 mL/Hr) IV Continuous <Continuous>  pantoprazole  Injectable 40 milliGRAM(s) IV Push daily  phenylephrine    Infusion 0.5 MICROgram(s)/kG/Min (6.563 mL/Hr) IV Continuous <Continuous>  piperacillin/tazobactam IVPB. 3.375 Gram(s) IV Intermittent every 12 hours  propofol Infusion 5 MICROgram(s)/kG/Min (2.292 mL/Hr) IV Continuous <Continuous>  sodium bicarbonate 650 milliGRAM(s) Oral two times a day  vasopressin Infusion 0.04 Unit(s)/Min (2.4 mL/Hr) IV Continuous <Continuous>    MEDICATIONS  (PRN):  acetaminophen  Suppository 650 milliGRAM(s) Rectal every 6 hours PRN For Temp greater than 38 C (100.4 F)  chlorproMAZINE    Tablet 25 milliGRAM(s) Oral every 6 hours PRN hiccups      Allergies    No Known Allergies    Intolerances        LABS:                        9.7    17.2  )-----------( CLUMPED    ( 17 Dec 2017 07:09 )             30.6     12-17    140  |  111<H>  |  93<H>  ----------------------------<  104<H>  4.9   |  17<L>  |  2.69<H>    Ca    7.2<L>      17 Dec 2017 07:09  Phos  6.0     12-17  Mg     2.6     12-17    TPro  5.2<L>  /  Alb  1.1<L>  /  TBili  1.5<H>  /  DBili  x   /  AST  43<H>  /  ALT  29  /  AlkPhos  47  12-17    PT/INR - ( 17 Dec 2017 07:09 )   PT: 11.3 sec;   INR: 1.04 ratio         PTT - ( 17 Dec 2017 07:09 )  PTT:25.5 sec    ABG - ( 17 Dec 2017 05:19 )  pH: 7.24  /  pCO2: 28    /  pO2: 123   / HCO3: 12    / Base Excess: -14.3 /  SaO2: 98                    CAPILLARY BLOOD GLUCOSE            RADIOLOGY & ADDITIONAL TESTS:    CXR:  < from: Xray Chest 1 View AP -PORTABLE-Routine (12.16.17 @ 10:33) >  IMPRESSION:  Changing lung infiltrates.  < end of copied text >    Ct scan chest:    ekg;    echo:

## 2017-12-17 NOTE — PROGRESS NOTE ADULT - PROBLEM SELECTOR PLAN 2
Resolved. Monitor electrolytes    ?hypovolemic hyponatremia in the setting of dec PO intake. serum osm 280, urine osm 448 with  Maria Eugenia 6 on IVF.   Serum sodium improving with IVF.  Monitor serum sodium.

## 2017-12-17 NOTE — PROGRESS NOTE ADULT - ASSESSMENT
A 72 y M  former smoker 20 PPD with Hx HTN BPH  p/w generalized weakness, voice changes with imaging showing left hilar mass in CT scan , s/p bronch and biopsy on 12/06 , admitted to ICU on 12/06 for pneumothorax s/p diagnostic bronchoscopy for left lobe lesions found on CT imaging - chest tube placed in ICU 12/6 on suction - admitted for urgent CT placement and monitoring. pt was downgraded on Lung biopsy from 12/07. Lung biopsy did not show any malignancy. s/p transbronchial lung biopsy , s/p endobronchial lung biopsy, s/p flexible bronchoscopy today by Dr. Piper.   ROS - unable to get as pt is intubated    admit to ICU post op monitoring s/p intubation s/p chest tube , hypotensive 2/2 septic shock, requiring pressors, on phenylephrine      Septic Shock  -c/w chest tube to suction, no air leak  -c/w  abx - zosyn  -s/p intubation , c/w mechanical ventilation  -c/w sedation  -hypotension post anesthesia , c/w IV fluids and c/w pressor   - c/w phenylephrine, vasopressin  - pt on propofol for sedation, fentanyl for pain  -increased white count, tissue culture growing gram negative rods rare neisseria, haemophilus, BCx negative to date  - UA negative      Acute Kidney Injury  -c/w IVF  -monitor urine output - worsening, approx 25cc/hr  -added sodium bicarb per Nephro recommendations    Hyponatremia.  Plan: resolved    New onset Atrial Fibrillation  -increased HR after given low dose levophed, continued despite d/c of levophed  - EKG shows Atrial fibrillation (new onset)  - Patient on amiodarone   - f/u cardiology - Dr. Jackson    Pneumothorax   Crepitus along neck and L anterior chest wall  -chest tube to suction    Lung mass  -c/w post op prophylaxis  -Bronch path negative for Ca.  -s/p EBUS VS mediastinoscopy  -cyto report, negative for  malignancy  - 1 biopsy still pending  -repeat CT chest - worsening necrotic mass from prior imaging  -holding all anticoagulation for liver mass biopsy with IR, possible for Monday if pt is off of pressors    Hypertension.    -hypotension , holding home BP meds    BPH (benign prostatic hyperplasia)  - monitor U/o  -resume PO tamsulosin with NG tube    penile swelling: Urology consult requested, cold compresses    Prophylactic measure  - IMPROVE score 2   - c/w heparin S.C for VTE prophylaxis  - GI stress ulcer PPx w/ Protonix. A 72 y M  former smoker 20 PPD with Hx HTN BPH  p/w generalized weakness, voice changes with imaging showing left hilar mass in CT scan , s/p bronch and biopsy on 12/06 , admitted to ICU on 12/06 for pneumothorax s/p diagnostic bronchoscopy for left lobe lesions found on CT imaging - chest tube placed in ICU 12/6 on suction - admitted for urgent CT placement and monitoring. pt was downgraded on Lung biopsy from 12/07. Lung biopsy did not show any malignancy. s/p transbronchial lung biopsy , s/p endobronchial lung biopsy, s/p flexible bronchoscopy today by Dr. Piper.   ROS - unable to get as pt is intubated    admit to ICU post op monitoring s/p intubation s/p chest tube , hypotensive 2/2 septic shock, requiring pressors, on phenylephrine      Septic Shock  -c/w chest tube to suction, no air leak  -c/w  abx - zosyn  -s/p intubation , c/w mechanical ventilation  -c/w sedation  -hypotension post anesthesia , c/w IV fluids and c/w pressor   - c/w phenylephrine, vasopressin  - pt on propofol for sedation, fentanyl for pain  -increased white count, tissue culture growing gram negative rods rare neisseria, haemophilus, BCx negative to date  - UA negative      Acute Kidney Injury  -c/w IVF  -monitor urine output - worsening, approx 25cc/hr  -added sodium bicarb per Nephro recommendations    Hyponatremia.  Plan: resolved    New onset Atrial Fibrillation  -increased HR after given low dose levophed, continued despite d/c of levophed  - EKG shows Atrial fibrillation (new onset)  - Patient on amiodarone   - f/u cardiology - Dr. Jackson    Pneumothorax   Crepitus along neck and L anterior chest wall  -chest tube to suction    Lung mass  -c/w post op prophylaxis  -Bronch path negative for Ca.  -s/p EBUS VS mediastinoscopy  -cyto report, negative for  malignancy  - 1 biopsy still pending  -repeat CT chest - worsening necrotic mass from prior imaging  -holding all anticoagulation for liver mass biopsy with IR, possible for Monday if pt is off of pressors    Hypertension.    -hypotension , holding home BP meds    BPH (benign prostatic hyperplasia)  - monitor U/o  -resume PO tamsulosin with NG tube    penile swelling: Urology consult requested, cold compresses, bacitracin ointment application    Prophylactic measure  - IMPROVE score 2   - c/w heparin S.C for VTE prophylaxis  - GI stress ulcer PPx w/ Protonix. A 72 y M  former smoker 20 PPD with Hx HTN BPH  p/w generalized weakness, voice changes with imaging showing left hilar mass in CT scan , s/p bronch and biopsy on 12/06 , admitted to ICU on 12/06 for pneumothorax s/p diagnostic bronchoscopy for left lobe lesions found on CT imaging - chest tube placed in ICU 12/6 on suction - admitted for urgent CT placement and monitoring. pt was downgraded on Lung biopsy from 12/07. Lung biopsy did not show any malignancy. s/p transbronchial lung biopsy , s/p endobronchial lung biopsy, s/p flexible bronchoscopy today by Dr. Piper.   ROS - unable to get as pt is intubated    admit to ICU post op monitoring s/p intubation s/p chest tube , hypotensive 2/2 septic shock, requiring pressors, on phenylephrine      Septic Shock  -c/w chest tube to suction, no air leak  -c/w  abx - zosyn  -s/p intubation , c/w mechanical ventilation  -c/w sedation  -hypotension post anesthesia , c/w IV fluids and c/w pressor   - c/w phenylephrine, vasopressin  - pt on propofol for sedation, fentanyl for pain  -increased white count, tissue culture growing gram negative rods rare neisseria, haemophilus, BCx negative to date  - UA negative      Acute Kidney Injury  -c/w IVF  -monitor urine output - worsening, approx 25cc/hr  -added sodium bicarb per Nephro recommendations    Hyponatremia.  Plan: resolved    New onset Atrial Fibrillation  -increased HR after given low dose levophed, continued despite d/c of levophed  - EKG shows Atrial fibrillation (new onset)  - Patient on amiodarone (day 4 loading)  - f/u cardiology - Dr. Jackson    Pneumothorax   Crepitus along neck and L anterior chest wall  -chest tube to suction    Lung mass  -c/w post op prophylaxis  -Bronch path negative for Ca.  -s/p EBUS VS mediastinoscopy  -cyto report, negative for  malignancy  - 1 biopsy still pending  -repeat CT chest - worsening necrotic mass from prior imaging  -holding all anticoagulation for liver mass biopsy with IR, possible for Monday if pt is off of pressors    Hypertension.    -hypotension , holding home BP meds    BPH (benign prostatic hyperplasia)  - monitor U/o  -resume PO tamsulosin with NG tube    penile swelling: Urology consult requested, cold compresses, bacitracin ointment application    Prophylactic measure  - IMPROVE score 2   - c/w heparin S.C for VTE prophylaxis  - GI stress ulcer PPx w/ Protonix.

## 2017-12-17 NOTE — PROGRESS NOTE ADULT - ASSESSMENT
72 years old male from home with PMHx of HTN and BPH and no PSH presents to ED c/o L-sided chest pain ,lung and hepatic mass, s/p PTHX,now respiratory failure and afib with RVR.  1.Vent support as per ICU.  2.Amiodarone loading D#4.  3.Continue ASA for stroke prevention.  4.Given suspected metastatic disease ,high risk for bleeding with full AC.  5.ABX.  6.Pressor support as per ICU.  7.GI and DVT prophylaxis.  8.Overall prognosis is poor.

## 2017-12-17 NOTE — PROGRESS NOTE ADULT - SUBJECTIVE AND OBJECTIVE BOX
Patient is a 72y old  Male who presents with a chief complaint of weakness, chest discomfort and voice and appetite changes (05 Dec 2017 01:22)    pt seen in icu [ x ], reg med floor [ ], bed [ x ], chair at bedside [   ], a+o x3 [  ],sedated [ x ], nad [x  ],   left chest to pleurovac [x],   et tube [x],  ngt feed [ x ], frias to bsd [x], right ij cent line [x], fentanyl, propofol, norepi and  phenylepherine drip [x],      Allergies    No Known Allergies        Vitals    T(F): 99.6 (12-17-17 @ 04:57), Max: 99.7 (12-16-17 @ 15:42)  HR: 96 (12-17-17 @ 07:00) (83 - 117)  BP: 105/58 (12-17-17 @ 07:00) (93/58 - 116/54)  RR: 21 (12-17-17 @ 07:00) (16 - 29)  SpO2: 94% (12-17-17 @ 07:00) (90% - 100%)  Wt(kg): --  CAPILLARY BLOOD GLUCOSE          Labs                          10.0   18.0  )-----------( 112      ( 16 Dec 2017 06:35 )             32.1       12-16    138  |  109<H>  |  82<H>  ----------------------------<  96  4.9   |  17<L>  |  2.61<H>    Ca    7.1<L>      16 Dec 2017 06:35  Phos  6.6     12-16  Mg     2.5     12-16    TPro  5.1<L>  /  Alb  1.0<L>  /  TBili  1.7<H>  /  DBili  x   /  AST  75<H>  /  ALT  40  /  AlkPhos  50  12-16            .Blood Blood  12-14 @ 00:30   No growth to date.  --  --      .Broncial received in trap  12-13 @ 08:06   Normal Respiratory Lissy present  --    No polymorphonuclear cells seen per low power field  No squamous epithelial cells per low power field  Moderate Gram Positive Cocci in Pairs and Chains per oil power field  Few-moderate Gram Negative Coccobacilli per oil power field      .Surgical Swab Bronchial Alveolar Lavage  12-12 @ 22:52   Moderate Prevotella intermedia  Rare Prevotella bivia  Few Normal Respiratory Lissy present  --  --      .Tissue Left Lower Lobe  12-12 @ 22:45   Rare Haemophilus parainfluenzae  Rare Neisseria species, not gonorrhoeae or meningitidis  Rare Alpha hemolytic strep  Few Prevotella melaninogenica  Numerous Prevotella intermedia  --    Rare polymorphonuclear leukocytes seen per low power field  Numerous Gram Negative Rods per oil power field      .Blood Blood  12-11 @ 11:22   No growth at 5 days.  --  --      .Broncial Other, bronchioalveolar lavage  12-06 @ 22:28   No growth at 1 week.  --  --      .Blood Blood-Peripheral  12-05 @ 10:57   No growth at 5 days.  --  --          Radiology Results      Meds    MEDICATIONS  (STANDING):  amiodarone    Tablet 400 milliGRAM(s) Oral three times a day  BACItracin   Ointment 1 Application(s) Topical two times a day  fentaNYL   Infusion 2 MICROgram(s)/kG/Hr (15.28 mL/Hr) IV Continuous <Continuous>  pantoprazole  Injectable 40 milliGRAM(s) IV Push daily  phenylephrine    Infusion 0.5 MICROgram(s)/kG/Min (6.563 mL/Hr) IV Continuous <Continuous>  piperacillin/tazobactam IVPB. 3.375 Gram(s) IV Intermittent every 12 hours  propofol Infusion 5 MICROgram(s)/kG/Min (2.292 mL/Hr) IV Continuous <Continuous>  sodium bicarbonate 650 milliGRAM(s) Oral two times a day  vasopressin Infusion 0.04 Unit(s)/Min (2.4 mL/Hr) IV Continuous <Continuous>      MEDICATIONS  (PRN):  acetaminophen  Suppository 650 milliGRAM(s) Rectal every 6 hours PRN For Temp greater than 38 C (100.4 F)      Physical Exam    Neuro :  no focal deficits  Respiratory: CTA B/L, left subcutaneous emphysema, left chest tube to pleurovac  CV: RRR, S1S2, no murmurs,   Abdominal: Soft, NT, ND +BS,  Extremities: No edema, + peripheral pulses  genitals: edematous penis with some erythema, frias to bsd    ASSESSMENT    perihilar mass,   hepatic and splenic lesions,   r/o malig,   copd   destruction of left 6th and 7th ribs and left T6 transverse process   exophytic left renal lesion  subcutaneous emphysema  pneumothorax  s/p hyponatremia  silverio  new afib  penile edema  h/o BPH (benign prostatic hyperplasia)  Hypertension        PLAN        S/P flexible bronchoscopy showing possible Large B-cell lymphoma,   s/p flex bronch transbronchial lung bx and EBU bx 12/12  cytopath of flex bronch and ebus neg for malig  cx from flex bronch with Few Normal Respiratory Lissy present and Rare Haemophilus parainfluenzae  Rare Neisseria species, not gonorrhoeae or meningitidis  -- Rare polymorphonuclear leukocytes seen per low power field  Numerous Gram Negative Rods per oil power field  noted above   thoracic surg f/u  monitor chest tube  heme onc cons  vent mgmt  cont atrov and prov nebs,   cont supplemental oxygen,  ct chest-abd-pelv result noted  pulm f/u noted  s/p bronch with bx   cytopath of bronch neg for malig noted  cytopatho transbronchial bx with Interstitial fibrosis and intra-alveolar smoker's  macrophages noted above.   urology cons for renal lesion  cont ivf  renal f/u   cardio cons noted  start amiodarone loading day #3  id f/u   add vanco with adj renally  f/u vanco t  cont zosyn  paliative eval noted  cont current meds  mgmt as per icu Patient is a 72y old  Male who presents with a chief complaint of weakness, chest discomfort and voice and appetite changes (05 Dec 2017 01:22)    pt seen in icu [ x ], reg med floor [ ], bed [ x ], chair at bedside [   ], a+o x3 [  ],sedated [ x ], nad [x  ],   left chest to pleurovac [x],   et tube [x],  ngt [ x ], frias to bsd [x], right ij cent line [x], fentanyl, propofol, norepi and  phenylepherine drip [x],      Allergies    No Known Allergies        Vitals    T(F): 99.6 (12-17-17 @ 04:57), Max: 99.7 (12-16-17 @ 15:42)  HR: 96 (12-17-17 @ 07:00) (83 - 117)  BP: 105/58 (12-17-17 @ 07:00) (93/58 - 116/54)  RR: 21 (12-17-17 @ 07:00) (16 - 29)  SpO2: 94% (12-17-17 @ 07:00) (90% - 100%)  Wt(kg): --  CAPILLARY BLOOD GLUCOSE          Labs                          10.0   18.0  )-----------( 112      ( 16 Dec 2017 06:35 )             32.1       12-16    138  |  109<H>  |  82<H>  ----------------------------<  96  4.9   |  17<L>  |  2.61<H>    Ca    7.1<L>      16 Dec 2017 06:35  Phos  6.6     12-16  Mg     2.5     12-16    TPro  5.1<L>  /  Alb  1.0<L>  /  TBili  1.7<H>  /  DBili  x   /  AST  75<H>  /  ALT  40  /  AlkPhos  50  12-16            .Blood Blood  12-14 @ 00:30   No growth to date.  --  --      .Broncial received in trap  12-13 @ 08:06   Normal Respiratory Lissy present  --    No polymorphonuclear cells seen per low power field  No squamous epithelial cells per low power field  Moderate Gram Positive Cocci in Pairs and Chains per oil power field  Few-moderate Gram Negative Coccobacilli per oil power field      .Surgical Swab Bronchial Alveolar Lavage  12-12 @ 22:52   Moderate Prevotella intermedia  Rare Prevotella bivia  Few Normal Respiratory Lissy present  --  --      .Tissue Left Lower Lobe  12-12 @ 22:45   Rare Haemophilus parainfluenzae  Rare Neisseria species, not gonorrhoeae or meningitidis  Rare Alpha hemolytic strep  Few Prevotella melaninogenica  Numerous Prevotella intermedia  --    Rare polymorphonuclear leukocytes seen per low power field  Numerous Gram Negative Rods per oil power field      .Blood Blood  12-11 @ 11:22   No growth at 5 days.  --  --      .Broncial Other, bronchioalveolar lavage  12-06 @ 22:28   No growth at 1 week.  --  --      .Blood Blood-Peripheral  12-05 @ 10:57   No growth at 5 days.  --  --          Radiology Results      Meds    MEDICATIONS  (STANDING):  amiodarone    Tablet 400 milliGRAM(s) Oral three times a day  BACItracin   Ointment 1 Application(s) Topical two times a day  fentaNYL   Infusion 2 MICROgram(s)/kG/Hr (15.28 mL/Hr) IV Continuous <Continuous>  pantoprazole  Injectable 40 milliGRAM(s) IV Push daily  phenylephrine    Infusion 0.5 MICROgram(s)/kG/Min (6.563 mL/Hr) IV Continuous <Continuous>  piperacillin/tazobactam IVPB. 3.375 Gram(s) IV Intermittent every 12 hours  propofol Infusion 5 MICROgram(s)/kG/Min (2.292 mL/Hr) IV Continuous <Continuous>  sodium bicarbonate 650 milliGRAM(s) Oral two times a day  vasopressin Infusion 0.04 Unit(s)/Min (2.4 mL/Hr) IV Continuous <Continuous>      MEDICATIONS  (PRN):  acetaminophen  Suppository 650 milliGRAM(s) Rectal every 6 hours PRN For Temp greater than 38 C (100.4 F)      Physical Exam    Neuro :  no focal deficits  Respiratory: CTA B/L, left subcutaneous emphysema, left chest tube to pleurovac  CV: RRR, S1S2, no murmurs,   Abdominal: Soft, NT, ND +BS,  Extremities: No edema, + peripheral pulses  genitals: edematous penis with some erythema, frias to bsd    ASSESSMENT    septic mass  perihilar mass,   hepatic and splenic lesions,   r/o malig,   copd   destruction of left 6th and 7th ribs and left T6 transverse process   exophytic left renal lesion  subcutaneous emphysema  pneumothorax  s/p hyponatremia  silverio  new afib  penile edema  h/o BPH (benign prostatic hyperplasia)  Hypertension        PLAN        S/P flexible bronchoscopy showing possible Large B-cell lymphoma,   s/p flex bronch transbronchial lung bx and EBU bx 12/12  cytopath of flex bronch and ebus neg for malig  cx from flex bronch with Few Normal Respiratory Lissy present and Rare Haemophilus parainfluenzae  Rare Neisseria species, not gonorrhoeae or meningitidis  -- Rare polymorphonuclear leukocytes seen per low power field  Numerous Gram Negative Rods per oil power field  noted above   thoracic surg f/u  monitor chest tube  heme onc cons  vent mgmt  cont atrov and prov nebs,   cont supplemental oxygen,  ct chest-abd-pelv result noted  pulm f/u noted  s/p bronch with bx   cytopath of bronch neg for malig noted  cytopatho transbronchial bx with Interstitial fibrosis and intra-alveolar smoker's  macrophages noted above.   urology cons for renal lesion  cont ivf  renal f/u   cardio cons noted  cont amiodarone loading day #4  id f/u   add vanco with adj renally  f/u vanco t  cont zosyn  paliative eval noted  cont current meds  pt may need ir bx of liver or renal lesion  mgmt as per icu

## 2017-12-18 NOTE — ADVANCED PRACTICE NURSE CONSULT - RECOMMEDATIONS
-Clean all wounds with normal saline and apply skin prep to the surrounding skin  -Apply TRIAD Moisture Barrier Cream to the Sacrococcyx, Bilateral Gluteus, and Bilateral Ischium areas b.i.d. PRN  -Elevate/float the patients heels using heel protectors and reposition the patient Q 2hrs using wedges or pillows

## 2017-12-18 NOTE — PROGRESS NOTE ADULT - SUBJECTIVE AND OBJECTIVE BOX
Patient is a 72y old  Male who presents with a chief complaint of weakness, chest discomfort and voice and appetite changes. Patient remains sedated, intubated on mechanical ventilation. Still on pressors meds. Biopsy done via EBUS still pending. LNs sampling neg for CA. Remains with chest tube in place. Subcutaneous emphysema slightly reduced.        INTERVAL HPI/OVERNIGHT EVENTS:      VITAL SIGNS:  T(F): 98.7 (12-18-17 @ 08:00)  HR: 86 (12-18-17 @ 09:30)  BP: 105/55 (12-18-17 @ 09:30)  RR: 26 (12-18-17 @ 09:30)  SpO2: 98% (12-18-17 @ 09:30)  Wt(kg): --  I&O's Detail    17 Dec 2017 07:01  -  18 Dec 2017 07:00  --------------------------------------------------------  IN:    Enteral Tube Flush: 370 mL    fentaNYL  Infusion: 367.2 mL    phenylephrine   Infusion: 1373.6 mL    propofol Infusion: 276 mL    Solution: 87.5 mL    vasopressin Infusion: 57.6 mL    Vital HN: 40 mL  Total IN: 2571.9 mL    OUT:    Chest Tube: 60 mL    Indwelling Catheter - Urethral: 799 mL  Total OUT: 859 mL    Total NET: 1712.9 mL      18 Dec 2017 07:01  -  18 Dec 2017 10:34  --------------------------------------------------------  IN:    fentaNYL  Infusion: 45.9 mL    phenylephrine   Infusion: 78.8 mL    propofol Infusion: 20.5 mL    Solution: 25 mL    vasopressin Infusion: 7.2 mL  Total IN: 177.4 mL    OUT:    Indwelling Catheter - Urethral: 100 mL  Total OUT: 100 mL    Total NET: 77.4 mL        Mode: AC/ CMV (Assist Control/ Continuous Mandatory Ventilation)  RR (machine): 24  TV (machine): 450  FiO2: 50  PEEP: 5  ITime: 1  MAP: 10  PIP: 22        REVIEW OF SYSTEMS:    CONSTITUTIONAL:  No fevers, chills, sweats    HEENT:  Eyes:  No diplopia or blurred vision. ENT:  No earache, sore throat or runny nose.    CARDIOVASCULAR:  No pressure, squeezing, tightness, or heaviness about the chest; no palpitations.    RESPIRATORY:  Per HPI    GASTROINTESTINAL:  No abdominal pain, nausea, vomiting or diarrhea.    GENITOURINARY:  No dysuria, frequency or urgency.    NEUROLOGIC:  No paresthesias, fasciculations, seizures or weakness.    PSYCHIATRIC:  No disorder of thought or mood.      PHYSICAL EXAM:    Constitutional: Well developed and nourished  Eyes:Perrla  ENMT: normal  Neck:supple  Respiratory: good air entry  Cardiovascular: S1 S2 regular  Gastrointestinal: Soft, Non tender  Extremities: No edema  Vascular:normal  Neurological:Awake, alert,Ox3  Musculoskeletal:Normal      MEDICATIONS  (STANDING):  amiodarone    Tablet 400 milliGRAM(s) Oral three times a day  BACItracin   Ointment 1 Application(s) Topical two times a day  fentaNYL   Infusion 2 MICROgram(s)/kG/Hr (15.28 mL/Hr) IV Continuous <Continuous>  pantoprazole  Injectable 40 milliGRAM(s) IV Push daily  phenylephrine    Infusion 0.5 MICROgram(s)/kG/Min (6.563 mL/Hr) IV Continuous <Continuous>  piperacillin/tazobactam IVPB. 3.375 Gram(s) IV Intermittent every 12 hours  propofol Infusion 5 MICROgram(s)/kG/Min (2.292 mL/Hr) IV Continuous <Continuous>  sodium bicarbonate 650 milliGRAM(s) Oral two times a day  vasopressin Infusion 0.04 Unit(s)/Min (2.4 mL/Hr) IV Continuous <Continuous>    MEDICATIONS  (PRN):  acetaminophen  Suppository 650 milliGRAM(s) Rectal every 6 hours PRN For Temp greater than 38 C (100.4 F)  chlorproMAZINE    Tablet 25 milliGRAM(s) Oral every 6 hours PRN hiccups      Allergies    No Known Allergies    Intolerances        LABS:                        9.5    19.3  )-----------( clumped    ( 18 Dec 2017 03:44 )             30.1     12-18    141  |  112<H>  |  100<H>  ----------------------------<  101<H>  5.0   |  18<L>  |  2.87<H>    Ca    7.1<L>      18 Dec 2017 03:44  Phos  6.4     12-18  Mg     2.6     12-18    TPro  5.3<L>  /  Alb  1.1<L>  /  TBili  1.6<H>  /  DBili  x   /  AST  36  /  ALT  22  /  AlkPhos  49  12-18    PT/INR - ( 18 Dec 2017 03:44 )   PT: 11.7 sec;   INR: 1.07 ratio         PTT - ( 18 Dec 2017 03:44 )  PTT:28.2 sec    ABG - ( 18 Dec 2017 04:58 )  pH: 7.16  /  pCO2: 45    /  pO2: 103   / HCO3: 16    / Base Excess: -12.1 /  SaO2: 97                    CAPILLARY BLOOD GLUCOSE            RADIOLOGY & ADDITIONAL TESTS:    CXR:  < from: Xray Chest 1 View AP- PORTABLE-Urgent (12.17.17 @ 19:03) >  ET and enteric tubes, rightIJ catheter and left chest tube remain in   place. No pneumothorax.    Decreased subcutaneous emphysema.    The known large left lower lobe mass is better seen on CT. Bony   destruction of the left posterior sixth and seventh ribs is also better   seen on CT.    Mild diffuse interstitial prominence, patchy lung opacities, left worse   than the right, and small left pleural effusion.    < end of copied text >    Ct scan chest:    ekg;    echo: Patient is a 72y old  Male who presents with a chief complaint of weakness, chest discomfort and voice and appetite changes. Patient remains sedated, intubated on mechanical ventilation. Still on pressors meds. Biopsy done via EBUS still pending. LNs sampling neg for CA. Remains with chest tube in place. Subcutaneous emphysema slightly reduced. Clinically unchanged.        INTERVAL HPI/OVERNIGHT EVENTS:      VITAL SIGNS:  T(F): 98.7 (12-18-17 @ 08:00)  HR: 86 (12-18-17 @ 09:30)  BP: 105/55 (12-18-17 @ 09:30)  RR: 26 (12-18-17 @ 09:30)  SpO2: 98% (12-18-17 @ 09:30)  Wt(kg): --  I&O's Detail    17 Dec 2017 07:01  -  18 Dec 2017 07:00  --------------------------------------------------------  IN:    Enteral Tube Flush: 370 mL    fentaNYL  Infusion: 367.2 mL    phenylephrine   Infusion: 1373.6 mL    propofol Infusion: 276 mL    Solution: 87.5 mL    vasopressin Infusion: 57.6 mL    Vital HN: 40 mL  Total IN: 2571.9 mL    OUT:    Chest Tube: 60 mL    Indwelling Catheter - Urethral: 799 mL  Total OUT: 859 mL    Total NET: 1712.9 mL      18 Dec 2017 07:01  -  18 Dec 2017 10:34  --------------------------------------------------------  IN:    fentaNYL  Infusion: 45.9 mL    phenylephrine   Infusion: 78.8 mL    propofol Infusion: 20.5 mL    Solution: 25 mL    vasopressin Infusion: 7.2 mL  Total IN: 177.4 mL    OUT:    Indwelling Catheter - Urethral: 100 mL  Total OUT: 100 mL    Total NET: 77.4 mL        Mode: AC/ CMV (Assist Control/ Continuous Mandatory Ventilation)  RR (machine): 24  TV (machine): 450  FiO2: 50  PEEP: 5  ITime: 1  MAP: 10  PIP: 22        REVIEW OF SYSTEMS:    CONSTITUTIONAL:  No fevers, chills, sweats    HEENT:  Eyes:  No diplopia or blurred vision. ENT:  No earache, sore throat or runny nose.    CARDIOVASCULAR:  No pressure, squeezing, tightness, or heaviness about the chest; no palpitations.    RESPIRATORY:  Per HPI    GASTROINTESTINAL:  No abdominal pain, nausea, vomiting or diarrhea.    GENITOURINARY:  No dysuria, frequency or urgency.    NEUROLOGIC:  No paresthesias, fasciculations, seizures or weakness.    PSYCHIATRIC:  No disorder of thought or mood.      PHYSICAL EXAM:    Constitutional: Well developed and nourished  Eyes:Perrla  ENMT: normal  Neck:supple  Respiratory: Ronchi bilaterally. +crepitus  Cardiovascular: S1 S2 regular  Gastrointestinal: Soft, Non tender  Extremities: No edema  Vascular:normal  Neurological:Awake, alert,Ox3  Musculoskeletal:Normal      MEDICATIONS  (STANDING):  amiodarone    Tablet 400 milliGRAM(s) Oral three times a day  BACItracin   Ointment 1 Application(s) Topical two times a day  fentaNYL   Infusion 2 MICROgram(s)/kG/Hr (15.28 mL/Hr) IV Continuous <Continuous>  pantoprazole  Injectable 40 milliGRAM(s) IV Push daily  phenylephrine    Infusion 0.5 MICROgram(s)/kG/Min (6.563 mL/Hr) IV Continuous <Continuous>  piperacillin/tazobactam IVPB. 3.375 Gram(s) IV Intermittent every 12 hours  propofol Infusion 5 MICROgram(s)/kG/Min (2.292 mL/Hr) IV Continuous <Continuous>  sodium bicarbonate 650 milliGRAM(s) Oral two times a day  vasopressin Infusion 0.04 Unit(s)/Min (2.4 mL/Hr) IV Continuous <Continuous>    MEDICATIONS  (PRN):  acetaminophen  Suppository 650 milliGRAM(s) Rectal every 6 hours PRN For Temp greater than 38 C (100.4 F)  chlorproMAZINE    Tablet 25 milliGRAM(s) Oral every 6 hours PRN hiccups      Allergies    No Known Allergies    Intolerances        LABS:                        9.5    19.3  )-----------( clumped    ( 18 Dec 2017 03:44 )             30.1     12-18    141  |  112<H>  |  100<H>  ----------------------------<  101<H>  5.0   |  18<L>  |  2.87<H>    Ca    7.1<L>      18 Dec 2017 03:44  Phos  6.4     12-18  Mg     2.6     12-18    TPro  5.3<L>  /  Alb  1.1<L>  /  TBili  1.6<H>  /  DBili  x   /  AST  36  /  ALT  22  /  AlkPhos  49  12-18    PT/INR - ( 18 Dec 2017 03:44 )   PT: 11.7 sec;   INR: 1.07 ratio         PTT - ( 18 Dec 2017 03:44 )  PTT:28.2 sec    ABG - ( 18 Dec 2017 04:58 )  pH: 7.16  /  pCO2: 45    /  pO2: 103   / HCO3: 16    / Base Excess: -12.1 /  SaO2: 97                    CAPILLARY BLOOD GLUCOSE            RADIOLOGY & ADDITIONAL TESTS:    CXR:  < from: Xray Chest 1 View AP- PORTABLE-Urgent (12.17.17 @ 19:03) >  ET and enteric tubes, rightIJ catheter and left chest tube remain in   place. No pneumothorax.    Decreased subcutaneous emphysema.    The known large left lower lobe mass is better seen on CT. Bony   destruction of the left posterior sixth and seventh ribs is also better   seen on CT.    Mild diffuse interstitial prominence, patchy lung opacities, left worse   than the right, and small left pleural effusion.    < end of copied text >    Ct scan chest:    ekg;    echo:

## 2017-12-18 NOTE — PROGRESS NOTE ADULT - ASSESSMENT
A 72 y M  former smoker 20 PPD with Hx HTN BPH  p/w generalized weakness, voice changes with imaging showing left hilar mass in CT scan , s/p bronch and biopsy on 12/06 , admitted to ICU on 12/06 for pneumothorax s/p diagnostic bronchoscopy for left lobe lesions found on CT imaging - chest tube placed in ICU 12/6 on suction - admitted for urgent CT placement and monitoring. pt was downgraded on Lung biopsy from 12/07. Lung biopsy did not show any malignancy. s/p transbronchial lung biopsy , s/p endobronchial lung biopsy, s/p flexible bronchoscopy today by Dr. Piper.   ROS - unable to get as pt is intubated    admit to ICU post op monitoring s/p intubation s/p chest tube , hypotensive 2/2 septic shock, requiring pressors, on phenylephrine      Septic Shock  -c/w chest tube to suction, no air leak  -c/w  abx - zosyn  -s/p intubation , c/w mechanical ventilation  -c/w sedation  -hypotension post anesthesia , c/w IV fluids and c/w pressor   - c/w phenylephrine, vasopressin  - pt on propofol for sedation, fentanyl for pain  -increased white count, tissue culture growing gram negative rods rare neisseria, haemophilus, BCx negative to date  - UA negative      Acute Kidney Injury  -c/w IVF  -monitor urine output - worsening, approx 25cc/hr  -added sodium bicarb per Nephro recommendations    Hyponatremia.  Plan: resolved    New onset Atrial Fibrillation  -increased HR after given low dose levophed, continued despite d/c of levophed  - EKG shows Atrial fibrillation (new onset)  - Patient on amiodarone (day 4 loading)  - f/u cardiology - Dr. Jackson    Pneumothorax   Crepitus along neck and L anterior chest wall  -chest tube to suction    Lung mass  -c/w post op prophylaxis  -Bronch path negative for Ca.  -s/p EBUS VS mediastinoscopy  -cyto report, negative for  malignancy  - 1 biopsy still pending  -repeat CT chest - worsening necrotic mass from prior imaging  -holding all anticoagulation for liver mass biopsy with IR, possible for Monday if pt is off of pressors    Hypertension.    -hypotension , holding home BP meds    BPH (benign prostatic hyperplasia)  - monitor U/o  -resume PO tamsulosin with NG tube    penile swelling: Urology consult requested, cold compresses, bacitracin ointment application    Prophylactic measure  - IMPROVE score 2   - c/w heparin S.C for VTE prophylaxis  - GI stress ulcer PPx w/ Protonix. A 72 y M  former smoker 20 PPD with Hx HTN BPH  p/w generalized weakness, voice changes with imaging showing left hilar mass in CT scan , s/p bronch and biopsy on 12/06 , admitted to ICU on 12/06 for pneumothorax s/p diagnostic bronchoscopy for left lobe lesions found on CT imaging - chest tube placed in ICU 12/6 on suction - admitted for urgent CT placement and monitoring. pt was downgraded on Lung biopsy from 12/07. Lung biopsy did not show any malignancy. s/p transbronchial lung biopsy , s/p endobronchial lung biopsy, s/p flexible bronchoscopy today by Dr. Piper.   ROS - unable to get as pt is intubated    admit to ICU post op monitoring s/p intubation s/p chest tube , hypotensive 2/2 septic shock, requiring pressors, on phenylephrine      1: Septic Shock  -c/w chest tube to suction, no air leak  -s/p intubation , c/w mechanical ventilation  -c/w sedation  -hypotension post anesthesia , c/w IV fluids and c/w pressor, Patient BP is still on lower side requiring pressors  - c/w phenylephrine, vasopressin  - pt on propofol for sedation, fentanyl for pain  -increased white count, tissue culture growing gram negative rods rare neisseria, haemophilus, BCx negative to date  - UA negative  - currently on zosyn  - ID Dr. Nicholas      2: Acute Kidney Injury  -c/w IVF  -monitor urine output - worsening, approx 25cc/hr  -added sodium bicarb per Nephro recommendations    Hyponatremia.  Plan: resolved    New onset Atrial Fibrillation  -increased HR after given low dose levophed, continued despite d/c of levophed  -Patient is in vasopressin  - EKG shows Atrial fibrillation (new onset)  - Patient on amiodarone (day 4 loading)  - f/u cardiology - Dr. Jackson    Pneumothorax   Crepitus along neck and L anterior chest wall  -chest tube to suction  -Repeat CXR unchanged from previous.    Lung mass  -c/w post op prophylaxis  -Bronch path negative for Ca.  -s/p EBUS VS mediastinoscopy  -cyto report, negative for  malignancy  - 1 biopsy still pending  -repeat CT chest - worsening necrotic mass from prior imaging  -Discussed with IR: not doing liver biopsy as patient is still on pressors,   -Restarted lovenox and aspirin    Hypertension.    -hypotension , holding home BP meds    BPH (benign prostatic hyperplasia)  - monitor U/o  -c/w NG tube    Penile swelling: Urology consult requested, cold compresses, bacitracin ointment application    Prophylactic measure  - IMPROVE score 2   - c/w heparin S.C for VTE prophylaxis  - GI stress ulcer PPx w/ Protonix. A 72 y M  former smoker 20 PPD with Hx HTN BPH  p/w generalized weakness, voice changes with imaging showing left hilar mass in CT scan , s/p bronch and biopsy on 12/06 , admitted to ICU on 12/06 for pneumothorax s/p diagnostic bronchoscopy for left lobe lesions found on CT imaging - chest tube placed in ICU 12/6 on suction - admitted for urgent CT placement and monitoring. pt was downgraded on Lung biopsy from 12/07. Lung biopsy did not show any malignancy. s/p transbronchial lung biopsy , s/p endobronchial lung biopsy, s/p flexible bronchoscopy today by Dr. Piper.   ROS - unable to get as pt is intubated    admit to ICU post op monitoring s/p intubation s/p chest tube , hypotensive 2/2 septic shock, requiring pressors, on phenylephrine      1: Septic Shock  -c/w chest tube to suction, no air leak  -s/p intubation , c/w mechanical ventilation  -c/w sedation  -hypotension post anesthesia , c/w IV fluids and c/w pressor, Patient BP is still on lower side requiring pressors  - c/w phenylephrine, vasopressin  - pt on propofol for sedation, fentanyl for pain  -increased white count, tissue culture growing gram negative rods rare neisseria, haemophilus, BCx negative to date  - UA negative  - currently on zosyn  - ID Dr. Nihcolas      2: Acute Kidney Injury  -c/w IVF  -monitor urine output - patient is going in ATN, over night given lasix one dose, UO increased to 50-75cc but worsening  -c/w sodium bicarb per Nephro recommendations  -Nephro: Dr. Reagan      3: New onset Atrial Fibrillation  -increased HR after given low dose levophed, continued despite d/c of levophed  -Patient is in vasopressin  - EKG shows Atrial fibrillation (new onset)  - Completed amiodarone loading, now amio 200mg  - f/u cardiology - Dr. Jackson    Pneumothorax   Crepitus along neck and L anterior chest wall  -chest tube to suction  -Repeat CXR unchanged from previous.    Lung mass  -c/w post op prophylaxis  -Bronch path negative for Ca.  -s/p EBUS VS mediastinoscopy  -cyto report, negative for  malignancy  - 1 biopsy still pending  -repeat CT chest - worsening necrotic mass from prior imaging  -Discussed with IR: not doing liver biopsy as patient is still on pressors,   -Restarted lovenox and aspirin    Hypertension.    -hypotension , holding home BP meds,  c/w pressors    BPH (benign prostatic hyperplasia)  - monitor U/o  -c/w NG tube    Penile swelling:   Urology consult requested, cold compresses, bacitracin ointment application    Hyperphosphatemia:  -Patient phosphate high in setting for kidney failure  -stable will c/w monitoring    Prophylactic measure  - IMPROVE score 2   - c/w heparin S.C for VTE prophylaxis  - GI stress ulcer PPx w/ Protonix.    Goals of care and discussion:  Patient has poor prognosis, Patient is going in ATN, very low UO,  Palliative on board: Patient is DNR

## 2017-12-18 NOTE — PROGRESS NOTE ADULT - PROBLEM SELECTOR PLAN 8
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

## 2017-12-18 NOTE — PROGRESS NOTE ADULT - SUBJECTIVE AND OBJECTIVE BOX
INTERVAL HPI/OVERNIGHT EVENTS: *** Patient had low UO <30 cc/hr s/p 1 dose of lasix UO then 50 50-75 cc/hr,     PRESSORS: [ ] YES [ ] NO  WHICH:    ANTIBIOTICS:  zosyn                 DATE STARTED: 12/15      Antimicrobial:  piperacillin/tazobactam IVPB. 3.375 Gram(s) IV Intermittent every 12 hours    Cardiovascular:  amiodarone    Tablet 400 milliGRAM(s) Oral three times a day  phenylephrine    Infusion 0.5 MICROgram(s)/kG/Min IV Continuous <Continuous>    Pulmonary:    Hematalogic:    Other:  acetaminophen  Suppository 650 milliGRAM(s) Rectal every 6 hours PRN  BACItracin   Ointment 1 Application(s) Topical two times a day  chlorproMAZINE    Tablet 25 milliGRAM(s) Oral every 6 hours PRN  fentaNYL   Infusion 2 MICROgram(s)/kG/Hr IV Continuous <Continuous>  pantoprazole  Injectable 40 milliGRAM(s) IV Push daily  propofol Infusion 5 MICROgram(s)/kG/Min IV Continuous <Continuous>  sodium bicarbonate 650 milliGRAM(s) Oral two times a day  vasopressin Infusion 0.04 Unit(s)/Min IV Continuous <Continuous>    acetaminophen  Suppository 650 milliGRAM(s) Rectal every 6 hours PRN  amiodarone    Tablet 400 milliGRAM(s) Oral three times a day  BACItracin   Ointment 1 Application(s) Topical two times a day  chlorproMAZINE    Tablet 25 milliGRAM(s) Oral every 6 hours PRN  fentaNYL   Infusion 2 MICROgram(s)/kG/Hr IV Continuous <Continuous>  pantoprazole  Injectable 40 milliGRAM(s) IV Push daily  phenylephrine    Infusion 0.5 MICROgram(s)/kG/Min IV Continuous <Continuous>  piperacillin/tazobactam IVPB. 3.375 Gram(s) IV Intermittent every 12 hours  propofol Infusion 5 MICROgram(s)/kG/Min IV Continuous <Continuous>  sodium bicarbonate 650 milliGRAM(s) Oral two times a day  vasopressin Infusion 0.04 Unit(s)/Min IV Continuous <Continuous>    Drug Dosing Weight  Height (cm): 165 (12 Dec 2017 13:14)  Weight (kg): 76.4 (12 Dec 2017 16:00)  BMI (kg/m2): 28.1 (12 Dec 2017 16:00)  BSA (m2): 1.84 (12 Dec 2017 16:00)    CENTRAL LINE: [x ] YES [ ] NO  LOCATION:   DATE INSERTED:  REMOVE: [ ] YES [ ] NO  EXPLAIN:    LERMA: [x ] YES [ ] NO    DATE INSERTED:  REMOVE:  [ ] YES [ ] NO  EXPLAIN:    A-LINE:  [x ] YES [ ] NO  LOCATION:   DATE INSERTED:  REMOVE:  [ ] YES [ ] NO  EXPLAIN:    PMH -reviewed admission note, no change since admission  Heart faliure: acute [ ] chronic [ ] acute or chronic [ ] diastolic [ ] systolic [ ] combied systolic and diastolic[ ]  ZENY: ATN[ ] renal medullary necrosis [ ] CKD I [ ]CKDII [ ]CKD III [ ]CKD IV [ ]CKD V [ ]Other pathological lesions [ ]  Abdominal Nutrition Status: malnutrition [ ] cachexia [ ] morbid obesity/BMI=40 [ ] Supplement ordered [___________]     ICU Vital Signs Last 24 Hrs  T(C): 37.1 (18 Dec 2017 08:00), Max: 38.1 (17 Dec 2017 16:00)  T(F): 98.7 (18 Dec 2017 08:00), Max: 100.5 (17 Dec 2017 16:00)  HR: 91 (18 Dec 2017 08:00) (90 - 122)  BP: 96/52 (18 Dec 2017 08:00) (83/49 - 117/57)  BP(mean): 62 (18 Dec 2017 08:00) (55 - 72)  ABP: 48/44 (17 Dec 2017 09:00) (48/44 - 48/45)  ABP(mean): 45 (17 Dec 2017 09:00) (45 - 47)  RR: 23 (18 Dec 2017 08:00) (17 - 34)  SpO2: 100% (18 Dec 2017 08:00) (94% - 100%)      ABG - ( 18 Dec 2017 04:58 )  pH: 7.16  /  pCO2: 45    /  pO2: 103   / HCO3: 16    / Base Excess: -12.1 /  SaO2: 97                    12-17 @ 07:01  -  12-18 @ 07:00  --------------------------------------------------------  IN: 2571.9 mL / OUT: 859 mL / NET: 1712.9 mL        Mode: AC/ CMV (Assist Control/ Continuous Mandatory Ventilation)  RR (machine): 24  TV (machine): 450  FiO2: 50  PEEP: 5  ITime: 1  MAP: 10  PIP: 26      PHYSICAL EXAM:    GENERAL: [x ]NAD, sedated, On mechanical ventilation  HEAD:  [x ]Atraumatic, [x]Normocephalic  EYES:[x ]conjunctiva and sclera clear  ENMT: ETT placed,  NECK: [x ]Supple, normal appearance,   NERVOUS SYSTEM:  [x ]Alert & Oriented X0,    CHEST/LUNG: On ETT tube, Clear to auscultation, [ x]No chest deformity; [ ]Normal percussion bilaterally; [ ]No rales, rhonchi, wheezing   HEART: [ x]Regular rate and rhythm; [x ]No murmurs, rubs, or gallops  ABDOMEN: [ x]Soft, Nontender, Nondistended; [x ]Bowel sounds present  EXTREMITIES:  [x ]2+ Peripheral Pulses, [ x]No clubbing, cyanosis, or edema, on SCD boots,  LYMPH: [ x]No lymphadenopathy noted  SKIN: [x ]No rashes or lesions; [ ]Good capillary refill      LABS:  CBC Full  -  ( 18 Dec 2017 03:44 )  WBC Count : 19.3 K/uL  Hemoglobin : 9.5 g/dL  Hematocrit : 30.1 %  Platelet Count - Automated : clumped K/uL  Mean Cell Volume : 102.4 fl  Mean Cell Hemoglobin : 32.3 pg  Mean Cell Hemoglobin Concentration : 31.5 gm/dL  Auto Neutrophil # : 16.9 K/uL  Auto Lymphocyte # : 1.2 K/uL  Auto Monocyte # : 0.4 K/uL  Auto Eosinophil # : 0.8 K/uL  Auto Basophil # : 0.1 K/uL  Auto Neutrophil % : 87.5 %  Auto Lymphocyte % : 6.3 %  Auto Monocyte % : 1.9 %  Auto Eosinophil % : 3.9 %  Auto Basophil % : 0.4 %    12-18    141  |  112<H>  |  100<H>  ----------------------------<  101<H>  5.0   |  18<L>  |  2.87<H>    Ca    7.1<L>      18 Dec 2017 03:44  Phos  6.4     12-18  Mg     2.6     12-18    TPro  5.3<L>  /  Alb  1.1<L>  /  TBili  1.6<H>  /  DBili  x   /  AST  36  /  ALT  22  /  AlkPhos  49  12-18    PT/INR - ( 18 Dec 2017 03:44 )   PT: 11.7 sec;   INR: 1.07 ratio         PTT - ( 18 Dec 2017 03:44 )  PTT:28.2 sec        RADIOLOGY & ADDITIONAL STUDIES REVIEWED:  ***    [ ]GOALS OF CARE DISCUSSION WITH PATIENT/FAMILY/PROXY:    CRITICAL CARE TIME SPENT: 35 minutes

## 2017-12-18 NOTE — PROGRESS NOTE ADULT - SUBJECTIVE AND OBJECTIVE BOX
Patient is a 72y old  Male who presents with a chief complaint of weakness, chest discomfort and voice and appetite changes (05 Dec 2017 01:22)    pt seen in icu [ x ], reg med floor [ ], bed [ x ], chair at bedside [   ], a+o x3 [  ],sedated [ x ], nad [x  ],   left chest to pleurovac [x],   et tube [x],  ngt [ x ], frias to bsd [x], right ij cent line [x], fentanyl, propofol, norepi and  phenylepherine drip [x],      Allergies    No Known Allergies        Vitals    T(F): 98.2 (12-18-17 @ 04:43), Max: 100.5 (12-17-17 @ 16:00)  HR: 92 (12-18-17 @ 07:00) (90 - 122)  BP: 92/53 (12-18-17 @ 07:00) (83/49 - 117/57)  RR: 29 (12-18-17 @ 07:00) (17 - 34)  SpO2: 100% (12-18-17 @ 07:00) (94% - 100%)  Wt(kg): --  CAPILLARY BLOOD GLUCOSE          Labs                          9.5    19.3  )-----------( clumped    ( 18 Dec 2017 03:44 )             30.1       12-18    141  |  112<H>  |  100<H>  ----------------------------<  101<H>  5.0   |  18<L>  |  2.87<H>    Ca    7.1<L>      18 Dec 2017 03:44  Phos  6.4     12-18  Mg     2.6     12-18    TPro  5.3<L>  /  Alb  1.1<L>  /  TBili  1.6<H>  /  DBili  x   /  AST  36  /  ALT  22  /  AlkPhos  49  12-18            .Blood Blood  12-14 @ 00:30   No growth to date.  --  --      .Broncial received in trap  12-13 @ 08:06   Normal Respiratory Lissy present  --    No polymorphonuclear cells seen per low power field  No squamous epithelial cells per low power field  Moderate Gram Positive Cocci in Pairs and Chains per oil power field  Few-moderate Gram Negative Coccobacilli per oil power field      .Surgical Swab Bronchial Alveolar Lavage  12-12 @ 22:52   Moderate Prevotella intermedia  Rare Prevotella bivia  Few Normal Respiratory Lissy present  --  --      .Tissue Left Lower Lobe  12-12 @ 22:45   Rare Haemophilus parainfluenzae  Rare Neisseria species, not gonorrhoeae or meningitidis  Rare Alpha hemolytic strep  Few Prevotella melaninogenica  Numerous Prevotella intermedia  --    Rare polymorphonuclear leukocytes seen per low power field  Numerous Gram Negative Rods per oil power field      .Blood Blood  12-11 @ 11:22   No growth at 5 days.  --  --      .Broncial Other, bronchioalveolar lavage  12-06 @ 22:28   No growth at 1 week.  --  --      .Blood Blood-Peripheral  12-05 @ 10:57   No growth at 5 days.  --  --          Radiology Results      Meds    MEDICATIONS  (STANDING):  amiodarone    Tablet 400 milliGRAM(s) Oral three times a day  BACItracin   Ointment 1 Application(s) Topical two times a day  fentaNYL   Infusion 2 MICROgram(s)/kG/Hr (15.28 mL/Hr) IV Continuous <Continuous>  pantoprazole  Injectable 40 milliGRAM(s) IV Push daily  phenylephrine    Infusion 0.5 MICROgram(s)/kG/Min (6.563 mL/Hr) IV Continuous <Continuous>  piperacillin/tazobactam IVPB. 3.375 Gram(s) IV Intermittent every 12 hours  propofol Infusion 5 MICROgram(s)/kG/Min (2.292 mL/Hr) IV Continuous <Continuous>  sodium bicarbonate 650 milliGRAM(s) Oral two times a day  vasopressin Infusion 0.04 Unit(s)/Min (2.4 mL/Hr) IV Continuous <Continuous>      MEDICATIONS  (PRN):  acetaminophen  Suppository 650 milliGRAM(s) Rectal every 6 hours PRN For Temp greater than 38 C (100.4 F)  chlorproMAZINE    Tablet 25 milliGRAM(s) Oral every 6 hours PRN hiccups      Physical Exam    Neuro :  no focal deficits  Respiratory: CTA B/L, left subcutaneous emphysema, left chest tube to pleurovac  CV: RRR, S1S2, no murmurs,   Abdominal: Soft, NT, ND +BS,  Extremities: No edema, + peripheral pulses  genitals: edematous penis with some erythema, frias to bsd    ASSESSMENT    septic mass  perihilar mass,   hepatic and splenic lesions,   r/o malig,   copd   destruction of left 6th and 7th ribs and left T6 transverse process   exophytic left renal lesion  subcutaneous emphysema  pneumothorax  s/p hyponatremia  silverio  new afib  penile edema  h/o BPH (benign prostatic hyperplasia)  Hypertension        PLAN        S/P flexible bronchoscopy showing possible Large B-cell lymphoma,   s/p flex bronch transbronchial lung bx and EBU bx 12/12  cytopath of flex bronch and ebus neg for malig  cx from flex bronch with Few Normal Respiratory Lissy present and Rare Haemophilus parainfluenzae  Rare Neisseria species, not gonorrhoeae or meningitidis  -- Rare polymorphonuclear leukocytes seen per low power field  Numerous Gram Negative Rods per oil power field  noted above   thoracic surg f/u  monitor chest tube  heme onc cons  vent mgmt  cont atrov and prov nebs,   cont supplemental oxygen,  ct chest-abd-pelv result noted  pulm f/u noted  s/p bronch with bx   cytopath of bronch neg for malig noted  cytopatho transbronchial bx with Interstitial fibrosis and intra-alveolar smoker's  macrophages noted above.   urology cons for renal lesion  cont ivf  renal f/u   cardio cons noted  cont amiodarone loading day #4  id f/u   add vanco with adj renally  f/u vanco t  cont zosyn  paliative eval noted  cont current meds  pt may need ir bx of liver or renal lesion  mgmt as per icu Patient is a 72y old  Male who presents with a chief complaint of weakness, chest discomfort and voice and appetite changes (05 Dec 2017 01:22)    pt seen in icu [ x ], reg med floor [ ], bed [ x ], chair at bedside [   ], a+o x3 [  ],sedated [ x ], nad [x  ],   left chest to pleurovac [x],   et tube [x],  ngt [ x ], frias to bsd [x], right ij cent line [x], fentanyl, propofol, norepi and  phenylepherine drip [x],    pt with episode of low urine output over night but has improved      Allergies    No Known Allergies        Vitals    T(F): 98.2 (12-18-17 @ 04:43), Max: 100.5 (12-17-17 @ 16:00)  HR: 92 (12-18-17 @ 07:00) (90 - 122)  BP: 92/53 (12-18-17 @ 07:00) (83/49 - 117/57)  RR: 29 (12-18-17 @ 07:00) (17 - 34)  SpO2: 100% (12-18-17 @ 07:00) (94% - 100%)  Wt(kg): --  CAPILLARY BLOOD GLUCOSE          Labs                          9.5    19.3  )-----------( clumped    ( 18 Dec 2017 03:44 )             30.1       12-18    141  |  112<H>  |  100<H>  ----------------------------<  101<H>  5.0   |  18<L>  |  2.87<H>    Ca    7.1<L>      18 Dec 2017 03:44  Phos  6.4     12-18  Mg     2.6     12-18    TPro  5.3<L>  /  Alb  1.1<L>  /  TBili  1.6<H>  /  DBili  x   /  AST  36  /  ALT  22  /  AlkPhos  49  12-18            .Blood Blood  12-14 @ 00:30   No growth to date.  --  --      .Broncial received in trap  12-13 @ 08:06   Normal Respiratory Lissy present  --    No polymorphonuclear cells seen per low power field  No squamous epithelial cells per low power field  Moderate Gram Positive Cocci in Pairs and Chains per oil power field  Few-moderate Gram Negative Coccobacilli per oil power field      .Surgical Swab Bronchial Alveolar Lavage  12-12 @ 22:52   Moderate Prevotella intermedia  Rare Prevotella bivia  Few Normal Respiratory Lissy present  --  --      .Tissue Left Lower Lobe  12-12 @ 22:45   Rare Haemophilus parainfluenzae  Rare Neisseria species, not gonorrhoeae or meningitidis  Rare Alpha hemolytic strep  Few Prevotella melaninogenica  Numerous Prevotella intermedia  --    Rare polymorphonuclear leukocytes seen per low power field  Numerous Gram Negative Rods per oil power field      .Blood Blood  12-11 @ 11:22   No growth at 5 days.  --  --      .Broncial Other, bronchioalveolar lavage  12-06 @ 22:28   No growth at 1 week.  --  --      .Blood Blood-Peripheral  12-05 @ 10:57   No growth at 5 days.  --  --          Radiology Results    < from: CT Chest No Cont (12.15.17 @ 14:17) >  IMPRESSION:     Previously seen left lower lobe necrotic mass now encompasses the entire   left lower lobe with internal gas loculations. Destruction of the   posterior sixth and seventh ribs and left sixth transverse process again   noted.    Peribronchovascular groundglass opacity is present in all lobes and has a   broad differential that includes infection, edema or ARDS.     Small left apical pneumothorax with chest tube in place.    < end of copied text >        < from: Xray Chest 1 View AP- PORTABLE-Urgent (12.17.17 @ 19:03) >  IMPRESSION:  ET and enteric tubes, rightIJ catheter and left chest tube remain in   place. No pneumothorax.    Decreased subcutaneous emphysema.    The known large left lower lobe mass is better seen on CT. Bony   destruction of the left posterior sixth and seventh ribs is also better   seen on CT.    Mild diffuse interstitial prominence, patchy lung opacities, left worse   than the right, and small left pleural effusion.    The cardiac silhouette is within normal limits in size.    < end of copied text >        Meds    MEDICATIONS  (STANDING):  amiodarone    Tablet 400 milliGRAM(s) Oral three times a day  BACItracin   Ointment 1 Application(s) Topical two times a day  fentaNYL   Infusion 2 MICROgram(s)/kG/Hr (15.28 mL/Hr) IV Continuous <Continuous>  pantoprazole  Injectable 40 milliGRAM(s) IV Push daily  phenylephrine    Infusion 0.5 MICROgram(s)/kG/Min (6.563 mL/Hr) IV Continuous <Continuous>  piperacillin/tazobactam IVPB. 3.375 Gram(s) IV Intermittent every 12 hours  propofol Infusion 5 MICROgram(s)/kG/Min (2.292 mL/Hr) IV Continuous <Continuous>  sodium bicarbonate 650 milliGRAM(s) Oral two times a day  vasopressin Infusion 0.04 Unit(s)/Min (2.4 mL/Hr) IV Continuous <Continuous>      MEDICATIONS  (PRN):  acetaminophen  Suppository 650 milliGRAM(s) Rectal every 6 hours PRN For Temp greater than 38 C (100.4 F)  chlorproMAZINE    Tablet 25 milliGRAM(s) Oral every 6 hours PRN hiccups      Physical Exam    Neuro :  no focal deficits  Respiratory: CTA B/L, left subcutaneous emphysema, left chest tube to pleurovac  CV: RRR, S1S2, no murmurs,   Abdominal: Soft, NT, ND +BS,  Extremities: No edema, + peripheral pulses  genitals: edematous penis with some erythema, frias to bsd    ASSESSMENT    septic shock  left lower lobe mass with bony destruction,   hepatic and splenic lesions,   r/o malig,   copd   destruction of left 6th and 7th ribs and left T6 transverse process   exophytic left renal lesion  subcutaneous emphysema  pneumothorax  s/p hyponatremia  silverio  new afib  penile edema  h/o BPH (benign prostatic hyperplasia)  Hypertension        PLAN        S/P flexible bronchoscopy showing possible Large B-cell lymphoma,   s/p flex bronch transbronchial lung bx and EBU bx 12/12  cytopath of flex bronch and ebus neg for malig  cx from flex bronch with Few Normal Respiratory Lissy present and Rare Haemophilus parainfluenzae  Rare Neisseria species, not gonorrhoeae or meningitidis  -- Rare polymorphonuclear leukocytes seen per low power field  Numerous Gram Negative Rods per oil power field  noted above   thoracic surg f/u  monitor chest tube  heme onc cons  vent mgmt  cont atrov and prov nebs,   cont supplemental oxygen,  ct chest-abd-pelv result noted above  rept cxr result noted above  pulm f/u   s/p bronch with bx   cytopath of bronch neg for malig noted  cytopatho transbronchial bx with Interstitial fibrosis and intra-alveolar smoker's  macrophages noted above.   urology cons for renal lesion  cont ivf  renal f/u   cardio cons noted  cont amiodarone loading day #4  id f/u   add vanco with adj renally  f/u vanco t  cont zosyn  paliative eval noted  cont current meds  pt for ir bx of liver or renal lesion  mgmt as per icu

## 2017-12-18 NOTE — ADVANCED PRACTICE NURSE CONSULT - ASSESSMENT
This is a 72yr old male patient admitted for Weakness, presenting with the following:  -There are intact DTI to the L. Flank (2cm x 2cm) (2cm x 0.5cm), R. Flank (4cm x 2cm), R. Ischium (1.5cm x 7cm) without drainage  -There is a DTI to the Sacrococcyx Area (10cm x 11.5cm) with epidermal blistering, epidermal separation, red tissue, and drainage present  -It is to be noted that the patient, upon assessment and discussion with the primary medical team, is exhibiting signs and symptoms of multisystem organ failure; the patient has been and is currently on high dose vasopressor medication. The patient has been maintained on a low-air loss bed, with heel protectors, and pressure injury prevention resources in place.

## 2017-12-18 NOTE — PROGRESS NOTE ADULT - PROBLEM SELECTOR PLAN 7
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

## 2017-12-18 NOTE — PROGRESS NOTE ADULT - SUBJECTIVE AND OBJECTIVE BOX
[facility] NEPHROLOGY- PROGRESS NOTE    71 yo M with HTN, BPH a/w chest discomfort, suprahilar/ hepatic mass and hyponatremia. Hosp course cb subcutaneous emphysema s/p bronch with Left chest tube placement. s/p EBUS  with  endobronchial lung biopsy neg for malignancy however mediastinal biopsy indeterminate. Now intubated with septic shock 2/2 Necrotizing PNA, hypotension on pressors, with ZENY and hyponatremia.   Renal consulted for hyponatremia and ZENY.       Hospital Medications: Medications reviewed.  REVIEW OF SYSTEMS: Unable to obtain    VITALS:  T(F): 98.7 (17 @ 08:00), Max: 100.5 (17 @ 16:00)  HR: 93 (17 @ 13:00)  BP: 94/50 (17 @ 13:00)  RR: 24 (17 @ 13:00)  SpO2: 97% (17 @ 13:00)  Wt(kg): --     @ 07:01  -   @ 07:00  --------------------------------------------------------  IN: 2571.9 mL / OUT: 859 mL / NET: 1712.9 mL     @ 07:01  -   @ 13:37  --------------------------------------------------------  IN: 237.1 mL / OUT: 195 mL / NET: 42.1 mL      PHYSICAL EXAM:  Gen: Sedated   HEENT: intubated  Cards: Irregular, +S1/S2, no M/G/R  Resp: left sided crepitus improving, intubated  with left Chest tube  GI: soft, NT/ND, NABS  : +frias  Extremities: +B LE edema/mild anasarca    LABS:      141  |  112<H>  |  100<H>  ----------------------------<  101<H>  5.0   |  18<L>  |  2.87<H>    Ca    7.1<L>      18 Dec 2017 03:44  Phos  6.4       Mg     2.6         TPro  5.3<L>  /  Alb  1.1<L>  /  TBili  1.6<H>  /  DBili      /  AST  36  /  ALT  22  /  AlkPhos  49      Creatinine Trend: 2.87 <--, 2.69 <--, 2.61 <--, 2.17 <--, 1.87 <--, 1.53 <--, 1.56 <--, 1.19 <--                        9.5    19.3  )-----------( clumped    ( 18 Dec 2017 03:44 )             30.1     Urine Studies:  Urinalysis Basic - ( 13 Dec 2017 19:36 )    Color: Yellow / Appearance: Clear / S.025 / pH:   Gluc:  / Ketone: Negative  / Bili: Small / Urobili: 4   Blood:  / Protein: 30 mg/dL / Nitrite: Negative   Leuk Esterase: Negative / RBC: 0-2 /HPF / WBC 0-2 /HPF   Sq Epi:  / Non Sq Epi: Few /HPF / Bacteria: Few /HPF      Sodium, Random Urine: 56 mmol/L ( @ 03:44)  Osmolality, Random Urine: 340 mos/kg ( @ 03:44)  Potassium, Random Urine: 32 mmol/L ( @ 03:44)  Chloride, Random Urine: 90 mmol/L ( @ 03:44)  Creatinine, Random Urine: 72 mg/dL ( @ 03:44)  Sodium, Random Urine: 9 mmol/L (12-15 @ 18:16)  Chloride, Random Urine: <10 mmol/L (12-15 @ 18:16)  Potassium, Random Urine: 36 mmol/L (12-15 @ 18:16)  Creatinine, Random Urine: 153 mg/dL (12-15 @ 18:16)  Osmolality, Random Urine: 330 mos/kg (12-15 @ 18:16)  Chloride, Random Urine: <10 mmol/L ( @ 13:22)  Osmolality, Random Urine: 427 mos/kg ( @ 13:22)  Sodium, Random Urine: 10 mmol/L ( @ 13:22)  Potassium, Random Urine: 58 mmol/L ( @ 13:22)  Creatinine, Random Urine: 173 mg/dL ( @ 13:22)  Osmolality, Random Urine: 448 mos/kg ( @ 06:40)  Sodium, Random Urine: 6 mmol/L ( @ 06:40)  Sodium, Random Urine: 6 mmol/L ( @ 06:40)  Creatinine, Random Urine: 102 mg/dL ( @ 06:40)    < from: US Renal (.16.17 @ 11:40) >  EXAM:  US KIDNEY(S)                            PROCEDURE DATE:  2017          INTERPRETATION:  History: Worsening renal function. Determining lesion   left kidney.    Bilateral renal ultrasound.  Limited by uncooperative patient as per technologist note.  Right kidney 12.7 left 12.3 cm long dimension. Normal cortical volume   echotexture. No hydronephrosis or calculus noted. The aforementioned   renal cyst identified on CT is not identified on this study. Follow-up   contrast-enhanced CT or MR is suggested for further characterization.    Impression: No hydronephrosis. Left renal lesion noted on CT not   identified on this study. Further evaluation with a contrast-enhanced CT   or MR is suggested.      < end of copied text >

## 2017-12-18 NOTE — PROGRESS NOTE ADULT - PROBLEM SELECTOR PLAN 6
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

## 2017-12-18 NOTE — PROGRESS NOTE ADULT - SUBJECTIVE AND OBJECTIVE BOX
Meds:  Zosyn 3.375 gm IVPB q 12 hours.    Allergies:  Allergies    No Known Allergies    Intolerances  ROS  [ x ] UNABLE TO ELICIT    General:  [  ] None  [  ] Fever  [  ] Chills  [  ] Malaise    Skin:  [  ] None [  ] Rash  [  ] Wound  [  ] Ulcer    HEENT:  [  ] None  [  ] Sore Throat  [  ] Nasal congestion/ runny nose  [  ] Photophobia [  ] Neck pain      Chest:  [  ] None   [  ] SOB  [  ] Cough  [  ] None    Cardiovascular:   [  ] None  [  ] CP  [  ] Palpitation    Gastrointestinal:  [  ] None  [  ] Abd pain   [  ] Nausea    [  ] Vomiting   [  ] Diarrhea	     Genitourinary:  [  ] None [  ] Polyuria   [  ] Urgency  [  ] Frequency  [  ] Dysuria    [  ]  Hematuria       Musculoskeletal:  [  ] None [  ] Back Pain	[  ] Body aches  [  ] Joint pain    Neurological: [  ] None [  ]Dizziness  [  ]Visual Disturbance  [  ]Headaches   [  ] Weakness        PHYSICAL EXAM:  Vital Signs Last 24 Hrs  T(C): 37.1 (18 Dec 2017 08:00), Max: 38.1 (17 Dec 2017 16:00)  T(F): 98.7 (18 Dec 2017 08:00), Max: 100.5 (17 Dec 2017 16:00)  HR: 96 (18 Dec 2017 08:30) (90 - 122)  BP: 99/55 (18 Dec 2017 08:30) (83/49 - 117/57)  BP(mean): 65 (18 Dec 2017 08:30) (55 - 72)  RR: 22 (18 Dec 2017 08:30) (17 - 34)  SpO2: 94% (18 Dec 2017 08:30) (94% - 100%)    Constitutional:    HEENT: [ x ] Wnl  [ x ] ET and NGT in place    Neck:  [ x ] Supple  [  ]Lymphadenopathy  [  ] No JVD   [  ] JVD  [  ] Masses   [  ] WNL    CHEST/Respiratory:  [  ]Clear to auscultation  [ x ] Rales   [ x ] Rhonchi   [  ] Wheezing     [ x ] Left side Chest tube      Cardiovascular:  [ x ] Reg S1 S2   [  ] Irreg S1 S2   [ x ]No Murmur  [  ] +ve Murmurs  [  ]Systolic [  ]Diastolic      Abdomen:  [ x ] Soft  [ x ] No tendrerness  [  ] Tenderness  [  ] Organomegaly  [  ] ABD Distention  [  ] Rigidity                       [ x ] No Regidity                       [ x ] No Rebound Tenderness  [ x ] No Guarding Rigidity  [  ] Rebound Tenderness[  ] Guarding Rigidity                          [ x ]  +ve Bowel Sounds  [  ] Decreased Bowel Sounds    [  ] Absent Bowel Sounds                            Extremities: [ x ] No edema [  ] Edema  [  ] Clubbing   [  ] Cyanosis                         [ x ] No Tender Calf muscles  [  ] Tender Calf muscles                        [ x ] Palpable peripheral pulses    Neurological: [  ] Awake  [  ] Alert  [  ] Oriented  x                             [  ] Confused  [  ] Drowzy  [ x ] repond to painful stimuli  [  ] Unresponsive    Skin:  [ x ] Intact [  ] Redness [  ] Thrombophlebitis  [  ] Rashes  [  ] Dry  [  ] Ulcers    Ortho:  [  ] Joint Swelling  [  ] Joint erythema [  ] Joint tenderness                [  ] Increased temp. to touch  [  ] DJD [ x ] WNL            LABS/DIAGNOSTIC TESTS                                      9.5    19.3  )-----------( clumped    ( 18 Dec 2017 03:44 )             30.1   12-18    141  |  112<H>  |  100<H>  ----------------------------<  101<H>  5.0   |  18<L>  |  2.87<H>    Ca    7.1<L>      18 Dec 2017 03:44  Phos  6.4     12-18  Mg     2.6     12-18    TPro  5.3<L>  /  Alb  1.1<L>  /  TBili  1.6<H>  /  DBili  x   /  AST  36  /  ALT  22  /  AlkPhos  49  12-18        CULTURES:   Culture - Bronchial (12.13.17 @ 08:06)    Gram Stain:   No polymorphonuclear cells seen per low power field  No squamous epithelial cells per low power field  Moderate Gram Positive Cocci in Pairs and Chains per oil power field  Few-moderate Gram Negative Coccobacilli per oil power field    Specimen Source: .Broncial received in trap    Culture Results:   Normal Respiratory Lissy present    Culture - Surgical Swab (12.12.17 @ 22:52)    Specimen Source: .Surgical Swab Bronchial Alveolar Lavage    Culture Results:   Few Normal Respiratory Lissy present    Culture - Tissue with Gram Stain (12.12.17 @ 22:45)    Gram Stain:   Rare polymorphonuclear leukocytes seen per low power field  Numerous Gram Negative Rods per oil power field    Specimen Source: .Tissue Left Lower Lobe    Culture Results:   Rare Haemophilus parainfluenzae  Rare Neisseria species, not gonorrhoeae or meningitidis    Culture - Blood (12.11.17 @ 11:22)    Specimen Source: .Blood Blood    Culture Results:   No growth to date.    Culture - Blood (12.11.17 @ 11:22)    Specimen Source: .Blood Blood    Culture Results:   No growth to date.    Culture - Blood (12.11.17 @ 11:22)    Specimen Source: .Blood Blood    Culture Results:   No growth to date.            RADIOLOGY    EXAM:  CHEST PORTABLE ROUTINE                            PROCEDURE DATE:  12/17/2017          INTERPRETATION:  [facility] Chest one view    HISTORY: Check ET tube placement    COMPARISON STUDY: 12/16/2017    Frontal expiratory view of the chest shows the heart to be similar in   size. Endotracheal tube, feeding tube and left chest tube remain present.   The lungs are slightly clearer and there is no evidence of pneumothorax   nor definite pleural effusion.    IMPRESSION:  No pneumothorax.    Thank you for the courtesy of this referral.          IMPRESSION:  Changing lung infiltrates.      EXAM:  CT CHEST                            PROCEDURE DATE:  12/15/2017          INTERPRETATION:  CLINICAL INFORMATION: Evaluate mid right lung opacity   seen on previous chest radiograph.    COMPARISON: Chest x-ray from 12/15/2017. Chest CT from 12/5/2017.    PROCEDURE:   CT of the Chest was performed without intravenous contrast.  Sagittal and coronal reformats were performed.      FINDINGS:    CHEST:     LINES AND TUBES: An endotracheal tube terminates at the level of the   aortic arch, abovethe jules. An enteric tube terminates in the stomach.   A right IJ central venous catheter terminates in the SVC. A left-sided   chest tube is noted.  LUNGS/LARGE AIRWAYS/PLEURA: Previously seen left lower lobe necrotic mass   now encompasses the entire left lower lobe with internal gas loculations.   Small left apical pneumothorax. Peribronchovascular groundglass opacities   are present in all lobes. Small bilateral pleural effusions with   associated compressive atelectasis.  VESSELS: Atherosclerotic changes of the aorta and coronary arteries.  HEART: Heart size is normal. No pericardial effusion.  MEDIASTINUM AND ALPHONSE: Pneumomediastinum. Redemonstrated 3.8 x 2.1 cm AP   window lymph node.  CHEST WALL AND LOWER NECK: Extensive bilateral subcutaneous emphysema.  VISUALIZED UPPER ABDOMEN: Redemonstrated, unchanged indeterminant central   low density splenic lesion. Small perihepatic ascites  BONES: Redemonstrated destruction of the left sixth and seventh ribs and   left T6 transverse process. Degenerative changes.    IMPRESSION:     Previously seen left lower lobe necrotic mass now encompasses the entire   left lower lobe with internal gas loculations. Destruction of the   posterior sixth and seventh ribs and left sixth transverse process again   noted.    Peribronchovascular groundglass opacity is present in all lobes and has a   broad differential that includes infection, edema or ARDS.     Small left apical pneumothorax with chest tube in place.              Assessment and Recommendation:   72 years old male from home with PMHx of HTN and BPH and no PSH presents to ED c/o L-sided chest pain radiating to the back with associated general weakness x6 months. Patient also reports loss of voice x6 months, in addition to decreased appetite and weight loss x2 weeks, only been able to consume x3 Ensure today.   10/6/17 patient had bronchoscopy and subsequent pneumothorax and chest tube insertion.  Patient was initially transferred from ICU to regular floor, then upgraded to the icu after condition became unstable.  Patient was started on IV Zosyn on 12/12/17.    12/18/17 WBC is increasing again.    Problem/Recommendation - 1:  Problem: Pneumonia, bacterial.   Recommendation:   1- UA & CS.  2- Follow Blood culture to final report are still negative.  3- Still recommend to Start Vancomycin 1 gm IVPB q day, and closely follow trough, and renal functions.  4- Continue IV Zosyn, but decrease the dose to 3.375 gm IVPB q 12 hours.  5- Fluid and electrolytes management.  6- CBC and BMP follow up.   7- Follow up CXR.  8- Respirator and Chest tube management.  9- Procalcitonin level.  10- will consider anti fungal coverage if condition is not improving.    Problem/Recommendation - 2:  ·  Problem: COPD (chronic obstructive pulmonary disease).    Recommendation:   1- Bronchodilators.  2- O2 as needed.  3- Pulmonary evaluation and management.  4- Steroids as per primary, and pulmonary team.     Problem/Recommendation - 3:  ·  Problem: ZENY (acute kidney injury).    Recommendation:   1- Renal management.  2- Fluid and electrolytes management and follow up.     Discussed with medical resident.

## 2017-12-18 NOTE — PROGRESS NOTE ADULT - ASSESSMENT
73 yo M with HTN, BPH a/w chest discomfort, suprahilar/ hepatic mass and hyponatremia. Hosp course cb subcutaneous emphysema s/p bronch with Left chest tube placement. 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 Now intubated with septic shock 2/2 necrotizing PNA,  hypotension on pressors, with ZENY and hyponatremia.   Renal consulted for hyponatremia and ZENY.

## 2017-12-18 NOTE — PROGRESS NOTE ADULT - PROBLEM SELECTOR PLAN 1
Bronchodilators neb  Oxygen supp  CCB Ventilator support  Follow up ABGs  Correct metabolic acidosis  Bronchodilators  Pulmonary toilet  Aspiration precautions  Decubitus prevention

## 2017-12-18 NOTE — PROGRESS NOTE ADULT - PROBLEM SELECTOR PLAN 2
Quantiferon Gold TB test  Empiric antibiotics  Bronch path negative for Ca.  Follow up EBUS bx report  Cyto from [facility] and BAL negative for CA  May need liver Bx of suspicious liver lesions Bronchodilators neb  Oxygen supp  CCB

## 2017-12-18 NOTE — PROGRESS NOTE ADULT - SUBJECTIVE AND OBJECTIVE BOX
CHIEF COMPLAINT:Patient is a 72y old  Male who presents with a chief complaint of weakness, chest discomfort and voice and appetite changes .Pt remains intubated.    	  REVIEW OF SYSTEMS:  [ X] Unable to obtain    PHYSICAL EXAM:  T(C): 37.1 (12-18-17 @ 08:00), Max: 38.1 (12-17-17 @ 16:00)  HR: 103 (12-18-17 @ 11:00) (85 - 122)  BP: 88/50 (12-18-17 @ 11:00) (83/49 - 114/49)  RR: 24 (12-18-17 @ 11:00) (17 - 34)  SpO2: 96% (12-18-17 @ 11:00) (94% - 100%)  Wt(kg): --  I&O's Summary    17 Dec 2017 07:01  -  18 Dec 2017 07:00  --------------------------------------------------------  IN: 2571.9 mL / OUT: 859 mL / NET: 1712.9 mL    18 Dec 2017 07:01  -  18 Dec 2017 11:20  --------------------------------------------------------  IN: 177.4 mL / OUT: 100 mL / NET: 77.4 mL        Appearance: Normal	  HEENT:   Normal oral mucosa, PERRL, EOMI	  Lymphatic: No lymphadenopathy  Cardiovascular: Normal S1 S2, No JVD, No murmurs, No edema  Respiratory: B/L ronchi  Gastrointestinal:  Soft, Non-tender, + BS	  Skin: No rashes, No ecchymoses, No cyanosis	  Extremities: Normal range of motion, No clubbing, cyanosis or edema  Vascular: Peripheral pulses palpable 2+ bilaterally    MEDICATIONS  (STANDING):  amiodarone    Tablet 400 milliGRAM(s) Oral three times a day  BACItracin   Ointment 1 Application(s) Topical two times a day  fentaNYL   Infusion 2 MICROgram(s)/kG/Hr (15.28 mL/Hr) IV Continuous <Continuous>  pantoprazole  Injectable 40 milliGRAM(s) IV Push daily  phenylephrine    Infusion 0.5 MICROgram(s)/kG/Min (6.563 mL/Hr) IV Continuous <Continuous>  piperacillin/tazobactam IVPB. 3.375 Gram(s) IV Intermittent every 12 hours  propofol Infusion 5 MICROgram(s)/kG/Min (2.292 mL/Hr) IV Continuous <Continuous>  sodium bicarbonate 650 milliGRAM(s) Oral two times a day  vasopressin Infusion 0.04 Unit(s)/Min (2.4 mL/Hr) IV Continuous <Continuous>      TELEMETRY: 	 afib     	  LABS:	 	                       9.5    19.3  )-----------( clumped    ( 18 Dec 2017 03:44 )             30.1     12-18    141  |  112<H>  |  100<H>  ----------------------------<  101<H>  5.0   |  18<L>  |  2.87<H>    Ca    7.1<L>      18 Dec 2017 03:44  Phos  6.4     12-18  Mg     2.6     12-18    TPro  5.3<L>  /  Alb  1.1<L>  /  TBili  1.6<H>  /  DBili  x   /  AST  36  /  ALT  22  /  AlkPhos  49  12-18      Lipid Profile: Cholesterol 64  LDL 20  HDL 26  TG 88    HgA1c: Hemoglobin A1C, Whole Blood: 6.2 % (12-05 @ 09:32)    TSH: Thyroid Stimulating Hormone, Serum: 0.45 uU/mL (12-05 @ 07:14)

## 2017-12-18 NOTE — PROGRESS NOTE ADULT - PROBLEM SELECTOR PLAN 1
Non-oliguric ZENY likely hemodynamically mediated in the setting of septic shock/ hypotension; ATN.  Renal function worsening.    LE edema/anasarca is due in large part to low oncotic pressure due to hypoalbuminemia/PCM in acute illness. Strict I/O as well.   UA with 30 protein and mod blood in the setting of infection. Recc to repeat UA once infection cleared. FeNa 0.07%- s/p IVF.  c/w antibiotics/ pressors as per MICU. Monitor Vanco trough.   CT abd/ pelvis 12/5 1.0 cm indeterminate exophytic lesion arising from the   lower pole of left kidney. No hydronephrosis. Subsequent, Renal US performed with left renal lesion not seen. Consider outpt Urology f/u.   Strict I/Os. Avoid nephrotoxins/ NSAIDs/ RCA. Monitor BMP.

## 2017-12-18 NOTE — PROGRESS NOTE ADULT - PROBLEM SELECTOR PLAN 3
Cont chest tube to suction  Thoracic surgery follow up Quantiferon Gold TB test  Empiric antibiotics  Bronch path negative for Ca.  Follow up EBUS bx report  Cyto from [facility] and BAL negative for CA  May need liver Bx of suspicious liver lesions

## 2017-12-18 NOTE — PROGRESS NOTE ADULT - ASSESSMENT
72 years old male from home with PMHx of HTN and BPH and no PSH presents to ED c/o L-sided chest pain ,lung and hepatic mass, s/p PTHX,now respiratory failure and afib with RVR.  1.Vent support as per ICU.  2.Amiodarone loading complete, change 200mg qd.  3.Continue ASA for stroke prevention.  4.Given suspected metastatic disease ,high risk for bleeding with full AC.  5.ABX.  6.Pressor support as per ICU.  7.GI and DVT prophylaxis.  8.Overall prognosis is poor.

## 2017-12-19 NOTE — PROGRESS NOTE ADULT - SUBJECTIVE AND OBJECTIVE BOX
Patient is a 72y old  Male who presents with a chief complaint of weakness, chest discomfort and voice and appetite changes (05 Dec 2017 01:22)    pt seen in icu [ x ], reg med floor [ ], bed [ x ], chair at bedside [   ], a+o x3 [  ],sedated [ x ], nad [x  ],   left chest to pleurovac [x],   et tube [x],  ngt [ x ], frias to bsd [x], right ij cent line [x], fentanyl, propofol, norepi and  phenylepherine drip [x],    pt with episode of low urine output over night but has improved          Allergies    No Known Allergies        Vitals    T(F): 97.9 (12-19-17 @ 06:00), Max: 98.7 (12-18-17 @ 08:00)  HR: 107 (12-19-17 @ 07:30) (82 - 134)  BP: 118/86 (12-19-17 @ 07:30) (88/48 - 134/97)  RR: 18 (12-19-17 @ 07:30) (17 - 32)  SpO2: 98% (12-19-17 @ 07:30) (91% - 100%)  Wt(kg): --  CAPILLARY BLOOD GLUCOSE          Labs                          10.2   30.4  )-----------( x        ( 19 Dec 2017 05:54 )             32.9       12-19    144  |  110<H>  |  96<H>  ----------------------------<  85  4.0   |  21<L>  |  2.83<H>    Ca    8.5      19 Dec 2017 04:31  Phos  2.8     12-19  Mg     2.6     12-19    TPro  5.3<L>  /  Alb  1.1<L>  /  TBili  1.6<H>  /  DBili  x   /  AST  36  /  ALT  22  /  AlkPhos  49  12-18            .Blood Blood  12-14 @ 00:30   No growth at 5 days.  --  --      .Broncial received in trap  12-13 @ 08:06   Normal Respiratory Lissy present  --    No polymorphonuclear cells seen per low power field  No squamous epithelial cells per low power field  Moderate Gram Positive Cocci in Pairs and Chains per oil power field  Few-moderate Gram Negative Coccobacilli per oil power field      .Surgical Swab Bronchial Alveolar Lavage  12-12 @ 22:52   Moderate Prevotella intermedia  Rare Prevotella bivia  Few Normal Respiratory Lissy present  --  --      .Tissue Left Lower Lobe  12-12 @ 22:45   Rare Haemophilus parainfluenzae  Rare Neisseria species, not gonorrhoeae or meningitidis  Rare Alpha hemolytic strep  Few Prevotella melaninogenica  Numerous Prevotella intermedia  --    Rare polymorphonuclear leukocytes seen per low power field  Numerous Gram Negative Rods per oil power field      .Blood Blood  12-11 @ 11:22   No growth at 5 days.  --  --      .Broncial Other, bronchioalveolar lavage  12-06 @ 22:28   No growth at 1 week.  --  --      .Blood Blood-Peripheral  12-05 @ 10:57   No growth at 5 days.  --  --          Radiology Results      Meds    MEDICATIONS  (STANDING):  amiodarone    Tablet 200 milliGRAM(s) Oral daily  aspirin  chewable 81 milliGRAM(s) Oral daily  BACItracin   Ointment 1 Application(s) Topical two times a day  enoxaparin Injectable 40 milliGRAM(s) SubCutaneous daily  fentaNYL   Infusion 1 MICROgram(s)/kG/Hr (7.64 mL/Hr) IV Continuous <Continuous>  pantoprazole  Injectable 40 milliGRAM(s) IV Push daily  phenylephrine    Infusion 0.5 MICROgram(s)/kG/Min (6.563 mL/Hr) IV Continuous <Continuous>  piperacillin/tazobactam IVPB. 3.375 Gram(s) IV Intermittent every 12 hours  propofol Infusion 5 MICROgram(s)/kG/Min (2.292 mL/Hr) IV Continuous <Continuous>  sodium bicarbonate 650 milliGRAM(s) Oral two times a day  vasopressin Infusion 0.04 Unit(s)/Min (2.4 mL/Hr) IV Continuous <Continuous>      MEDICATIONS  (PRN):  acetaminophen  Suppository 650 milliGRAM(s) Rectal every 6 hours PRN For Temp greater than 38 C (100.4 F)  chlorproMAZINE    Tablet 25 milliGRAM(s) Oral every 6 hours PRN hiccups      Physical Exam    Neuro :  no focal deficits  Respiratory: CTA B/L, left subcutaneous emphysema, left chest tube to pleurovac  CV: RRR, S1S2, no murmurs,   Abdominal: Soft, NT, ND +BS,  Extremities: No edema, + peripheral pulses  genitals: edematous penis with some erythema, frias to bsd    ASSESSMENT    septic shock  left lower lobe mass with bony destruction,   hepatic and splenic lesions,   r/o malig,   copd   destruction of left 6th and 7th ribs and left T6 transverse process   exophytic left renal lesion  subcutaneous emphysema  pneumothorax  s/p hyponatremia  silverio  new afib  penile edema  h/o BPH (benign prostatic hyperplasia)  Hypertension        PLAN        S/P flexible bronchoscopy showing possible Large B-cell lymphoma,   s/p flex bronch transbronchial lung bx and EBU bx 12/12  cytopath of flex bronch and ebus neg for malig  cx from flex bronch with Few Normal Respiratory Lissy present and Rare Haemophilus parainfluenzae  Rare Neisseria species, not gonorrhoeae or meningitidis  -- Rare polymorphonuclear leukocytes seen per low power field  Numerous Gram Negative Rods per oil power field  noted above   thoracic surg f/u  monitor chest tube  heme onc cons  vent mgmt  cont atrov and prov nebs,   cont supplemental oxygen,  ct chest-abd-pelv result noted above  rept cxr result noted above  pulm f/u   s/p bronch with bx   cytopath of bronch neg for malig noted  cytopatho transbronchial bx with Interstitial fibrosis and intra-alveolar smoker's  macrophages noted above.   urology cons for renal lesion  cont ivf  renal f/u   cardio cons noted  cont amiodarone loading day #4  id f/u   add vanco with adj renally  f/u vanco t  cont zosyn  paliative eval noted  cont current meds  pt for ir bx of liver or renal lesion  mgmt as per icu Patient is a 72y old  Male who presents with a chief complaint of weakness, chest discomfort and voice and appetite changes (05 Dec 2017 01:22)    pt seen in icu [ x ], reg med floor [ ], bed [ x ], chair at bedside [   ], a+o x3 [  ],sedated [ x ], nad [x  ],   left chest to pleurovac [x],   et tube [x],  ngt to wall suction [ x ], frias to bsd [x], right ij cent line [x], fentanyl, propofol, norepi and  phenylepherine drip [x],    pt with episode of low urine output over night but has improved          Allergies    No Known Allergies        Vitals    T(F): 97.9 (12-19-17 @ 06:00), Max: 98.7 (12-18-17 @ 08:00)  HR: 107 (12-19-17 @ 07:30) (82 - 134)  BP: 118/86 (12-19-17 @ 07:30) (88/48 - 134/97)  RR: 18 (12-19-17 @ 07:30) (17 - 32)  SpO2: 98% (12-19-17 @ 07:30) (91% - 100%)  Wt(kg): --  CAPILLARY BLOOD GLUCOSE          Labs                          10.2   30.4  )-----------( x        ( 19 Dec 2017 05:54 )             32.9       12-19    144  |  110<H>  |  96<H>  ----------------------------<  85  4.0   |  21<L>  |  2.83<H>    Ca    8.5      19 Dec 2017 04:31  Phos  2.8     12-19  Mg     2.6     12-19    TPro  5.3<L>  /  Alb  1.1<L>  /  TBili  1.6<H>  /  DBili  x   /  AST  36  /  ALT  22  /  AlkPhos  49  12-18        .Blood Blood  12-14 @ 00:30   No growth at 5 days.  --  --      .Broncial received in trap  12-13 @ 08:06   Normal Respiratory Lissy present  --    No polymorphonuclear cells seen per low power field  No squamous epithelial cells per low power field  Moderate Gram Positive Cocci in Pairs and Chains per oil power field  Few-moderate Gram Negative Coccobacilli per oil power field      .Surgical Swab Bronchial Alveolar Lavage  12-12 @ 22:52   Moderate Prevotella intermedia  Rare Prevotella bivia  Few Normal Respiratory Lissy present  --  --      .Tissue Left Lower Lobe  12-12 @ 22:45   Rare Haemophilus parainfluenzae  Rare Neisseria species, not gonorrhoeae or meningitidis  Rare Alpha hemolytic strep  Few Prevotella melaninogenica  Numerous Prevotella intermedia  --    Rare polymorphonuclear leukocytes seen per low power field  Numerous Gram Negative Rods per oil power field      .Blood Blood  12-11 @ 11:22   No growth at 5 days.  --  --      .Broncial Other, bronchioalveolar lavage  12-06 @ 22:28   No growth at 1 week.  --  --      .Blood Blood-Peripheral  12-05 @ 10:57   No growth at 5 days.  --  --          Radiology Results      Meds    MEDICATIONS  (STANDING):  amiodarone    Tablet 200 milliGRAM(s) Oral daily  aspirin  chewable 81 milliGRAM(s) Oral daily  BACItracin   Ointment 1 Application(s) Topical two times a day  enoxaparin Injectable 40 milliGRAM(s) SubCutaneous daily  fentaNYL   Infusion 1 MICROgram(s)/kG/Hr (7.64 mL/Hr) IV Continuous <Continuous>  pantoprazole  Injectable 40 milliGRAM(s) IV Push daily  phenylephrine    Infusion 0.5 MICROgram(s)/kG/Min (6.563 mL/Hr) IV Continuous <Continuous>  piperacillin/tazobactam IVPB. 3.375 Gram(s) IV Intermittent every 12 hours  propofol Infusion 5 MICROgram(s)/kG/Min (2.292 mL/Hr) IV Continuous <Continuous>  sodium bicarbonate 650 milliGRAM(s) Oral two times a day  vasopressin Infusion 0.04 Unit(s)/Min (2.4 mL/Hr) IV Continuous <Continuous>      MEDICATIONS  (PRN):  acetaminophen  Suppository 650 milliGRAM(s) Rectal every 6 hours PRN For Temp greater than 38 C (100.4 F)  chlorproMAZINE    Tablet 25 milliGRAM(s) Oral every 6 hours PRN hiccups      Physical Exam    Neuro :  no focal deficits  Respiratory: CTA B/L, left subcutaneous emphysema, left chest tube to pleurovac  CV: RRR, S1S2, no murmurs,   Abdominal: Soft, NT, ND +BS,  Extremities: No edema, + peripheral pulses  genitals: edematous penis with some erythema, frias to bsd    ASSESSMENT    septic shock  left lower lobe mass with bony destruction,   hepatic and splenic lesions,   r/o malig,   copd   destruction of left 6th and 7th ribs and left T6 transverse process   exophytic left renal lesion  subcutaneous emphysema  pneumothorax  s/p hyponatremia  silverio  new afib  penile edema  h/o BPH (benign prostatic hyperplasia)  Hypertension        PLAN        S/P flexible bronchoscopy showing possible Large B-cell lymphoma,   s/p flex bronch transbronchial lung bx and EBU bx 12/12  cytopath of flex bronch and ebus neg for malig  cx from flex bronch with Few Normal Respiratory Lissy present and Rare Haemophilus parainfluenzae  Rare Neisseria species, not gonorrhoeae or meningitidis  -- Rare polymorphonuclear leukocytes seen per low power field  Numerous Gram Negative Rods per oil power field  noted above   thoracic surg f/u  monitor chest tube  heme onc cons  vent mgmt  cont atrov and prov nebs,   cont supplemental oxygen,  ct chest-abd-pelv result noted above  rept cxr result noted above  pulm f/u   s/p bronch with bx   cytopath of bronch neg for malig noted  cytopatho transbronchial bx with Interstitial fibrosis and intra-alveolar smoker's  macrophages noted above.   urology cons for renal lesion  cont ivf  renal f/u   cardio f/u  cont amiodarone 200 mg daily  id f/u   add vanco with adj renally  f/u vanco t  cont zosyn  paliative eval noted  cont current meds  pt for ir bx of liver or renal lesion  mgmt as per icu

## 2017-12-19 NOTE — PROGRESS NOTE ADULT - SUBJECTIVE AND OBJECTIVE BOX
INTERVAL HPI/OVERNIGHT EVENTS: *** No overnight events    PRESSORS: [ ] YES [ ] NO  WHICH:    ANTIBIOTICS:                  DATE STARTED:  ANTIBIOTICS:                  DATE STARTED:  ANTIBIOTICS:                  DATE STARTED:    Antimicrobial:  piperacillin/tazobactam IVPB. 3.375 Gram(s) IV Intermittent every 12 hours    Cardiovascular:  amiodarone    Tablet 200 milliGRAM(s) Oral daily  phenylephrine    Infusion 0.5 MICROgram(s)/kG/Min IV Continuous <Continuous>    Pulmonary:    Hematalogic:  aspirin  chewable 81 milliGRAM(s) Oral daily  enoxaparin Injectable 40 milliGRAM(s) SubCutaneous daily    Other:  acetaminophen  Suppository 650 milliGRAM(s) Rectal every 6 hours PRN  BACItracin   Ointment 1 Application(s) Topical two times a day  chlorproMAZINE    Tablet 25 milliGRAM(s) Oral every 6 hours PRN  fentaNYL   Infusion 1 MICROgram(s)/kG/Hr IV Continuous <Continuous>  pantoprazole  Injectable 40 milliGRAM(s) IV Push daily  propofol Infusion 5 MICROgram(s)/kG/Min IV Continuous <Continuous>  sodium bicarbonate 650 milliGRAM(s) Oral two times a day  vasopressin Infusion 0.04 Unit(s)/Min IV Continuous <Continuous>    acetaminophen  Suppository 650 milliGRAM(s) Rectal every 6 hours PRN  amiodarone    Tablet 200 milliGRAM(s) Oral daily  aspirin  chewable 81 milliGRAM(s) Oral daily  BACItracin   Ointment 1 Application(s) Topical two times a day  chlorproMAZINE    Tablet 25 milliGRAM(s) Oral every 6 hours PRN  enoxaparin Injectable 40 milliGRAM(s) SubCutaneous daily  fentaNYL   Infusion 1 MICROgram(s)/kG/Hr IV Continuous <Continuous>  pantoprazole  Injectable 40 milliGRAM(s) IV Push daily  phenylephrine    Infusion 0.5 MICROgram(s)/kG/Min IV Continuous <Continuous>  piperacillin/tazobactam IVPB. 3.375 Gram(s) IV Intermittent every 12 hours  propofol Infusion 5 MICROgram(s)/kG/Min IV Continuous <Continuous>  sodium bicarbonate 650 milliGRAM(s) Oral two times a day  vasopressin Infusion 0.04 Unit(s)/Min IV Continuous <Continuous>    Drug Dosing Weight  Height (cm): 165 (12 Dec 2017 13:14)  Weight (kg): 76.4 (12 Dec 2017 16:00)  BMI (kg/m2): 28.1 (12 Dec 2017 16:00)  BSA (m2): 1.84 (12 Dec 2017 16:00)    CENTRAL LINE: [ ] YES [ ] NO  LOCATION:   DATE INSERTED:  REMOVE: [ ] YES [ ] NO  EXPLAIN:    FLORENTIN: [ ] YES [ ] NO    DATE INSERTED:  REMOVE:  [ ] YES [ ] NO  EXPLAIN:    A-LINE:  [ ] YES [ ] NO  LOCATION:   DATE INSERTED:  REMOVE:  [ ] YES [ ] NO  EXPLAIN:    PMH -reviewed admission note, no change since admission  Heart faliure: acute [ ] chronic [ ] acute or chronic [ ] diastolic [ ] systolic [ ] combied systolic and diastolic[ ]  ZENY: ATN[ ] renal medullary necrosis [ ] CKD I [ ]CKDII [ ]CKD III [ ]CKD IV [ ]CKD V [ ]Other pathological lesions [ ]  Abdominal Nutrition Status: malnutrition [ ] cachexia [ ] morbid obesity/BMI=40 [ ] Supplement ordered [___________]     ICU Vital Signs Last 24 Hrs  T(C): 36.6 (19 Dec 2017 06:00), Max: 37.1 (18 Dec 2017 13:30)  T(F): 97.9 (19 Dec 2017 06:00), Max: 98.7 (18 Dec 2017 13:30)  HR: 107 (19 Dec 2017 07:30) (82 - 134)  BP: 118/86 (19 Dec 2017 07:30) (88/48 - 134/97)  BP(mean): 91 (19 Dec 2017 07:30) (57 - 106)  ABP: --  ABP(mean): --  RR: 18 (19 Dec 2017 07:30) (17 - 32)  SpO2: 98% (19 Dec 2017 07:30) (91% - 100%)      ABG - ( 18 Dec 2017 23:38 )  pH: 7.15  /  pCO2: 44    /  pO2: 86    / HCO3: 15    / Base Excess: -13.2 /  SaO2: 95                    12-18 @ 07:01  -  12-19 @ 07:00  --------------------------------------------------------  IN: 1490.8 mL / OUT: 920 mL / NET: 570.8 mL        Mode: AC/ CMV (Assist Control/ Continuous Mandatory Ventilation)  RR (machine): 28  TV (machine): 450  FiO2: 50  PEEP: 5  ITime: 1  MAP: 13  PIP: 24      PHYSICAL EXAM:    GENERAL: [ ]NAD, [ ]well-groomed, [ ]well-developed  HEAD:  [ ]Atraumatic, [ ]Normocephalic  EYES: [ ]EOMI, [ ]PERRLA, [ ]conjunctiva and sclera clear  ENMT: [ ]No tonsillar erythema, exudates, or enlargement; [ ]Moist mucous membranes, [ ]Good dentition, [ ]No lesions  NECK: [ ]Supple, normal appearance, [ ]No JVD; [ ]Normal thyroid; [ ]Trachea midline  NERVOUS SYSTEM:  [ ]Alert & Oriented X3, [ ]Good concentration; [ ]Motor Strength 5/5 B/L upper and lower extremities; [ ]DTRs 2+ intact and symmetric  CHEST/LUNG: [ ]No chest deformity; [ ]Normal percussion bilaterally; [ ]No rales, rhonchi, wheezing   HEART: [ ]Regular rate and rhythm; [ ]No murmurs, rubs, or gallops  ABDOMEN: [ ]Soft, Nontender, Nondistended; [ ]Bowel sounds present  EXTREMITIES:  [ ]2+ Peripheral Pulses, [ ]No clubbing, cyanosis, or edema  LYMPH: [ ]No lymphadenopathy noted  SKIN: [ ]No rashes or lesions; [ ]Good capillary refill      LABS:  CBC Full  -  ( 19 Dec 2017 05:54 )  WBC Count : 30.4 K/uL  Hemoglobin : 10.2 g/dL  Hematocrit : 32.9 %  Platelet Count - Automated : x  Mean Cell Volume : 103.3 fl  Mean Cell Hemoglobin : 32.2 pg  Mean Cell Hemoglobin Concentration : 31.1 gm/dL  Auto Neutrophil # : x  Auto Lymphocyte # : x  Auto Monocyte # : x  Auto Eosinophil # : x  Auto Basophil # : x  Auto Neutrophil % : x  Auto Lymphocyte % : x  Auto Monocyte % : x  Auto Eosinophil % : x  Auto Basophil % : x    12-19    144  |  110<H>  |  96<H>  ----------------------------<  85  4.0   |  21<L>  |  2.83<H>    Ca    8.5      19 Dec 2017 04:31  Phos  2.8     12-19  Mg     2.6     12-19    TPro  5.3<L>  /  Alb  1.1<L>  /  TBili  1.6<H>  /  DBili  x   /  AST  36  /  ALT  22  /  AlkPhos  49  12-18    PT/INR - ( 19 Dec 2017 05:54 )   PT: 11.3 sec;   INR: 1.04 ratio         PTT - ( 19 Dec 2017 05:54 )  PTT:30.5 sec        RADIOLOGY & ADDITIONAL STUDIES REVIEWED:  ***    [ ]GOALS OF CARE DISCUSSION WITH PATIENT/FAMILY/PROXY:    CRITICAL CARE TIME SPENT: 35 minutes INTERVAL HPI/OVERNIGHT EVENTS: *** No overnight events    PRESSORS: [ ] YES [ ] NO  WHICH:    ANTIBIOTICS:     zosyn            DATE STARTED:12/15      Antimicrobial:  piperacillin/tazobactam IVPB. 3.375 Gram(s) IV Intermittent every 12 hours    Cardiovascular:  amiodarone    Tablet 200 milliGRAM(s) Oral daily  phenylephrine    Infusion 0.5 MICROgram(s)/kG/Min IV Continuous <Continuous>    Pulmonary:    Hematalogic:  aspirin  chewable 81 milliGRAM(s) Oral daily  enoxaparin Injectable 40 milliGRAM(s) SubCutaneous daily    Other:  acetaminophen  Suppository 650 milliGRAM(s) Rectal every 6 hours PRN  BACItracin   Ointment 1 Application(s) Topical two times a day  chlorproMAZINE    Tablet 25 milliGRAM(s) Oral every 6 hours PRN  fentaNYL   Infusion 1 MICROgram(s)/kG/Hr IV Continuous <Continuous>  pantoprazole  Injectable 40 milliGRAM(s) IV Push daily  propofol Infusion 5 MICROgram(s)/kG/Min IV Continuous <Continuous>  sodium bicarbonate 650 milliGRAM(s) Oral two times a day  vasopressin Infusion 0.04 Unit(s)/Min IV Continuous <Continuous>    acetaminophen  Suppository 650 milliGRAM(s) Rectal every 6 hours PRN  amiodarone    Tablet 200 milliGRAM(s) Oral daily  aspirin  chewable 81 milliGRAM(s) Oral daily  BACItracin   Ointment 1 Application(s) Topical two times a day  chlorproMAZINE    Tablet 25 milliGRAM(s) Oral every 6 hours PRN  enoxaparin Injectable 40 milliGRAM(s) SubCutaneous daily  fentaNYL   Infusion 1 MICROgram(s)/kG/Hr IV Continuous <Continuous>  pantoprazole  Injectable 40 milliGRAM(s) IV Push daily  phenylephrine    Infusion 0.5 MICROgram(s)/kG/Min IV Continuous <Continuous>  piperacillin/tazobactam IVPB. 3.375 Gram(s) IV Intermittent every 12 hours  propofol Infusion 5 MICROgram(s)/kG/Min IV Continuous <Continuous>  sodium bicarbonate 650 milliGRAM(s) Oral two times a day  vasopressin Infusion 0.04 Unit(s)/Min IV Continuous <Continuous>    Drug Dosing Weight  Height (cm): 165 (12 Dec 2017 13:14)  Weight (kg): 76.4 (12 Dec 2017 16:00)  BMI (kg/m2): 28.1 (12 Dec 2017 16:00)  BSA (m2): 1.84 (12 Dec 2017 16:00)    CENTRAL LINE: [x ] YES [ ] NO  LOCATION:   DATE INSERTED:  REMOVE: [ ] YES [ ] NO  EXPLAIN:    LERMA: [x ] YES [ ] NO    DATE INSERTED:  REMOVE:  [ ] YES [ ] NO  EXPLAIN:    A-LINE:  [x ] YES [ ] NO  LOCATION:   DATE INSERTED:  REMOVE:  [ ] YES [ ] NO  EXPLAIN:    PMH -reviewed admission note, no change since admission  Heart faliure: acute [ ] chronic [ ] acute or chronic [ ] diastolic [ ] systolic [ ] combied systolic and diastolic[ ]  ZENY: ATN[ ] renal medullary necrosis [ ] CKD I [ ]CKDII [ ]CKD III [ ]CKD IV [ ]CKD V [ ]Other pathological lesions [ ]  Abdominal Nutrition Status: malnutrition [ ] cachexia [ ] morbid obesity/BMI=40 [ ] Supplement ordered [___________]     ICU Vital Signs Last 24 Hrs  T(C): 36.6 (19 Dec 2017 06:00), Max: 37.1 (18 Dec 2017 13:30)  T(F): 97.9 (19 Dec 2017 06:00), Max: 98.7 (18 Dec 2017 13:30)  HR: 107 (19 Dec 2017 07:30) (82 - 134)  BP: 118/86 (19 Dec 2017 07:30) (88/48 - 134/97)  BP(mean): 91 (19 Dec 2017 07:30) (57 - 106)  ABP: --  ABP(mean): --  RR: 18 (19 Dec 2017 07:30) (17 - 32)  SpO2: 98% (19 Dec 2017 07:30) (91% - 100%)      ABG - ( 18 Dec 2017 23:38 )  pH: 7.15  /  pCO2: 44    /  pO2: 86    / HCO3: 15    / Base Excess: -13.2 /  SaO2: 95                    12-18 @ 07:01  -  12-19 @ 07:00  --------------------------------------------------------  IN: 1490.8 mL / OUT: 920 mL / NET: 570.8 mL        Mode: AC/ CMV (Assist Control/ Continuous Mandatory Ventilation)  RR (machine): 28  TV (machine): 450  FiO2: 50  PEEP: 5  ITime: 1  MAP: 13  PIP: 24      Physical exam:  GENERAL: [x ]NAD, sedated, On mechanical ventilation  HEAD:  [x ]Atraumatic, [x]Normocephalic  EYES:[x ]conjunctiva and sclera clear  ENMT: ETT placed,  NECK: [x ]Supple, normal appearance,   NERVOUS SYSTEM:  [x ]Alert & Oriented X0,    CHEST/LUNG: On ETT tube, Clear to auscultation, [ x]No chest deformity; [ ]Normal percussion bilaterally; [ ]No rales, rhonchi, wheezing   HEART: [ x]Regular rate and rhythm; [x ]No murmurs, rubs, or gallops  ABDOMEN: [ x]Soft, Nontender, Nondistended; [x ]Bowel sounds present  EXTREMITIES:  [x ]2+ Peripheral Pulses, [ x]No clubbing, cyanosis, or edema, on SCD boots,  LYMPH: [ x]No lymphadenopathy noted  SKIN: [x ]No rashes or lesions; [ ]Good capillary refill      LABS:  CBC Full  -  ( 19 Dec 2017 05:54 )  WBC Count : 30.4 K/uL  Hemoglobin : 10.2 g/dL  Hematocrit : 32.9 %  Platelet Count - Automated : x  Mean Cell Volume : 103.3 fl  Mean Cell Hemoglobin : 32.2 pg  Mean Cell Hemoglobin Concentration : 31.1 gm/dL  Auto Neutrophil # : x  Auto Lymphocyte # : x  Auto Monocyte # : x  Auto Eosinophil # : x  Auto Basophil # : x  Auto Neutrophil % : x  Auto Lymphocyte % : x  Auto Monocyte % : x  Auto Eosinophil % : x  Auto Basophil % : x    12-19    144  |  110<H>  |  96<H>  ----------------------------<  85  4.0   |  21<L>  |  2.83<H>    Ca    8.5      19 Dec 2017 04:31  Phos  2.8     12-19  Mg     2.6     12-19    TPro  5.3<L>  /  Alb  1.1<L>  /  TBili  1.6<H>  /  DBili  x   /  AST  36  /  ALT  22  /  AlkPhos  49  12-18    PT/INR - ( 19 Dec 2017 05:54 )   PT: 11.3 sec;   INR: 1.04 ratio         PTT - ( 19 Dec 2017 05:54 )  PTT:30.5 sec        RADIOLOGY & ADDITIONAL STUDIES REVIEWED:  ***    [ ]GOALS OF CARE DISCUSSION WITH PATIENT/FAMILY/PROXY:    CRITICAL CARE TIME SPENT: 35 minutes

## 2017-12-19 NOTE — PROGRESS NOTE ADULT - PROBLEM SELECTOR PLAN 1
Non-oliguric ZENY likely hemodynamically mediated in the setting of septic shock/ hypotension; ATN.  Renal function now stable ?plateau phase  LE edema/anasarca is due in large part to low oncotic pressure due to hypoalbuminemia/PCM in acute illness. Strict I/O as well.   UA with 30 protein and mod blood in the setting of infection. Recc to repeat UA once infection cleared. FeNa 0.07%- s/p IVF.  c/w antibiotics/ pressors as per MICU. Monitor Vanco trough.   CT abd/ pelvis 12/5 1.0 cm indeterminate exophytic lesion arising from the   lower pole of left kidney. No hydronephrosis. Subsequent, Renal US performed with left renal lesion not seen. Consider outpt Urology f/u.   Strict I/Os. Avoid nephrotoxins/ NSAIDs/ RCA. Monitor BMP.

## 2017-12-19 NOTE — PROGRESS NOTE ADULT - PROBLEM SELECTOR PLAN 1
Ventilator support  Follow up ABGs  Correct metabolic acidosis  Bronchodilators  Pulmonary toilet  Aspiration precautions  Decubitus prevention

## 2017-12-19 NOTE — PROGRESS NOTE ADULT - SUBJECTIVE AND OBJECTIVE BOX
CHIEF COMPLAINT:Patient is a 72y old  Male who presents with a chief complaint of weakness, chest discomfort and voice and appetite changes. Pt remains intubated.    	  REVIEW OF SYSTEMS:  [X ] Unable to obtain    PHYSICAL EXAM:  T(C): 36.8 (12-19-17 @ 07:30), Max: 37.1 (12-18-17 @ 13:30)  HR: 96 (12-19-17 @ 10:00) (82 - 134)  BP: 108/56 (12-19-17 @ 10:00) (88/48 - 134/97)  RR: 20 (12-19-17 @ 10:00) (17 - 32)  SpO2: 99% (12-19-17 @ 10:00) (91% - 100%)  Wt(kg): --  I&O's Summary    18 Dec 2017 07:01  -  19 Dec 2017 07:00  --------------------------------------------------------  IN: 1490.8 mL / OUT: 920 mL / NET: 570.8 mL    19 Dec 2017 07:01  -  19 Dec 2017 11:16  --------------------------------------------------------  IN: 286.2 mL / OUT: 115 mL / NET: 171.2 mL        Appearance: Normal	  HEENT:   Normal oral mucosa, PERRL, EOMI	  Lymphatic: No lymphadenopathy  Cardiovascular: Normal S1 S2, No JVD, No murmurs, No edema  Respiratory: B/L ronchi  Gastrointestinal:  Soft, Non-tender, + BS	  Skin: No rashes, No ecchymoses, No cyanosis	  Extremities: Normal range of motion, No clubbing, cyanosis or edema  Vascular: Peripheral pulses palpable 2+ bilaterally    MEDICATIONS  (STANDING):  amiodarone    Tablet 200 milliGRAM(s) Oral daily  aspirin  chewable 81 milliGRAM(s) Oral daily  BACItracin   Ointment 1 Application(s) Topical two times a day  fentaNYL   Infusion 0.5 MICROgram(s)/kG/Hr (3.82 mL/Hr) IV Continuous <Continuous>  furosemide   Injectable 60 milliGRAM(s) IV Push once  heparin  Injectable 5000 Unit(s) IV Push every 8 hours  pantoprazole  Injectable 40 milliGRAM(s) IV Push daily  phenylephrine    Infusion 0.5 MICROgram(s)/kG/Min (6.563 mL/Hr) IV Continuous <Continuous>  piperacillin/tazobactam IVPB. 3.375 Gram(s) IV Intermittent every 12 hours  propofol Infusion 5 MICROgram(s)/kG/Min (2.292 mL/Hr) IV Continuous <Continuous>  sodium bicarbonate 650 milliGRAM(s) Oral two times a day  vasopressin Infusion 0.04 Unit(s)/Min (2.4 mL/Hr) IV Continuous <Continuous>      TELEMETRY: afib	      	  LABS:	 	                       10.2   30.4  )-----------( CLUMPED    ( 19 Dec 2017 05:54 )             32.9     12-19    144  |  110<H>  |  96<H>  ----------------------------<  85  4.0   |  21<L>  |  2.83<H>    Ca    8.5      19 Dec 2017 04:31  Phos  2.8     12-19  Mg     2.6     12-19    TPro  5.3<L>  /  Alb  1.1<L>  /  TBili  1.6<H>  /  DBili  x   /  AST  36  /  ALT  22  /  AlkPhos  49  12-18      Lipid Profile: Cholesterol 64  LDL 20  HDL 26  TG 88    HgA1c: Hemoglobin A1C, Whole Blood: 6.2 % (12-05 @ 09:32)    TSH: Thyroid Stimulating Hormone, Serum: 0.45 uU/mL (12-05 @ 07:14)

## 2017-12-19 NOTE — PROGRESS NOTE ADULT - ASSESSMENT
A 72 y M  former smoker 20 PPD with Hx HTN BPH  p/w generalized weakness, voice changes with imaging showing left hilar mass in CT scan , s/p bronch and biopsy on 12/06 , admitted to ICU on 12/06 for pneumothorax s/p diagnostic bronchoscopy for left lobe lesions found on CT imaging - chest tube placed in ICU 12/6 on suction - admitted for urgent CT placement and monitoring. pt was downgraded on Lung biopsy from 12/07. Lung biopsy did not show any malignancy. s/p transbronchial lung biopsy , s/p endobronchial lung biopsy, s/p flexible bronchoscopy today by Dr. Piper.   ROS - unable to get as pt is intubated    admit to ICU post op monitoring s/p intubation s/p chest tube , hypotensive 2/2 septic shock, requiring pressors, on phenylephrine      1: Septic Shock  -c/w chest tube to suction, no air leak  -s/p intubation , c/w mechanical ventilation  -c/w sedation  -hypotension post anesthesia , c/w IV fluids and c/w pressor, Patient BP is still on lower side requiring pressors  - c/w phenylephrine, vasopressin  - pt on propofol for sedation, fentanyl for pain  -increased white count, tissue culture growing gram negative rods rare neisseria, haemophilus, BCx negative to date  - UA negative  - currently on zosyn  - ID Dr. Nicholas      2: Acute Kidney Injury  -c/w IVF  -monitor urine output - patient is going in ATN, over night given lasix one dose, UO increased to 50-75cc but worsening  -c/w sodium bicarb per Nephro recommendations  -Nephro: Dr. Reagan      3: New onset Atrial Fibrillation  -increased HR after given low dose levophed, continued despite d/c of levophed  -Patient is in vasopressin  - EKG shows Atrial fibrillation (new onset)  - Completed amiodarone loading, now amio 200mg  - f/u cardiology - Dr. Jackson    Pneumothorax   Crepitus along neck and L anterior chest wall  -chest tube to suction  -Repeat CXR unchanged from previous.    Lung mass  -c/w post op prophylaxis  -Bronch path negative for Ca.  -s/p EBUS VS mediastinoscopy  -cyto report, negative for  malignancy  - 1 biopsy still pending  -repeat CT chest - worsening necrotic mass from prior imaging  -Discussed with IR: not doing liver biopsy as patient is still on pressors,   -Restarted lovenox and aspirin    Hypertension.    -hypotension , holding home BP meds,  c/w pressors    BPH (benign prostatic hyperplasia)  - monitor U/o  -c/w NG tube    Penile swelling:   Urology consult requested, cold compresses, bacitracin ointment application    Hyperphosphatemia:  -Patient phosphate high in setting for kidney failure  -stable will c/w monitoring    Prophylactic measure  - IMPROVE score 2   - c/w heparin S.C for VTE prophylaxis  - GI stress ulcer PPx w/ Protonix.    Goals of care and discussion:  Patient has poor prognosis, Patient is going in ATN, very low UO,  Palliative on board: Patient is DNR

## 2017-12-19 NOTE — PROGRESS NOTE ADULT - ASSESSMENT
71 yo M with HTN, BPH a/w chest discomfort, suprahilar/ hepatic mass and hyponatremia. Hosp course cb subcutaneous emphysema s/p bronch with Left chest tube placement. 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 Now intubated with septic shock 2/2 necrotizing PNA,  hypotension on pressors, with ZENY and hyponatremia.   Renal consulted for hyponatremia and ZENY.

## 2017-12-19 NOTE — PROGRESS NOTE ADULT - SUBJECTIVE AND OBJECTIVE BOX
Patient is a 72y old Male who presents with a chief complaint of weakness, chest discomfort and voice and appetite change.   Patient remains sedated, intubated on mechanical ventilation. Still on pressors meds. Biopsy done via EBUS still pending. LNs sampling neg for CA. Remains with chest tube in place. Subcutaneous emphysema slightly reduced. Clinically unchanged.      INTERVAL HPI/OVERNIGHT EVENTS:      VITAL SIGNS:  T(F): 98.2 (12-19-17 @ 07:30)  HR: 96 (12-19-17 @ 10:00)  BP: 108/56 (12-19-17 @ 10:00)  RR: 20 (12-19-17 @ 10:00)  SpO2: 99% (12-19-17 @ 10:00)  Wt(kg): --  I&O's Detail    18 Dec 2017 07:01  -  19 Dec 2017 07:00  --------------------------------------------------------  IN:    fentaNYL  Infusion: 114 mL    fentaNYL  Infusion: 137.7 mL    phenylephrine   Infusion: 1024.4 mL    propofol Infusion: 82.1 mL    Solution: 75 mL    vasopressin Infusion: 26.4 mL    vasopressin Infusion: 31.2 mL  Total IN: 1490.8 mL    OUT:    Chest Tube: 50 mL    Indwelling Catheter - Urethral: 600 mL    Other: 270 mL  Total OUT: 920 mL    Total NET: 570.8 mL      19 Dec 2017 07:01  -  19 Dec 2017 11:12  --------------------------------------------------------  IN:    fentaNYL  Infusion: 22.8 mL    phenylephrine   Infusion: 222 mL    propofol Infusion: 34.2 mL    vasopressin Infusion: 7.2 mL  Total IN: 286.2 mL    OUT:    Indwelling Catheter - Urethral: 115 mL  Total OUT: 115 mL    Total NET: 171.2 mL        Mode: AC/ CMV (Assist Control/ Continuous Mandatory Ventilation)  RR (machine): 28  TV (machine): 450  FiO2: 50  PEEP: 5  ITime: 1  MAP: 12  PIP: 23        REVIEW OF SYSTEMS:    CONSTITUTIONAL:  No fevers, chills, sweats    HEENT:  Eyes:  No diplopia or blurred vision. ENT:  No earache, sore throat or runny nose.    CARDIOVASCULAR:  No pressure, squeezing, tightness, or heaviness about the chest; no palpitations.    RESPIRATORY:  Per HPI    GASTROINTESTINAL:  No abdominal pain, nausea, vomiting or diarrhea.    GENITOURINARY:  No dysuria, frequency or urgency.    NEUROLOGIC:  No paresthesias, fasciculations, seizures or weakness.    PSYCHIATRIC:  No disorder of thought or mood.      PHYSICAL EXAM:    Constitutional: Well developed and nourished  Eyes:Perrla  ENMT: normal  Neck:supple  Respiratory: good air entry  Cardiovascular: S1 S2 regular  Gastrointestinal: Soft, Non tender  Extremities: No edema  Vascular:normal  Neurological:Awake, alert,Ox3  Musculoskeletal:Normal      MEDICATIONS  (STANDING):  amiodarone    Tablet 200 milliGRAM(s) Oral daily  aspirin  chewable 81 milliGRAM(s) Oral daily  BACItracin   Ointment 1 Application(s) Topical two times a day  fentaNYL   Infusion 0.5 MICROgram(s)/kG/Hr (3.82 mL/Hr) IV Continuous <Continuous>  furosemide   Injectable 60 milliGRAM(s) IV Push once  heparin  Injectable 5000 Unit(s) IV Push every 8 hours  pantoprazole  Injectable 40 milliGRAM(s) IV Push daily  phenylephrine    Infusion 0.5 MICROgram(s)/kG/Min (6.563 mL/Hr) IV Continuous <Continuous>  piperacillin/tazobactam IVPB. 3.375 Gram(s) IV Intermittent every 12 hours  propofol Infusion 5 MICROgram(s)/kG/Min (2.292 mL/Hr) IV Continuous <Continuous>  sodium bicarbonate 650 milliGRAM(s) Oral two times a day  vasopressin Infusion 0.04 Unit(s)/Min (2.4 mL/Hr) IV Continuous <Continuous>    MEDICATIONS  (PRN):  acetaminophen  Suppository 650 milliGRAM(s) Rectal every 6 hours PRN For Temp greater than 38 C (100.4 F)  chlorproMAZINE    Tablet 25 milliGRAM(s) Oral every 6 hours PRN hiccups      Allergies    No Known Allergies    Intolerances        LABS:                        10.2   30.4  )-----------( CLUMPED    ( 19 Dec 2017 05:54 )             32.9     12-19    144  |  110<H>  |  96<H>  ----------------------------<  85  4.0   |  21<L>  |  2.83<H>    Ca    8.5      19 Dec 2017 04:31  Phos  2.8     12-19  Mg     2.6     12-19    TPro  5.3<L>  /  Alb  1.1<L>  /  TBili  1.6<H>  /  DBili  x   /  AST  36  /  ALT  22  /  AlkPhos  49  12-18    PT/INR - ( 19 Dec 2017 05:54 )   PT: 11.3 sec;   INR: 1.04 ratio         PTT - ( 19 Dec 2017 05:54 )  PTT:30.5 sec    ABG - ( 18 Dec 2017 23:38 )  pH: 7.15  /  pCO2: 44    /  pO2: 86    / HCO3: 15    / Base Excess: -13.2 /  SaO2: 95                    CAPILLARY BLOOD GLUCOSE            RADIOLOGY & ADDITIONAL TESTS:    CXR:  < from: Xray Chest 1 View AP -PORTABLE-Routine (12.18.17 @ 10:04) >  AP semisupine chest on December 18 at 8:49 AM. Patient   is respirator dependent and is being followed for left chest tube and   infiltrates.    Heart is magnified by technique.    Endotracheal tube, nasogastric tube, right jugular line, and left chest   tube remain in place.    There are significant bilateral infiltrates left greater than right. Left   chest appears expanded.    < end of copied text >    Ct scan chest:  < from: CT Chest No Cont (12.15.17 @ 14:17) >  CHEST:     LINES AND TUBES: An endotracheal tube terminates at the level of the   aortic arch, abovethe jules. An enteric tube terminates in the stomach.   A right IJ central venous catheter terminates in the SVC. A left-sided   chest tube is noted.  LUNGS/LARGE AIRWAYS/PLEURA: Previously seen left lower lobe necrotic mass   now encompasses the entire left lower lobe with internal gas loculations.   Small left apical pneumothorax. Peribronchovascular groundglass opacities   are present in all lobes. Small bilateral pleural effusions with   associated compressive atelectasis.  VESSELS: Atherosclerotic changes of the aorta and coronary arteries.  HEART: Heart size is normal. No pericardial effusion.  MEDIASTINUM AND ALPHONSE: Pneumomediastinum. Redemonstrated 3.8 x 2.1 cm AP   window lymph node.  CHEST WALL AND LOWER NECK: Extensive bilateral subcutaneous emphysema.  VISUALIZED UPPER ABDOMEN: Redemonstrated, unchanged indeterminant central   low density splenic lesion. Small perihepatic ascites  BONES: Redemonstrated destruction of the left sixth and seventh ribs and   left T6 transverse process. Degenerative changes.    < end of copied text >    ekg;    echo: Patient is a 72y old Male who presents with a chief complaint of weakness, chest discomfort and voice and appetite change.   Patient remains sedated, intubated on mechanical ventilation. Still on pressors meds. Biopsy done via EBUS showed no malignant cells. LNs sampling neg for CA. Remains with chest tube in place. Subcutaneous emphysema slightly reduced. Clinically unchanged.  On pressors meds still    INTERVAL HPI/OVERNIGHT EVENTS:      VITAL SIGNS:  T(F): 98.2 (12-19-17 @ 07:30)  HR: 96 (12-19-17 @ 10:00)  BP: 108/56 (12-19-17 @ 10:00)  RR: 20 (12-19-17 @ 10:00)  SpO2: 99% (12-19-17 @ 10:00)  Wt(kg): --  I&O's Detail    18 Dec 2017 07:01  -  19 Dec 2017 07:00  --------------------------------------------------------  IN:    fentaNYL  Infusion: 114 mL    fentaNYL  Infusion: 137.7 mL    phenylephrine   Infusion: 1024.4 mL    propofol Infusion: 82.1 mL    Solution: 75 mL    vasopressin Infusion: 26.4 mL    vasopressin Infusion: 31.2 mL  Total IN: 1490.8 mL    OUT:    Chest Tube: 50 mL    Indwelling Catheter - Urethral: 600 mL    Other: 270 mL  Total OUT: 920 mL    Total NET: 570.8 mL      19 Dec 2017 07:01  -  19 Dec 2017 11:12  --------------------------------------------------------  IN:    fentaNYL  Infusion: 22.8 mL    phenylephrine   Infusion: 222 mL    propofol Infusion: 34.2 mL    vasopressin Infusion: 7.2 mL  Total IN: 286.2 mL    OUT:    Indwelling Catheter - Urethral: 115 mL  Total OUT: 115 mL    Total NET: 171.2 mL        Mode: AC/ CMV (Assist Control/ Continuous Mandatory Ventilation)  RR (machine): 28  TV (machine): 450  FiO2: 50  PEEP: 5  ITime: 1  MAP: 12  PIP: 23        REVIEW OF SYSTEMS:    CONSTITUTIONAL:  No fevers, chills, sweats    HEENT:  Eyes:  No diplopia or blurred vision. ENT:  No earache, sore throat or runny nose.    CARDIOVASCULAR:  No pressure, squeezing, tightness, or heaviness about the chest; no palpitations.    RESPIRATORY:  Per HPI    GASTROINTESTINAL:  No abdominal pain, nausea, vomiting or diarrhea.    GENITOURINARY:  No dysuria, frequency or urgency.    NEUROLOGIC:  No paresthesias, fasciculations, seizures or weakness.    PSYCHIATRIC:  No disorder of thought or mood.      PHYSICAL EXAM:    Constitutional: Well developed and nourished  Eyes:Perrla  ENMT: normal  Neck:supple  Respiratory: Crepitus on left upper chest.  Cardiovascular: S1 S2 regular  Gastrointestinal: Soft, Non tender  Extremities: + edema of exts  Vascular:normal  Neurological:Awake, alert,Ox3  Musculoskeletal:Normal      MEDICATIONS  (STANDING):  amiodarone    Tablet 200 milliGRAM(s) Oral daily  aspirin  chewable 81 milliGRAM(s) Oral daily  BACItracin   Ointment 1 Application(s) Topical two times a day  fentaNYL   Infusion 0.5 MICROgram(s)/kG/Hr (3.82 mL/Hr) IV Continuous <Continuous>  furosemide   Injectable 60 milliGRAM(s) IV Push once  heparin  Injectable 5000 Unit(s) IV Push every 8 hours  pantoprazole  Injectable 40 milliGRAM(s) IV Push daily  phenylephrine    Infusion 0.5 MICROgram(s)/kG/Min (6.563 mL/Hr) IV Continuous <Continuous>  piperacillin/tazobactam IVPB. 3.375 Gram(s) IV Intermittent every 12 hours  propofol Infusion 5 MICROgram(s)/kG/Min (2.292 mL/Hr) IV Continuous <Continuous>  sodium bicarbonate 650 milliGRAM(s) Oral two times a day  vasopressin Infusion 0.04 Unit(s)/Min (2.4 mL/Hr) IV Continuous <Continuous>    MEDICATIONS  (PRN):  acetaminophen  Suppository 650 milliGRAM(s) Rectal every 6 hours PRN For Temp greater than 38 C (100.4 F)  chlorproMAZINE    Tablet 25 milliGRAM(s) Oral every 6 hours PRN hiccups      Allergies    No Known Allergies    Intolerances        LABS:                        10.2   30.4  )-----------( CLUMPED    ( 19 Dec 2017 05:54 )             32.9     12-19    144  |  110<H>  |  96<H>  ----------------------------<  85  4.0   |  21<L>  |  2.83<H>    Ca    8.5      19 Dec 2017 04:31  Phos  2.8     12-19  Mg     2.6     12-19    TPro  5.3<L>  /  Alb  1.1<L>  /  TBili  1.6<H>  /  DBili  x   /  AST  36  /  ALT  22  /  AlkPhos  49  12-18    PT/INR - ( 19 Dec 2017 05:54 )   PT: 11.3 sec;   INR: 1.04 ratio         PTT - ( 19 Dec 2017 05:54 )  PTT:30.5 sec    ABG - ( 18 Dec 2017 23:38 )  pH: 7.15  /  pCO2: 44    /  pO2: 86    / HCO3: 15    / Base Excess: -13.2 /  SaO2: 95                    CAPILLARY BLOOD GLUCOSE            RADIOLOGY & ADDITIONAL TESTS:    CXR:  < from: Xray Chest 1 View AP -PORTABLE-Routine (12.18.17 @ 10:04) >  AP semisupine chest on December 18 at 8:49 AM. Patient   is respirator dependent and is being followed for left chest tube and   infiltrates.    Heart is magnified by technique.    Endotracheal tube, nasogastric tube, right jugular line, and left chest   tube remain in place.    There are significant bilateral infiltrates left greater than right. Left   chest appears expanded.    < end of copied text >    Ct scan chest:  < from: CT Chest No Cont (12.15.17 @ 14:17) >  CHEST:     LINES AND TUBES: An endotracheal tube terminates at the level of the   aortic arch, abovethe jules. An enteric tube terminates in the stomach.   A right IJ central venous catheter terminates in the SVC. A left-sided   chest tube is noted.  LUNGS/LARGE AIRWAYS/PLEURA: Previously seen left lower lobe necrotic mass   now encompasses the entire left lower lobe with internal gas loculations.   Small left apical pneumothorax. Peribronchovascular groundglass opacities   are present in all lobes. Small bilateral pleural effusions with   associated compressive atelectasis.  VESSELS: Atherosclerotic changes of the aorta and coronary arteries.  HEART: Heart size is normal. No pericardial effusion.  MEDIASTINUM AND ALPHONSE: Pneumomediastinum. Redemonstrated 3.8 x 2.1 cm AP   window lymph node.  CHEST WALL AND LOWER NECK: Extensive bilateral subcutaneous emphysema.  VISUALIZED UPPER ABDOMEN: Redemonstrated, unchanged indeterminant central   low density splenic lesion. Small perihepatic ascites  BONES: Redemonstrated destruction of the left sixth and seventh ribs and   left T6 transverse process. Degenerative changes.    < end of copied text >    ekg;    echo:

## 2017-12-19 NOTE — PROGRESS NOTE ADULT - SUBJECTIVE AND OBJECTIVE BOX
[facility] NEPHROLOGY- PROGRESS NOTE    73 yo M with HTN, BPH a/w chest discomfort, suprahilar/ hepatic mass and hyponatremia. Hosp course cb subcutaneous emphysema s/p bronch with Left chest tube placement. s/p EBUS  with  endobronchial lung biopsy neg for malignancy however mediastinal biopsy indeterminate. Now intubated with septic shock 2/2 Necrotizing PNA, hypotension on pressors, with ZENY and hyponatremia.   Renal consulted for hyponatremia and ZENY.       Hospital Medications: Medications reviewed.  REVIEW OF SYSTEMS: Unable to obtain    VITALS:  T(F): 98.5 (17 @ 15:20), Max: 98.6 (17 @ 13:00)  HR: 104 (17 @ 16:00)  BP: 106/63 (17 @ 16:00)  RR: 22 (17 @ 16:00)  SpO2: 99% (17 @ 16:00)  Wt(kg): --     @ 07:01  -   @ 07:00  --------------------------------------------------------  IN: 1490.8 mL / OUT: 920 mL / NET: 570.8 mL     @ 07:01  -   @ 17:24  --------------------------------------------------------  IN: 823.7 mL / OUT: 505 mL / NET: 318.7 mL      PHYSICAL EXAM:  Gen: Sedated   HEENT: intubated  Cards: Irregular, +S1/S2, no M/G/R  Resp: mechanical BS with left Chest tube  GI: soft, NT/ND, NABS  : +frias  Extremities: +B LE edema/anasarca    LABS:      144  |  110<H>  |  96<H>  ----------------------------<  85  4.0   |  21<L>  |  2.83<H>    Ca    8.5      19 Dec 2017 04:31  Phos  2.8       Mg     2.6         TPro  5.3<L>  /  Alb  1.1<L>  /  TBili  1.6<H>  /  DBili      /  AST  36  /  ALT  22  /  AlkPhos  49      Creatinine Trend: 2.83 <--, 2.87 <--, 2.69 <--, 2.61 <--, 2.17 <--, 1.87 <--, 1.53 <--, 1.56 <--                        10.2   30.4  )-----------( CLUMPED    ( 19 Dec 2017 05:54 )             32.9     Urine Studies:  Urinalysis Basic - ( 13 Dec 2017 19:36 )    Color: Yellow / Appearance: Clear / S.025 / pH:   Gluc:  / Ketone: Negative  / Bili: Small / Urobili: 4   Blood:  / Protein: 30 mg/dL / Nitrite: Negative   Leuk Esterase: Negative / RBC: 0-2 /HPF / WBC 0-2 /HPF   Sq Epi:  / Non Sq Epi: Few /HPF / Bacteria: Few /HPF      Sodium, Random Urine: 56 mmol/L ( @ 03:44)  Osmolality, Random Urine: 340 mos/kg ( @ 03:44)  Potassium, Random Urine: 32 mmol/L ( @ 03:44)  Chloride, Random Urine: 90 mmol/L ( @ 03:44)  Creatinine, Random Urine: 72 mg/dL ( @ 03:44)  Sodium, Random Urine: 9 mmol/L (12-15 @ 18:16)  Chloride, Random Urine: <10 mmol/L (12-15 @ 18:16)  Potassium, Random Urine: 36 mmol/L (12-15 @ 18:16)  Creatinine, Random Urine: 153 mg/dL (12-15 @ 18:16)  Osmolality, Random Urine: 330 mos/kg (12-15 @ 18:16)  Chloride, Random Urine: <10 mmol/L ( @ 13:22)  Osmolality, Random Urine: 427 mos/kg ( @ 13:22)  Sodium, Random Urine: 10 mmol/L ( @ 13:22)  Potassium, Random Urine: 58 mmol/L ( @ :22)  Creatinine, Random Urine: 173 mg/dL ( @ :22)  Osmolality, Random Urine: 448 mos/kg ( @ 06:40)  Sodium, Random Urine: 6 mmol/L ( @ 06:40)  Sodium, Random Urine: 6 mmol/L ( @ 06:40)  Creatinine, Random Urine: 102 mg/dL ( @ 06:40)

## 2017-12-19 NOTE — PROGRESS NOTE ADULT - SUBJECTIVE AND OBJECTIVE BOX
Meds:  Zosyn 3.375 gm IVPB q 12 hours.    Allergies:  Allergies    No Known Allergies    Intolerances  ROS  [ x ] UNABLE TO ELICIT    General:  [  ] None  [  ] Fever  [  ] Chills  [  ] Malaise    Skin:  [  ] None [  ] Rash  [  ] Wound  [  ] Ulcer    HEENT:  [  ] None  [  ] Sore Throat  [  ] Nasal congestion/ runny nose  [  ] Photophobia [  ] Neck pain      Chest:  [  ] None   [  ] SOB  [  ] Cough  [  ] None    Cardiovascular:   [  ] None  [  ] CP  [  ] Palpitation    Gastrointestinal:  [  ] None  [  ] Abd pain   [  ] Nausea    [  ] Vomiting   [  ] Diarrhea	     Genitourinary:  [  ] None [  ] Polyuria   [  ] Urgency  [  ] Frequency  [  ] Dysuria    [  ]  Hematuria       Musculoskeletal:  [  ] None [  ] Back Pain	[  ] Body aches  [  ] Joint pain    Neurological: [  ] None [  ]Dizziness  [  ]Visual Disturbance  [  ]Headaches   [  ] Weakness        PHYSICAL EXAM:  Vital Signs Last 24 Hrs  T(C): 36.8 (19 Dec 2017 00:00), Max: 37.1 (18 Dec 2017 08:00)  T(F): 98.3 (19 Dec 2017 00:00), Max: 98.7 (18 Dec 2017 08:00)  HR: 91 (19 Dec 2017 05:56) (82 - 134)  BP: 110/72 (19 Dec 2017 04:00) (88/48 - 134/97)  BP(mean): 80 (19 Dec 2017 04:00) (57 - 106)  RR: 17 (19 Dec 2017 04:00) (17 - 32)  SpO2: 95% (19 Dec 2017 05:56) (91% - 100%)    Constitutional:    HEENT: [ x ] Wnl  [ x ] ET and NGT in place    Neck:  [ x ] Supple  [  ]Lymphadenopathy  [  ] No JVD   [  ] JVD  [  ] Masses   [  ] WNL    CHEST/Respiratory:  [  ]Clear to auscultation  [ x ] Rales   [ x ] Rhonchi   [  ] Wheezing     [ x ] Left side Chest tube      Cardiovascular:  [ x ] Reg S1 S2   [  ] Irreg S1 S2   [ x ]No Murmur  [  ] +ve Murmurs  [  ]Systolic [  ]Diastolic      Abdomen:  [ x ] Soft  [ x ] No tendrerness  [  ] Tenderness  [  ] Organomegaly  [  ] ABD Distention  [  ] Rigidity                       [ x ] No Regidity                       [ x ] No Rebound Tenderness  [ x ] No Guarding Rigidity  [  ] Rebound Tenderness[  ] Guarding Rigidity                          [ x ]  +ve Bowel Sounds  [  ] Decreased Bowel Sounds    [  ] Absent Bowel Sounds                            Extremities: [ x ] No edema [  ] Edema  [  ] Clubbing   [  ] Cyanosis                         [ x ] No Tender Calf muscles  [  ] Tender Calf muscles                        [ x ] Palpable peripheral pulses    Neurological: [  ] Awake  [  ] Alert  [  ] Oriented  x                             [  ] Confused  [  ] Drowzy  [ x ] repond to painful stimuli  [  ] Unresponsive    Skin:  [ x ] Intact [  ] Redness [  ] Thrombophlebitis  [  ] Rashes  [  ] Dry  [  ] Ulcers    Ortho:  [  ] Joint Swelling  [  ] Joint erythema [  ] Joint tenderness                [  ] Increased temp. to touch  [  ] DJD [ x ] WNL            LABS/DIAGNOSTIC TESTS                                     6.4    11.1  )-----------( 189      ( 19 Dec 2017 04:31 )             20.0   12-19    144  |  110<H>  |  96<H>  ----------------------------<  85  4.0   |  21<L>  |  2.83<H>    Ca    8.5      19 Dec 2017 04:31  Phos  2.8     12-19  Mg     2.6     12-19    TPro  5.3<L>  /  Alb  1.1<L>  /  TBili  1.6<H>  /  DBili  x   /  AST  36  /  ALT  22  /  AlkPhos  49  12-18     ABG - ( 18 Dec 2017 23:38 )  pH: 7.15  /  pCO2: 44    /  pO2: 86    / HCO3: 15    / Base Excess: -13.2 /  SaO2: 95         CULTURES:   Culture - Bronchial (12.13.17 @ 08:06)    Gram Stain:   No polymorphonuclear cells seen per low power field  No squamous epithelial cells per low power field  Moderate Gram Positive Cocci in Pairs and Chains per oil power field  Few-moderate Gram Negative Coccobacilli per oil power field    Specimen Source: .Broncial received in trap    Culture Results:   Normal Respiratory Lissy present    Culture - Surgical Swab (12.12.17 @ 22:52)    Specimen Source: .Surgical Swab Bronchial Alveolar Lavage    Culture Results:   Few Normal Respiratory Lissy present    Culture - Tissue with Gram Stain (12.12.17 @ 22:45)    Gram Stain:   Rare polymorphonuclear leukocytes seen per low power field  Numerous Gram Negative Rods per oil power field    Specimen Source: .Tissue Left Lower Lobe    Culture Results:   Rare Haemophilus parainfluenzae  Rare Neisseria species, not gonorrhoeae or meningitidis    Culture - Blood (12.11.17 @ 11:22)    Specimen Source: .Blood Blood    Culture Results:   No growth to date.    Culture - Blood (12.11.17 @ 11:22)    Specimen Source: .Blood Blood    Culture Results:   No growth to date.    Culture - Blood (12.11.17 @ 11:22)    Specimen Source: .Blood Blood    Culture Results:   No growth to date.            RADIOLOGY    EXAM:  CHEST PORTABLE ROUTINE                            PROCEDURE DATE:  12/17/2017          INTERPRETATION:  [facility] Chest one view    HISTORY: Check ET tube placement    COMPARISON STUDY: 12/16/2017    Frontal expiratory view of the chest shows the heart to be similar in   size. Endotracheal tube, feeding tube and left chest tube remain present.   The lungs are slightly clearer and there is no evidence of pneumothorax   nor definite pleural effusion.    IMPRESSION:  No pneumothorax.    Thank you for the courtesy of this referral.          IMPRESSION:  Changing lung infiltrates.      EXAM:  CT CHEST                            PROCEDURE DATE:  12/15/2017          INTERPRETATION:  CLINICAL INFORMATION: Evaluate mid right lung opacity   seen on previous chest radiograph.    COMPARISON: Chest x-ray from 12/15/2017. Chest CT from 12/5/2017.    PROCEDURE:   CT of the Chest was performed without intravenous contrast.  Sagittal and coronal reformats were performed.      FINDINGS:    CHEST:     LINES AND TUBES: An endotracheal tube terminates at the level of the   aortic arch, abovethe jules. An enteric tube terminates in the stomach.   A right IJ central venous catheter terminates in the SVC. A left-sided   chest tube is noted.  LUNGS/LARGE AIRWAYS/PLEURA: Previously seen left lower lobe necrotic mass   now encompasses the entire left lower lobe with internal gas loculations.   Small left apical pneumothorax. Peribronchovascular groundglass opacities   are present in all lobes. Small bilateral pleural effusions with   associated compressive atelectasis.  VESSELS: Atherosclerotic changes of the aorta and coronary arteries.  HEART: Heart size is normal. No pericardial effusion.  MEDIASTINUM AND ALPHONSE: Pneumomediastinum. Redemonstrated 3.8 x 2.1 cm AP   window lymph node.  CHEST WALL AND LOWER NECK: Extensive bilateral subcutaneous emphysema.  VISUALIZED UPPER ABDOMEN: Redemonstrated, unchanged indeterminant central   low density splenic lesion. Small perihepatic ascites  BONES: Redemonstrated destruction of the left sixth and seventh ribs and   left T6 transverse process. Degenerative changes.    IMPRESSION:     Previously seen left lower lobe necrotic mass now encompasses the entire   left lower lobe with internal gas loculations. Destruction of the   posterior sixth and seventh ribs and left sixth transverse process again   noted.    Peribronchovascular groundglass opacity is present in all lobes and has a   broad differential that includes infection, edema or ARDS.     Small left apical pneumothorax with chest tube in place.              Assessment and Recommendation:   72 years old male from home with PMHx of HTN and BPH and no PSH presents to ED c/o L-sided chest pain radiating to the back with associated general weakness x6 months. Patient also reports loss of voice x6 months, in addition to decreased appetite and weight loss x2 weeks, only been able to consume x3 Ensure today.   10/6/17 patient had bronchoscopy and subsequent pneumothorax and chest tube insertion.  Patient was initially transferred from ICU to regular floor, then upgraded to the icu after condition became unstable.  Patient was started on IV Zosyn on 12/12/17.    12/18/17 WBC is increasing again.  12/19/17 WBC improved but H&H dropped.    Problem/Recommendation - 1:  Problem: Pneumonia, bacterial.   Recommendation:   1- UA & CS.  2- Follow Blood culture to final report are still negative.  3- Blood transfusions as needed.  4- Continue IV Zosyn, but decrease the dose to 3.375 gm IVPB q 12 hours.  5- Fluid and electrolytes management.  6- CBC and BMP follow up.   7- Follow up CXR.  8- Respirator and Chest tube management.  9- Procalcitonin level.  10- Consider anti fungal coverage if condition is not improving.  11- Recommend to Start Vancomycin 1 gm IVPB q day, and closely follow trough, and renal functions.  Problem/Recommendation - 2:  ·  Problem: COPD (chronic obstructive pulmonary disease).    Recommendation:   1- Bronchodilators.  2- O2 as needed.  3- Pulmonary evaluation and management.  4- Steroids as per primary, and pulmonary team.     Problem/Recommendation - 3:  ·  Problem: ZENY (acute kidney injury).    Recommendation:   1- Renal management.  2- Fluid and electrolytes management and follow up.     Discussed with medical resident.

## 2017-12-19 NOTE — PROGRESS NOTE ADULT - ASSESSMENT
72 years old male from home with PMHx of HTN and BPH and no PSH presents to ED c/o L-sided chest pain ,lung and hepatic mass, s/p PTHX,now respiratory failure and afib with RVR.  1.Vent support as per ICU.  2.Amiodarone  200mg qd.  3.Continue ASA for stroke prevention.  4.Given suspected metastatic disease ,high risk for bleeding with full AC.  5.ABX.  6.Pressor support as per ICU.  7.GI and DVT prophylaxis.  8.Overall prognosis is poor.

## 2017-12-19 NOTE — PROGRESS NOTE ADULT - PROBLEM SELECTOR PLAN 3
Quantiferon Gold TB test  Empiric antibiotics  Bronch path negative for Ca.  Follow up EBUS bx report  Cyto from [facility] and BAL negative for CA  May need liver Bx of suspicious liver lesions Quantiferon Gold TB test  Empiric antibiotics  Bronch path negative for Ca.  Cyto from [facility] and BAL negative for CA  May need liver Bx of suspicious liver lesions

## 2017-12-20 NOTE — PROGRESS NOTE ADULT - ASSESSMENT
A 72 y M  former smoker 20 PPD with Hx HTN BPH  p/w generalized weakness, voice changes with imaging showing left hilar mass in CT scan , s/p bronch and biopsy on 12/06 , admitted to ICU on 12/06 for pneumothorax s/p diagnostic bronchoscopy for left lobe lesions found on CT imaging - chest tube placed in ICU 12/6 on suction - admitted for urgent CT placement and monitoring. pt was downgraded on Lung biopsy from 12/07. Lung biopsy did not show any malignancy. s/p transbronchial lung biopsy , s/p endobronchial lung biopsy, s/p flexible bronchoscopy today by Dr. Piper.   ROS - unable to get as pt is intubated    admit to ICU post op monitoring s/p intubation s/p chest tube , hypotensive 2/2 septic shock, requiring pressors, on phenylephrine      1: Septic Shock  -c/w chest tube to suction, no air leak  -s/p intubation , c/w mechanical ventilation  -c/w sedation  -hypotension post anesthesia , c/w IV fluids and c/w pressor, Patient BP is still on lower side requiring pressors  - c/w phenylephrine, vasopressin  - pt on propofol for sedation, fentanyl for pain on tapering doses  -increased white count, tissue culture growing gram negative rods rare neisseria, haemophilus, BCx negative to date  - UA negative  - currently on zosyn and vanco  - ID Dr. Nicholas      2: Acute Kidney Injury  -c/w IVF  -monitor urine output - patient is going in ATN, over night given lasix one dose, UO increased to 50-75cc but worsening  -c/w sodium bicarb   -Nephro: Dr. Reagan      3: New onset Atrial Fibrillation  -Patient is in vasopressin  - EKG shows Atrial fibrillation (new onset)  - Completed amiodarone loading, now amio 200mg  c/w amiodaronr  - f/u cardiology - Dr. Jakcson    Pneumothorax   Crepitus along neck and L anterior chest wall  -chest tube to suction      Lung mass  -c/w post op prophylaxis  -Bronch path negative for Ca.  -s/p EBUS VS mediastinoscopy  -cyto report, negative for  malignancy  - 1 biopsy still pending  -repeat CT chest - worsening necrotic mass from prior imaging  -Discussed with IR: not doing liver biopsy as patient is still on pressors,       Hypertension.    -hypotension , holding home BP meds,  c/w pressors    BPH (benign prostatic hyperplasia)  - monitor U/o  -c/w NG tube    Penile swelling:   Urology consult requested, cold compresses, bacitracin ointment application    Hyperphosphatemia:  -Patient phosphate high in setting for kidney failure  -stable will c/w monitoring    Prophylactic measure  - IMPROVE score 2   - c/w heparin S.C for VTE prophylaxis  - GI stress ulcer PPx w/ Protonix.    Goals of care and discussion:  Patient has poor prognosis, Patient is going in ATN, very low UO,  Palliative on board: Patient is DNR A 72 y M  former smoker 20 PPD with Hx HTN BPH  p/w generalized weakness, voice changes with imaging showing left hilar mass in CT scan , s/p bronch and biopsy on 12/06 , admitted to ICU on 12/06 for pneumothorax s/p diagnostic bronchoscopy for left lobe lesions found on CT imaging - chest tube placed in ICU 12/6 on suction - admitted for urgent CT placement and monitoring. pt was downgraded on Lung biopsy from 12/07. Lung biopsy did not show any malignancy. s/p transbronchial lung biopsy , s/p endobronchial lung biopsy, s/p flexible bronchoscopy today by Dr. Piper.   ROS - unable to get as pt is intubated    admit to ICU post op monitoring s/p intubation s/p chest tube , hypotensive 2/2 septic shock, requiring pressors, on phenylephrine      1: Septic Shock  -c/w chest tube to suction, no air leak  -s/p intubation , c/w mechanical ventilation  -c/w sedation  -hypotension post anesthesia , c/w IV fluids and c/w pressor, Now tapering off pressors  - fentanyl for pain on tapering doses  -increased white count, tissue culture growing gram negative rods rare neisseria, haemophilus, BCx negative to date  - UA negative  - currently on zosyn and vanco  -f/u vanco trough  - ID Dr. Nicholas      2: ATN  -c/w IVF  -monitor urine output - patient is going in ATN, over night given lasix one dose, UO is net positve 735  -Sodium bicar increased dose  -Nephro: Dr. Reagan      3: New onset Atrial Fibrillation  -Patient is in vasopressin  - EKG shows Atrial fibrillation (new onset)  - Completed amiodarone loading, now amio 200mg  c/w amiodaronr  - f/u cardiology - Dr. Jackson    Pneumothorax   Crepitus along neck and L anterior chest wall  -chest tube to suction      Lung mass  -c/w post op prophylaxis  -Bronch path negative for Ca.  -s/p EBUS VS mediastinoscopy  -cyto report, negative for  malignancy  - 1 biopsy still pending  -repeat CT chest - worsening necrotic mass from prior imaging  -Discussed with IR: not doing liver biopsy as patient is still on pressors,       Hypertension.    -hypotension , holding home BP meds,  c/w pressors    BPH (benign prostatic hyperplasia)  - monitor U/o  -c/w NG tube    Penile swelling:   Urology consult requested, cold compresses, bacitracin ointment application    Hyperphosphatemia:  -Patient phosphate high in setting for kidney failure  -stable will c/w monitoring    Prophylactic measure  - IMPROVE score 2   - c/w heparin S.C for VTE prophylaxis  - GI stress ulcer PPx w/ Protonix.    Goals of care and discussion:  Patient has poor prognosis, Patient is going in ATN, very low UO,  Palliative on board: Patient is DNR

## 2017-12-20 NOTE — PROGRESS NOTE ADULT - PROBLEM SELECTOR PLAN 1
Non-oliguric ZENY likely hemodynamically mediated in the setting of septic shock/ hypotension; ATN.  Renal function worsening.   LE edema/anasarca is due in large part to low oncotic pressure due to hypoalbuminemia/PCM in acute illness. Strict I/O as well.   UA with 30 protein and mod blood in the setting of infection. Recc to repeat UA once infection cleared.   c/w antibiotics/ pressors as per MICU. Monitor Vanco trough.   CT abd/ pelvis 12/5 1.0 cm indeterminate exophytic lesion arising from the   lower pole of left kidney. No hydronephrosis. Subsequent, Renal US performed with left renal lesion not seen. Consider outpt Urology f/u.   Strict I/Os. Avoid nephrotoxins/ NSAIDs/ RCA. Monitor BMP.

## 2017-12-20 NOTE — PROGRESS NOTE ADULT - PROBLEM SELECTOR PLAN 3
Quantiferon Gold TB test  Empiric antibiotics  Bronch path negative for Ca.  Cyto from [facility] and BAL negative for CA  May need liver Bx of suspicious liver lesions

## 2017-12-20 NOTE — PROGRESS NOTE ADULT - SUBJECTIVE AND OBJECTIVE BOX
Patient is a 72y old  Male who presents with a chief complaint of weakness, chest discomfort and voice and appetite changes (05 Dec 2017 01:22)    pt seen in icu [ x ], reg med floor [ ], bed [ x ], chair at bedside [   ], a+o x3 [  ],sedated [ x ], nad [x  ],   left chest to pleurovac [x],   et tube [x],  ngt to wall suction [ x ], frias to bsd [x], right ij cent line [x], fentanyl, propofol, norepi and  phenylepherine drip [x],    pt with episode of low urine output over night but has improved      Allergies    No Known Allergies        Vitals    T(F): 97.4 (12-20-17 @ 06:00), Max: 98.6 (12-19-17 @ 13:00)  HR: 104 (12-20-17 @ 07:00) (89 - 115)  BP: 104/53 (12-20-17 @ 07:00) (100/56 - 120/82)  RR: 22 (12-20-17 @ 07:00) (16 - 28)  SpO2: 100% (12-20-17 @ 07:00) (96% - 100%)  Wt(kg): --  CAPILLARY BLOOD GLUCOSE          Labs                          9.7    25.8  )-----------( 71       ( 20 Dec 2017 04:20 )             30.0       12-20    143  |  114<H>  |  115<H>  ----------------------------<  115<H>  5.2   |  16<L>  |  3.53<H>    Ca    7.5<L>      20 Dec 2017 04:20  Phos  7.5     12-20  Mg     2.8     12-20              .Blood Blood  12-14 @ 00:30   No growth at 5 days.  --  --      .Broncial received in trap  12-13 @ 08:06   Normal Respiratory Lissy present  --    No polymorphonuclear cells seen per low power field  No squamous epithelial cells per low power field  Moderate Gram Positive Cocci in Pairs and Chains per oil power field  Few-moderate Gram Negative Coccobacilli per oil power field      .Surgical Swab Bronchial Alveolar Lavage  12-12 @ 22:52   Moderate Prevotella intermedia  Rare Prevotella bivia  Few Normal Respiratory Lissy present  --  --      .Tissue Left Lower Lobe  12-12 @ 22:45   Rare Haemophilus parainfluenzae  Rare Neisseria species, not gonorrhoeae or meningitidis  Rare Alpha hemolytic strep  Few Prevotella melaninogenica  Numerous Prevotella intermedia  --    Rare polymorphonuclear leukocytes seen per low power field  Numerous Gram Negative Rods per oil power field      .Blood Blood  12-11 @ 11:22   No growth at 5 days.  --  --      .Broncial Other, bronchioalveolar lavage  12-06 @ 22:28   No growth at 1 week.  --  --      .Blood Blood-Peripheral  12-05 @ 10:57   No growth at 5 days.  --  --          Radiology Results      Meds    MEDICATIONS  (STANDING):  amiodarone    Tablet 200 milliGRAM(s) Oral daily  aspirin  chewable 81 milliGRAM(s) Oral daily  BACItracin   Ointment 1 Application(s) Topical two times a day  fentaNYL   Infusion 0.5 MICROgram(s)/kG/Hr (3.82 mL/Hr) IV Continuous <Continuous>  heparin  Injectable 5000 Unit(s) IV Push every 8 hours  midodrine 5 milliGRAM(s) Oral every 8 hours  pantoprazole  Injectable 40 milliGRAM(s) IV Push daily  phenylephrine    Infusion 0.5 MICROgram(s)/kG/Min (6.563 mL/Hr) IV Continuous <Continuous>  piperacillin/tazobactam IVPB. 3.375 Gram(s) IV Intermittent every 12 hours  propofol Infusion 5 MICROgram(s)/kG/Min (2.292 mL/Hr) IV Continuous <Continuous>  sodium bicarbonate 1300 milliGRAM(s) Oral two times a day  vancomycin  IVPB 1000 milliGRAM(s) IV Intermittent every 24 hours  vasopressin Infusion 0.04 Unit(s)/Min (2.4 mL/Hr) IV Continuous <Continuous>      MEDICATIONS  (PRN):  acetaminophen  Suppository 650 milliGRAM(s) Rectal every 6 hours PRN For Temp greater than 38 C (100.4 F)  chlorproMAZINE    Tablet 25 milliGRAM(s) Oral every 6 hours PRN hiccups      Physical Exam    Neuro :  no focal deficits  Respiratory: CTA B/L, left subcutaneous emphysema, left chest tube to pleurovac  CV: RRR, S1S2, no murmurs,   Abdominal: Soft, NT, ND +BS,  Extremities: No edema, + peripheral pulses  genitals: edematous penis with some erythema, frias to bsd    ASSESSMENT    septic shock  left lower lobe mass with bony destruction,   hepatic and splenic lesions,   r/o malig,   copd   destruction of left 6th and 7th ribs and left T6 transverse process   exophytic left renal lesion  subcutaneous emphysema  pneumothorax  s/p hyponatremia  silverio  new afib  penile edema  h/o BPH (benign prostatic hyperplasia)  Hypertension        PLAN        S/P flexible bronchoscopy showing possible Large B-cell lymphoma,   s/p flex bronch transbronchial lung bx and EBU bx 12/12  cytopath of flex bronch and ebus neg for malig  cx from flex bronch with Few Normal Respiratory Lissy present and Rare Haemophilus parainfluenzae  Rare Neisseria species, not gonorrhoeae or meningitidis  -- Rare polymorphonuclear leukocytes seen per low power field  Numerous Gram Negative Rods per oil power field  noted above   thoracic surg f/u  monitor chest tube  heme onc cons  vent mgmt  cont atrov and prov nebs,   cont supplemental oxygen,  ct chest-abd-pelv result noted above  rept cxr result noted above  pulm f/u   s/p bronch with bx   cytopath of bronch neg for malig noted  cytopatho transbronchial bx with Interstitial fibrosis and intra-alveolar smoker's  macrophages noted above.   urology cons for renal lesion  cont ivf  renal f/u   cardio f/u  cont amiodarone 200 mg daily  id f/u   add vanco with adj renally  f/u vanco t  cont zosyn  paliative eval noted  cont current meds  pt for ir bx of liver or renal lesion  mgmt as per icu Patient is a 72y old  Male who presents with a chief complaint of weakness, chest discomfort and voice and appetite changes (05 Dec 2017 01:22)    pt seen in icu [ x ], reg med floor [ ], bed [ x ], chair at bedside [   ], a+o x3 [  ],sedated [ x ], nad [x  ],   left chest to pleurovac [x],   et tube [x],  ngt to wall suction [ x ], frias to bsd [x], right ij cent line [x], fentanyl, propofol, norepi and  phenylepherine drip [x],      Allergies    No Known Allergies        Vitals    T(F): 97.4 (12-20-17 @ 06:00), Max: 98.6 (12-19-17 @ 13:00)  HR: 104 (12-20-17 @ 07:00) (89 - 115)  BP: 104/53 (12-20-17 @ 07:00) (100/56 - 120/82)  RR: 22 (12-20-17 @ 07:00) (16 - 28)  SpO2: 100% (12-20-17 @ 07:00) (96% - 100%)  Wt(kg): --  CAPILLARY BLOOD GLUCOSE          Labs                          9.7    25.8  )-----------( 71       ( 20 Dec 2017 04:20 )             30.0       12-20    143  |  114<H>  |  115<H>  ----------------------------<  115<H>  5.2   |  16<L>  |  3.53<H>    Ca    7.5<L>      20 Dec 2017 04:20  Phos  7.5     12-20  Mg     2.8     12-20              .Blood Blood  12-14 @ 00:30   No growth at 5 days.  --  --      .Broncial received in trap  12-13 @ 08:06   Normal Respiratory Lissy present  --    No polymorphonuclear cells seen per low power field  No squamous epithelial cells per low power field  Moderate Gram Positive Cocci in Pairs and Chains per oil power field  Few-moderate Gram Negative Coccobacilli per oil power field      .Surgical Swab Bronchial Alveolar Lavage  12-12 @ 22:52   Moderate Prevotella intermedia  Rare Prevotella bivia  Few Normal Respiratory Lissy present  --  --      .Tissue Left Lower Lobe  12-12 @ 22:45   Rare Haemophilus parainfluenzae  Rare Neisseria species, not gonorrhoeae or meningitidis  Rare Alpha hemolytic strep  Few Prevotella melaninogenica  Numerous Prevotella intermedia  --    Rare polymorphonuclear leukocytes seen per low power field  Numerous Gram Negative Rods per oil power field      .Blood Blood  12-11 @ 11:22   No growth at 5 days.  --  --      .Broncial Other, bronchioalveolar lavage  12-06 @ 22:28   No growth at 1 week.  --  --      .Blood Blood-Peripheral  12-05 @ 10:57   No growth at 5 days.  --  --          Radiology Results      Meds    MEDICATIONS  (STANDING):  amiodarone    Tablet 200 milliGRAM(s) Oral daily  aspirin  chewable 81 milliGRAM(s) Oral daily  BACItracin   Ointment 1 Application(s) Topical two times a day  fentaNYL   Infusion 0.5 MICROgram(s)/kG/Hr (3.82 mL/Hr) IV Continuous <Continuous>  heparin  Injectable 5000 Unit(s) IV Push every 8 hours  midodrine 5 milliGRAM(s) Oral every 8 hours  pantoprazole  Injectable 40 milliGRAM(s) IV Push daily  phenylephrine    Infusion 0.5 MICROgram(s)/kG/Min (6.563 mL/Hr) IV Continuous <Continuous>  piperacillin/tazobactam IVPB. 3.375 Gram(s) IV Intermittent every 12 hours  propofol Infusion 5 MICROgram(s)/kG/Min (2.292 mL/Hr) IV Continuous <Continuous>  sodium bicarbonate 1300 milliGRAM(s) Oral two times a day  vancomycin  IVPB 1000 milliGRAM(s) IV Intermittent every 24 hours  vasopressin Infusion 0.04 Unit(s)/Min (2.4 mL/Hr) IV Continuous <Continuous>      MEDICATIONS  (PRN):  acetaminophen  Suppository 650 milliGRAM(s) Rectal every 6 hours PRN For Temp greater than 38 C (100.4 F)  chlorproMAZINE    Tablet 25 milliGRAM(s) Oral every 6 hours PRN hiccups      Physical Exam    Neuro :  no focal deficits  Respiratory: CTA B/L, left subcutaneous emphysema, left chest tube to pleurovac  CV: RRR, S1S2, no murmurs,   Abdominal: Soft, NT, ND +BS,  Extremities: No edema, + peripheral pulses  genitals: edematous penis with some erythema, frias to bsd    ASSESSMENT    septic shock  left lower lobe mass with bony destruction,   hepatic and splenic lesions,   r/o malig,   copd   destruction of left 6th and 7th ribs and left T6 transverse process   exophytic left renal lesion  subcutaneous emphysema  pneumothorax  s/p hyponatremia  silverio  new afib  penile edema  h/o BPH (benign prostatic hyperplasia)  Hypertension        PLAN        S/P flexible bronchoscopy showing possible Large B-cell lymphoma,   s/p flex bronch transbronchial lung bx and EBU bx 12/12  cytopath of flex bronch and ebus neg for malig  cx from flex bronch with Few Normal Respiratory Lissy present and Rare Haemophilus parainfluenzae  Rare Neisseria species, not gonorrhoeae or meningitidis  -- Rare polymorphonuclear leukocytes seen per low power field  Numerous Gram Negative Rods per oil power field  noted above   thoracic surg f/u  monitor chest tube  heme onc cons  vent mgmt  cont atrov and prov nebs,   cont supplemental oxygen,  ct chest-abd-pelv result noted above  rept cxr result noted above  pulm f/u   s/p bronch with bx   cytopath of bronch neg for malig noted  cytopatho transbronchial bx with Interstitial fibrosis and intra-alveolar smoker's  macrophages noted above.   urology cons for renal lesion  cont ivf  renal f/u   cardio f/u  cont amiodarone 200 mg daily  id f/u   add vanco with adj renally  f/u vanco t  cont zosyn  paliative eval noted  cont current meds  pt for ir bx of liver or renal lesion when stable  mgmt as per icu

## 2017-12-20 NOTE — CHART NOTE - NSCHARTNOTEFT_GEN_A_CORE
Upon Nutritional Assessment by the Registered Dietitian your patient was determined to meet criteria / has evidence of the following diagnosis/diagnoses:          [ ]  Mild Protein Calorie Malnutrition        [ ]  Moderate Protein Calorie Malnutrition        [ X ] Severe Protein Calorie Malnutrition        [ ] Unspecified Protein Calorie Malnutrition        [ ] Underweight / BMI <19        [ ] Morbid Obesity / BMI > 40      Findings as based on:  •  Comprehensive nutrition assessment and consultation  •  Calorie counts (nutrient intake analysis)  •  Food acceptance and intake status from observations by staff  •  Follow up  •  Patient education  •  Intervention secondary to interdisciplinary rounds  •   concerns      Treatment:    The following diet has been recommended:  continue Vital AF TF as ordered for now, monitor TF tolerance                                                                    Nephrocaps, Vit C supplements as medically feasible, Monitor needs for Zn supplement       PROVIDER Section:     By signing this assessment you are acknowledging and agree with the diagnosis/diagnoses assigned by the Registered Dietitian    Comments:

## 2017-12-20 NOTE — PROGRESS NOTE ADULT - SUBJECTIVE AND OBJECTIVE BOX
Meds:  Zosyn 3.375 gm IVPB q 12 hours.    Allergies:  Allergies    No Known Allergies    Intolerances  ROS  [ x ] UNABLE TO ELICIT    General:  [  ] None  [  ] Fever  [  ] Chills  [  ] Malaise    Skin:  [  ] None [  ] Rash  [  ] Wound  [  ] Ulcer    HEENT:  [  ] None  [  ] Sore Throat  [  ] Nasal congestion/ runny nose  [  ] Photophobia [  ] Neck pain      Chest:  [  ] None   [  ] SOB  [  ] Cough  [  ] None    Cardiovascular:   [  ] None  [  ] CP  [  ] Palpitation    Gastrointestinal:  [  ] None  [  ] Abd pain   [  ] Nausea    [  ] Vomiting   [  ] Diarrhea	     Genitourinary:  [  ] None [  ] Polyuria   [  ] Urgency  [  ] Frequency  [  ] Dysuria    [  ]  Hematuria       Musculoskeletal:  [  ] None [  ] Back Pain	[  ] Body aches  [  ] Joint pain    Neurological: [  ] None [  ]Dizziness  [  ]Visual Disturbance  [  ]Headaches   [  ] Weakness        PHYSICAL EXAM:  Vital Signs Last 24 Hrs  T(C): 36.3 (20 Dec 2017 06:00), Max: 36.9 (19 Dec 2017 15:20)  T(F): 97.4 (20 Dec 2017 06:00), Max: 98.5 (19 Dec 2017 15:20)  HR: 107 (20 Dec 2017 14:00) (89 - 122)  BP: 114/75 (20 Dec 2017 14:00) (102/68 - 148/57)  BP(mean): 84 (20 Dec 2017 14:00) (63 - 85)  RR: 19 (20 Dec 2017 14:00) (17 - 31)  SpO2: 100% (20 Dec 2017 14:00) (96% - 100%)    Constitutional:    HEENT: [ x ] Wnl  [ x ] ET and NGT in place    Neck:  [ x ] Supple  [  ]Lymphadenopathy  [  ] No JVD   [  ] JVD  [  ] Masses   [  ] WNL    CHEST/Respiratory:  [  ]Clear to auscultation  [ x ] Rales   [ x ] Rhonchi   [  ] Wheezing     [ x ] Left side Chest tube      Cardiovascular:  [ x ] Reg S1 S2   [  ] Irreg S1 S2   [ x ]No Murmur  [  ] +ve Murmurs  [  ]Systolic [  ]Diastolic      Abdomen:  [ x ] Soft  [ x ] No tendrerness  [  ] Tenderness  [  ] Organomegaly  [  ] ABD Distention  [  ] Rigidity                       [ x ] No Regidity                       [ x ] No Rebound Tenderness  [ x ] No Guarding Rigidity  [  ] Rebound Tenderness[  ] Guarding Rigidity                          [ x ]  +ve Bowel Sounds  [  ] Decreased Bowel Sounds    [  ] Absent Bowel Sounds                            Extremities: [ x ] No edema [  ] Edema  [  ] Clubbing   [  ] Cyanosis                         [ x ] No Tender Calf muscles  [  ] Tender Calf muscles                        [ x ] Palpable peripheral pulses    Neurological: [  ] Awake  [  ] Alert  [  ] Oriented  x                             [  ] Confused  [  ] Drowzy  [ x ] repond to painful stimuli  [  ] Unresponsive    Skin:  [ x ] Intact [  ] Redness [  ] Thrombophlebitis  [  ] Rashes  [  ] Dry  [  ] Ulcers    Ortho:  [  ] Joint Swelling  [  ] Joint erythema [  ] Joint tenderness                [  ] Increased temp. to touch  [  ] DJD [ x ] WNL            LABS/DIAGNOSTIC TESTS                                    9.7    25.8  )-----------( 71       ( 20 Dec 2017 04:20 )             30.0   12-20    143  |  114<H>  |  115<H>  ----------------------------<  115<H>  5.2   |  16<L>  |  3.53<H>    Ca    7.5<L>      20 Dec 2017 04:20  Phos  7.5     12-20  Mg     2.8     12-20     ABG - ( 20 Dec 2017 06:52 )  pH: 7.22  /  pCO2: 37    /  pO2: 150   / HCO3: 14    / Base Excess: -12.2 /  SaO2: 99                              CULTURES:   Culture - Bronchial (12.13.17 @ 08:06)    Gram Stain:   No polymorphonuclear cells seen per low power field  No squamous epithelial cells per low power field  Moderate Gram Positive Cocci in Pairs and Chains per oil power field  Few-moderate Gram Negative Coccobacilli per oil power field    Specimen Source: .Broncial received in trap    Culture Results:   Normal Respiratory Lissy present    Culture - Surgical Swab (12.12.17 @ 22:52)    Specimen Source: .Surgical Swab Bronchial Alveolar Lavage    Culture Results:   Few Normal Respiratory Lissy present    Culture - Tissue with Gram Stain (12.12.17 @ 22:45)    Gram Stain:   Rare polymorphonuclear leukocytes seen per low power field  Numerous Gram Negative Rods per oil power field    Specimen Source: .Tissue Left Lower Lobe    Culture Results:   Rare Haemophilus parainfluenzae  Rare Neisseria species, not gonorrhoeae or meningitidis    Culture - Blood (12.11.17 @ 11:22)    Specimen Source: .Blood Blood    Culture Results:   No growth to date.    Culture - Blood (12.11.17 @ 11:22)    Specimen Source: .Blood Blood    Culture Results:   No growth to date.    Culture - Blood (12.11.17 @ 11:22)    Specimen Source: .Blood Blood    Culture Results:   No growth to date.            RADIOLOGY    EXAM:  CHEST PORTABLE ROUTINE                            PROCEDURE DATE:  12/17/2017          INTERPRETATION:  [facility] Chest one view    HISTORY: Check ET tube placement    COMPARISON STUDY: 12/16/2017    Frontal expiratory view of the chest shows the heart to be similar in   size. Endotracheal tube, feeding tube and left chest tube remain present.   The lungs are slightly clearer and there is no evidence of pneumothorax   nor definite pleural effusion.    IMPRESSION:  No pneumothorax.    Thank you for the courtesy of this referral.          IMPRESSION:  Changing lung infiltrates.      EXAM:  CT CHEST                            PROCEDURE DATE:  12/15/2017          INTERPRETATION:  CLINICAL INFORMATION: Evaluate mid right lung opacity   seen on previous chest radiograph.    COMPARISON: Chest x-ray from 12/15/2017. Chest CT from 12/5/2017.    PROCEDURE:   CT of the Chest was performed without intravenous contrast.  Sagittal and coronal reformats were performed.      FINDINGS:    CHEST:     LINES AND TUBES: An endotracheal tube terminates at the level of the   aortic arch, abovethe jules. An enteric tube terminates in the stomach.   A right IJ central venous catheter terminates in the SVC. A left-sided   chest tube is noted.  LUNGS/LARGE AIRWAYS/PLEURA: Previously seen left lower lobe necrotic mass   now encompasses the entire left lower lobe with internal gas loculations.   Small left apical pneumothorax. Peribronchovascular groundglass opacities   are present in all lobes. Small bilateral pleural effusions with   associated compressive atelectasis.  VESSELS: Atherosclerotic changes of the aorta and coronary arteries.  HEART: Heart size is normal. No pericardial effusion.  MEDIASTINUM AND ALPHONSE: Pneumomediastinum. Redemonstrated 3.8 x 2.1 cm AP   window lymph node.  CHEST WALL AND LOWER NECK: Extensive bilateral subcutaneous emphysema.  VISUALIZED UPPER ABDOMEN: Redemonstrated, unchanged indeterminant central   low density splenic lesion. Small perihepatic ascites  BONES: Redemonstrated destruction of the left sixth and seventh ribs and   left T6 transverse process. Degenerative changes.    IMPRESSION:     Previously seen left lower lobe necrotic mass now encompasses the entire   left lower lobe with internal gas loculations. Destruction of the   posterior sixth and seventh ribs and left sixth transverse process again   noted.    Peribronchovascular groundglass opacity is present in all lobes and has a   broad differential that includes infection, edema or ARDS.     Small left apical pneumothorax with chest tube in place.              Assessment and Recommendation:   72 years old male from home with PMHx of HTN and BPH and no PSH presents to ED c/o L-sided chest pain radiating to the back with associated general weakness x6 months. Patient also reports loss of voice x6 months, in addition to decreased appetite and weight loss x2 weeks, only been able to consume x3 Ensure today.   10/6/17 patient had bronchoscopy and subsequent pneumothorax and chest tube insertion.  Patient was initially transferred from ICU to regular floor, then upgraded to the icu after condition became unstable.  Patient was started on IV Zosyn on 12/12/17.    12/18/17 WBC is increasing again.  12/19/17 WBC improved but H&H dropped.  Vancomycin was started as suggested, but Mycamine is not started as suggested, and discussed with ICU resident on 12/19/17.     Problem/Recommendation - 1:  Problem: Pneumonia, bacterial.   Recommendation:   1- UA & CS.  2- Follow Blood culture to final report are still negative.  3- Blood transfusions as needed.  4- Continue IV Zosyn, but decrease the dose to 3.375 gm IVPB q 12 hours.  5- Fluid and electrolytes management.  6- CBC and CMP follow up.   7- Follow up CXR.  8- Respirator and Chest tube management.  9- Procalcitonin level.  10- Suggest anti fungal coverage (Mycamine 100 mg IVPB q day) As patient has been on long duration of ABX, with intubation and central lines.  11- Recommend to Start Vancomycin 1 gm IVPB q day, and closely follow trough(daily), and renal functions.    Problem/Recommendation - 2:  ·  Problem: COPD (chronic obstructive pulmonary disease).    Recommendation:   1- Bronchodilators.  2- O2 as needed.  3- Pulmonary evaluation and management.  4- Steroids as per primary, and pulmonary team.     Problem/Recommendation - 3:  ·  Problem: ZENY (acute kidney injury).    Recommendation:   1- Renal management.  2- Fluid and electrolytes management and follow up.     Discussed with ICU medical resident, and medical PCP.

## 2017-12-20 NOTE — PROGRESS NOTE ADULT - SUBJECTIVE AND OBJECTIVE BOX
CHIEF COMPLAINT:Patient is a 72y old  Male who presents with a chief complaint of weakness, chest discomfort and voice and appetite changes .Pt still intubated.    	  REVIEW OF SYSTEMS:  [ X] Unable to obtain    PHYSICAL EXAM:  T(C): 36.3 (12-20-17 @ 06:00), Max: 37 (12-19-17 @ 13:00)  HR: 102 (12-20-17 @ 09:30) (89 - 122)  BP: 119/74 (12-20-17 @ 09:30) (100/67 - 119/74)  RR: 28 (12-20-17 @ 09:30) (16 - 30)  SpO2: 100% (12-20-17 @ 09:30) (96% - 100%)  Wt(kg): --  I&O's Summary    19 Dec 2017 07:01  -  20 Dec 2017 07:00  --------------------------------------------------------  IN: 2208 mL / OUT: 1015 mL / NET: 1193 mL        Appearance: Normal	  HEENT:   Normal oral mucosa, PERRL, EOMI	  Lymphatic: No lymphadenopathy  Cardiovascular: Normal S1 S2, No JVD, No murmurs, No edema  Respiratory: B/L ronchi  Gastrointestinal:  Soft, Non-tender, + BS	  Skin: No rashes, No ecchymoses, No cyanosis	  Extremities: Normal range of motion, No clubbing, cyanosis or edema  Vascular: Peripheral pulses palpable 2+ bilaterally    MEDICATIONS  (STANDING):  amiodarone    Tablet 200 milliGRAM(s) Oral daily  aspirin  chewable 81 milliGRAM(s) Oral daily  BACItracin   Ointment 1 Application(s) Topical two times a day  fentaNYL   Infusion 0.5 MICROgram(s)/kG/Hr (3.82 mL/Hr) IV Continuous <Continuous>  heparin  Injectable 5000 Unit(s) IV Push every 8 hours  midodrine 5 milliGRAM(s) Oral every 8 hours  pantoprazole  Injectable 40 milliGRAM(s) IV Push daily  phenylephrine    Infusion 0.5 MICROgram(s)/kG/Min (6.563 mL/Hr) IV Continuous <Continuous>  piperacillin/tazobactam IVPB. 3.375 Gram(s) IV Intermittent every 12 hours  sodium bicarbonate 1300 milliGRAM(s) Oral two times a day  vancomycin  IVPB 1000 milliGRAM(s) IV Intermittent every 24 hours  vasopressin Infusion 0.04 Unit(s)/Min (2.4 mL/Hr) IV Continuous <Continuous>      	  LABS:	 	                        9.7    25.8  )-----------( 71       ( 20 Dec 2017 04:20 )             30.0     12-20    143  |  114<H>  |  115<H>  ----------------------------<  115<H>  5.2   |  16<L>  |  3.53<H>    Ca    7.5<L>      20 Dec 2017 04:20  Phos  7.5     12-20  Mg     2.8     12-20        Lipid Profile: Cholesterol 64  LDL 20  HDL 26  TG 88    HgA1c: Hemoglobin A1C, Whole Blood: 6.2 % (12-05 @ 09:32)    TSH: Thyroid Stimulating Hormone, Serum: 0.45 uU/mL (12-05 @ 07:14)

## 2017-12-20 NOTE — PROGRESS NOTE ADULT - SUBJECTIVE AND OBJECTIVE BOX
INTERVAL HPI/OVERNIGHT EVENTS: ***    PRESSORS: [ ] YES [ ] NO  WHICH:    ANTIBIOTICS:                  DATE STARTED:  ANTIBIOTICS:                  DATE STARTED:  ANTIBIOTICS:                  DATE STARTED:    Antimicrobial:  piperacillin/tazobactam IVPB. 3.375 Gram(s) IV Intermittent every 12 hours  vancomycin  IVPB 1000 milliGRAM(s) IV Intermittent every 24 hours    Cardiovascular:  amiodarone    Tablet 200 milliGRAM(s) Oral daily  midodrine 5 milliGRAM(s) Oral every 8 hours  phenylephrine    Infusion 0.5 MICROgram(s)/kG/Min IV Continuous <Continuous>    Pulmonary:    Hematalogic:  aspirin  chewable 81 milliGRAM(s) Oral daily  heparin  Injectable 5000 Unit(s) IV Push every 8 hours    Other:  acetaminophen  Suppository 650 milliGRAM(s) Rectal every 6 hours PRN  BACItracin   Ointment 1 Application(s) Topical two times a day  chlorproMAZINE    Tablet 25 milliGRAM(s) Oral every 6 hours PRN  fentaNYL   Infusion 0.5 MICROgram(s)/kG/Hr IV Continuous <Continuous>  pantoprazole  Injectable 40 milliGRAM(s) IV Push daily  propofol Infusion 5 MICROgram(s)/kG/Min IV Continuous <Continuous>  sodium bicarbonate 1300 milliGRAM(s) Oral two times a day  vasopressin Infusion 0.04 Unit(s)/Min IV Continuous <Continuous>    acetaminophen  Suppository 650 milliGRAM(s) Rectal every 6 hours PRN  amiodarone    Tablet 200 milliGRAM(s) Oral daily  aspirin  chewable 81 milliGRAM(s) Oral daily  BACItracin   Ointment 1 Application(s) Topical two times a day  chlorproMAZINE    Tablet 25 milliGRAM(s) Oral every 6 hours PRN  fentaNYL   Infusion 0.5 MICROgram(s)/kG/Hr IV Continuous <Continuous>  heparin  Injectable 5000 Unit(s) IV Push every 8 hours  midodrine 5 milliGRAM(s) Oral every 8 hours  pantoprazole  Injectable 40 milliGRAM(s) IV Push daily  phenylephrine    Infusion 0.5 MICROgram(s)/kG/Min IV Continuous <Continuous>  piperacillin/tazobactam IVPB. 3.375 Gram(s) IV Intermittent every 12 hours  propofol Infusion 5 MICROgram(s)/kG/Min IV Continuous <Continuous>  sodium bicarbonate 1300 milliGRAM(s) Oral two times a day  vancomycin  IVPB 1000 milliGRAM(s) IV Intermittent every 24 hours  vasopressin Infusion 0.04 Unit(s)/Min IV Continuous <Continuous>    Drug Dosing Weight  Height (cm): 165 (12 Dec 2017 13:14)  Weight (kg): 76.4 (12 Dec 2017 16:00)  BMI (kg/m2): 28.1 (12 Dec 2017 16:00)  BSA (m2): 1.84 (12 Dec 2017 16:00)    CENTRAL LINE: [ ] YES [ ] NO  LOCATION:   DATE INSERTED:  REMOVE: [ ] YES [ ] NO  EXPLAIN:    LERMA: [ ] YES [ ] NO    DATE INSERTED:  REMOVE:  [ ] YES [ ] NO  EXPLAIN:    A-LINE:  [ ] YES [ ] NO  LOCATION:   DATE INSERTED:  REMOVE:  [ ] YES [ ] NO  EXPLAIN:    PMH -reviewed admission note, no change since admission  Heart faliure: acute [ ] chronic [ ] acute or chronic [ ] diastolic [ ] systolic [ ] combied systolic and diastolic[ ]  ZENY: ATN[ ] renal medullary necrosis [ ] CKD I [ ]CKDII [ ]CKD III [ ]CKD IV [ ]CKD V [ ]Other pathological lesions [ ]  Abdominal Nutrition Status: malnutrition [ ] cachexia [ ] morbid obesity/BMI=40 [ ] Supplement ordered [___________]     ICU Vital Signs Last 24 Hrs  T(C): 36.9 (19 Dec 2017 23:57), Max: 37 (19 Dec 2017 13:00)  T(F): 98.5 (19 Dec 2017 23:57), Max: 98.6 (19 Dec 2017 13:00)  HR: 105 (20 Dec 2017 05:25) (89 - 115)  BP: 103/65 (20 Dec 2017 04:00) (100/56 - 120/82)  BP(mean): 73 (20 Dec 2017 04:00) (63 - 91)  ABP: --  ABP(mean): --  RR: 27 (20 Dec 2017 04:00) (16 - 29)  SpO2: 98% (20 Dec 2017 05:25) (95% - 100%)      ABG - ( 19 Dec 2017 21:08 )  pH: 7.18  /  pCO2: 41    /  pO2: 82    / HCO3: 15    / Base Excess: -12.7 /  SaO2: 94                    12-18 @ 07:01  -  12-19 @ 07:00  --------------------------------------------------------  IN: 1490.8 mL / OUT: 920 mL / NET: 570.8 mL        Mode: AC/ CMV (Assist Control/ Continuous Mandatory Ventilation)  RR (machine): 28  TV (machine): 500  FiO2: 50  PEEP: 5  ITime: 1  MAP: 10  PIP: 25      PHYSICAL EXAM:    GENERAL: [ ]NAD, [ ]well-groomed, [ ]well-developed  HEAD:  [ ]Atraumatic, [ ]Normocephalic  EYES: [ ]EOMI, [ ]PERRLA, [ ]conjunctiva and sclera clear  ENMT: [ ]No tonsillar erythema, exudates, or enlargement; [ ]Moist mucous membranes, [ ]Good dentition, [ ]No lesions  NECK: [ ]Supple, normal appearance, [ ]No JVD; [ ]Normal thyroid; [ ]Trachea midline  NERVOUS SYSTEM:  [ ]Alert & Oriented X3, [ ]Good concentration; [ ]Motor Strength 5/5 B/L upper and lower extremities; [ ]DTRs 2+ intact and symmetric  CHEST/LUNG: [ ]No chest deformity; [ ]Normal percussion bilaterally; [ ]No rales, rhonchi, wheezing   HEART: [ ]Regular rate and rhythm; [ ]No murmurs, rubs, or gallops  ABDOMEN: [ ]Soft, Nontender, Nondistended; [ ]Bowel sounds present  EXTREMITIES:  [ ]2+ Peripheral Pulses, [ ]No clubbing, cyanosis, or edema  LYMPH: [ ]No lymphadenopathy noted  SKIN: [ ]No rashes or lesions; [ ]Good capillary refill      LABS:  CBC Full  -  ( 20 Dec 2017 04:20 )  WBC Count : 25.8 K/uL  Hemoglobin : 9.7 g/dL  Hematocrit : 30.0 %  Platelet Count - Automated : 71 K/uL  Mean Cell Volume : 101.3 fl  Mean Cell Hemoglobin : 32.8 pg  Mean Cell Hemoglobin Concentration : 32.4 gm/dL  Auto Neutrophil # : 23.4 K/uL  Auto Lymphocyte # : 1.4 K/uL  Auto Monocyte # : 0.5 K/uL  Auto Eosinophil # : 0.4 K/uL  Auto Basophil # : 0.1 K/uL  Auto Neutrophil % : 90.8 %  Auto Lymphocyte % : 5.3 %  Auto Monocyte % : 2.0 %  Auto Eosinophil % : 1.7 %  Auto Basophil % : 0.2 %    12-20    143  |  114<H>  |  115<H>  ----------------------------<  115<H>  5.2   |  16<L>  |  3.53<H>    Ca    7.5<L>      20 Dec 2017 04:20  Phos  7.5     12-20  Mg     2.8     12-20      PT/INR - ( 19 Dec 2017 05:54 )   PT: 11.3 sec;   INR: 1.04 ratio         PTT - ( 19 Dec 2017 05:54 )  PTT:30.5 sec        RADIOLOGY & ADDITIONAL STUDIES REVIEWED:  ***    [ ]GOALS OF CARE DISCUSSION WITH PATIENT/FAMILY/PROXY:    CRITICAL CARE TIME SPENT: 35 minutes INTERVAL HPI/OVERNIGHT EVENTS: *** No major overnight events    PRESSORS: [ x] YES [ ] NO  WHICH:    ANTIBIOTICS:  zosyn               DATE STARTED: 12/16  ANTIBIOTICS:  vanco                DATE STARTED: 12/19:    Antimicrobial:  piperacillin/tazobactam IVPB. 3.375 Gram(s) IV Intermittent every 12 hours  vancomycin  IVPB 1000 milliGRAM(s) IV Intermittent every 24 hours    Cardiovascular:  amiodarone    Tablet 200 milliGRAM(s) Oral daily  midodrine 5 milliGRAM(s) Oral every 8 hours  phenylephrine    Infusion 0.5 MICROgram(s)/kG/Min IV Continuous <Continuous>    Pulmonary:    Hematalogic:  aspirin  chewable 81 milliGRAM(s) Oral daily  heparin  Injectable 5000 Unit(s) IV Push every 8 hours    Other:  acetaminophen  Suppository 650 milliGRAM(s) Rectal every 6 hours PRN  BACItracin   Ointment 1 Application(s) Topical two times a day  chlorproMAZINE    Tablet 25 milliGRAM(s) Oral every 6 hours PRN  fentaNYL   Infusion 0.5 MICROgram(s)/kG/Hr IV Continuous <Continuous>  pantoprazole  Injectable 40 milliGRAM(s) IV Push daily  propofol Infusion 5 MICROgram(s)/kG/Min IV Continuous <Continuous>  sodium bicarbonate 1300 milliGRAM(s) Oral two times a day  vasopressin Infusion 0.04 Unit(s)/Min IV Continuous <Continuous>    acetaminophen  Suppository 650 milliGRAM(s) Rectal every 6 hours PRN  amiodarone    Tablet 200 milliGRAM(s) Oral daily  aspirin  chewable 81 milliGRAM(s) Oral daily  BACItracin   Ointment 1 Application(s) Topical two times a day  chlorproMAZINE    Tablet 25 milliGRAM(s) Oral every 6 hours PRN  fentaNYL   Infusion 0.5 MICROgram(s)/kG/Hr IV Continuous <Continuous>  heparin  Injectable 5000 Unit(s) IV Push every 8 hours  midodrine 5 milliGRAM(s) Oral every 8 hours  pantoprazole  Injectable 40 milliGRAM(s) IV Push daily  phenylephrine    Infusion 0.5 MICROgram(s)/kG/Min IV Continuous <Continuous>  piperacillin/tazobactam IVPB. 3.375 Gram(s) IV Intermittent every 12 hours  propofol Infusion 5 MICROgram(s)/kG/Min IV Continuous <Continuous>  sodium bicarbonate 1300 milliGRAM(s) Oral two times a day  vancomycin  IVPB 1000 milliGRAM(s) IV Intermittent every 24 hours  vasopressin Infusion 0.04 Unit(s)/Min IV Continuous <Continuous>    Drug Dosing Weight  Height (cm): 165 (12 Dec 2017 13:14)  Weight (kg): 76.4 (12 Dec 2017 16:00)  BMI (kg/m2): 28.1 (12 Dec 2017 16:00)  BSA (m2): 1.84 (12 Dec 2017 16:00)    CENTRAL LINE: [x ] YES [ ] NO  LOCATION:   DATE INSERTED:  REMOVE: [ ] YES [ ] NO  EXPLAIN:    LERMA: [x ] YES [ ] NO    DATE INSERTED:  REMOVE:  [ ] YES [ ] NO  EXPLAIN:    A-LINE:  [ ] YES [x ] NO  LOCATION:   DATE INSERTED:  REMOVE:  [ ] YES [ ] NO  EXPLAIN:    PMH -reviewed admission note, no change since admission  Heart faliure: acute [ ] chronic [ ] acute or chronic [ ] diastolic [ ] systolic [ ] combied systolic and diastolic[ ]  ZENY: ATN[ ] renal medullary necrosis [ ] CKD I [ ]CKDII [ ]CKD III [ ]CKD IV [ ]CKD V [ ]Other pathological lesions [ ]  Abdominal Nutrition Status: malnutrition [ ] cachexia [ ] morbid obesity/BMI=40 [ ] Supplement ordered [___________]     ICU Vital Signs Last 24 Hrs  T(C): 36.9 (19 Dec 2017 23:57), Max: 37 (19 Dec 2017 13:00)  T(F): 98.5 (19 Dec 2017 23:57), Max: 98.6 (19 Dec 2017 13:00)  HR: 105 (20 Dec 2017 05:25) (89 - 115)  BP: 103/65 (20 Dec 2017 04:00) (100/56 - 120/82)  BP(mean): 73 (20 Dec 2017 04:00) (63 - 91)  ABP: --  ABP(mean): --  RR: 27 (20 Dec 2017 04:00) (16 - 29)  SpO2: 98% (20 Dec 2017 05:25) (95% - 100%)      ABG - ( 19 Dec 2017 21:08 )  pH: 7.18  /  pCO2: 41    /  pO2: 82    / HCO3: 15    / Base Excess: -12.7 /  SaO2: 94                    12-18 @ 07:01  -  12-19 @ 07:00  --------------------------------------------------------  IN: 1490.8 mL / OUT: 920 mL / NET: 570.8 mL        Mode: AC/ CMV (Assist Control/ Continuous Mandatory Ventilation)  RR (machine): 28  TV (machine): 500  FiO2: 50  PEEP: 5  ITime: 1  MAP: 10  PIP: 25      PHYSICAL EXAM:    Physical exam:  GENERAL: [x ]NAD, sedated, On mechanical ventilation  HEAD:  [x ]Atraumatic, [x]Normocephalic  EYES:[x ]conjunctiva and sclera clear  ENMT: ETT placed,  NECK: [x ]Supple, normal appearance,   NERVOUS SYSTEM:  [x ]Alert & Oriented X0,    CHEST/LUNG: On ETT tube, Clear to auscultation, [ x]No chest deformity; [ ]Normal percussion bilaterally; [ ]No rales, rhonchi, wheezing   HEART: [ x]Regular rate and rhythm; [x ]No murmurs, rubs, or gallops  ABDOMEN: [ x]Soft, Nontender, Nondistended; [x ]Bowel sounds present  EXTREMITIES:  [x ]2+ Peripheral Pulses, [ x]No clubbing, cyanosis, or edema, on SCD boots,  GENITOURINARY: Large swollen penile area with Lerma catheter  LYMPH: [ x]No lymphadenopathy noted  SKIN: [x ]No rashes or lesions; [ ]Good capillary refill      LABS:  CBC Full  -  ( 20 Dec 2017 04:20 )  WBC Count : 25.8 K/uL  Hemoglobin : 9.7 g/dL  Hematocrit : 30.0 %  Platelet Count - Automated : 71 K/uL  Mean Cell Volume : 101.3 fl  Mean Cell Hemoglobin : 32.8 pg  Mean Cell Hemoglobin Concentration : 32.4 gm/dL  Auto Neutrophil # : 23.4 K/uL  Auto Lymphocyte # : 1.4 K/uL  Auto Monocyte # : 0.5 K/uL  Auto Eosinophil # : 0.4 K/uL  Auto Basophil # : 0.1 K/uL  Auto Neutrophil % : 90.8 %  Auto Lymphocyte % : 5.3 %  Auto Monocyte % : 2.0 %  Auto Eosinophil % : 1.7 %  Auto Basophil % : 0.2 %    12-20    143  |  114<H>  |  115<H>  ----------------------------<  115<H>  5.2   |  16<L>  |  3.53<H>    Ca    7.5<L>      20 Dec 2017 04:20  Phos  7.5     12-20  Mg     2.8     12-20      PT/INR - ( 19 Dec 2017 05:54 )   PT: 11.3 sec;   INR: 1.04 ratio         PTT - ( 19 Dec 2017 05:54 )  PTT:30.5 sec        RADIOLOGY & ADDITIONAL STUDIES REVIEWED:  ***    [ ]GOALS OF CARE DISCUSSION WITH PATIENT/FAMILY/PROXY:    CRITICAL CARE TIME SPENT: 35 minutes

## 2017-12-20 NOTE — PROGRESS NOTE ADULT - SUBJECTIVE AND OBJECTIVE BOX
Patient is a 72y old  Male who presents with a chief complaint of weakness, chest discomfort and voice and appetite changes.   Patient remains sedated, intubated on mechanical ventilation. Still on pressors meds. Biopsy done via EBUS showed no malignant cells. LNs sampling neg for CA. Remains with chest tube in place. Subcutaneous emphysema slightly reduced. Clinically unchanged.         INTERVAL HPI/OVERNIGHT EVENTS:      VITAL SIGNS:  T(F): 97.4 (12-20-17 @ 06:00)  HR: 102 (12-20-17 @ 09:30)  BP: 119/74 (12-20-17 @ 09:30)  RR: 28 (12-20-17 @ 09:30)  SpO2: 100% (12-20-17 @ 09:30)  Wt(kg): --  I&O's Detail    19 Dec 2017 07:01  -  20 Dec 2017 07:00  --------------------------------------------------------  IN:    Enteral Tube Flush: 200 mL    fentaNYL  Infusion: 30.4 mL    fentaNYL  Infusion: 74.5 mL    phenylephrine   Infusion: 1546.9 mL    propofol Infusion: 273.6 mL    Solution: 25 mL    vasopressin Infusion: 21.6 mL    vasopressin Infusion: 36 mL  Total IN: 2208 mL    OUT:    Chest Tube: 30 mL    Indwelling Catheter - Urethral: 735 mL    Other: 250 mL  Total OUT: 1015 mL    Total NET: 1193 mL        Mode: AC/ CMV (Assist Control/ Continuous Mandatory Ventilation)  RR (machine): 28  TV (machine): 500  FiO2: 50  PEEP: 5  ITime: 1  MAP: 9  PIP: 21        REVIEW OF SYSTEMS:    CONSTITUTIONAL:  No fevers, chills, sweats    HEENT:  Eyes:  No diplopia or blurred vision. ENT:  No earache, sore throat or runny nose.    CARDIOVASCULAR:  No pressure, squeezing, tightness, or heaviness about the chest; no palpitations.    RESPIRATORY:  Per HPI    GASTROINTESTINAL:  No abdominal pain, nausea, vomiting or diarrhea.    GENITOURINARY:  No dysuria, frequency or urgency.    NEUROLOGIC:  No paresthesias, fasciculations, seizures or weakness.    PSYCHIATRIC:  No disorder of thought or mood.      PHYSICAL EXAM:    Constitutional: Well developed and nourished  Eyes:Perrla  ENMT: normal  Neck:supple  Respiratory: good air entry  Cardiovascular: S1 S2 regular  Gastrointestinal: Soft, Non tender  Extremities: No edema  Vascular:normal  Neurological:Awake, alert,Ox3  Musculoskeletal:Normal      MEDICATIONS  (STANDING):  amiodarone    Tablet 200 milliGRAM(s) Oral daily  aspirin  chewable 81 milliGRAM(s) Oral daily  BACItracin   Ointment 1 Application(s) Topical two times a day  fentaNYL   Infusion 0.5 MICROgram(s)/kG/Hr (3.82 mL/Hr) IV Continuous <Continuous>  heparin  Injectable 5000 Unit(s) IV Push every 8 hours  midodrine 5 milliGRAM(s) Oral every 8 hours  pantoprazole  Injectable 40 milliGRAM(s) IV Push daily  phenylephrine    Infusion 0.5 MICROgram(s)/kG/Min (6.563 mL/Hr) IV Continuous <Continuous>  piperacillin/tazobactam IVPB. 3.375 Gram(s) IV Intermittent every 12 hours  sodium bicarbonate 1300 milliGRAM(s) Oral two times a day  vancomycin  IVPB 1000 milliGRAM(s) IV Intermittent every 24 hours  vasopressin Infusion 0.04 Unit(s)/Min (2.4 mL/Hr) IV Continuous <Continuous>    MEDICATIONS  (PRN):  acetaminophen  Suppository 650 milliGRAM(s) Rectal every 6 hours PRN For Temp greater than 38 C (100.4 F)  chlorproMAZINE    Tablet 25 milliGRAM(s) Oral every 6 hours PRN hiccups      Allergies    No Known Allergies    Intolerances        LABS:                        9.7    25.8  )-----------( 71       ( 20 Dec 2017 04:20 )             30.0     12-20    143  |  114<H>  |  115<H>  ----------------------------<  115<H>  5.2   |  16<L>  |  3.53<H>    Ca    7.5<L>      20 Dec 2017 04:20  Phos  7.5     12-20  Mg     2.8     12-20      PT/INR - ( 19 Dec 2017 05:54 )   PT: 11.3 sec;   INR: 1.04 ratio         PTT - ( 19 Dec 2017 05:54 )  PTT:30.5 sec    ABG - ( 20 Dec 2017 06:52 )  pH: 7.22  /  pCO2: 37    /  pO2: 150   / HCO3: 14    / Base Excess: -12.2 /  SaO2: 99                    CAPILLARY BLOOD GLUCOSE            RADIOLOGY & ADDITIONAL TESTS:    CXR:  < from: Xray Chest 1 View AP -PORTABLE-Routine (12.20.17 @ 10:26) >  There is improvement in the lung and pleural picture with   significant residual.    < end of copied text >    Ct scan chest:  < from: CT Chest No Cont (12.15.17 @ 14:17) >  Previously seen left lower lobe necrotic mass now encompasses the entire   left lower lobe with internal gas loculations. Destruction of the   posterior sixth and seventh ribs and left sixth transverse process again   noted.    Peribronchovascular groundglass opacity is present in all lobes and has a   broad differential that includes infection, edema or ARDS.     Small left apical pneumothorax with chest tube in place.    < end of copied text >    ekg;    echo: Patient is a 72y old  Male who presents with a chief complaint of weakness, chest discomfort and voice and appetite changes.   Patient remains sedated, intubated on mechanical ventilation. Still on pressors meds. Biopsy done via EBUS showed no malignant cells. LNs sampling neg for CA. Remains with chest tube in place. Subcutaneous emphysema slightly reduced. Clinically unchanged.   Will need liver biopsy when more stable. Worsened renal status.      INTERVAL HPI/OVERNIGHT EVENTS:      VITAL SIGNS:  T(F): 97.4 (12-20-17 @ 06:00)  HR: 102 (12-20-17 @ 09:30)  BP: 119/74 (12-20-17 @ 09:30)  RR: 28 (12-20-17 @ 09:30)  SpO2: 100% (12-20-17 @ 09:30)  Wt(kg): --  I&O's Detail    19 Dec 2017 07:01  -  20 Dec 2017 07:00  --------------------------------------------------------  IN:    Enteral Tube Flush: 200 mL    fentaNYL  Infusion: 30.4 mL    fentaNYL  Infusion: 74.5 mL    phenylephrine   Infusion: 1546.9 mL    propofol Infusion: 273.6 mL    Solution: 25 mL    vasopressin Infusion: 21.6 mL    vasopressin Infusion: 36 mL  Total IN: 2208 mL    OUT:    Chest Tube: 30 mL    Indwelling Catheter - Urethral: 735 mL    Other: 250 mL  Total OUT: 1015 mL    Total NET: 1193 mL        Mode: AC/ CMV (Assist Control/ Continuous Mandatory Ventilation)  RR (machine): 28  TV (machine): 500  FiO2: 50  PEEP: 5  ITime: 1  MAP: 9  PIP: 21        REVIEW OF SYSTEMS:    CONSTITUTIONAL:  No fevers, chills, sweats    HEENT:  Eyes:  No diplopia or blurred vision. ENT:  No earache, sore throat or runny nose.    CARDIOVASCULAR:  No pressure, squeezing, tightness, or heaviness about the chest; no palpitations.    RESPIRATORY:  Per HPI    GASTROINTESTINAL:  No abdominal pain, nausea, vomiting or diarrhea.    GENITOURINARY:  No dysuria, frequency or urgency.    NEUROLOGIC:  No paresthesias, fasciculations, seizures or weakness.    PSYCHIATRIC:  No disorder of thought or mood.      PHYSICAL EXAM:    Constitutional: Well developed and nourished  Eyes:Perrla  ENMT: normal  Neck:supple  Respiratory: good air entry  Cardiovascular: S1 S2 regular  Gastrointestinal: Soft, Non tender  Extremities: + edema  Vascular:normal  Neurological:Awake, alert,Ox3  Musculoskeletal:Penile swelling      MEDICATIONS  (STANDING):  amiodarone    Tablet 200 milliGRAM(s) Oral daily  aspirin  chewable 81 milliGRAM(s) Oral daily  BACItracin   Ointment 1 Application(s) Topical two times a day  fentaNYL   Infusion 0.5 MICROgram(s)/kG/Hr (3.82 mL/Hr) IV Continuous <Continuous>  heparin  Injectable 5000 Unit(s) IV Push every 8 hours  midodrine 5 milliGRAM(s) Oral every 8 hours  pantoprazole  Injectable 40 milliGRAM(s) IV Push daily  phenylephrine    Infusion 0.5 MICROgram(s)/kG/Min (6.563 mL/Hr) IV Continuous <Continuous>  piperacillin/tazobactam IVPB. 3.375 Gram(s) IV Intermittent every 12 hours  sodium bicarbonate 1300 milliGRAM(s) Oral two times a day  vancomycin  IVPB 1000 milliGRAM(s) IV Intermittent every 24 hours  vasopressin Infusion 0.04 Unit(s)/Min (2.4 mL/Hr) IV Continuous <Continuous>    MEDICATIONS  (PRN):  acetaminophen  Suppository 650 milliGRAM(s) Rectal every 6 hours PRN For Temp greater than 38 C (100.4 F)  chlorproMAZINE    Tablet 25 milliGRAM(s) Oral every 6 hours PRN hiccups      Allergies    No Known Allergies    Intolerances        LABS:                        9.7    25.8  )-----------( 71       ( 20 Dec 2017 04:20 )             30.0     12-20    143  |  114<H>  |  115<H>  ----------------------------<  115<H>  5.2   |  16<L>  |  3.53<H>    Ca    7.5<L>      20 Dec 2017 04:20  Phos  7.5     12-20  Mg     2.8     12-20      PT/INR - ( 19 Dec 2017 05:54 )   PT: 11.3 sec;   INR: 1.04 ratio         PTT - ( 19 Dec 2017 05:54 )  PTT:30.5 sec    ABG - ( 20 Dec 2017 06:52 )  pH: 7.22  /  pCO2: 37    /  pO2: 150   / HCO3: 14    / Base Excess: -12.2 /  SaO2: 99                    CAPILLARY BLOOD GLUCOSE            RADIOLOGY & ADDITIONAL TESTS:    CXR:  < from: Xray Chest 1 View AP -PORTABLE-Routine (12.20.17 @ 10:26) >  There is improvement in the lung and pleural picture with   significant residual.    < end of copied text >    Ct scan chest:  < from: CT Chest No Cont (12.15.17 @ 14:17) >  Previously seen left lower lobe necrotic mass now encompasses the entire   left lower lobe with internal gas loculations. Destruction of the   posterior sixth and seventh ribs and left sixth transverse process again   noted.    Peribronchovascular groundglass opacity is present in all lobes and has a   broad differential that includes infection, edema or ARDS.     Small left apical pneumothorax with chest tube in place.    < end of copied text >    ekg;    echo: Patient is a 72y old  Male who presents with a chief complaint of weakness, chest discomfort and voice and appetite changes.   Patient remains sedated, intubated on mechanical ventilation. Still on pressors meds. Biopsy done via EBUS showed no malignant cells. LNs sampling neg for CA. Remains with chest tube in place. Subcutaneous emphysema slightly reduced. Clinically unchanged.   Will need liver biopsy when more stable. Worsened renal status.      INTERVAL HPI/OVERNIGHT EVENTS:      VITAL SIGNS:  T(F): 97.4 (12-20-17 @ 06:00)  HR: 102 (12-20-17 @ 09:30)  BP: 119/74 (12-20-17 @ 09:30)  RR: 28 (12-20-17 @ 09:30)  SpO2: 100% (12-20-17 @ 09:30)  Wt(kg): --  I&O's Detail    19 Dec 2017 07:01  -  20 Dec 2017 07:00  --------------------------------------------------------  IN:    Enteral Tube Flush: 200 mL    fentaNYL  Infusion: 30.4 mL    fentaNYL  Infusion: 74.5 mL    phenylephrine   Infusion: 1546.9 mL    propofol Infusion: 273.6 mL    Solution: 25 mL    vasopressin Infusion: 21.6 mL    vasopressin Infusion: 36 mL  Total IN: 2208 mL    OUT:    Chest Tube: 30 mL    Indwelling Catheter - Urethral: 735 mL    Other: 250 mL  Total OUT: 1015 mL    Total NET: 1193 mL        Mode: AC/ CMV (Assist Control/ Continuous Mandatory Ventilation)  RR (machine): 28  TV (machine): 500  FiO2: 50  PEEP: 5  ITime: 1  MAP: 9  PIP: 21        REVIEW OF SYSTEMS:    CONSTITUTIONAL:  No fevers, chills, sweats    HEENT:  Eyes:  No diplopia or blurred vision. ENT:  No earache, sore throat or runny nose.    CARDIOVASCULAR:  No pressure, squeezing, tightness, or heaviness about the chest; no palpitations.    RESPIRATORY:  Per HPI    GASTROINTESTINAL:  No abdominal pain, nausea, vomiting or diarrhea.    GENITOURINARY:  No dysuria, frequency or urgency.    NEUROLOGIC:  No paresthesias, fasciculations, seizures or weakness.    PSYCHIATRIC:  No disorder of thought or mood.      PHYSICAL EXAM:    Constitutional: Well developed and nourished  Eyes:Perrla  ENMT: normal  Neck:supple  Respiratory:Ronchi bilaterally. +crepitus on left upper chest.  Cardiovascular: S1 S2 regular  Gastrointestinal: Soft, Non tender  Extremities: + edema  Vascular:normal  Neurological:Awake, alert,Ox3  Musculoskeletal:Penile swelling      MEDICATIONS  (STANDING):  amiodarone    Tablet 200 milliGRAM(s) Oral daily  aspirin  chewable 81 milliGRAM(s) Oral daily  BACItracin   Ointment 1 Application(s) Topical two times a day  fentaNYL   Infusion 0.5 MICROgram(s)/kG/Hr (3.82 mL/Hr) IV Continuous <Continuous>  heparin  Injectable 5000 Unit(s) IV Push every 8 hours  midodrine 5 milliGRAM(s) Oral every 8 hours  pantoprazole  Injectable 40 milliGRAM(s) IV Push daily  phenylephrine    Infusion 0.5 MICROgram(s)/kG/Min (6.563 mL/Hr) IV Continuous <Continuous>  piperacillin/tazobactam IVPB. 3.375 Gram(s) IV Intermittent every 12 hours  sodium bicarbonate 1300 milliGRAM(s) Oral two times a day  vancomycin  IVPB 1000 milliGRAM(s) IV Intermittent every 24 hours  vasopressin Infusion 0.04 Unit(s)/Min (2.4 mL/Hr) IV Continuous <Continuous>    MEDICATIONS  (PRN):  acetaminophen  Suppository 650 milliGRAM(s) Rectal every 6 hours PRN For Temp greater than 38 C (100.4 F)  chlorproMAZINE    Tablet 25 milliGRAM(s) Oral every 6 hours PRN hiccups      Allergies    No Known Allergies    Intolerances        LABS:                        9.7    25.8  )-----------( 71       ( 20 Dec 2017 04:20 )             30.0     12-20    143  |  114<H>  |  115<H>  ----------------------------<  115<H>  5.2   |  16<L>  |  3.53<H>    Ca    7.5<L>      20 Dec 2017 04:20  Phos  7.5     12-20  Mg     2.8     12-20      PT/INR - ( 19 Dec 2017 05:54 )   PT: 11.3 sec;   INR: 1.04 ratio         PTT - ( 19 Dec 2017 05:54 )  PTT:30.5 sec    ABG - ( 20 Dec 2017 06:52 )  pH: 7.22  /  pCO2: 37    /  pO2: 150   / HCO3: 14    / Base Excess: -12.2 /  SaO2: 99                    CAPILLARY BLOOD GLUCOSE            RADIOLOGY & ADDITIONAL TESTS:    CXR:  < from: Xray Chest 1 View AP -PORTABLE-Routine (12.20.17 @ 10:26) >  There is improvement in the lung and pleural picture with   significant residual.    < end of copied text >    Ct scan chest:  < from: CT Chest No Cont (12.15.17 @ 14:17) >  Previously seen left lower lobe necrotic mass now encompasses the entire   left lower lobe with internal gas loculations. Destruction of the   posterior sixth and seventh ribs and left sixth transverse process again   noted.    Peribronchovascular groundglass opacity is present in all lobes and has a   broad differential that includes infection, edema or ARDS.     Small left apical pneumothorax with chest tube in place.    < end of copied text >    ekg;    echo:

## 2017-12-20 NOTE — PROGRESS NOTE ADULT - SUBJECTIVE AND OBJECTIVE BOX
[facility] NEPHROLOGY- PROGRESS NOTE    73 yo M with HTN, BPH a/w chest discomfort, suprahilar/ hepatic mass and hyponatremia. Hosp course cb subcutaneous emphysema s/p bronch with Left chest tube placement. s/p EBUS  with  endobronchial lung biopsy neg for malignancy however mediastinal biopsy indeterminate. Now intubated with septic shock 2/2 Necrotizing PNA, hypotension on pressors, with ZENY and hyponatremia.   Renal consulted for hyponatremia and ZENY.       Hospital Medications: Medications reviewed.  REVIEW OF SYSTEMS: Unable to obtain    VITALS:  T(F): 97.4 (17 @ 06:00), Max: 98.6 (17 @ 13:00)  HR: 102 (17 @ 09:30)  BP: 119/74 (17 @ 09:30)  RR: 28 (17 @ 09:30)  SpO2: 100% (17 @ 09:30)  Wt(kg): --     @ 07:01  -   @ 07:00  --------------------------------------------------------  IN: 2208 mL / OUT: 1015 mL / NET: 1193 mL      PHYSICAL EXAM:  Gen: Sedated   HEENT: intubated  Cards: Irregular, +S1/S2, no M/G/R  Resp: mechanical BS with left Chest tube  GI: soft, NT/ND, NABS  : +frias  Extremities: +B LE edema/anasarca    LABS:      143  |  114<H>  |  115<H>  ----------------------------<  115<H>  5.2   |  16<L>  |  3.53<H>    Ca    7.5<L>      20 Dec 2017 04:20  Phos  7.5     12-20  Mg     2.8           Creatinine Trend: 3.53 <--, 3.51 <--, 2.83 <--, 2.87 <--, 2.69 <--, 2.61 <--, 2.17 <--, 1.87 <--                        9.7    25.8  )-----------( 71       ( 20 Dec 2017 04:20 )             30.0     Urine Studies:  Urinalysis Basic - ( 13 Dec 2017 19:36 )    Color: Yellow / Appearance: Clear / S.025 / pH:   Gluc:  / Ketone: Negative  / Bili: Small / Urobili: 4   Blood:  / Protein: 30 mg/dL / Nitrite: Negative   Leuk Esterase: Negative / RBC: 0-2 /HPF / WBC 0-2 /HPF   Sq Epi:  / Non Sq Epi: Few /HPF / Bacteria: Few /HPF      Sodium, Random Urine: 56 mmol/L ( @ 03:44)  Osmolality, Random Urine: 340 mos/kg ( @ 03:44)  Potassium, Random Urine: 32 mmol/L ( @ 03:44)  Chloride, Random Urine: 90 mmol/L ( @ 03:44)  Creatinine, Random Urine: 72 mg/dL ( @ 03:44)  Sodium, Random Urine: 9 mmol/L (12-15 @ 18:16)  Chloride, Random Urine: <10 mmol/L (12-15 @ 18:16)  Potassium, Random Urine: 36 mmol/L (12-15 @ 18:16)  Creatinine, Random Urine: 153 mg/dL (12-15 @ 18:16)  Osmolality, Random Urine: 330 mos/kg (12-15 @ 18:16)  Chloride, Random Urine: <10 mmol/L ( @ 13:22)  Osmolality, Random Urine: 427 mos/kg ( @ 13:22)  Sodium, Random Urine: 10 mmol/L ( @ 13:22)  Potassium, Random Urine: 58 mmol/L ( @ 13:22)  Creatinine, Random Urine: 173 mg/dL ( @ 13:22)

## 2017-12-20 NOTE — PROGRESS NOTE ADULT - PROBLEM SELECTOR PLAN 6
AG metabolic acidosis with respiratory acidosis. Low Ph. Check Lactate level. Agree with increasing NaHCO3 tabs. Vent support as per MICU.  Monitor ph

## 2017-12-21 NOTE — PROGRESS NOTE ADULT - PROBLEM SELECTOR PLAN 4
in the setting of sepsis. BP low normal c/w pressors as per MICU. If pursuing RRT, will need to titrate up pressors.

## 2017-12-21 NOTE — PROGRESS NOTE ADULT - ASSESSMENT
A 72 y M  former smoker 20 PPD with Hx HTN BPH  p/w generalized weakness, voice changes with imaging showing left hilar mass in CT scan , s/p bronch and biopsy on 12/06 , admitted to ICU on 12/06 for pneumothorax s/p diagnostic bronchoscopy for left lobe lesions found on CT imaging - chest tube placed in ICU 12/6 on suction - admitted for urgent CT placement and monitoring. pt was downgraded on Lung biopsy from 12/07. Lung biopsy did not show any malignancy. s/p transbronchial lung biopsy , s/p endobronchial lung biopsy, s/p flexible bronchoscopy today by Dr. Piper.   ROS - unable to get as pt is intubated    admit to ICU post op monitoring s/p intubation s/p chest tube , hypotensive 2/2 septic shock, requiring pressors, on phenylephrine      1: Septic Shock  -c/w chest tube to suction, no air leak  -s/p intubation , c/w mechanical ventilation  -c/w sedation  -hypotension post anesthesia ,   -** Patient is off pressors and fentanyl now  -increased white count, tissue culture growing gram negative rods rare neisseria, haemophilus, BCx negative to date  - UA negative  - currently on zosyn and vanco  -** Vanco rough is 11, Will continue vanco same dose, Repeat vanco today  - ID Dr. Nicholas      2: ATN  -c/w IVF  BUN/cr increasing  -monitor urine output - patient is going in ATN, over night given lasix one dose, UO is low but becoming net negative -308  -Sodium bicar increased dose  -Nephro: Dr. Reagan      3: New onset Atrial Fibrillation  - Rate controlled   - EKG shows Atrial fibrillation (new onset)  - Completed amiodarone loading, now amio 200mg  c/w amiodarone  - f/u cardiology - Dr. Jackson    Pneumothorax   Resolved  D/patricio chest tube    Lung mass  -c/w post op prophylaxis  -Bronch path negative for Ca.  -s/p EBUS VS mediastinoscopy  -cyto report, negative for  malignancy  - 1 biopsy still pending  -repeat CT chest - worsening necrotic mass from prior imaging  -Discussed with IR: not doing liver biopsy as patient is still on pressors,       Hypertension.    -hypotension , holding home BP meds,  c/w pressors    BPH (benign prostatic hyperplasia)  - monitor U/o  -c/w NG tube    Penile swelling:   Urology consult requested, cold compresses, bacitracin ointment application    Hyperphosphatemia:  -Patient phosphate high in setting for kidney failure  -stable will c/w monitoring    Prophylactic measure  - IMPROVE score 2   - c/w heparin S.C for VTE prophylaxis  - GI stress ulcer PPx w/ Protonix.    Goals of care and discussion:  Patient has poor prognosis, Patient is going in ATN, very low UO,  Palliative on board: Patient is DNR

## 2017-12-21 NOTE — PROGRESS NOTE ADULT - SUBJECTIVE AND OBJECTIVE BOX
Patient is a 72y old  Male who presents with a chief complaint of weakness, chest discomfort and voice and appetite changes (05 Dec 2017 01:22)  Patient remains on mechanical ventilation, sedated, intubated in NAD. +anasarca. Urine output decreased.    INTERVAL HPI/OVERNIGHT EVENTS:      VITAL SIGNS:  T(F): 97.7 (12-21-17 @ 07:00)  HR: 97 (12-21-17 @ 11:00)  BP: 142/76 (12-21-17 @ 11:00)  RR: 26 (12-21-17 @ 11:00)  SpO2: 100% (12-21-17 @ 11:00)  Wt(kg): --  I&O's Detail    20 Dec 2017 07:01  -  21 Dec 2017 07:00  --------------------------------------------------------  IN:    Enteral Tube Flush: 85 mL    phenylephrine   Infusion: 135.7 mL    Solution: 250 mL    Solution: 50 mL    vasopressin Infusion: 14.4 mL    Vital HN: 250 mL  Total IN: 785.1 mL    OUT:    Indwelling Catheter - Urethral: 308 mL    Other: 150 mL  Total OUT: 458 mL    Total NET: 327.1 mL      21 Dec 2017 07:01  -  21 Dec 2017 12:12  --------------------------------------------------------  IN:    phenylephrine   Infusion: 14.3 mL    vasopressin Infusion: 4.8 mL    Vital HN: 75 mL  Total IN: 94.1 mL    OUT:    Indwelling Catheter - Urethral: 40 mL  Total OUT: 40 mL    Total NET: 54.1 mL        Mode: AC/ CMV (Assist Control/ Continuous Mandatory Ventilation)  RR (machine): 28  TV (machine): 500  FiO2: 40  PEEP: 5  ITime: 1  MAP: 12  PIP: 28        REVIEW OF SYSTEMS:    CONSTITUTIONAL:  No fevers, chills, sweats    HEENT:  Eyes:  No diplopia or blurred vision. ENT:  No earache, sore throat or runny nose.    CARDIOVASCULAR:  No pressure, squeezing, tightness, or heaviness about the chest; no palpitations.    RESPIRATORY:  Per HPI    GASTROINTESTINAL:  No abdominal pain, nausea, vomiting or diarrhea.    GENITOURINARY:  No dysuria, frequency or urgency.    NEUROLOGIC:  No paresthesias, fasciculations, seizures or weakness.    PSYCHIATRIC:  No disorder of thought or mood.      PHYSICAL EXAM:    Constitutional: Well developed and nourished  Eyes:Perrla  ENMT: normal  Neck:supple  Respiratory: Occasional ronchi  Cardiovascular: S1 S2 regular  Gastrointestinal: Soft, Non tender  Extremities: No edema  Vascular:normal  Neurological:Sedated  Musculoskeletal:Normal      MEDICATIONS  (STANDING):  amiodarone    Tablet 200 milliGRAM(s) Oral daily  aspirin  chewable 81 milliGRAM(s) Oral daily  BACItracin   Ointment 1 Application(s) Topical two times a day  heparin  Injectable 5000 Unit(s) IV Push every 8 hours  micafungin IVPB      micafungin IVPB 100 milliGRAM(s) IV Intermittent once  midodrine 5 milliGRAM(s) Oral every 8 hours  pantoprazole  Injectable 40 milliGRAM(s) IV Push daily  phenylephrine    Infusion 0.5 MICROgram(s)/kG/Min (6.563 mL/Hr) IV Continuous <Continuous>  piperacillin/tazobactam IVPB. 3.375 Gram(s) IV Intermittent every 12 hours  sodium bicarbonate 1300 milliGRAM(s) Oral two times a day  vancomycin  IVPB 1000 milliGRAM(s) IV Intermittent every 24 hours    MEDICATIONS  (PRN):  acetaminophen  Suppository 650 milliGRAM(s) Rectal every 6 hours PRN For Temp greater than 38 C (100.4 F)      Allergies    No Known Allergies    Intolerances        LABS:                        9.5    17.8  )-----------( 45       ( 21 Dec 2017 06:32 )             29.6     12-21    144  |  115<H>  |  128<H>  ----------------------------<  149<H>  4.8   |  17<L>  |  3.63<H>    Ca    7.6<L>      21 Dec 2017 06:32  Phos  7.2     12-21  Mg     2.9     12-21    TPro  5.4<L>  /  Alb  1.1<L>  /  TBili  1.5<H>  /  DBili  x   /  AST  21  /  ALT  17  /  AlkPhos  46  12-21        ABG - ( 21 Dec 2017 05:40 )  pH: 7.25  /  pCO2: 37    /  pO2: 54    / HCO3: 16    / Base Excess: -10.2 /  SaO2: 83                    CAPILLARY BLOOD GLUCOSE            RADIOLOGY & ADDITIONAL TESTS:    CXR:    Ct scan chest:    ekg;    echo:

## 2017-12-21 NOTE — PROGRESS NOTE ADULT - PROBLEM SELECTOR PLAN 3
Quantiferon Gold TB test  Empiric antibiotics  Bronch path negative for Ca.  Cyto from [place] and BAL negative for CA  May need liver Bx of suspicious liver lesions when off pressors meds

## 2017-12-21 NOTE — PROGRESS NOTE ADULT - SUBJECTIVE AND OBJECTIVE BOX
INTERVAL HPI/OVERNIGHT EVENTS: *** No overnight events. Patient is off pressors since yesterday    PRESSORS: [ ] YES [x ] NO  WHICH:    ANTIBIOTICS:     vanco             DATE STARTED: 12/19  ANTIBIOTICS:     zosyn             DATE STARTED: 12/15    Antimicrobial:  piperacillin/tazobactam IVPB. 3.375 Gram(s) IV Intermittent every 12 hours  vancomycin  IVPB 1000 milliGRAM(s) IV Intermittent every 24 hours    Cardiovascular:  amiodarone    Tablet 200 milliGRAM(s) Oral daily  midodrine 5 milliGRAM(s) Oral every 8 hours    Pulmonary:    Hematalogic:  aspirin  chewable 81 milliGRAM(s) Oral daily  heparin  Injectable 5000 Unit(s) IV Push every 8 hours    Other:  acetaminophen  Suppository 650 milliGRAM(s) Rectal every 6 hours PRN  BACItracin   Ointment 1 Application(s) Topical two times a day  chlorproMAZINE    Tablet 25 milliGRAM(s) Oral every 6 hours PRN  fentaNYL   Infusion 0.5 MICROgram(s)/kG/Hr IV Continuous <Continuous>  pantoprazole  Injectable 40 milliGRAM(s) IV Push daily  sodium bicarbonate 1300 milliGRAM(s) Oral two times a day    acetaminophen  Suppository 650 milliGRAM(s) Rectal every 6 hours PRN  amiodarone    Tablet 200 milliGRAM(s) Oral daily  aspirin  chewable 81 milliGRAM(s) Oral daily  BACItracin   Ointment 1 Application(s) Topical two times a day  chlorproMAZINE    Tablet 25 milliGRAM(s) Oral every 6 hours PRN  fentaNYL   Infusion 0.5 MICROgram(s)/kG/Hr IV Continuous <Continuous>  heparin  Injectable 5000 Unit(s) IV Push every 8 hours  midodrine 5 milliGRAM(s) Oral every 8 hours  pantoprazole  Injectable 40 milliGRAM(s) IV Push daily  piperacillin/tazobactam IVPB. 3.375 Gram(s) IV Intermittent every 12 hours  sodium bicarbonate 1300 milliGRAM(s) Oral two times a day  vancomycin  IVPB 1000 milliGRAM(s) IV Intermittent every 24 hours    Drug Dosing Weight  Height (cm): 165 (12 Dec 2017 13:14)  Weight (kg): 76.4 (12 Dec 2017 16:00)  BMI (kg/m2): 28.1 (12 Dec 2017 16:00)  BSA (m2): 1.84 (12 Dec 2017 16:00)    CENTRAL LINE: [ x] YES [ ] NO  LOCATION:   DATE INSERTED:  REMOVE: [ ] YES [ ] NO  EXPLAIN:    LERMA: [x ] YES [ ] NO    DATE INSERTED:  REMOVE:  [ ] YES [ ] NO  EXPLAIN:    A-LINE:  [ ] YES [ x] NO  LOCATION:   DATE INSERTED:  REMOVE:  [ ] YES [ ] NO  EXPLAIN:    PMH -reviewed admission note, no change since admission  Heart faliure: acute [ ] chronic [ ] acute or chronic [ ] diastolic [ ] systolic [ ] combied systolic and diastolic[ ]  ZENY: ATN[ ] renal medullary necrosis [ ] CKD I [ ]CKDII [ ]CKD III [ ]CKD IV [ ]CKD V [ ]Other pathological lesions [ ]  Abdominal Nutrition Status: malnutrition [ ] cachexia [ ] morbid obesity/BMI=40 [ ] Supplement ordered [___________]     ICU Vital Signs Last 24 Hrs  T(C): 36.5 (21 Dec 2017 07:00), Max: 36.6 (20 Dec 2017 11:00)  T(F): 97.7 (21 Dec 2017 07:00), Max: 97.8 (20 Dec 2017 11:00)  HR: 98 (21 Dec 2017 07:30) (90 - 127)  BP: 92/50 (21 Dec 2017 07:30) (86/44 - 148/57)  BP(mean): 59 (21 Dec 2017 07:30) (53 - 87)  ABP: --  ABP(mean): --  RR: 25 (21 Dec 2017 07:30) (19 - 31)  SpO2: 98% (21 Dec 2017 07:30) (92% - 100%)      ABG - ( 21 Dec 2017 05:40 )  pH: 7.25  /  pCO2: 37    /  pO2: 54    / HCO3: 16    / Base Excess: -10.2 /  SaO2: 83                    12-20 @ 07:01  -  12-21 @ 07:00  --------------------------------------------------------  IN: 785.1 mL / OUT: 458 mL / NET: 327.1 mL        Mode: AC/ CMV (Assist Control/ Continuous Mandatory Ventilation)  RR (machine): 28  TV (machine): 500  FiO2: 50  PEEP: 5  ITime: 1  MAP: 14  PIP: 31      Physical exam:  GENERAL: [x ]NAD, sedated, On mechanical ventilation  HEAD:  [x ]Atraumatic, [x]Normocephalic  EYES:[x ]conjunctiva and sclera clear  ENMT: ETT placed,  NECK: [x ]Supple, normal appearance,   NERVOUS SYSTEM:  [x ]Alert & Oriented X0,    CHEST/LUNG: On ETT tube, Clear to auscultation, [ x]No chest deformity; [ ]Normal percussion bilaterally; [ ]No rales, rhonchi, wheezing   HEART: [ x]Regular rate and rhythm; [x ]No murmurs, rubs, or gallops  ABDOMEN: [ x]Soft, Nontender, Nondistended; [x ]Bowel sounds present  EXTREMITIES:  [x ]2+ Peripheral Pulses, [ x]No clubbing, cyanosis, or edema, on SCD boots,  GENITOURINARY: Large swollen penile area with Lerma catheter  LYMPH: [ x]No lymphadenopathy noted  SKIN: [x ]No rashes or lesions; [ ]Good capillary refill      LABS:  CBC Full  -  ( 21 Dec 2017 06:32 )  WBC Count : 17.8 K/uL  Hemoglobin : 9.5 g/dL  Hematocrit : 29.6 %  Platelet Count - Automated : 45 K/uL  Mean Cell Volume : 99.0 fl  Mean Cell Hemoglobin : 31.7 pg  Mean Cell Hemoglobin Concentration : 32.0 gm/dL  Auto Neutrophil # : 16.7 K/uL  Auto Lymphocyte # : 0.7 K/uL  Auto Monocyte # : 0.3 K/uL  Auto Eosinophil # : 0.0 K/uL  Auto Basophil # : 0.0 K/uL  Auto Neutrophil % : 93.8 %  Auto Lymphocyte % : 4.2 %  Auto Monocyte % : 1.8 %  Auto Eosinophil % : 0.0 %  Auto Basophil % : 0.2 %    12-21    144  |  115<H>  |  128<H>  ----------------------------<  149<H>  4.8   |  17<L>  |  3.63<H>    Ca    7.6<L>      21 Dec 2017 06:32  Phos  7.2     12-21  Mg     2.9     12-21    TPro  5.4<L>  /  Alb  1.1<L>  /  TBili  1.5<H>  /  DBili  x   /  AST  21  /  ALT  17  /  AlkPhos  46  12-21            RADIOLOGY & ADDITIONAL STUDIES REVIEWED:  *** < from: Xray Chest 1 View AP -PORTABLE-Routine (12.20.17 @ 10:26) >  INTERPRETATION:  AP semierect chest on December 20 at 10:04 AM. Patient   is being followed for positive lung findings and has left chest tube.    Heart magnified by technique.    Endotracheal tube, nasogastric tube, right jugular line, and left chest   tube remain. Left lung appears expanded.    There is significant bilateral infiltrates but these have improved   somewhat from December 18. Basilar fluid also shows improvement.    IMPRESSION: There is improvement in the lung and pleural picture with   significant residual.            < end of copied text >      [ ]GOALS OF CARE DISCUSSION WITH PATIENT/FAMILY/PROXY:    CRITICAL CARE TIME SPENT: 35 minutes

## 2017-12-21 NOTE — PROGRESS NOTE ADULT - SUBJECTIVE AND OBJECTIVE BOX
Patient is a 72y old  Male who presents with a chief complaint of weakness, chest discomfort and voice and appetite changes (05 Dec 2017 01:22)    pt seen in icu [ x ], reg med floor [ ], bed [ x ], chair at bedside [   ], a+o x3 [  ],sedated [ x ], nad [x  ],   left chest to pleurovac [x],   et tube [x],  ngt to wall suction [ x ], frias to bsd [x], right ij cent line [x], fentanyl, propofol, norepi and  phenylepherine drip [x],    pt with episode of low urine output over night but has improved      Allergies    No Known Allergies        Vitals    T(F): 97.7 (12-21-17 @ 07:00), Max: 97.8 (12-20-17 @ 11:00)  HR: 106 (12-21-17 @ 07:00) (90 - 127)  BP: 86/44 (12-21-17 @ 07:00) (86/44 - 148/57)  RR: 25 (12-21-17 @ 07:00) (19 - 31)  SpO2: 97% (12-21-17 @ 07:00) (92% - 100%)  Wt(kg): --  CAPILLARY BLOOD GLUCOSE          Labs                          9.5    17.8  )-----------( 45       ( 21 Dec 2017 06:32 )             29.6       12-21    144  |  115<H>  |  128<H>  ----------------------------<  149<H>  4.8   |  17<L>  |  3.63<H>    Ca    7.6<L>      21 Dec 2017 06:32  Phos  7.2     12-21  Mg     2.9     12-21    TPro  5.4<L>  /  Alb  1.1<L>  /  TBili  1.5<H>  /  DBili  x   /  AST  21  /  ALT  17  /  AlkPhos  46  12-21            .Sputum Sputum  12-20 @ 21:49 --  --    Moderate polymorphonuclear leukocytes per low power field  No Squamous epithelial cells per low power field  Few Gram positive cocci in pairs per oil power field      .Blood Blood  12-14 @ 00:30   No growth at 5 days.  --  --      .Broncial received in trap  12-13 @ 08:06   Normal Respiratory Lissy present  --    No polymorphonuclear cells seen per low power field  No squamous epithelial cells per low power field  Moderate Gram Positive Cocci in Pairs and Chains per oil power field  Few-moderate Gram Negative Coccobacilli per oil power field      .Surgical Swab Bronchial Alveolar Lavage  12-12 @ 22:52   Moderate Prevotella intermedia  Rare Prevotella bivia  Few Normal Respiratory Lissy present  --  --      .Tissue Left Lower Lobe  12-12 @ 22:45   Rare Haemophilus parainfluenzae  Rare Neisseria species, not gonorrhoeae or meningitidis  Rare Alpha hemolytic strep  Few Prevotella melaninogenica  Numerous Prevotella intermedia  --    Rare polymorphonuclear leukocytes seen per low power field  Numerous Gram Negative Rods per oil power field      .Blood Blood  12-11 @ 11:22   No growth at 5 days.  --  --      .Broncial Other, bronchioalveolar lavage  12-06 @ 22:28   No growth at 1 week.  --  --      .Blood Blood-Peripheral  12-05 @ 10:57   No growth at 5 days.  --  --          Radiology Results      Meds    MEDICATIONS  (STANDING):  amiodarone    Tablet 200 milliGRAM(s) Oral daily  aspirin  chewable 81 milliGRAM(s) Oral daily  BACItracin   Ointment 1 Application(s) Topical two times a day  fentaNYL   Infusion 0.5 MICROgram(s)/kG/Hr (3.82 mL/Hr) IV Continuous <Continuous>  heparin  Injectable 5000 Unit(s) IV Push every 8 hours  midodrine 5 milliGRAM(s) Oral every 8 hours  pantoprazole  Injectable 40 milliGRAM(s) IV Push daily  piperacillin/tazobactam IVPB. 3.375 Gram(s) IV Intermittent every 12 hours  sodium bicarbonate 1300 milliGRAM(s) Oral two times a day  vancomycin  IVPB 1000 milliGRAM(s) IV Intermittent every 24 hours      MEDICATIONS  (PRN):  acetaminophen  Suppository 650 milliGRAM(s) Rectal every 6 hours PRN For Temp greater than 38 C (100.4 F)  chlorproMAZINE    Tablet 25 milliGRAM(s) Oral every 6 hours PRN hiccups      Physical Exam    Neuro :  no focal deficits  Respiratory: CTA B/L, left subcutaneous emphysema, left chest tube to pleurovac  CV: RRR, S1S2, no murmurs,   Abdominal: Soft, NT, ND +BS,  Extremities: No edema, + peripheral pulses  genitals: edematous penis with some erythema, frias to bsd    ASSESSMENT    septic shock  left lower lobe mass with bony destruction,   hepatic and splenic lesions,   r/o malig,   copd   destruction of left 6th and 7th ribs and left T6 transverse process   exophytic left renal lesion  subcutaneous emphysema  pneumothorax  s/p hyponatremia  silverio  new afib  penile edema  h/o BPH (benign prostatic hyperplasia)  Hypertension        PLAN        S/P flexible bronchoscopy showing possible Large B-cell lymphoma,   s/p flex bronch transbronchial lung bx and EBU bx 12/12  cytopath of flex bronch and ebus neg for malig  cx from flex bronch with Few Normal Respiratory Lissy present and Rare Haemophilus parainfluenzae  Rare Neisseria species, not gonorrhoeae or meningitidis  -- Rare polymorphonuclear leukocytes seen per low power field  Numerous Gram Negative Rods per oil power field  noted above   thoracic surg f/u  monitor chest tube  heme onc cons  vent mgmt  cont atrov and prov nebs,   cont supplemental oxygen,  ct chest-abd-pelv result noted above  rept cxr result noted above  pulm f/u   s/p bronch with bx   cytopath of bronch neg for malig noted  cytopatho transbronchial bx with Interstitial fibrosis and intra-alveolar smoker's  macrophages noted above.   urology cons for renal lesion  cont ivf  renal f/u   cardio f/u  cont amiodarone 200 mg daily  id f/u   add vanco with adj renally  f/u vanco t  cont zosyn  paliative eval noted  cont current meds  pt for ir bx of liver or renal lesion  mgmt as per icu Patient is a 72y old  Male who presents with a chief complaint of weakness, chest discomfort and voice and appetite changes (05 Dec 2017 01:22)    pt seen in icu [ x ], reg med floor [ ], bed [ x ], chair at bedside [   ], a+o x3 [  ],sedated [ x ], nad [x  ],   left chest to pleurovac [x],   et tube [x],  ngt to wall suction [ x ], frias to bsd [x], right ij cent line [x], fentanyl drip [x],        Allergies    No Known Allergies        Vitals    T(F): 97.7 (12-21-17 @ 07:00), Max: 97.8 (12-20-17 @ 11:00)  HR: 106 (12-21-17 @ 07:00) (90 - 127)  BP: 86/44 (12-21-17 @ 07:00) (86/44 - 148/57)  RR: 25 (12-21-17 @ 07:00) (19 - 31)  SpO2: 97% (12-21-17 @ 07:00) (92% - 100%)  Wt(kg): --  CAPILLARY BLOOD GLUCOSE          Labs                          9.5    17.8  )-----------( 45       ( 21 Dec 2017 06:32 )             29.6       12-21    144  |  115<H>  |  128<H>  ----------------------------<  149<H>  4.8   |  17<L>  |  3.63<H>    Ca    7.6<L>      21 Dec 2017 06:32  Phos  7.2     12-21  Mg     2.9     12-21    TPro  5.4<L>  /  Alb  1.1<L>  /  TBili  1.5<H>  /  DBili  x   /  AST  21  /  ALT  17  /  AlkPhos  46  12-21            .Sputum Sputum  12-20 @ 21:49 --  --    Moderate polymorphonuclear leukocytes per low power field  No Squamous epithelial cells per low power field  Few Gram positive cocci in pairs per oil power field      .Blood Blood  12-14 @ 00:30   No growth at 5 days.  --  --      .Broncial received in trap  12-13 @ 08:06   Normal Respiratory Lissy present  --    No polymorphonuclear cells seen per low power field  No squamous epithelial cells per low power field  Moderate Gram Positive Cocci in Pairs and Chains per oil power field  Few-moderate Gram Negative Coccobacilli per oil power field      .Surgical Swab Bronchial Alveolar Lavage  12-12 @ 22:52   Moderate Prevotella intermedia  Rare Prevotella bivia  Few Normal Respiratory Lissy present  --  --      .Tissue Left Lower Lobe  12-12 @ 22:45   Rare Haemophilus parainfluenzae  Rare Neisseria species, not gonorrhoeae or meningitidis  Rare Alpha hemolytic strep  Few Prevotella melaninogenica  Numerous Prevotella intermedia  --    Rare polymorphonuclear leukocytes seen per low power field  Numerous Gram Negative Rods per oil power field      .Blood Blood  12-11 @ 11:22   No growth at 5 days.  --  --      .Broncial Other, bronchioalveolar lavage  12-06 @ 22:28   No growth at 1 week.  --  --      .Blood Blood-Peripheral  12-05 @ 10:57   No growth at 5 days.  --  --          Radiology Results    < from: Xray Chest 1 View AP -PORTABLE-Routine (12.20.17 @ 10:26) >  IMPRESSION: There is improvement in the lung and pleural picture with   significant residual.    < end of copied text >        Meds    MEDICATIONS  (STANDING):  amiodarone    Tablet 200 milliGRAM(s) Oral daily  aspirin  chewable 81 milliGRAM(s) Oral daily  BACItracin   Ointment 1 Application(s) Topical two times a day  fentaNYL   Infusion 0.5 MICROgram(s)/kG/Hr (3.82 mL/Hr) IV Continuous <Continuous>  heparin  Injectable 5000 Unit(s) IV Push every 8 hours  midodrine 5 milliGRAM(s) Oral every 8 hours  pantoprazole  Injectable 40 milliGRAM(s) IV Push daily  piperacillin/tazobactam IVPB. 3.375 Gram(s) IV Intermittent every 12 hours  sodium bicarbonate 1300 milliGRAM(s) Oral two times a day  vancomycin  IVPB 1000 milliGRAM(s) IV Intermittent every 24 hours      MEDICATIONS  (PRN):  acetaminophen  Suppository 650 milliGRAM(s) Rectal every 6 hours PRN For Temp greater than 38 C (100.4 F)  chlorproMAZINE    Tablet 25 milliGRAM(s) Oral every 6 hours PRN hiccups      Physical Exam    Neuro :  no focal deficits  Respiratory: CTA B/L, left subcutaneous emphysema, left chest tube to pleurovac  CV: RRR, S1S2, no murmurs,   Abdominal: Soft, NT, ND +BS,  Extremities: b/l le edema, + peripheral pulses  genitals: edematous penis with some erythema, frias to bsd    ASSESSMENT    septic shock  left lower lobe mass with bony destruction,   hepatic and splenic lesions,   r/o malig,   copd   destruction of left 6th and 7th ribs and left T6 transverse process   exophytic left renal lesion  subcutaneous emphysema  pneumothorax  s/p hyponatremia  silverio  new afib  penile edema  h/o BPH (benign prostatic hyperplasia)  Hypertension        PLAN        S/P flexible bronchoscopy showing possible Large B-cell lymphoma,   s/p flex bronch transbronchial lung bx and EBU bx 12/12  cytopath of flex bronch and ebus neg for malig  cx from flex bronch with Few Normal Respiratory Lissy present and Rare Haemophilus parainfluenzae  Rare Neisseria species, not gonorrhoeae or meningitidis  -- Rare polymorphonuclear leukocytes seen per low power field  Numerous Gram Negative Rods per oil power field  noted above   thoracic surg f/u  monitor chest tube  vent mgmt  cont atrov and prov nebs,   cont supplemental oxygen,  ct chest-abd-pelv result note  rept cxr result with improvement noted above  pulm f/u   s/p bronch with bx   cytopath of bronch neg for malig noted  cytopatho transbronchial bx with Interstitial fibrosis and intra-alveolar smoker's  macrophages noted above.   urology cons for renal lesion  cont ivf  renal f/u   cardio f/u  cont amiodarone 200 mg daily  id f/u noted  cont vanco with adj renally  f/u vanco t  cont zosyn  Suggest anti fungal coverage (Mycamine 100 mg IVPB q day) As patient has been on long duration of ABX, with intubation and central lines.  paliative eval noted  cont current meds  pt for ir bx of liver or renal lesion  mgmt as per icu Patient is a 72y old  Male who presents with a chief complaint of weakness, chest discomfort and voice and appetite changes (05 Dec 2017 01:22)    pt seen in icu [ x ], reg med floor [ ], bed [ x ], chair at bedside [   ], a+o x3 [  ],sedated [ x ], nad [x  ],     et tube [x],  ngt feed [ x ], frias to bsd [x], right ij cent line [x], fentanyl drip [x],        Allergies    No Known Allergies        Vitals    T(F): 97.7 (12-21-17 @ 07:00), Max: 97.8 (12-20-17 @ 11:00)  HR: 106 (12-21-17 @ 07:00) (90 - 127)  BP: 86/44 (12-21-17 @ 07:00) (86/44 - 148/57)  RR: 25 (12-21-17 @ 07:00) (19 - 31)  SpO2: 97% (12-21-17 @ 07:00) (92% - 100%)  Wt(kg): --  CAPILLARY BLOOD GLUCOSE          Labs                          9.5    17.8  )-----------( 45       ( 21 Dec 2017 06:32 )             29.6       12-21    144  |  115<H>  |  128<H>  ----------------------------<  149<H>  4.8   |  17<L>  |  3.63<H>    Ca    7.6<L>      21 Dec 2017 06:32  Phos  7.2     12-21  Mg     2.9     12-21    TPro  5.4<L>  /  Alb  1.1<L>  /  TBili  1.5<H>  /  DBili  x   /  AST  21  /  ALT  17  /  AlkPhos  46  12-21            .Sputum Sputum  12-20 @ 21:49 --  --    Moderate polymorphonuclear leukocytes per low power field  No Squamous epithelial cells per low power field  Few Gram positive cocci in pairs per oil power field      .Blood Blood  12-14 @ 00:30   No growth at 5 days.  --  --      .Broncial received in trap  12-13 @ 08:06   Normal Respiratory Lissy present  --    No polymorphonuclear cells seen per low power field  No squamous epithelial cells per low power field  Moderate Gram Positive Cocci in Pairs and Chains per oil power field  Few-moderate Gram Negative Coccobacilli per oil power field      .Surgical Swab Bronchial Alveolar Lavage  12-12 @ 22:52   Moderate Prevotella intermedia  Rare Prevotella bivia  Few Normal Respiratory Lissy present  --  --      .Tissue Left Lower Lobe  12-12 @ 22:45   Rare Haemophilus parainfluenzae  Rare Neisseria species, not gonorrhoeae or meningitidis  Rare Alpha hemolytic strep  Few Prevotella melaninogenica  Numerous Prevotella intermedia  --    Rare polymorphonuclear leukocytes seen per low power field  Numerous Gram Negative Rods per oil power field      .Blood Blood  12-11 @ 11:22   No growth at 5 days.  --  --      .Broncial Other, bronchioalveolar lavage  12-06 @ 22:28   No growth at 1 week.  --  --      .Blood Blood-Peripheral  12-05 @ 10:57   No growth at 5 days.  --  --          Radiology Results    < from: Xray Chest 1 View AP -PORTABLE-Routine (12.20.17 @ 10:26) >  IMPRESSION: There is improvement in the lung and pleural picture with   significant residual.    < end of copied text >        Meds    MEDICATIONS  (STANDING):  amiodarone    Tablet 200 milliGRAM(s) Oral daily  aspirin  chewable 81 milliGRAM(s) Oral daily  BACItracin   Ointment 1 Application(s) Topical two times a day  fentaNYL   Infusion 0.5 MICROgram(s)/kG/Hr (3.82 mL/Hr) IV Continuous <Continuous>  heparin  Injectable 5000 Unit(s) IV Push every 8 hours  midodrine 5 milliGRAM(s) Oral every 8 hours  pantoprazole  Injectable 40 milliGRAM(s) IV Push daily  piperacillin/tazobactam IVPB. 3.375 Gram(s) IV Intermittent every 12 hours  sodium bicarbonate 1300 milliGRAM(s) Oral two times a day  vancomycin  IVPB 1000 milliGRAM(s) IV Intermittent every 24 hours      MEDICATIONS  (PRN):  acetaminophen  Suppository 650 milliGRAM(s) Rectal every 6 hours PRN For Temp greater than 38 C (100.4 F)  chlorproMAZINE    Tablet 25 milliGRAM(s) Oral every 6 hours PRN hiccups      Physical Exam    Neuro :  no focal deficits  Respiratory: CTA B/L,   CV: RRR, S1S2, no murmurs,   Abdominal: Soft, NT, ND +BS,  Extremities: b/l le edema, + peripheral pulses  genitals: edematous penis with some erythema, frias to bsd    ASSESSMENT    septic shock  left lower lobe mass with bony destruction,   hepatic and splenic lesions,   r/o malig,   copd   destruction of left 6th and 7th ribs and left T6 transverse process   exophytic left renal lesion  subcutaneous emphysema  pneumothorax  s/p hyponatremia  silverio  new afib  penile edema  h/o BPH (benign prostatic hyperplasia)  Hypertension        PLAN        S/P flexible bronchoscopy showing possible Large B-cell lymphoma,   s/p flex bronch transbronchial lung bx and EBU bx 12/12  cytopath of flex bronch and ebus neg for malig  cx from flex bronch with Few Normal Respiratory Lissy present and Rare Haemophilus parainfluenzae  Rare Neisseria species, not gonorrhoeae or meningitidis  -- Rare polymorphonuclear leukocytes seen per low power field  Numerous Gram Negative Rods per oil power field  noted above   thoracic surg f/u  s/p d/c chest tube  vent mgmt  cont atrov and prov nebs,   cont supplemental oxygen,  ct chest-abd-pelv result note  rept cxr result with improvement noted above  pulm f/u   s/p bronch with bx   cytopath of bronch neg for malig noted  cytopatho transbronchial bx with Interstitial fibrosis and intra-alveolar smoker's  macrophages noted above.   urology cons for renal lesion  cont ivf  renal f/u   cardio f/u  cont amiodarone 200 mg daily  id f/u noted  cont vanco with adj renally  f/u vanco t  cont zosyn  Suggest anti fungal coverage (Mycamine 100 mg IVPB q day) As patient has been on long duration of ABX, with intubation and central lines.  paliative eval noted  cont current meds  pt for ir bx of liver or renal lesion  mgmt as per icu

## 2017-12-21 NOTE — PROGRESS NOTE ADULT - PROBLEM SELECTOR PLAN 6
AG metabolic acidosis with respiratory acidosis. Low Ph. Check Lactate level. c/w NaHCO3 tabs. Vent support as per MICU.  Monitor ph

## 2017-12-21 NOTE — PROGRESS NOTE ADULT - PROBLEM SELECTOR PLAN 7
Due in large part to low oncotic pressure from hypoalbuminemia.  Diuresis is not likely to help.  Continue TFs to improve nutrition and possibly oncotic pressure. Consider albumin assisted diuresis.

## 2017-12-21 NOTE — PROGRESS NOTE ADULT - SUBJECTIVE AND OBJECTIVE BOX
[facility] NEPHROLOGY- PROGRESS NOTE    71 yo M with HTN, BPH a/w chest discomfort, suprahilar/ hepatic mass and hyponatremia. Hosp course cb subcutaneous emphysema s/p bronch with Left chest tube placement. s/p EBUS 12/12 with  endobronchial lung biopsy neg for malignancy however mediastinal biopsy indeterminate. Now intubated with septic shock 2/2 Necrotizing PNA, hypotension on pressors, with ZENY and hyponatremia.   Renal consulted for hyponatremia and ZENY.       Hospital Medications: Medications reviewed.  REVIEW OF SYSTEMS: Unable to obtain    VITALS:  T(F): 97.8 (12-21-17 @ 13:00), Max: 97.8 (12-21-17 @ 13:00)  HR: 113 (12-21-17 @ 14:00)  BP: 103/60 (12-21-17 @ 14:00)  RR: 28 (12-21-17 @ 14:00)  SpO2: 100% (12-21-17 @ 14:00)  Wt(kg): --    12-20 @ 07:01  -  12-21 @ 07:00  --------------------------------------------------------  IN: 785.1 mL / OUT: 458 mL / NET: 327.1 mL    12-21 @ 07:01  -  12-21 @ 14:37  --------------------------------------------------------  IN: 196.3 mL / OUT: 90 mL / NET: 106.3 mL        PHYSICAL EXAM:  Gen: Sedated   HEENT: intubated  Cards: Irregular, +S1/S2, no M/G/R  Resp: mechanical BS   GI: soft, NT/ND, NABS  : +frias  Extremities: +B LE edema/anasarca    LABS:  12-21    144  |  115<H>  |  128<H>  ----------------------------<  149<H>  4.8   |  17<L>  |  3.63<H>    Ca    7.6<L>      21 Dec 2017 06:32  Phos  7.2     12-21  Mg     2.9     12-21    TPro  5.4<L>  /  Alb  1.1<L>  /  TBili  1.5<H>  /  DBili      /  AST  21  /  ALT  17  /  AlkPhos  46  12-21    Creatinine Trend: 3.63 <--, 3.53 <--, 3.51 <--, 2.83 <--, 2.87 <--, 2.69 <--, 2.61 <--, 2.17 <--                        9.5    17.8  )-----------( 45       ( 21 Dec 2017 06:32 )             29.6     Urine Studies:    Sodium, Random Urine: 56 mmol/L (12-18 @ 03:44)  Osmolality, Random Urine: 340 mos/kg (12-18 @ 03:44)  Potassium, Random Urine: 32 mmol/L (12-18 @ 03:44)  Chloride, Random Urine: 90 mmol/L (12-18 @ 03:44)  Creatinine, Random Urine: 72 mg/dL (12-18 @ 03:44)  Sodium, Random Urine: 9 mmol/L (12-15 @ 18:16)  Chloride, Random Urine: <10 mmol/L (12-15 @ 18:16)  Potassium, Random Urine: 36 mmol/L (12-15 @ 18:16)  Creatinine, Random Urine: 153 mg/dL (12-15 @ 18:16)  Osmolality, Random Urine: 330 mos/kg (12-15 @ 18:16)

## 2017-12-21 NOTE — PROGRESS NOTE ADULT - PROBLEM SELECTOR PLAN 5
in the setting of ZENY. Vital AF 1.2 has fairly low phos content.  Nepro has ~100mg less phos/24h period at 40ml/hr rate.  Phos high, but stable.  Okay to continue Vital AF 1.2 for now.  Monitor serum phos. Consider Renagel phos binder.

## 2017-12-21 NOTE — PROCEDURE NOTE - NSINFORMCONSENT_GEN_A_CORE
Benefits, risks, and possible complications of procedure explained to patient/caregiver who verbalized understanding and gave written consent.
change in line/Benefits, risks, and possible complications of procedure explained to patient/caregiver who verbalized understanding and gave written consent.
Benefits, risks, and possible complications of procedure explained to patient/caregiver who verbalized understanding and gave written consent.

## 2017-12-21 NOTE — PROGRESS NOTE ADULT - SUBJECTIVE AND OBJECTIVE BOX
CHIEF COMPLAINT: Patient is a 72 yr old  Male who presents with a chief complaint of weakness, chest discomfort and voice and appetite changes (05 Dec 2017 01:22)    	  REVIEW OF SYSTEMS:  unable to obtain    PHYSICAL EXAM:  T(C): 36.5 (12-21-17 @ 07:00), Max: 36.5 (12-21-17 @ 07:00)  HR: 97 (12-21-17 @ 11:00) (90 - 127)  BP: 142/76 (12-21-17 @ 11:00) (76/41 - 142/76)  RR: 26 (12-21-17 @ 11:00) (19 - 31)  SpO2: 100% (12-21-17 @ 11:00) (92% - 100%)    I&O's Summary    20 Dec 2017 07:01  -  21 Dec 2017 07:00  --------------------------------------------------------  IN: 785.1 mL / OUT: 458 mL / NET: 327.1 mL    21 Dec 2017 07:01  -  21 Dec 2017 11:46  --------------------------------------------------------  IN: 94.1 mL / OUT: 40 mL / NET: 54.1 mL        Appearance: Normal	  HEENT:   Normal oral mucosa, PERRL, EOMI	  Lymphatic: No lymphadenopathy  Cardiovascular: Normal S1 S2, No JVD, No murmurs, No edema  Respiratory: b/l ronchi  Gastrointestinal:  Soft, Non-tender, + BS	  Skin: No rashes, No ecchymoses, No cyanosis	  Extremities: Normal range of motion, No clubbing, cyanosis or edema  Vascular: Peripheral pulses palpable 2+ bilaterally    MEDICATIONS  (STANDING):  amiodarone    Tablet 200 milliGRAM(s) Oral daily  aspirin  chewable 81 milliGRAM(s) Oral daily  BACItracin   Ointment 1 Application(s) Topical two times a day  heparin  Injectable 5000 Unit(s) IV Push every 8 hours  micafungin IVPB      micafungin IVPB 100 milliGRAM(s) IV Intermittent once  midodrine 5 milliGRAM(s) Oral every 8 hours  pantoprazole  Injectable 40 milliGRAM(s) IV Push daily  phenylephrine    Infusion 0.5 MICROgram(s)/kG/Min (6.563 mL/Hr) IV Continuous <Continuous>  piperacillin/tazobactam IVPB. 3.375 Gram(s) IV Intermittent every 12 hours  sodium bicarbonate 1300 milliGRAM(s) Oral two times a day  vancomycin  IVPB 1000 milliGRAM(s) IV Intermittent every 24 hours      	  LABS:	 	                       9.5    17.8  )-----------( 45       ( 21 Dec 2017 06:32 )             29.6     12-21    144  |  115<H>  |  128<H>  ----------------------------<  149<H>  4.8   |  17<L>  |  3.63<H>    Ca    7.6<L>      21 Dec 2017 06:32  Phos  7.2     12-21  Mg     2.9     12-21    TPro  5.4<L>  /  Alb  1.1<L>  /  TBili  1.5<H>  /  DBili  x   /  AST  21  /  ALT  17  /  AlkPhos  46  12-21      Lipid Profile: Cholesterol 64  LDL 20  HDL 26  TG 88    HgA1c: Hemoglobin A1C, Whole Blood: 6.2 % (12-05 @ 09:32)    TSH: Thyroid Stimulating Hormone, Serum: 0.45 uU/mL (12-05 @ 07:14)

## 2017-12-21 NOTE — PROGRESS NOTE ADULT - SUBJECTIVE AND OBJECTIVE BOX
Meds:  Zosyn 3.375 gm IVPB q 12 hours.  Vancomycin 1 gm IVPB Q day.    Allergies:  Allergies    No Known Allergies    Intolerances  ROS  [ x ] UNABLE TO ELICIT    General:  [  ] None  [  ] Fever  [  ] Chills  [  ] Malaise    Skin:  [  ] None [  ] Rash  [  ] Wound  [  ] Ulcer    HEENT:  [  ] None  [  ] Sore Throat  [  ] Nasal congestion/ runny nose  [  ] Photophobia [  ] Neck pain      Chest:  [  ] None   [  ] SOB  [  ] Cough  [  ] None    Cardiovascular:   [  ] None  [  ] CP  [  ] Palpitation    Gastrointestinal:  [  ] None  [  ] Abd pain   [  ] Nausea    [  ] Vomiting   [  ] Diarrhea	     Genitourinary:  [  ] None [  ] Polyuria   [  ] Urgency  [  ] Frequency  [  ] Dysuria    [  ]  Hematuria       Musculoskeletal:  [  ] None [  ] Back Pain	[  ] Body aches  [  ] Joint pain    Neurological: [  ] None [  ]Dizziness  [  ]Visual Disturbance  [  ]Headaches   [  ] Weakness        PHYSICAL EXAM:  Vital Signs Last 24 Hrs  T(C): 36.5 (21 Dec 2017 07:00), Max: 36.6 (20 Dec 2017 11:00)  T(F): 97.7 (21 Dec 2017 07:00), Max: 97.8 (20 Dec 2017 11:00)  HR: 106 (21 Dec 2017 07:00) (90 - 127)  BP: 86/44 (21 Dec 2017 07:00) (86/44 - 148/57)  BP(mean): 53 (21 Dec 2017 07:00) (53 - 87)  RR: 25 (21 Dec 2017 07:00) (19 - 31)  SpO2: 97% (21 Dec 2017 07:00) (92% - 100%)    Constitutional:    HEENT: [ x ] Wnl  [ x ] ET and NGT in place    Neck:  [ x ] Supple  [  ]Lymphadenopathy  [  ] No JVD   [  ] JVD  [  ] Masses   [  ] WNL    CHEST/Respiratory:  [  ]Clear to auscultation  [ x ] Rales   [ x ] Rhonchi   [  ] Wheezing     [ x ] Left side Chest tube      Cardiovascular:  [ x ] Reg S1 S2   [  ] Irreg S1 S2   [ x ]No Murmur  [  ] +ve Murmurs  [  ]Systolic [  ]Diastolic      Abdomen:  [ x ] Soft  [ x ] No tendrerness  [  ] Tenderness  [  ] Organomegaly  [  ] ABD Distention  [  ] Rigidity                       [ x ] No Regidity                       [ x ] No Rebound Tenderness  [ x ] No Guarding Rigidity  [  ] Rebound Tenderness[  ] Guarding Rigidity                          [ x ]  +ve Bowel Sounds  [  ] Decreased Bowel Sounds    [  ] Absent Bowel Sounds                            Extremities: [ x ] No edema [  ] Edema  [  ] Clubbing   [  ] Cyanosis                         [ x ] No Tender Calf muscles  [  ] Tender Calf muscles                        [ x ] Palpable peripheral pulses    Neurological: [  ] Awake  [  ] Alert  [  ] Oriented  x                             [  ] Confused  [  ] Drowzy  [ x ] respond to painful stimuli  [  ] Unresponsive    Skin:  [ x ] Intact [  ] Redness [  ] Thrombophlebitis  [  ] Rashes  [  ] Dry  [  ] Ulcers    Ortho:  [  ] Joint Swelling  [  ] Joint erythema [  ] Joint tenderness                [  ] Increased temp. to touch  [  ] DJD [ x ] WNL            LABS/DIAGNOSTIC TESTS                                     9.5    17.8  )-----------( 45       ( 21 Dec 2017 06:32 )             29.6   12-21    144  |  115<H>  |  128<H>  ----------------------------<  149<H>  4.8   |  17<L>  |  3.63<H>    Ca    7.6<L>      21 Dec 2017 06:32  Phos  7.2     12-21  Mg     2.9     12-21    TPro  5.4<L>  /  Alb  1.1<L>  /  TBili  1.5<H>  /  DBili  x   /  AST  21  /  ALT  17  /  AlkPhos  46  12-21      ABG - ( 21 Dec 2017 05:40 )  pH: 7.25  /  pCO2: 37    /  pO2: 54    / HCO3: 16    / Base Excess: -10.2 /  SaO2: 83          Vancomycin Level, Trough (12.20.17 @ 15:01)    Vancomycin Level, Trough: 11.6: Vancomycin trough levels should be rapidly reached and maintained at  15-20 ug/ml for life threatening MRSA  infections such as sepsis, endocarditis, osteomyelitis and pneumonia. A  first trough level should be drawn  before the 3rd or 4th dose.  Risk of renal toxicity is increased for levels >15 ug/ml, in patients on  other nephrotoxic drugs, who are  hemodynamically unstable, have unstable renal function, or are on  Vancomycin therapy for >14 days. Renal function with  creatinine levels should be monitored for those patients. ug/mL       CULTURES:   Culture - Bronchial (12.13.17 @ 08:06)    Gram Stain:   No polymorphonuclear cells seen per low power field  No squamous epithelial cells per low power field  Moderate Gram Positive Cocci in Pairs and Chains per oil power field  Few-moderate Gram Negative Coccobacilli per oil power field    Specimen Source: .Broncial received in trap    Culture Results:   Normal Respiratory Lissy present    Culture - Surgical Swab (12.12.17 @ 22:52)    Specimen Source: .Surgical Swab Bronchial Alveolar Lavage    Culture Results:   Few Normal Respiratory Lissy present    Culture - Tissue with Gram Stain (12.12.17 @ 22:45)    Gram Stain:   Rare polymorphonuclear leukocytes seen per low power field  Numerous Gram Negative Rods per oil power field    Specimen Source: .Tissue Left Lower Lobe    Culture Results:   Rare Haemophilus parainfluenzae  Rare Neisseria species, not gonorrhoeae or meningitidis    Culture - Blood (12.11.17 @ 11:22)    Specimen Source: .Blood Blood    Culture Results:   No growth to date.    Culture - Blood (12.11.17 @ 11:22)    Specimen Source: .Blood Blood    Culture Results:   No growth to date.    Culture - Blood (12.11.17 @ 11:22)    Specimen Source: .Blood Blood    Culture Results:   No growth to date.            RADIOLOGY    EXAM:  CHEST PORTABLE ROUTINE                            PROCEDURE DATE:  12/17/2017          INTERPRETATION:  She[facility] Chest one view    HISTORY: Check ET tube placement    COMPARISON STUDY: 12/16/2017    Frontal expiratory view of the chest shows the heart to be similar in   size. Endotracheal tube, feeding tube and left chest tube remain present.   The lungs are slightly clearer and there is no evidence of pneumothorax   nor definite pleural effusion.    IMPRESSION:  No pneumothorax.    Thank you for the courtesy of this referral.          IMPRESSION:  Changing lung infiltrates.      EXAM:  CT CHEST                            PROCEDURE DATE:  12/15/2017          INTERPRETATION:  CLINICAL INFORMATION: Evaluate mid right lung opacity   seen on previous chest radiograph.    COMPARISON: Chest x-ray from 12/15/2017. Chest CT from 12/5/2017.    PROCEDURE:   CT of the Chest was performed without intravenous contrast.  Sagittal and coronal reformats were performed.      FINDINGS:    CHEST:     LINES AND TUBES: An endotracheal tube terminates at the level of the   aortic arch, abovethe jules. An enteric tube terminates in the stomach.   A right IJ central venous catheter terminates in the SVC. A left-sided   chest tube is noted.  LUNGS/LARGE AIRWAYS/PLEURA: Previously seen left lower lobe necrotic mass   now encompasses the entire left lower lobe with internal gas loculations.   Small left apical pneumothorax. Peribronchovascular groundglass opacities   are present in all lobes. Small bilateral pleural effusions with   associated compressive atelectasis.  VESSELS: Atherosclerotic changes of the aorta and coronary arteries.  HEART: Heart size is normal. No pericardial effusion.  MEDIASTINUM AND ALPHONSE: Pneumomediastinum. Redemonstrated 3.8 x 2.1 cm AP   window lymph node.  CHEST WALL AND LOWER NECK: Extensive bilateral subcutaneous emphysema.  VISUALIZED UPPER ABDOMEN: Redemonstrated, unchanged indeterminant central   low density splenic lesion. Small perihepatic ascites  BONES: Redemonstrated destruction of the left sixth and seventh ribs and   left T6 transverse process. Degenerative changes.    IMPRESSION:     Previously seen left lower lobe necrotic mass now encompasses the entire   left lower lobe with internal gas loculations. Destruction of the   posterior sixth and seventh ribs and left sixth transverse process again   noted.    Peribronchovascular groundglass opacity is present in all lobes and has a   broad differential that includes infection, edema or ARDS.     Small left apical pneumothorax with chest tube in place.              Assessment and Recommendation:   72 years old male from home with PMHx of HTN and BPH and no PSH presents to ED c/o L-sided chest pain radiating to the back with associated general weakness x6 months. Patient also reports loss of voice x6 months, in addition to decreased appetite and weight loss x2 weeks, only been able to consume x3 Ensure today.   10/6/17 patient had bronchoscopy and subsequent pneumothorax and chest tube insertion.  Patient was initially transferred from ICU to regular floor, then upgraded to the icu after condition became unstable.  Patient was started on IV Zosyn on 12/12/17.    12/18/17 WBC is increasing again.  12/19/17 WBC improved but H&H dropped.  Vancomycin was started as suggested, but Mycamine is not started as suggested, and discussed with ICU resident on 12/19/17.   12/21/17 Patient is tachycardiac, and BP is low, O2 is low, but PH, and WBC are better.    Problem/Recommendation - 1:  Problem: Pneumonia, bacterial.   Recommendation:   1- UA & CS.  2- Follow Blood culture to final report are negative.  3- Blood transfusions as needed.  4- Continue IV Zosyn 3.375 gm IVPB q 12 hours.  5- Fluid and electrolytes management.  6- CBC and CMP follow up.   7- Follow up CXR.  8- Respirator and Chest tube management.  9- Procalcitonin level.  10- Suggest anti fungal coverage (Mycamine 100 mg IVPB q day) As patient has been on long duration of ABX, with intubation and central lines.  11- Continue Vancomycin 1 gm IVPB q day, and closely follow trough(daily), and renal functions.    Problem/Recommendation - 2:  ·  Problem: COPD (chronic obstructive pulmonary disease).    Recommendation:   1- Bronchodilators.  2- O2 as needed.  3- Pulmonary evaluation and management.  4- Steroids as per primary, and pulmonary team.     Problem/Recommendation - 3:  ·  Problem: ZENY (acute kidney injury).    Recommendation:   1- Renal management.  2- Fluid and electrolytes management and follow up.     Discussed with ICU medical resident, and medical PCP.

## 2017-12-21 NOTE — PROGRESS NOTE ADULT - PROBLEM SELECTOR PLAN 1
Oliguric ZENY likely hemodynamically mediated in the setting of septic shock/ hypotension; ATN.  Renal function worsening and urine o/p declining. Plan for palliative care meeting with surrogate 12/22 and will also discuss RRT at that time.   LE edema/anasarca is due in large part to low oncotic pressure due to hypoalbuminemia/PCM in acute illness. Strict I/O as well. Consider albumin assisted diuresis with Lasix.    UA with 30 protein and mod blood in the setting of infection. Recc to repeat UA once infection cleared.   c/w antibiotics/ pressors as per MICU. Monitor Vanco trough.   CT abd/ pelvis 12/5 1.0 cm indeterminate exophytic lesion arising from the   lower pole of left kidney. No hydronephrosis. Subsequent, Renal US performed with left renal lesion not seen. Consider outpt Urology f/u.   Strict I/Os. Avoid nephrotoxins/ NSAIDs/ RCA. Monitor BMP.

## 2017-12-21 NOTE — PROCEDURE NOTE - NSPROCDETAILS_GEN_ALL_CORE
guidewire recovered/sterile technique, catheter placed/lumen(s) aspirated and flushed/sterile dressing applied/ultrasound guidance
sterile dressing applied
lumen(s) aspirated and flushed/sterile dressing applied/ultrasound guidance/guidewire recovered/sterile technique, catheter placed

## 2017-12-22 NOTE — PROGRESS NOTE ADULT - PROBLEM SELECTOR PLAN 4
Patient's emergency contact/surrogate is his close friend of 20+ years, Adolph Prabhakar ([phone]). Family surrogacy law placed in patient's chart. Met with patient's surrogate, Adolph, face-to-face; Dr. Arnold present. Discussed patient's status. Ongoing discussions of risks vs benefits of artificial life support ie trach/peg/dialysis. Due to pt's continued decline and before artificial life-prolonging measures are taken, including trach/PEG/dialysis, pt needs further work up to r/o infection vs malignancy. Discussed with Dr. Sultana, Dr. Schwartz, Dr. Nicholas and Dr. Otero; surrogate is agreeable. In the meantime, patient's surrogate will try to contact patient's estranged brother, who resides in [place]; they have been estranged with no contact x 20 years per report. DNR on file.

## 2017-12-22 NOTE — PROGRESS NOTE ADULT - SUBJECTIVE AND OBJECTIVE BOX
[facility] NEPHROLOGY- PROGRESS NOTE    71 yo M with HTN, BPH a/w chest discomfort, suprahilar/ hepatic mass and hyponatremia. Hosp course cb subcutaneous emphysema s/p bronch with Left chest tube placement. s/p EBUS 12/12 with  endobronchial lung biopsy neg for malignancy however mediastinal biopsy indeterminate. Now intubated with septic shock 2/2 Necrotizing PNA, hypotension on pressors, with ZENY and hyponatremia.   Renal consulted for hyponatremia and ZENY.       Hospital Medications: Medications reviewed.  REVIEW OF SYSTEMS: Unable to obtain    VITALS:  T(F): 100.1 (12-22-17 @ 12:30), Max: 100.1 (12-22-17 @ 12:30)  HR: 108 (12-22-17 @ 12:30)  BP: 102/55 (12-22-17 @ 12:30)  RR: 28 (12-22-17 @ 12:30)  SpO2: 100% (12-22-17 @ 12:30)  Wt(kg): --    12-21 @ 07:01  -  12-22 @ 07:00  --------------------------------------------------------  IN: 1542.2 mL / OUT: 465 mL / NET: 1077.2 mL    12-22 @ 07:01  -  12-22 @ 14:14  --------------------------------------------------------  IN: 417.8 mL / OUT: 235 mL / NET: 182.8 mL      PHYSICAL EXAM:  Gen: Unresponsive to tactile stimuli (not on sedation)  HEENT: intubated  Cards: Irregular, +S1/S2, no M/G/R  Resp: mechanical BS   GI: soft, NT/ND, NABS  : +frias  Extremities: +B LE edema/anasarca    LABS:  12-22    144  |  116<H>  |  140<H>  ----------------------------<  169<H>  4.7   |  18<L>  |  3.92<H>    Ca    7.4<L>      22 Dec 2017 05:20  Phos  7.0     12-22  Mg     2.9     12-22    TPro  5.4<L>  /  Alb  1.1<L>  /  TBili  1.5<H>  /  DBili      /  AST  21  /  ALT  17  /  AlkPhos  46  12-21    Creatinine Trend: 3.92 <--, 3.63 <--, 3.53 <--, 3.51 <--, 2.83 <--, 2.87 <--, 2.69 <--, 2.61 <--                        9.5    22.0  )-----------( 62       ( 22 Dec 2017 05:19 )             29.4     Urine Studies:    Sodium, Random Urine: 56 mmol/L (12-18 @ 03:44)  Osmolality, Random Urine: 340 mos/kg (12-18 @ 03:44)  Potassium, Random Urine: 32 mmol/L (12-18 @ 03:44)  Chloride, Random Urine: 90 mmol/L (12-18 @ 03:44)  Creatinine, Random Urine: 72 mg/dL (12-18 @ 03:44)  Sodium, Random Urine: 9 mmol/L (12-15 @ 18:16)  Chloride, Random Urine: <10 mmol/L (12-15 @ 18:16)  Potassium, Random Urine: 36 mmol/L (12-15 @ 18:16)  Creatinine, Random Urine: 153 mg/dL (12-15 @ 18:16)  Osmolality, Random Urine: 330 mos/kg (12-15 @ 18:16)

## 2017-12-22 NOTE — PROGRESS NOTE ADULT - PROBLEM SELECTOR PLAN 2
2/2 septic shock, s/p EBUS. Patient remains intubated; unable to wean. Need further work up before artificial life-prolonging measures ie trach. 2/2 septic shock, s/p EBUS. Patient remains intubated; unable to wean. Need further work up before artificial life-prolonging measures ie trach/peg.

## 2017-12-22 NOTE — PROGRESS NOTE ADULT - SUBJECTIVE AND OBJECTIVE BOX
OVERNIGHT EVENTS: Pt remains intubated, with worsening kidney function, on pressor; off sedation with no improvement in mental status    Present Symptoms:   Review of Systems: Pt intubated - unable to obtain     MEDICATIONS  (STANDING):  amiodarone    Tablet 200 milliGRAM(s) Oral daily  aspirin  chewable 81 milliGRAM(s) Oral daily  BACItracin   Ointment 1 Application(s) Topical two times a day  heparin  Injectable 5000 Unit(s) SubCutaneous every 12 hours  micafungin IVPB      micafungin IVPB 100 milliGRAM(s) IV Intermittent every 24 hours  midodrine 5 milliGRAM(s) Oral every 8 hours  pantoprazole  Injectable 40 milliGRAM(s) IV Push daily  phenylephrine    Infusion 0.5 MICROgram(s)/kG/Min (6.563 mL/Hr) IV Continuous <Continuous>  piperacillin/tazobactam IVPB. 3.375 Gram(s) IV Intermittent every 12 hours  sodium bicarbonate 1300 milliGRAM(s) Oral two times a day  vancomycin  IVPB 1000 milliGRAM(s) IV Intermittent every 24 hours    MEDICATIONS  (PRN):  acetaminophen    Suspension 650 milliGRAM(s) Oral every 6 hours PRN For Temp greater than 38 C (100.4 F)      PHYSICAL EXAM:  Vital Signs Last 24 Hrs  T(C): 37.6 (22 Dec 2017 15:07), Max: 37.8 (22 Dec 2017 12:30)  T(F): 99.7 (22 Dec 2017 15:07), Max: 100.1 (22 Dec 2017 12:30)  HR: 102 (22 Dec 2017 15:30) (83 - 124)  BP: 84/41 (22 Dec 2017 15:30) (82/47 - 140/63)  BP(mean): 51 (22 Dec 2017 15:30) (51 - 91)  RR: 30 (22 Dec 2017 15:30) (15 - 33)  SpO2: 100% (22 Dec 2017 15:30) (98% - 100%)  General: pt intubated, off sedation, but unresponsive, not responding to pain.   Karnofsky Performance Score/Palliative Performance Status Version2:    20%    HEENT: dry mouth  ET tube/trach oral lesions:  Lungs: tachypnea, on vent  CV:  tachycardia  GI: ncontinent, NGT  :   frias  Musculoskeletal: bedbound, + anasarca  Skin: + anasarca  Neuro: no deficits cognitive impairment dsyphagia/dysarthria paresis: other:  Oral intake ability: unable/only mouth care [minimal moderate full capability]  Diet: NPO,     LABS:                          9.5    22.0  )-----------( 62       ( 22 Dec 2017 05:19 )             29.4     12-22    144  |  116<H>  |  140<H>  ----------------------------<  169<H>  4.7   |  18<L>  |  3.92<H>    Ca    7.4<L>      22 Dec 2017 05:20  Phos  7.0     12-22  Mg     2.9     12-22    TPro  5.4<L>  /  Alb  1.1<L>  /  TBili  1.5<H>  /  DBili  x   /  AST  21  /  ALT  17  /  AlkPhos  46  12-21        RADIOLOGY & ADDITIONAL STUDIES:    ADVANCE DIRECTIVES:  Advanced Care Planning discussion total time spent: OVERNIGHT EVENTS: Pt remains intubated, with worsening kidney function, on pressor; off sedation with no improvement in mental status    Present Symptoms:   Review of Systems: Pt intubated - unable to obtain     MEDICATIONS  (STANDING):  amiodarone    Tablet 200 milliGRAM(s) Oral daily  aspirin  chewable 81 milliGRAM(s) Oral daily  BACItracin   Ointment 1 Application(s) Topical two times a day  heparin  Injectable 5000 Unit(s) SubCutaneous every 12 hours  micafungin IVPB      micafungin IVPB 100 milliGRAM(s) IV Intermittent every 24 hours  midodrine 5 milliGRAM(s) Oral every 8 hours  pantoprazole  Injectable 40 milliGRAM(s) IV Push daily  phenylephrine    Infusion 0.5 MICROgram(s)/kG/Min (6.563 mL/Hr) IV Continuous <Continuous>  piperacillin/tazobactam IVPB. 3.375 Gram(s) IV Intermittent every 12 hours  sodium bicarbonate 1300 milliGRAM(s) Oral two times a day  vancomycin  IVPB 1000 milliGRAM(s) IV Intermittent every 24 hours    MEDICATIONS  (PRN):  acetaminophen    Suspension 650 milliGRAM(s) Oral every 6 hours PRN For Temp greater than 38 C (100.4 F)      PHYSICAL EXAM:  Vital Signs Last 24 Hrs  T(C): 37.6 (22 Dec 2017 15:07), Max: 37.8 (22 Dec 2017 12:30)  T(F): 99.7 (22 Dec 2017 15:07), Max: 100.1 (22 Dec 2017 12:30)  HR: 102 (22 Dec 2017 15:30) (83 - 124)  BP: 84/41 (22 Dec 2017 15:30) (82/47 - 140/63)  BP(mean): 51 (22 Dec 2017 15:30) (51 - 91)  RR: 30 (22 Dec 2017 15:30) (15 - 33)  SpO2: 100% (22 Dec 2017 15:30) (98% - 100%)  General: pt intubated, off sedation, but unresponsive, not responding to pain.   Karnofsky Performance Score/Palliative Performance Status Version2:    20%    HEENT: dry mouth  ET tube/trach oral lesions:  Lungs: tachypnea, on vent  CV:  tachycardia  GI: ncontinent, NGT  :   frias  Musculoskeletal: bedbound, + anasarca  Skin: + anasarca  Neuro: cognitive impairment, off sedation but pt remains unresponsive   Oral intake ability: unable/only mouth care  Diet: NPO - NGT    LABS:                          9.5    22.0  )-----------( 62       ( 22 Dec 2017 05:19 )             29.4     12-22    144  |  116<H>  |  140<H>  ----------------------------<  169<H>  4.7   |  18<L>  |  3.92<H>    Ca    7.4<L>      22 Dec 2017 05:20  Phos  7.0     12-22  Mg     2.9     12-22    TPro  5.4<L>  /  Alb  1.1<L>  /  TBili  1.5<H>  /  DBili  x   /  AST  21  /  ALT  17  /  AlkPhos  46  12-21        RADIOLOGY & ADDITIONAL STUDIES: reviewed    ADVANCE DIRECTIVES: DNR

## 2017-12-22 NOTE — PROGRESS NOTE ADULT - PROBLEM SELECTOR PLAN 1
Ventilator support  Follow up ABGs  Correct metabolic acidosis  Bronchodilators  Pulmonary toilet  Aspiration precautions  Decubitus prevention Ventilator support  Follow up ABGs  Correct metabolic acidosis  Bronchodilators  Pulmonary toilet  Aspiration precautions  Decubitus prevention  Trach next week

## 2017-12-22 NOTE — PROGRESS NOTE ADULT - SUBJECTIVE AND OBJECTIVE BOX
INTERVAL HPI/OVERNIGHT EVENTS: *** No overnight events.     PRESSORS: [x ] YES [ ] NO  WHICH:    ANTIBIOTICS: zosyn                 DATE STARTED:12/15  ANTIBIOTICS:    vanco                        DATE STARTED:12/19    Antimicrobial:  micafungin IVPB      micafungin IVPB 100 milliGRAM(s) IV Intermittent every 24 hours  piperacillin/tazobactam IVPB. 3.375 Gram(s) IV Intermittent every 12 hours  vancomycin  IVPB 1000 milliGRAM(s) IV Intermittent every 24 hours    Cardiovascular:  amiodarone    Tablet 200 milliGRAM(s) Oral daily  midodrine 5 milliGRAM(s) Oral every 8 hours  phenylephrine    Infusion 0.5 MICROgram(s)/kG/Min IV Continuous <Continuous>    Pulmonary:    Hematalogic:  aspirin  chewable 81 milliGRAM(s) Oral daily  heparin  Injectable 5000 Unit(s) SubCutaneous every 12 hours    Other:  acetaminophen  Suppository 650 milliGRAM(s) Rectal every 6 hours PRN  BACItracin   Ointment 1 Application(s) Topical two times a day  pantoprazole  Injectable 40 milliGRAM(s) IV Push daily  sodium bicarbonate 1300 milliGRAM(s) Oral two times a day    acetaminophen  Suppository 650 milliGRAM(s) Rectal every 6 hours PRN  amiodarone    Tablet 200 milliGRAM(s) Oral daily  aspirin  chewable 81 milliGRAM(s) Oral daily  BACItracin   Ointment 1 Application(s) Topical two times a day  heparin  Injectable 5000 Unit(s) SubCutaneous every 12 hours  micafungin IVPB      micafungin IVPB 100 milliGRAM(s) IV Intermittent every 24 hours  midodrine 5 milliGRAM(s) Oral every 8 hours  pantoprazole  Injectable 40 milliGRAM(s) IV Push daily  phenylephrine    Infusion 0.5 MICROgram(s)/kG/Min IV Continuous <Continuous>  piperacillin/tazobactam IVPB. 3.375 Gram(s) IV Intermittent every 12 hours  sodium bicarbonate 1300 milliGRAM(s) Oral two times a day  vancomycin  IVPB 1000 milliGRAM(s) IV Intermittent every 24 hours    Drug Dosing Weight  Height (cm): 165 (12 Dec 2017 13:14)  Weight (kg): 76.4 (12 Dec 2017 16:00)  BMI (kg/m2): 28.1 (12 Dec 2017 16:00)  BSA (m2): 1.84 (12 Dec 2017 16:00)    CENTRAL LINE: [ ] YES [ ] NO  LOCATION:   DATE INSERTED:  REMOVE: [ ] YES [ ] NO  EXPLAIN:    LERMA: [ ] YES [ ] NO    DATE INSERTED:  REMOVE:  [ ] YES [ ] NO  EXPLAIN:    A-LINE:  [ ] YES [ ] NO  LOCATION:   DATE INSERTED:  REMOVE:  [ ] YES [ ] NO  EXPLAIN:    PMH -reviewed admission note, no change since admission  Heart faliure: acute [ ] chronic [ ] acute or chronic [ ] diastolic [ ] systolic [ ] combied systolic and diastolic[ ]  ZENY: ATN[ ] renal medullary necrosis [ ] CKD I [ ]CKDII [ ]CKD III [ ]CKD IV [ ]CKD V [ ]Other pathological lesions [ ]  Abdominal Nutrition Status: malnutrition [ ] cachexia [ ] morbid obesity/BMI=40 [ ] Supplement ordered [___________]     ICU Vital Signs Last 24 Hrs  T(C): 37.6 (22 Dec 2017 05:50), Max: 37.6 (22 Dec 2017 05:50)  T(F): 99.6 (22 Dec 2017 05:50), Max: 99.6 (22 Dec 2017 05:50)  HR: 104 (22 Dec 2017 06:16) (83 - 124)  BP: 102/52 (22 Dec 2017 06:00) (76/41 - 142/76)  BP(mean): 64 (22 Dec 2017 06:00) (48 - 108)  ABP: --  ABP(mean): --  RR: 17 (22 Dec 2017 06:00) (15 - 33)  SpO2: 100% (22 Dec 2017 06:16) (90% - 100%)      ABG - ( 21 Dec 2017 05:40 )  pH: 7.25  /  pCO2: 37    /  pO2: 54    / HCO3: 16    / Base Excess: -10.2 /  SaO2: 83                    12-21 @ 07:01  -  12-22 @ 07:00  --------------------------------------------------------  IN: 1474.6 mL / OUT: 390 mL / NET: 1084.6 mL        Mode: AC/ CMV (Assist Control/ Continuous Mandatory Ventilation)  RR (machine): 28  TV (machine): 500  FiO2: 50  PEEP: 5  ITime: 1  MAP: 11  PIP: 22    Physical exam:  GENERAL: [x ]NAD,, On mechanical ventilation, sleepy  HEAD:  [x ]Atraumatic, [x]Normocephalic  EYES:[x ]conjunctiva and sclera clear  ENMT: ETT placed,  NECK: [x ]Supple, normal appearance,   NERVOUS SYSTEM:  [x ]Alert & Oriented X0,    CHEST/LUNG: On ETT tube, Clear to auscultation, [ x]No chest deformity; [ ]Normal percussion bilaterally; [ ]No rales, rhonchi, wheezing   HEART: [ x]Regular rate and rhythm; [x ]No murmurs, rubs, or gallops  ABDOMEN: [ x]Soft, Nontender, Nondistended; [x ]Bowel sounds present  EXTREMITIES:  [x ]2+ Peripheral Pulses, [ x]No clubbing, cyanosis, or edema, on SCD boots,  GENITOURINARY: Large swollen penile area with Lerma catheter  LYMPH: [ x]No lymphadenopathy noted  SKIN: [x ]No rashes or lesions; [ ]Good capillary refill        LABS:  CBC Full  -  ( 22 Dec 2017 05:19 )  WBC Count : 22.0 K/uL  Hemoglobin : 9.5 g/dL  Hematocrit : 29.4 %  Platelet Count - Automated : 62 K/uL  Mean Cell Volume : 100.5 fl  Mean Cell Hemoglobin : 32.7 pg  Mean Cell Hemoglobin Concentration : 32.5 gm/dL  Auto Neutrophil # : 20.2 K/uL  Auto Lymphocyte # : 1.2 K/uL  Auto Monocyte # : 0.5 K/uL  Auto Eosinophil # : 0.0 K/uL  Auto Basophil # : 0.1 K/uL  Auto Neutrophil % : 92.1 %  Auto Lymphocyte % : 5.3 %  Auto Monocyte % : 2.2 %  Auto Eosinophil % : 0.0 %  Auto Basophil % : 0.4 %    12-22    144  |  116<H>  |  140<H>  ----------------------------<  169<H>  4.7   |  18<L>  |  3.92<H>    Ca    7.4<L>      22 Dec 2017 05:20  Phos  7.0     12-22  Mg     2.9     12-22    TPro  5.4<L>  /  Alb  1.1<L>  /  TBili  1.5<H>  /  DBili  x   /  AST  21  /  ALT  17  /  AlkPhos  46  12-21        Culture Results:   Normal Respiratory Lissy present (12-20 @ 21:49)      RADIOLOGY & ADDITIONAL STUDIES REVIEWED:  ***    [ ]GOALS OF CARE DISCUSSION WITH PATIENT/FAMILY/PROXY:    CRITICAL CARE TIME SPENT: 35 minutes

## 2017-12-22 NOTE — PROGRESS NOTE ADULT - SUBJECTIVE AND OBJECTIVE BOX
Meds:  Zosyn 3.375 gm IVPB q 12 hours.  Vancomycin 1 gm IVPB Q day.  Micafungin 100 mg IVPB q day.       Allergies:  Allergies    No Known Allergies    Intolerances  ROS  [ x ] UNABLE TO ELICIT    General:  [  ] None  [  ] Fever  [  ] Chills  [  ] Malaise    Skin:  [  ] None [  ] Rash  [  ] Wound  [  ] Ulcer    HEENT:  [  ] None  [  ] Sore Throat  [  ] Nasal congestion/ runny nose  [  ] Photophobia [  ] Neck pain      Chest:  [  ] None   [  ] SOB  [  ] Cough  [  ] None    Cardiovascular:   [  ] None  [  ] CP  [  ] Palpitation    Gastrointestinal:  [  ] None  [  ] Abd pain   [  ] Nausea    [  ] Vomiting   [  ] Diarrhea	     Genitourinary:  [  ] None [  ] Polyuria   [  ] Urgency  [  ] Frequency  [  ] Dysuria    [  ]  Hematuria       Musculoskeletal:  [  ] None [  ] Back Pain	[  ] Body aches  [  ] Joint pain    Neurological: [  ] None [  ]Dizziness  [  ]Visual Disturbance  [  ]Headaches   [  ] Weakness        PHYSICAL EXAM:  Vital Signs Last 24 Hrs  T(C): 37.6 (22 Dec 2017 05:50), Max: 37.6 (22 Dec 2017 05:50)  T(F): 99.6 (22 Dec 2017 05:50), Max: 99.6 (22 Dec 2017 05:50)  HR: 124 (22 Dec 2017 05:00) (86 - 124)  BP: 88/62 (22 Dec 2017 05:00) (76/41 - 142/76)  BP(mean): 66 (22 Dec 2017 05:00) (48 - 108)  RR: 22 (22 Dec 2017 05:00) (20 - 33)  SpO2: 100% (22 Dec 2017 05:00) (90% - 100%)    Constitutional:    HEENT: [ x ] Wnl  [ x ] ET and NGT in place    Neck:  [ x ] Supple  [  ]Lymphadenopathy  [  ] No JVD   [  ] JVD  [  ] Masses   [  ] WNL    CHEST/Respiratory:  [  ]Clear to auscultation  [ x ] Rales   [ x ] Rhonchi   [  ] Wheezing     [ x ] Left side Chest tube      Cardiovascular:  [ x ] Reg S1 S2   [  ] Irreg S1 S2   [ x ]No Murmur  [  ] +ve Murmurs  [  ]Systolic [  ]Diastolic      Abdomen:  [ x ] Soft  [ x ] No tendrerness  [  ] Tenderness  [  ] Organomegaly  [  ] ABD Distention  [  ] Rigidity                       [ x ] No Regidity                       [ x ] No Rebound Tenderness  [ x ] No Guarding Rigidity  [  ] Rebound Tenderness[  ] Guarding Rigidity                          [ x ]  +ve Bowel Sounds  [  ] Decreased Bowel Sounds    [  ] Absent Bowel Sounds                            Extremities: [ x ] No edema [  ] Edema  [  ] Clubbing   [  ] Cyanosis                         [ x ] No Tender Calf muscles  [  ] Tender Calf muscles                        [ x ] Palpable peripheral pulses    Neurological: [  ] Awake  [  ] Alert  [  ] Oriented  x                             [  ] Confused  [  ] Drowzy  [ x ] respond to painful stimuli  [  ] Unresponsive    Skin:  [ x ] Intact [  ] Redness [  ] Thrombophlebitis  [  ] Rashes  [  ] Dry  [  ] Ulcers    Ortho:  [  ] Joint Swelling  [  ] Joint erythema [  ] Joint tenderness                [  ] Increased temp. to touch  [  ] DJD [ x ] WNL            LABS/DIAGNOSTIC TESTS                        9.5    17.8  )-----------( 45       ( 21 Dec 2017 06:32 )             29.6   12-21    144  |  115<H>  |  128<H>  ----------------------------<  149<H>  4.8   |  17<L>  |  3.63<H>    Ca    7.6<L>      21 Dec 2017 06:32  Phos  7.2     12-21  Mg     2.9     12-21    TPro  5.4<L>  /  Alb  1.1<L>  /  TBili  1.5<H>  /  DBili  x   /  AST  21  /  ALT  17  /  AlkPhos  46  12-21    ABG - ( 21 Dec 2017 05:40 )  pH: 7.25  /  pCO2: 37    /  pO2: 54    / HCO3: 16    / Base Excess: -10.2 /  SaO2: 83                                   Vancomycin Level, Trough (12.20.17 @ 15:01)    Vancomycin Level, Trough: 11.6: Vancomycin trough levels should be rapidly reached and maintained at  15-20 ug/ml for life threatening MRSA  infections such as sepsis, endocarditis, osteomyelitis and pneumonia. A  first trough level should be drawn  before the 3rd or 4th dose.  Risk of renal toxicity is increased for levels >15 ug/ml, in patients on  other nephrotoxic drugs, who are  hemodynamically unstable, have unstable renal function, or are on  Vancomycin therapy for >14 days. Renal function with  creatinine levels should be monitored for those patients. ug/mL       CULTURES:   Culture - Bronchial (12.13.17 @ 08:06)    Gram Stain:   No polymorphonuclear cells seen per low power field  No squamous epithelial cells per low power field  Moderate Gram Positive Cocci in Pairs and Chains per oil power field  Few-moderate Gram Negative Coccobacilli per oil power field    Specimen Source: .Broncial received in trap    Culture Results:   Normal Respiratory Lissy present    Culture - Surgical Swab (12.12.17 @ 22:52)    Specimen Source: .Surgical Swab Bronchial Alveolar Lavage    Culture Results:   Few Normal Respiratory Lissy present    Culture - Tissue with Gram Stain (12.12.17 @ 22:45)    Gram Stain:   Rare polymorphonuclear leukocytes seen per low power field  Numerous Gram Negative Rods per oil power field    Specimen Source: .Tissue Left Lower Lobe    Culture Results:   Rare Haemophilus parainfluenzae  Rare Neisseria species, not gonorrhoeae or meningitidis    Culture - Blood (12.11.17 @ 11:22)    Specimen Source: .Blood Blood    Culture Results:   No growth to date.    Culture - Blood (12.11.17 @ 11:22)    Specimen Source: .Blood Blood    Culture Results:   No growth to date.    Culture - Blood (12.11.17 @ 11:22)    Specimen Source: .Blood Blood    Culture Results:   No growth to date.            RADIOLOGY    EXAM:  CHEST PORTABLE ROUTINE                            PROCEDURE DATE:  12/17/2017          INTERPRETATION:  [facility] Chest one view    HISTORY: Check ET tube placement    COMPARISON STUDY: 12/16/2017    Frontal expiratory view of the chest shows the heart to be similar in   size. Endotracheal tube, feeding tube and left chest tube remain present.   The lungs are slightly clearer and there is no evidence of pneumothorax   nor definite pleural effusion.    IMPRESSION:  No pneumothorax.    Thank you for the courtesy of this referral.          IMPRESSION:  Changing lung infiltrates.      EXAM:  CT CHEST                            PROCEDURE DATE:  12/15/2017          INTERPRETATION:  CLINICAL INFORMATION: Evaluate mid right lung opacity   seen on previous chest radiograph.    COMPARISON: Chest x-ray from 12/15/2017. Chest CT from 12/5/2017.    PROCEDURE:   CT of the Chest was performed without intravenous contrast.  Sagittal and coronal reformats were performed.      FINDINGS:    CHEST:     LINES AND TUBES: An endotracheal tube terminates at the level of the   aortic arch, above the jules. An enteric tube terminates in the stomach.   A right IJ central venous catheter terminates in the SVC. A left-sided   chest tube is noted.  LUNGS/LARGE AIRWAYS/PLEURA: Previously seen left lower lobe necrotic mass   now encompasses the entire left lower lobe with internal gas loculations.   Small left apical pneumothorax. Peribronchovascular groundglass opacities   are present in all lobes. Small bilateral pleural effusions with   associated compressive atelectasis.  VESSELS: Atherosclerotic changes of the aorta and coronary arteries.  HEART: Heart size is normal. No pericardial effusion.  MEDIASTINUM AND ALPHONSE: Pneumomediastinum. Redemonstrated 3.8 x 2.1 cm AP   window lymph node.  CHEST WALL AND LOWER NECK: Extensive bilateral subcutaneous emphysema.  VISUALIZED UPPER ABDOMEN: Redemonstrated, unchanged indeterminant central   low density splenic lesion. Small perihepatic ascites  BONES: Redemonstrated destruction of the left sixth and seventh ribs and   left T6 transverse process. Degenerative changes.    IMPRESSION:     Previously seen left lower lobe necrotic mass now encompasses the entire   left lower lobe with internal gas loculations. Destruction of the   posterior sixth and seventh ribs and left sixth transverse process again   noted.    Peribronchovascular groundglass opacity is present in all lobes and has a   broad differential that includes infection, edema or ARDS.     Small left apical pneumothorax with chest tube in place.              Assessment and Recommendation:   72 years old male from home with PMHx of HTN and BPH and no PSH presents to ED c/o L-sided chest pain radiating to the back with associated general weakness x6 months. Patient also reports loss of voice x6 months, in addition to decreased appetite and weight loss x2 weeks, only been able to consume x3 Ensure today.   10/6/17 patient had bronchoscopy and subsequent pneumothorax and chest tube insertion.  Patient was initially transferred from ICU to regular floor, then upgraded to the icu after condition became unstable.  Patient was started on IV Zosyn on 12/12/17.    12/18/17 WBC is increasing again.  12/19/17 WBC improved but H&H dropped.  Vancomycin was started as suggested, but Mycamine is not started as suggested, and discussed with ICU resident on 12/19/17.   12/21/17 Patient is tachycardiac, and BP is low, O2 is low, but PH, and WBC are better.  Micafungin was started 12/21/17, patient is hypotensive, and tachycardiac and labs for today are not available yet.      Problem/Recommendation - 1:  Problem: Pneumonia, bacterial.   Recommendation:   1- UA & CS.  2- Follow Blood culture to final report are negative.  3- Blood transfusions as needed.  4- Continue IV Zosyn 3.375 gm IVPB q 12 hours.  5- Fluid and electrolytes management.  6- CBC and CMP follow up.   7- Follow up CXR.  8- Respirator and Chest tube management.  9- Procalcitonin level.  10- Continue Mycamine 100 mg IVPB q day As patient has been on long duration of ABX, with intubation and central lines.  11- Continue Vancomycin, but decrease the dose to 500 mg IVPB q day, and closely follow trough(daily), and renal functions.    Problem/Recommendation - 2:  ·  Problem: COPD (chronic obstructive pulmonary disease).    Recommendation:   1- Bronchodilators.  2- O2 as needed.  3- Pulmonary, and cardilogy management.  4- Steroids as per primary, and pulmonary team.     Problem/Recommendation - 3:  ·  Problem: ZENY (acute kidney injury).    Recommendation:   1- Renal management.  2- Fluid and electrolytes management and follow up.     Discussed with ICU medical resident, and medical PCP.

## 2017-12-22 NOTE — PROGRESS NOTE ADULT - SUBJECTIVE AND OBJECTIVE BOX
Patient is a 72y old  Male who presents with a chief complaint of weakness, chest discomfort and voice and appetite changes (05 Dec 2017 01:22)    pt seen in icu [ x ], reg med floor [ ], bed [ x ], chair at bedside [   ], a+o x3 [  ],sedated [ x ], nad [x  ],     et tube [x],  ngt feed [ x ], frias to bsd [x], right ij cent line [x], fentanyl drip [x],      Allergies    No Known Allergies        Vitals    T(F): 99.6 (12-22-17 @ 05:50), Max: 99.6 (12-22-17 @ 05:50)  HR: 92 (12-22-17 @ 07:00) (83 - 124)  BP: 105/52 (12-22-17 @ 07:00) (76/41 - 142/76)  RR: 29 (12-22-17 @ 07:00) (15 - 33)  SpO2: 100% (12-22-17 @ 07:00) (90% - 100%)  Wt(kg): --  CAPILLARY BLOOD GLUCOSE          Labs                          9.5    22.0  )-----------( 62       ( 22 Dec 2017 05:19 )             29.4       12-22    144  |  116<H>  |  140<H>  ----------------------------<  169<H>  4.7   |  18<L>  |  3.92<H>    Ca    7.4<L>      22 Dec 2017 05:20  Phos  7.0     12-22  Mg     2.9     12-22    TPro  5.4<L>  /  Alb  1.1<L>  /  TBili  1.5<H>  /  DBili  x   /  AST  21  /  ALT  17  /  AlkPhos  46  12-21            .Sputum Sputum  12-20 @ 21:49   Normal Respiratory Lissy present  --    Moderate polymorphonuclear leukocytes per low power field  No Squamous epithelial cells per low power field  Few Gram positive cocci in pairs per oil power field      .Blood Blood  12-14 @ 00:30   No growth at 5 days.  --  --      .Broncial received in trap  12-13 @ 08:06   Normal Respiratory Lissy present  --    No polymorphonuclear cells seen per low power field  No squamous epithelial cells per low power field  Moderate Gram Positive Cocci in Pairs and Chains per oil power field  Few-moderate Gram Negative Coccobacilli per oil power field      .Surgical Swab Bronchial Alveolar Lavage  12-12 @ 22:52   Moderate Prevotella intermedia  Rare Prevotella bivia  Few Normal Respiratory Lissy present  --  --      .Tissue Left Lower Lobe  12-12 @ 22:45   Rare Haemophilus parainfluenzae  Rare Neisseria species, not gonorrhoeae or meningitidis  Rare Alpha hemolytic strep  Few Prevotella melaninogenica  Numerous Prevotella intermedia  --    Rare polymorphonuclear leukocytes seen per low power field  Numerous Gram Negative Rods per oil power field      .Blood Blood  12-11 @ 11:22   No growth at 5 days.  --  --      .Broncial Other, bronchioalveolar lavage  12-06 @ 22:28   No growth at 1 week.  --  --      .Blood Blood-Peripheral  12-05 @ 10:57   No growth at 5 days.  --  --          Radiology Results      Meds    MEDICATIONS  (STANDING):  amiodarone    Tablet 200 milliGRAM(s) Oral daily  aspirin  chewable 81 milliGRAM(s) Oral daily  BACItracin   Ointment 1 Application(s) Topical two times a day  heparin  Injectable 5000 Unit(s) SubCutaneous every 12 hours  micafungin IVPB      micafungin IVPB 100 milliGRAM(s) IV Intermittent every 24 hours  midodrine 5 milliGRAM(s) Oral every 8 hours  pantoprazole  Injectable 40 milliGRAM(s) IV Push daily  phenylephrine    Infusion 0.5 MICROgram(s)/kG/Min (6.563 mL/Hr) IV Continuous <Continuous>  piperacillin/tazobactam IVPB. 3.375 Gram(s) IV Intermittent every 12 hours  sodium bicarbonate 1300 milliGRAM(s) Oral two times a day  vancomycin  IVPB 1000 milliGRAM(s) IV Intermittent every 24 hours      MEDICATIONS  (PRN):  acetaminophen  Suppository 650 milliGRAM(s) Rectal every 6 hours PRN For Temp greater than 38 C (100.4 F)      Physical Exam    Neuro :  no focal deficits  Respiratory: CTA B/L,   CV: RRR, S1S2, no murmurs,   Abdominal: Soft, NT, ND +BS,  Extremities: b/l le edema, + peripheral pulses  genitals: edematous penis with some erythema, frias to bsd    ASSESSMENT    septic shock  left lower lobe mass with bony destruction,   hepatic and splenic lesions,   r/o malig,   copd   destruction of left 6th and 7th ribs and left T6 transverse process   exophytic left renal lesion  subcutaneous emphysema  pneumothorax  s/p hyponatremia  silverio  new afib  penile edema  h/o BPH (benign prostatic hyperplasia)  Hypertension        PLAN        S/P flexible bronchoscopy showing possible Large B-cell lymphoma,   s/p flex bronch transbronchial lung bx and EBU bx 12/12  cytopath of flex bronch and ebus neg for malig  cx from flex bronch with Few Normal Respiratory Lissy present and Rare Haemophilus parainfluenzae  Rare Neisseria species, not gonorrhoeae or meningitidis  -- Rare polymorphonuclear leukocytes seen per low power field  Numerous Gram Negative Rods per oil power field  noted above   thoracic surg f/u  s/p d/c chest tube  vent mgmt  cont atrov and prov nebs,   cont supplemental oxygen,  ct chest-abd-pelv result note  rept cxr result with improvement noted above  pulm f/u   s/p bronch with bx   cytopath of bronch neg for malig noted  cytopatho transbronchial bx with Interstitial fibrosis and intra-alveolar smoker's  macrophages noted above.   urology cons for renal lesion  cont ivf  renal f/u   cardio f/u  cont amiodarone 200 mg daily  id f/u noted  cont vanco with adj renally  f/u vanco t  cont zosyn  Suggest anti fungal coverage (Mycamine 100 mg IVPB q day) As patient has been on long duration of ABX, with intubation and central lines.  paliative eval noted  cont current meds  pt for ir bx of liver or renal lesion  mgmt as per icu Patient is a 72y old  Male who presents with a chief complaint of weakness, chest discomfort and voice and appetite changes (05 Dec 2017 01:22)    pt seen in icu [ x ], reg med floor [ ], bed [ x ], chair at bedside [   ], a+o x3 [  ],sedated [ x ], nad [x  ],     et tube [x],  ngt feed [ x ], frias to bsd [x], right ij cent line [x],       Allergies    No Known Allergies        Vitals    T(F): 99.6 (12-22-17 @ 05:50), Max: 99.6 (12-22-17 @ 05:50)  HR: 92 (12-22-17 @ 07:00) (83 - 124)  BP: 105/52 (12-22-17 @ 07:00) (76/41 - 142/76)  RR: 29 (12-22-17 @ 07:00) (15 - 33)  SpO2: 100% (12-22-17 @ 07:00) (90% - 100%)  Wt(kg): --  CAPILLARY BLOOD GLUCOSE          Labs                          9.5    22.0  )-----------( 62       ( 22 Dec 2017 05:19 )             29.4       12-22    144  |  116<H>  |  140<H>  ----------------------------<  169<H>  4.7   |  18<L>  |  3.92<H>    Ca    7.4<L>      22 Dec 2017 05:20  Phos  7.0     12-22  Mg     2.9     12-22    TPro  5.4<L>  /  Alb  1.1<L>  /  TBili  1.5<H>  /  DBili  x   /  AST  21  /  ALT  17  /  AlkPhos  46  12-21      Vancomycin Level, Trough (12.21.17 @ 16:11)    Vancomycin Level, Trough: 16.9: Vancomycin trough levels should be rapidly reached and maintained at  15-20 ug/ml for life threatening MRSA  infections such as sepsis, endocarditis, osteomyelitis and pneumonia. A  first trough level should be drawn  before the 3rd or 4th dose.  Risk of renal toxicity is increased for levels >15 ug/ml, in patients on  other nephrotoxic drugs, who are  hemodynamically unstable, have unstable renal function, or are on  Vancomycin therapy for >14 days. Renal function with  creatinine levels should be monitored for those patients. ug/mL          .Sputum Sputum  12-20 @ 21:49   Normal Respiratory Lissy present  --    Moderate polymorphonuclear leukocytes per low power field  No Squamous epithelial cells per low power field  Few Gram positive cocci in pairs per oil power field      .Blood Blood  12-14 @ 00:30   No growth at 5 days.  --  --      .Broncial received in trap  12-13 @ 08:06   Normal Respiratory Lissy present  --    No polymorphonuclear cells seen per low power field  No squamous epithelial cells per low power field  Moderate Gram Positive Cocci in Pairs and Chains per oil power field  Few-moderate Gram Negative Coccobacilli per oil power field      .Surgical Swab Bronchial Alveolar Lavage  12-12 @ 22:52   Moderate Prevotella intermedia  Rare Prevotella bivia  Few Normal Respiratory Lissy present  --  --      .Tissue Left Lower Lobe  12-12 @ 22:45   Rare Haemophilus parainfluenzae  Rare Neisseria species, not gonorrhoeae or meningitidis  Rare Alpha hemolytic strep  Few Prevotella melaninogenica  Numerous Prevotella intermedia  --    Rare polymorphonuclear leukocytes seen per low power field  Numerous Gram Negative Rods per oil power field      .Blood Blood  12-11 @ 11:22   No growth at 5 days.  --  --      .Broncial Other, bronchioalveolar lavage  12-06 @ 22:28   No growth at 1 week.  --  --      .Blood Blood-Peripheral  12-05 @ 10:57   No growth at 5 days.  --  --          Radiology Results      Meds    MEDICATIONS  (STANDING):  amiodarone    Tablet 200 milliGRAM(s) Oral daily  aspirin  chewable 81 milliGRAM(s) Oral daily  BACItracin   Ointment 1 Application(s) Topical two times a day  heparin  Injectable 5000 Unit(s) SubCutaneous every 12 hours  micafungin IVPB      micafungin IVPB 100 milliGRAM(s) IV Intermittent every 24 hours  midodrine 5 milliGRAM(s) Oral every 8 hours  pantoprazole  Injectable 40 milliGRAM(s) IV Push daily  phenylephrine    Infusion 0.5 MICROgram(s)/kG/Min (6.563 mL/Hr) IV Continuous <Continuous>  piperacillin/tazobactam IVPB. 3.375 Gram(s) IV Intermittent every 12 hours  sodium bicarbonate 1300 milliGRAM(s) Oral two times a day  vancomycin  IVPB 1000 milliGRAM(s) IV Intermittent every 24 hours      MEDICATIONS  (PRN):  acetaminophen  Suppository 650 milliGRAM(s) Rectal every 6 hours PRN For Temp greater than 38 C (100.4 F)      Physical Exam    Neuro :  no focal deficits  Respiratory: CTA B/L,   CV: RRR, S1S2, no murmurs,   Abdominal: Soft, NT, ND +BS,  Extremities: b/l le edema, + peripheral pulses  genitals: edematous penis with some erythema, frias to bsd    ASSESSMENT    septic shock  left lower lobe mass with bony destruction,   hepatic and splenic lesions,   r/o malig,   copd   destruction of left 6th and 7th ribs and left T6 transverse process   exophytic left renal lesion  subcutaneous emphysema  pneumothorax  s/p hyponatremia  silverio  new afib  penile edema  h/o BPH (benign prostatic hyperplasia)  Hypertension        PLAN        S/P flexible bronchoscopy showing possible Large B-cell lymphoma,   s/p flex bronch transbronchial lung bx and EBU bx 12/12  cytopath of flex bronch and ebus neg for malig  cx from flex bronch with Few Normal Respiratory Lissy present and Rare Haemophilus parainfluenzae  Rare Neisseria species, not gonorrhoeae or meningitidis  -- Rare polymorphonuclear leukocytes seen per low power field  Numerous Gram Negative Rods per oil power field  noted above   thoracic surg f/u  s/p d/c chest tube  vent mgmt  cont atrov and prov nebs,   cont supplemental oxygen,  ct chest-abd-pelv result note  rept cxr result with improvement noted above  pulm f/u   s/p bronch with bx   cytopath of bronch neg for malig noted  cytopatho transbronchial bx with Interstitial fibrosis and intra-alveolar smoker's  macrophages noted above.   urology cons for renal lesion  cont ivf  renal f/u   cardio f/u  cont amiodarone 200 mg daily  id f/u noted  cont vanco  vanco t noted above  cont zosyn  Mycamine 100 mg IVPB q day added.  paliative eval noted  cont current meds  pt for ir bx of liver or renal lesion  mgmt as per icu Patient is a 72y old  Male who presents with a chief complaint of weakness, chest discomfort and voice and appetite changes (05 Dec 2017 01:22)    pt seen in icu [ x ], reg med floor [ ], bed [ x ], chair at bedside [   ], a+o x3 [  ],sedated [ x ], nad [x  ],     et tube [x],  ngt feed [ x ], frias to bsd [x], left ij cent line [x], phenylepherine drip [x]      Allergies    No Known Allergies        Vitals    T(F): 99.6 (12-22-17 @ 05:50), Max: 99.6 (12-22-17 @ 05:50)  HR: 92 (12-22-17 @ 07:00) (83 - 124)  BP: 105/52 (12-22-17 @ 07:00) (76/41 - 142/76)  RR: 29 (12-22-17 @ 07:00) (15 - 33)  SpO2: 100% (12-22-17 @ 07:00) (90% - 100%)  Wt(kg): --  CAPILLARY BLOOD GLUCOSE          Labs                          9.5    22.0  )-----------( 62       ( 22 Dec 2017 05:19 )             29.4       12-22    144  |  116<H>  |  140<H>  ----------------------------<  169<H>  4.7   |  18<L>  |  3.92<H>    Ca    7.4<L>      22 Dec 2017 05:20  Phos  7.0     12-22  Mg     2.9     12-22    TPro  5.4<L>  /  Alb  1.1<L>  /  TBili  1.5<H>  /  DBili  x   /  AST  21  /  ALT  17  /  AlkPhos  46  12-21      Vancomycin Level, Trough (12.21.17 @ 16:11)    Vancomycin Level, Trough: 16.9: Vancomycin trough levels should be rapidly reached and maintained at  15-20 ug/ml for life threatening MRSA  infections such as sepsis, endocarditis, osteomyelitis and pneumonia. A  first trough level should be drawn  before the 3rd or 4th dose.  Risk of renal toxicity is increased for levels >15 ug/ml, in patients on  other nephrotoxic drugs, who are  hemodynamically unstable, have unstable renal function, or are on  Vancomycin therapy for >14 days. Renal function with  creatinine levels should be monitored for those patients. ug/mL          .Sputum Sputum  12-20 @ 21:49   Normal Respiratory Lissy present  --    Moderate polymorphonuclear leukocytes per low power field  No Squamous epithelial cells per low power field  Few Gram positive cocci in pairs per oil power field      .Blood Blood  12-14 @ 00:30   No growth at 5 days.  --  --      .Broncial received in trap  12-13 @ 08:06   Normal Respiratory Lissy present  --    No polymorphonuclear cells seen per low power field  No squamous epithelial cells per low power field  Moderate Gram Positive Cocci in Pairs and Chains per oil power field  Few-moderate Gram Negative Coccobacilli per oil power field      .Surgical Swab Bronchial Alveolar Lavage  12-12 @ 22:52   Moderate Prevotella intermedia  Rare Prevotella bivia  Few Normal Respiratory Lissy present  --  --      .Tissue Left Lower Lobe  12-12 @ 22:45   Rare Haemophilus parainfluenzae  Rare Neisseria species, not gonorrhoeae or meningitidis  Rare Alpha hemolytic strep  Few Prevotella melaninogenica  Numerous Prevotella intermedia  --    Rare polymorphonuclear leukocytes seen per low power field  Numerous Gram Negative Rods per oil power field      .Blood Blood  12-11 @ 11:22   No growth at 5 days.  --  --      .Broncial Other, bronchioalveolar lavage  12-06 @ 22:28   No growth at 1 week.  --  --      .Blood Blood-Peripheral  12-05 @ 10:57   No growth at 5 days.  --  --          Radiology Results      Meds    MEDICATIONS  (STANDING):  amiodarone    Tablet 200 milliGRAM(s) Oral daily  aspirin  chewable 81 milliGRAM(s) Oral daily  BACItracin   Ointment 1 Application(s) Topical two times a day  heparin  Injectable 5000 Unit(s) SubCutaneous every 12 hours  micafungin IVPB      micafungin IVPB 100 milliGRAM(s) IV Intermittent every 24 hours  midodrine 5 milliGRAM(s) Oral every 8 hours  pantoprazole  Injectable 40 milliGRAM(s) IV Push daily  phenylephrine    Infusion 0.5 MICROgram(s)/kG/Min (6.563 mL/Hr) IV Continuous <Continuous>  piperacillin/tazobactam IVPB. 3.375 Gram(s) IV Intermittent every 12 hours  sodium bicarbonate 1300 milliGRAM(s) Oral two times a day  vancomycin  IVPB 1000 milliGRAM(s) IV Intermittent every 24 hours      MEDICATIONS  (PRN):  acetaminophen  Suppository 650 milliGRAM(s) Rectal every 6 hours PRN For Temp greater than 38 C (100.4 F)      Physical Exam    Neuro :  no focal deficits  Respiratory: CTA B/L,   CV: RRR, S1S2, no murmurs,   Abdominal: Soft, NT, ND +BS,  Extremities: b/l le edema, + peripheral pulses  genitals: edematous penis with some erythema, frias to bsd    ASSESSMENT    septic shock  left lower lobe mass with bony destruction,   hepatic and splenic lesions,   r/o malig,   copd   destruction of left 6th and 7th ribs and left T6 transverse process   exophytic left renal lesion  subcutaneous emphysema  pneumothorax  s/p hyponatremia  silverio  new afib  penile edema  h/o BPH (benign prostatic hyperplasia)  Hypertension        PLAN        S/P flexible bronchoscopy showing possible Large B-cell lymphoma,   s/p flex bronch transbronchial lung bx and EBU bx 12/12  cytopath of flex bronch and ebus neg for malig  cx from flex bronch with Few Normal Respiratory Lissy present and Rare Haemophilus parainfluenzae  Rare Neisseria species, not gonorrhoeae or meningitidis  -- Rare polymorphonuclear leukocytes seen per low power field  Numerous Gram Negative Rods per oil power field  noted above   thoracic surg f/u  vent mgmt  cont atrov and prov nebs,   cont supplemental oxygen,  ct chest-abd-pelv result note  rept cxr result with improvement noted  pulm f/u   s/p bronch with bx   cytopath of bronch neg for malig noted  cytopatho transbronchial bx with Interstitial fibrosis and intra-alveolar smoker's  macrophages noted above.   urology cons for renal lesion  cont ivf  renal f/u   cardio f/u  cont amiodarone 200 mg daily  cont midodrine  id f/u noted  cont vanco  vanco t noted above  cont zosyn  Mycamine 100 mg IVPB q day added.  paliative eval noted  cont current meds  pt for ir bx of liver or renal lesion when stable  mgmt as per icu

## 2017-12-22 NOTE — CONSULT NOTE ADULT - PROBLEM SELECTOR RECOMMENDATION 9
th epicture with pain, splenic and liver mass without fever or chills, suggest it is cancer rather than lung abscess.  all culture has been neg too.  previous biopsies had scanty cells,  therefore path was inconclusive, not negative for cancer.  he needs another biopsy when he comes off vent.  do CEA

## 2017-12-22 NOTE — CONSULT NOTE ADULT - SUBJECTIVE AND OBJECTIVE BOX
72 year old male ex smoker 20 year ago presented with left chest pain with radiation to back.  he has been weak, loss of voice, and weight in the last 6 months.  CXR and CT showed large left side lung mass with a large spleen and a liver lesion.  he does not have fever or chills.  bronchoscopy with RUL mass biopsy and EBUS biopsy of mediastinal nodes were negative.  The hospital courase was complicated with pneumothorax and septic shock.  he is not waking up and has ZENY since the septic shock.  not responding on vent. no palpable nodes at neck. chest has rhonchi, abd is soft and flat. no leg edema.CBC Full  -  ( 22 Dec 2017 05:19 )  WBC Count : 22.0 K/uL  Hemoglobin : 9.5 g/dL  Hematocrit : 29.4 %  Platelet Count - Automated : 62 K/uL  Mean Cell Volume : 100.5 fl  Mean Cell Hemoglobin : 32.7 pg  Mean Cell Hemoglobin Concentration : 32.5 gm/dL  Auto Neutrophil # : 20.2 K/uL  Auto Lymphocyte # : 1.2 K/uL  Auto Monocyte # : 0.5 K/uL  Auto Eosinophil # : 0.0 K/uL  Auto Basophil # : 0.1 K/uL  Auto Neutrophil % : 92.1 %  Auto Lymphocyte % : 5.3 %  Auto Monocyte % : 2.2 %  Auto Eosinophil % : 0.0 %  Auto Basophil % : 0.4 %  Comprehensive Metabolic Panel (12.21.17 @ 06:32)    Sodium, Serum: 144 mmol/L    Potassium, Serum: 4.8 mmol/L    Chloride, Serum: 115 mmol/L    Carbon Dioxide, Serum: 17 mmol/L    Anion Gap, Serum: 12 mmol/L    Blood Urea Nitrogen, Serum: 128 mg/dL    Creatinine, Serum: 3.63 mg/dL    Glucose, Serum: 149 mg/dL    Calcium, Total Serum: 7.6 mg/dL    Protein Total, Serum: 5.4 g/dL    Albumin, Serum: 1.1 g/dL    Bilirubin Total, Serum: 1.5 mg/dL    Alkaline Phosphatase, Serum: 46 U/L    Aspartate Aminotransferase (AST/SGOT): 21 U/L    Alanine Aminotransferase (ALT/SGPT): 17 U/L DA    eGFR if Non : 16: Interpretative comment  The units for eGFR are ml/min/1.73m2 (normalized body surface area). The  eGFR is calculated from a serum creatinine using the CKD-EPI equation.  Other variables required for calculation are race, age and sex. Among  patients with chronic kidney disease (CKD), the eGFR is useful in  determining the stage of disease according to KDOQI CKD classification.  All eGFR results are reported numerically with the following  interpretation.          GFR                    With                 Without     (ml/min/1.73 m2)    Kidney Damage       Kidney Damage        >= 90                    Stage 1                     Normal        60-89                    Stage 2                     Decreased GFR        30-59     Stage 3                     Stage 3        15-29                    Stage 4                     Stage 4        < 15                      Stage 5                     Stage 5  Each stage of CKD assumes that the associated GFR level has been in  effect for at least 3 months. Determination of stages one and two (with  eGFR > 59 ml/min/m2) requires estimation of kidney damage for at least 3  months as defined by structural or functional abnormalities.  Limitations: All estimates of GFR will be less accurate for patients at  extremes of muscle mass (including but not limited to frail elderly,  critically ill, or cancer patients), those with unusual diets, and those  with conditions associated with reduced secretion or extrarenal  elimination of creatinine. The eGFR equation is not recommended for use  in patients with unstable creatinine levels. mL/min/1.73M2    eGFR if African American: 18 mL/min/1.73M2    < from: CT Chest w/ IV Cont (12.05.17 @ 10:00) >  INTERPRETATION:  CLINICAL INFORMATION: 6 month history of weight loss;   weakness; lung mass on chest x-ray    COMPARISON: Chest x-ray obtained 12/4/2017    PROCEDURE:   CT of the Chest, Abdomen and Pelvis was performed with intravenous   contrast.   Intravenous contrast: 90 ml Omnipaque 350. 10 ml discarded.  Oral contrast: None.  Sagittal and coronal reformats were performed.      FINDINGS:    CHEST:     LUNGS AND LARGE AIRWAYS: Patent central airways. 7.6 x 6.2 x 8.3 cm   cavitary mass in the left lower lobe. There is encasement and narrowing   of the left lower lobe pulmonary artery and bronchus. The mass also abuts   the left inferior pulmonary vein. Debris is present within this mass.   There is destruction of the adjacent left posterior sixth and seventh   ribs and left T6 transverse process.    3 mm right lower lobe nodules noted (series 4, image 350).    There is right dependent subsegmental atelectasis and/or trace pleural   effusion. Mild bilateral upper lobe centrilobular emphysema.    PLEURA: There is a moderate loculated left-sided pleural effusion likely   secondary to malignancy.  VESSELS: There are mild atherosclerotic changes seen throughout the   aortic vessels.  HEART: Heart size is normal. No pericardial effusion.  MEDIASTINUM AND ALPHONSE: Precarinal and AP window adenopathy, the largest     < end of copied text >  < from: CT Chest w/ IV Cont (12.05.17 @ 10:00) >  measuring 3.8 x 2.9 cm and AP window.  CHEST WALL AND LOWER NECK: 2.9 x 2.1 cm intramuscular lipoma in left   lateral chest wall.    ABDOMEN AND PELVIS:    LIVER: 1.5 x 1.4 cm right hepatic lesion, concerning for metastasis.  BILE DUCTS: Normal caliber.  GALLBLADDER: Within normal limits.  SPLEEN: Enlarged measuring 19 cm in craniocaudad dimension. Indeterminate   6.3 x 5.5 cm central low density lesion with associated peripheral   infarct.  PANCREAS: Within normal limits.  ADRENALS: Within normal limits.  KIDNEYS/URETERS: 1.0 cm indeterminate exophytic lesion arising from the   lower pole of left kidney. No hydronephrosis.    BLADDER: Within normal limits.  REPRODUCTIVE ORGANS: The prostate is within normal limits.    BOWEL: No bowel obstruction. Sigmoid diverticulosis without active   diverticulitis.   PERITONEUM: 2.2 x 2.1 cm necrotic implant in the right paracolic gutter.     < end of copied text >  < from: CT Chest w/ IV Cont (12.05.17 @ 10:00) >  No ascites.  VESSELS: Atherosclerotic calcifications.  RETROPERITONEUM: 4.0 x 3.2 cm necrotic portacaval adenopathy.    ABDOMINAL WALL: Within normal limits.  BONES: Destruction of the left 6th and 7th ribs and left T6 transverse   process as described above. No additional destructive osseous lesions.    IMPRESSION: 7.6 cm necrotic left lower lobe mass consistent with primary   neoplasm.  Mediastinal adenopathy and destruction of adjacent osseous structures.  Hepatic and splenic lesions, likely metastatic.  Upper abdominal adenopathy.  1.0 cm indeterminate exophytic left renal lesion.    < end of copied text >

## 2017-12-22 NOTE — CONSULT NOTE ADULT - PROVIDER SPECIALTY LIST ADULT
Cardiology
Critical Care
Heme/Onc
Infectious Disease
Nephrology
Palliative Care
Pulmonology
Thoracic Surgery
Urology
Critical Care
Urology

## 2017-12-22 NOTE — PROGRESS NOTE ADULT - PROBLEM SELECTOR PLAN 1
s/p EBUS. Pt with elevated WBC, tissue culture growing gram negative rods rare neisseria, haemophilus; BCx negative to date. Pt remains intubated; requiring pressor. Continue IV abx. DNR.   Pt remains intubated, off sedation. With increasing WBC and worsening kidney function. DNR on file. s/p EBUS. Pt with elevated WBC, tissue culture growing gram negative rods rare neisseria, haemophilus; BCx negative to date. Pt remains intubated, off sedation. With increasing WBC and worsening kidney function. DNR on file. Continue IV abx. DNR on file.

## 2017-12-22 NOTE — CONSULT NOTE ADULT - CONSULT REQUESTED DATE/TIME
05-Dec-2017 10:47
06-Dec-2017 13:45
12-Dec-2017 16:15
13-Dec-2017 11:41
14-Dec-2017 09:59
14-Dec-2017 11:19
14-Dec-2017 13:18
16-Dec-2017
22-Dec-2017 19:31
06-Dec-2017 12:50
16-Dec-2017

## 2017-12-22 NOTE — PROGRESS NOTE ADULT - SUBJECTIVE AND OBJECTIVE BOX
Patient is a 72y old  Male who presents with a chief complaint of weakness, chest discomfort and voice and appetite changes.  Patient remains on mechanical ventilation, sedated, intubated in NAD. +anasarca. Urine output decreased.        INTERVAL HPI/OVERNIGHT EVENTS:      VITAL SIGNS:  T(F): 98.1 (12-22-17 @ 08:30)  HR: 109 (12-22-17 @ 09:41)  BP: 123/72 (12-22-17 @ 09:30)  RR: 30 (12-22-17 @ 09:41)  SpO2: 100% (12-22-17 @ 09:41)  Wt(kg): --  I&O's Detail    21 Dec 2017 07:01  -  22 Dec 2017 07:00  --------------------------------------------------------  IN:    Enteral Tube Flush: 190 mL    phenylephrine   Infusion: 302.4 mL    Solution: 75 mL    Solution: 250 mL    vasopressin Infusion: 4.8 mL    Vital HN: 720 mL  Total IN: 1542.2 mL    OUT:    Indwelling Catheter - Urethral: 465 mL  Total OUT: 465 mL    Total NET: 1077.2 mL      22 Dec 2017 07:01  -  22 Dec 2017 10:43  --------------------------------------------------------  IN:    phenylephrine   Infusion: 30.2 mL    Solution: 12.5 mL    Vital HN: 80 mL  Total IN: 122.7 mL    OUT:    Indwelling Catheter - Urethral: 45 mL  Total OUT: 45 mL    Total NET: 77.7 mL        Mode: AC/ CMV (Assist Control/ Continuous Mandatory Ventilation)  RR (machine): 28  TV (machine): 500  FiO2: 50  PEEP: 5  ITime: 1  MAP: 13  PIP: 23        REVIEW OF SYSTEMS:    CONSTITUTIONAL:  No fevers, chills, sweats    HEENT:  Eyes:  No diplopia or blurred vision. ENT:  No earache, sore throat or runny nose.    CARDIOVASCULAR:  No pressure, squeezing, tightness, or heaviness about the chest; no palpitations.    RESPIRATORY:  Per HPI    GASTROINTESTINAL:  No abdominal pain, nausea, vomiting or diarrhea.    GENITOURINARY:  No dysuria, frequency or urgency.    NEUROLOGIC:  No paresthesias, fasciculations, seizures or weakness.    PSYCHIATRIC:  No disorder of thought or mood.      PHYSICAL EXAM:    Constitutional: Well developed and nourished  Eyes:Perrla  ENMT: normal  Neck:supple  Respiratory: good air entry  Cardiovascular: S1 S2 regular  Gastrointestinal: Soft, Non tender  Extremities: No edema  Vascular:normal  Neurological:Awake, alert,Ox3  Musculoskeletal:Normal      MEDICATIONS  (STANDING):  amiodarone    Tablet 200 milliGRAM(s) Oral daily  aspirin  chewable 81 milliGRAM(s) Oral daily  BACItracin   Ointment 1 Application(s) Topical two times a day  heparin  Injectable 5000 Unit(s) SubCutaneous every 12 hours  micafungin IVPB      micafungin IVPB 100 milliGRAM(s) IV Intermittent every 24 hours  midodrine 5 milliGRAM(s) Oral every 8 hours  pantoprazole  Injectable 40 milliGRAM(s) IV Push daily  phenylephrine    Infusion 0.5 MICROgram(s)/kG/Min (6.563 mL/Hr) IV Continuous <Continuous>  piperacillin/tazobactam IVPB. 3.375 Gram(s) IV Intermittent every 12 hours  sodium bicarbonate 1300 milliGRAM(s) Oral two times a day  vancomycin  IVPB 1000 milliGRAM(s) IV Intermittent every 24 hours    MEDICATIONS  (PRN):  acetaminophen  Suppository 650 milliGRAM(s) Rectal every 6 hours PRN For Temp greater than 38 C (100.4 F)      Allergies    No Known Allergies    Intolerances        LABS:                        9.5    22.0  )-----------( 62       ( 22 Dec 2017 05:19 )             29.4     12-22    144  |  116<H>  |  140<H>  ----------------------------<  169<H>  4.7   |  18<L>  |  3.92<H>    Ca    7.4<L>      22 Dec 2017 05:20  Phos  7.0     12-22  Mg     2.9     12-22    TPro  5.4<L>  /  Alb  1.1<L>  /  TBili  1.5<H>  /  DBili  x   /  AST  21  /  ALT  17  /  AlkPhos  46  12-21        ABG - ( 21 Dec 2017 05:40 )  pH: 7.25  /  pCO2: 37    /  pO2: 54    / HCO3: 16    / Base Excess: -10.2 /  SaO2: 83                    CAPILLARY BLOOD GLUCOSE            RADIOLOGY & ADDITIONAL TESTS:    CXR:    < from: Xray Chest 1 View AP -PORTABLE-Routine (12.21.17 @ 18:24) >  Bilateral internal jugular central venous catheters, both with   the tips in the superior vena cava. Endotracheal tube with tip at the   level of the clavicular heads. Enteric tube entering the stomach, tip not   visualized.    Small bilateral pleural effusions, left side greater than right, are   noted. Hazy bilateral airspace disease is unchanged. There is no   pneumothorax.    < end of copied text >  Ct scan chest:  < from: CT Chest No Cont (12.15.17 @ 14:17) >  Previously seen left lower lobe necrotic mass now encompasses the entire   left lower lobe with internal gas loculations. Destruction of the   posterior sixth and seventh ribs and left sixth transverse process again   noted.    Peribronchovascular groundglass opacity is present in all lobes and has a   broad differential that includes infection, edema or ARDS.     Small left apical pneumothorax with chest tube in place.    < end of copied text >    ekg;    echo:  < from: Transthoracic Echocardiogram (12.13.17 @ 07:19) >  1. Endocardium not well visualized; unable to evaluate left  ventricular function.  2. Right ventricle not well visualized. Probably normal  right ventricular size and systolic function.  3. RV systolic pressure is 51 mm Hg. Moderatepulmonary  hypertension.  4. Trace pericardial effusion. No echocardiographic  evidence of pericardial tamponade.    < end of copied text > Patient is a 72y old  Male who presents with a chief complaint of weakness, chest discomfort and voice and appetite changes.  Patient remains on mechanical ventilation, sedated, intubated in NAD. +anasarca. Urine output decreased.  Clinically deteriorated. Remains on pressors meds. Unable to wean. Will need tracheostomy next week.      INTERVAL HPI/OVERNIGHT EVENTS:      VITAL SIGNS:  T(F): 98.1 (12-22-17 @ 08:30)  HR: 109 (12-22-17 @ 09:41)  BP: 123/72 (12-22-17 @ 09:30)  RR: 30 (12-22-17 @ 09:41)  SpO2: 100% (12-22-17 @ 09:41)  Wt(kg): --  I&O's Detail    21 Dec 2017 07:01  -  22 Dec 2017 07:00  --------------------------------------------------------  IN:    Enteral Tube Flush: 190 mL    phenylephrine   Infusion: 302.4 mL    Solution: 75 mL    Solution: 250 mL    vasopressin Infusion: 4.8 mL    Vital HN: 720 mL  Total IN: 1542.2 mL    OUT:    Indwelling Catheter - Urethral: 465 mL  Total OUT: 465 mL    Total NET: 1077.2 mL      22 Dec 2017 07:01  -  22 Dec 2017 10:43  --------------------------------------------------------  IN:    phenylephrine   Infusion: 30.2 mL    Solution: 12.5 mL    Vital HN: 80 mL  Total IN: 122.7 mL    OUT:    Indwelling Catheter - Urethral: 45 mL  Total OUT: 45 mL    Total NET: 77.7 mL        Mode: AC/ CMV (Assist Control/ Continuous Mandatory Ventilation)  RR (machine): 28  TV (machine): 500  FiO2: 50  PEEP: 5  ITime: 1  MAP: 13  PIP: 23        REVIEW OF SYSTEMS:    CONSTITUTIONAL:  No fevers, chills, sweats    HEENT:  Eyes:  No diplopia or blurred vision. ENT:  No earache, sore throat or runny nose.    CARDIOVASCULAR:  No pressure, squeezing, tightness, or heaviness about the chest; no palpitations.    RESPIRATORY:  Per HPI    GASTROINTESTINAL:  No abdominal pain, nausea, vomiting or diarrhea.    GENITOURINARY:  No dysuria, frequency or urgency.    NEUROLOGIC:  No paresthesias, fasciculations, seizures or weakness.    PSYCHIATRIC:  No disorder of thought or mood.      PHYSICAL EXAM:    Constitutional: Well developed and nourished  Eyes:Perrla  ENMT: normal  Neck:supple  Respiratory: good air entry  Cardiovascular: S1 S2 regular  Gastrointestinal: Soft, Non tender  Extremities: No edema  Vascular:normal  Neurological:sedated  Musculoskeletal:Normal      MEDICATIONS  (STANDING):  amiodarone    Tablet 200 milliGRAM(s) Oral daily  aspirin  chewable 81 milliGRAM(s) Oral daily  BACItracin   Ointment 1 Application(s) Topical two times a day  heparin  Injectable 5000 Unit(s) SubCutaneous every 12 hours  micafungin IVPB      micafungin IVPB 100 milliGRAM(s) IV Intermittent every 24 hours  midodrine 5 milliGRAM(s) Oral every 8 hours  pantoprazole  Injectable 40 milliGRAM(s) IV Push daily  phenylephrine    Infusion 0.5 MICROgram(s)/kG/Min (6.563 mL/Hr) IV Continuous <Continuous>  piperacillin/tazobactam IVPB. 3.375 Gram(s) IV Intermittent every 12 hours  sodium bicarbonate 1300 milliGRAM(s) Oral two times a day  vancomycin  IVPB 1000 milliGRAM(s) IV Intermittent every 24 hours    MEDICATIONS  (PRN):  acetaminophen  Suppository 650 milliGRAM(s) Rectal every 6 hours PRN For Temp greater than 38 C (100.4 F)      Allergies    No Known Allergies    Intolerances        LABS:                        9.5    22.0  )-----------( 62       ( 22 Dec 2017 05:19 )             29.4     12-22    144  |  116<H>  |  140<H>  ----------------------------<  169<H>  4.7   |  18<L>  |  3.92<H>    Ca    7.4<L>      22 Dec 2017 05:20  Phos  7.0     12-22  Mg     2.9     12-22    TPro  5.4<L>  /  Alb  1.1<L>  /  TBili  1.5<H>  /  DBili  x   /  AST  21  /  ALT  17  /  AlkPhos  46  12-21        ABG - ( 21 Dec 2017 05:40 )  pH: 7.25  /  pCO2: 37    /  pO2: 54    / HCO3: 16    / Base Excess: -10.2 /  SaO2: 83                    CAPILLARY BLOOD GLUCOSE            RADIOLOGY & ADDITIONAL TESTS:    CXR:    < from: Xray Chest 1 View AP -PORTABLE-Routine (12.21.17 @ 18:24) >  Bilateral internal jugular central venous catheters, both with   the tips in the superior vena cava. Endotracheal tube with tip at the   level of the clavicular heads. Enteric tube entering the stomach, tip not   visualized.    Small bilateral pleural effusions, left side greater than right, are   noted. Hazy bilateral airspace disease is unchanged. There is no   pneumothorax.    < end of copied text >  Ct scan chest:  < from: CT Chest No Cont (12.15.17 @ 14:17) >  Previously seen left lower lobe necrotic mass now encompasses the entire   left lower lobe with internal gas loculations. Destruction of the   posterior sixth and seventh ribs and left sixth transverse process again   noted.    Peribronchovascular groundglass opacity is present in all lobes and has a   broad differential that includes infection, edema or ARDS.     Small left apical pneumothorax with chest tube in place.    < end of copied text >    ekg;    echo:  < from: Transthoracic Echocardiogram (12.13.17 @ 07:19) >  1. Endocardium not well visualized; unable to evaluate left  ventricular function.  2. Right ventricle not well visualized. Probably normal  right ventricular size and systolic function.  3. RV systolic pressure is 51 mm Hg. Moderatepulmonary  hypertension.  4. Trace pericardial effusion. No echocardiographic  evidence of pericardial tamponade.    < end of copied text >

## 2017-12-22 NOTE — CONSULT NOTE ADULT - CONSULT REQUESTED BY NAME
Dr. Bocanegra
Dr. Bocanegra
Dr. Bocanegra.
Dr. German
Dr. Sultana
Dr. Sultana
Eber Ambrocio
ICU
Dr. German
Dr. Sultana

## 2017-12-22 NOTE — PROGRESS NOTE ADULT - PROBLEM SELECTOR PLAN 1
Oliguric ZENY likely hemodynamically mediated in the setting of septic shock/ hypotension; ATN.  Renal function worsening and urine o/p declining. Had a GOC meeting with Adolph Omalley (pt's surrogate), his wife and with Elaina Palliative care NP.  Discussed pt's poor overall status. He is undecided and wants 24hrs to contact pt's estranged brother before making a decision regarding HD, trach/ PEG etc.   LE edema/anasarca is due in large part to low oncotic pressure due to hypoalbuminemia/PCM in acute illness. Strict I/O as well. Consider albumin assisted diuresis with Lasix.    UA with 30 protein and mod blood in the setting of infection. Recc to repeat UA once infection cleared.   c/w antibiotics/ pressors as per MICU. Monitor Vanco trough.   CT abd/ pelvis 12/5 1.0 cm indeterminate exophytic lesion arising from the   lower pole of left kidney. No hydronephrosis. Subsequent, Renal US performed with left renal lesion not seen. Consider outpt Urology f/u.   Strict I/Os. Avoid nephrotoxins/ NSAIDs/ RCA. Monitor BMP.

## 2017-12-22 NOTE — PROGRESS NOTE ADULT - SUBJECTIVE AND OBJECTIVE BOX
CHIEF COMPLAINT:Patient is a 72y old  Male who presents with a chief complaint of weakness, chest discomfort and voice and appetite changes. Pt remains intubated.    	  REVIEW OF SYSTEMS:  [ X] Unable to obtain    PHYSICAL EXAM:  T(C): 36.7 (12-22-17 @ 08:30), Max: 37.6 (12-22-17 @ 05:50)  HR: 114 (12-22-17 @ 11:00) (83 - 124)  BP: 108/54 (12-22-17 @ 11:00) (82/47 - 140/63)  RR: 28 (12-22-17 @ 11:00) (15 - 33)  SpO2: 100% (12-22-17 @ 11:00) (90% - 100%)  Wt(kg): --  I&O's Summary    21 Dec 2017 07:01  -  22 Dec 2017 07:00  --------------------------------------------------------  IN: 1542.2 mL / OUT: 465 mL / NET: 1077.2 mL    22 Dec 2017 07:01  -  22 Dec 2017 11:15  --------------------------------------------------------  IN: 366.3 mL / OUT: 175 mL / NET: 191.3 mL        Appearance: Normal	  HEENT:   Normal oral mucosa, PERRL, EOMI	  Lymphatic: No lymphadenopathy  Cardiovascular: Normal S1 S2, No JVD, No murmurs, No edema  Respiratory: B/L ronchi  Gastrointestinal:  Soft, Non-tender, + BS	  Skin: No rashes, No ecchymoses, No cyanosis	  Extremities: Normal range of motion, No clubbing, cyanosis or edema      MEDICATIONS  (STANDING):  amiodarone    Tablet 200 milliGRAM(s) Oral daily  aspirin  chewable 81 milliGRAM(s) Oral daily  BACItracin   Ointment 1 Application(s) Topical two times a day  heparin  Injectable 5000 Unit(s) SubCutaneous every 12 hours  micafungin IVPB      micafungin IVPB 100 milliGRAM(s) IV Intermittent every 24 hours  midodrine 5 milliGRAM(s) Oral every 8 hours  pantoprazole  Injectable 40 milliGRAM(s) IV Push daily  phenylephrine    Infusion 0.5 MICROgram(s)/kG/Min (6.563 mL/Hr) IV Continuous <Continuous>  piperacillin/tazobactam IVPB. 3.375 Gram(s) IV Intermittent every 12 hours  sodium bicarbonate 1300 milliGRAM(s) Oral two times a day  vancomycin  IVPB 1000 milliGRAM(s) IV Intermittent every 24 hours      TELEMETRY: 	  afib    	  LABS:	 	                      9.5    22.0  )-----------( 62       ( 22 Dec 2017 05:19 )             29.4     12-22    144  |  116<H>  |  140<H>  ----------------------------<  169<H>  4.7   |  18<L>  |  3.92<H>    Ca    7.4<L>      22 Dec 2017 05:20  Phos  7.0     12-22  Mg     2.9     12-22    TPro  5.4<L>  /  Alb  1.1<L>  /  TBili  1.5<H>  /  DBili  x   /  AST  21  /  ALT  17  /  AlkPhos  46  12-21      Lipid Profile: Cholesterol 64  LDL 20  HDL 26  TG 88    HgA1c: Hemoglobin A1C, Whole Blood: 6.2 % (12-05 @ 09:32)    TSH: Thyroid Stimulating Hormone, Serum: 0.45 uU/mL (12-05 @ 07:14)

## 2017-12-22 NOTE — CONSULT NOTE ADULT - CONSULT REASON
Afib with RVR, Respiratory failure
Elevated serum creatinine and hyponatremia
Perihilar mass
Pneumonia.
left chest subcutaneous emphysema s/p bronchoscopy
lung masses
medical decision making - septic shock requiring pressors, full code on file
penile swelling
s/p EBUS
Respiratory monitoring s/p bronchoscopy
Penile edema

## 2017-12-23 NOTE — PROGRESS NOTE ADULT - ASSESSMENT
A 72 y M  former smoker 20 PPD with Hx HTN BPH  p/w generalized weakness, voice changes with imaging showing left hilar mass in CT scan , s/p bronch and biopsy on 12/06 , admitted to ICU on 12/06 for pneumothorax s/p diagnostic bronchoscopy for left lobe lesions found on CT imaging - chest tube placed in ICU 12/6 on suction - admitted for urgent CT placement and monitoring. pt was downgraded on Lung biopsy from 12/07. Lung biopsy did not show any malignancy. s/p transbronchial lung biopsy , s/p endobronchial lung biopsy, s/p flexible bronchoscopy today by Dr. Piper.   ROS - unable to get as pt is intubated    admit to ICU post op monitoring s/p intubation s/p chest tube , hypotensive 2/2 septic shock, requiring pressors, on phenylephrine      1: Septic Shock  -c/w chest tube to suction, no air leak  -s/p intubation , c/w mechanical ventilation  -c/w sedation  -hypotension post anesthesia ,   -** Patient is off pressors and fentanyl now  -increased white count, tissue culture growing gram negative rods rare neisseria, haemophilus, BCx negative to date  - UA negative  - currently on zosyn and vanco  - ID Dr. Nicholas      2: ATN  -c/w IVF  BUN/cr increasing  -monitor urine output - patient is going in ATN, over night given lasix one dose,   -Sodium bicar increased dose  Fried to let us know about HD  -Nephro: Dr. Reagan      3: New onset Atrial Fibrillation  - Rate controlled   - EKG shows Atrial fibrillation (new onset)  - Completed amiodarone loading, now amio 200mg  c/w amiodarone  - f/u cardiology - Dr. Jackson    Pneumothorax   Resolved  D/patricio chest tube    Lung mass  -c/w post op prophylaxis  -Bronch path negative for Ca.  -s/p EBUS VS mediastinoscopy  -cyto report, negative for  malignancy  - 1 biopsy still pending  -repeat CT chest - worsening necrotic mass from prior imaging    Hypertension.    -hypotension , holding home BP meds,  c/w pressors    BPH (benign prostatic hyperplasia)  - monitor U/o  -c/w NG tube    Penile swelling:   Urology consult requested, cold compresses, bacitracin ointment application    Hyperphosphatemia:  -Patient phosphate high in setting for kidney failure  -stable will c/w monitoring    Prophylactic measure  - IMPROVE score 2   - c/w heparin S.C for VTE prophylaxis  - GI stress ulcer PPx w/ Protonix.    Goals of care and discussion:  Patient has poor prognosis, Patient is going in ATN, very low UO,  Palliative on board: Patient is DNR

## 2017-12-23 NOTE — PROGRESS NOTE ADULT - SUBJECTIVE AND OBJECTIVE BOX
[facility] NEPHROLOGY- PROGRESS NOTE    71 yo M with HTN, BPH a/w chest discomfort, suprahilar/ hepatic mass and hyponatremia. Hosp course cb subcutaneous emphysema s/p bronch with Left chest tube placement. s/p EBUS 12/12 with  endobronchial lung biopsy neg for malignancy however mediastinal biopsy indeterminate. Now intubated with septic shock 2/2 Necrotizing PNA, hypotension on pressors, with ZENY and hyponatremia.   Renal consulted for hyponatremia and ZENY.       Hospital Medications: Medications reviewed.  REVIEW OF SYSTEMS: Unable to obtain as patient intubated    VITALS:  T(F): 100.2 (12-23-17 @ 15:00), Max: 101.7 (12-23-17 @ 13:00)  HR: 129 (12-23-17 @ 15:00)  BP: 124/65 (12-23-17 @ 15:00)  RR: 36 (12-23-17 @ 15:00)  SpO2: 100% (12-23-17 @ 15:00)  Wt(kg): --    12-22 @ 07:01  -  12-23 @ 07:00  --------------------------------------------------------  IN: 1595.2 mL / OUT: 815 mL / NET: 780.2 mL    12-23 @ 07:01  -  12-23 @ 16:03  --------------------------------------------------------  IN: 625.1 mL / OUT: 290 mL / NET: 335.1 mL      PHYSICAL EXAM:  Gen: Unresponsive to tactile stimuli (not on sedation)  HEENT: intubated  Cards: Irregular, +S1/S2, no M/G/R  Resp: mechanical BS   GI: soft, NT/ND, NABS  : + frias  Extremities: +B LE edema/anasarca    LABS:  12-23    145  |  116<H>  |  149  ----------------------------<  176<H>  4.9   |  16<L>  |  4.24<H>    Ca    7.3<L>      23 Dec 2017 07:01  Phos  7.0     12-23  Mg     3.0     12-23    TPro  5.8<L>  /  Alb  1.2<L>  /  TBili  1.3<H>  /  DBili      /  AST  30  /  ALT  21  /  AlkPhos  73  12-23    Creatinine Trend: 4.24 <--, 3.92 <--, 3.63 <--, 3.53 <--, 3.51 <--, 2.83 <--, 2.87 <--, 2.69 <--                        9.3    19.0  )-----------( 49       ( 23 Dec 2017 07:01 )             29.8

## 2017-12-23 NOTE — PROGRESS NOTE ADULT - PROBLEM SELECTOR PLAN 6
Non-anion gap metabolic acidosis, gap-metabolic acidosis with respiratory acidosis. Check lactate level. Increase sodium bicarbonate to TID. Can increase RR or TV to increase pH.  Monitor pH.

## 2017-12-23 NOTE — PROGRESS NOTE ADULT - SUBJECTIVE AND OBJECTIVE BOX
Meds:  Zosyn 3.375 gm IVPB q 12 hours.  Vancomycin 1 gm IVPB Q day.  Micafungin 100 mg IVPB q day.       Allergies:  Allergies    No Known Allergies    Intolerances  ROS  [ x ] UNABLE TO ELICIT    General:  [  ] None  [  ] Fever  [  ] Chills  [  ] Malaise    Skin:  [  ] None [  ] Rash  [  ] Wound  [  ] Ulcer    HEENT:  [  ] None  [  ] Sore Throat  [  ] Nasal congestion/ runny nose  [  ] Photophobia [  ] Neck pain      Chest:  [  ] None   [  ] SOB  [  ] Cough  [  ] None    Cardiovascular:   [  ] None  [  ] CP  [  ] Palpitation    Gastrointestinal:  [  ] None  [  ] Abd pain   [  ] Nausea    [  ] Vomiting   [  ] Diarrhea	     Genitourinary:  [  ] None [  ] Polyuria   [  ] Urgency  [  ] Frequency  [  ] Dysuria    [  ]  Hematuria       Musculoskeletal:  [  ] None [  ] Back Pain	[  ] Body aches  [  ] Joint pain    Neurological: [  ] None [  ]Dizziness  [  ]Visual Disturbance  [  ]Headaches   [  ] Weakness        PHYSICAL EXAM:  Vital Signs Last 24 Hrs  T(C): 38.7 (23 Dec 2017 13:00), Max: 38.7 (23 Dec 2017 13:00)  T(F): 101.7 (23 Dec 2017 13:00), Max: 101.7 (23 Dec 2017 13:00)  HR: 122 (23 Dec 2017 13:00) (98 - 135)  BP: 111/60 (23 Dec 2017 13:00) (79/43 - 124/48)  BP(mean): 72 (23 Dec 2017 13:00) (49 - 93)  RR: 30 (23 Dec 2017 13:00) (20 - 37)  SpO2: 100% (23 Dec 2017 13:00) (92% - 100%)    Constitutional:    HEENT: [ x ] Wnl  [ x ] ET and NGT in place    Neck:  [ x ] Supple  [  ]Lymphadenopathy  [  ] No JVD   [  ] JVD  [  ] Masses   [  ] WNL    CHEST/Respiratory:  [  ]Clear to auscultation  [ x ] Rales   [ x ] Rhonchi   [  ] Wheezing     [ x ] Left side Chest tube      Cardiovascular:  [ x ] Reg S1 S2   [  ] Irreg S1 S2   [ x ]No Murmur  [  ] +ve Murmurs  [  ]Systolic [  ]Diastolic      Abdomen:  [ x ] Soft  [ x ] No tendrerness  [  ] Tenderness  [  ] Organomegaly  [  ] ABD Distention  [  ] Rigidity                       [ x ] No Regidity                       [ x ] No Rebound Tenderness  [ x ] No Guarding Rigidity  [  ] Rebound Tenderness[  ] Guarding Rigidity                          [ x ]  +ve Bowel Sounds  [  ] Decreased Bowel Sounds    [  ] Absent Bowel Sounds                            Extremities: [ x ] No edema [  ] Edema  [  ] Clubbing   [  ] Cyanosis                         [ x ] No Tender Calf muscles  [  ] Tender Calf muscles                        [ x ] Palpable peripheral pulses    Neurological: [  ] Awake  [  ] Alert  [  ] Oriented  x                             [  ] Confused  [  ] Drowzy  [ x ] respond to painful stimuli  [  ] Unresponsive    Skin:  [ x ] Intact [  ] Redness [  ] Thrombophlebitis  [  ] Rashes  [  ] Dry  [  ] Ulcers    Ortho:  [  ] Joint Swelling  [  ] Joint erythema [  ] Joint tenderness                [  ] Increased temp. to touch  [  ] DJD [ x ] WNL            LABS/DIAGNOSTIC TESTS                        9.3    19.0  )-----------( 49       ( 23 Dec 2017 07:01 )             29.8   12-23    145  |  116<H>  |  149  ----------------------------<  176<H>  4.9   |  16<L>  |  4.24<H>    Ca    7.3<L>      23 Dec 2017 07:01  Phos  7.0     12-23  Mg     3.0     12-23    TPro  5.8<L>  /  Alb  1.2<L>  /  TBili  1.3<H>  /  DBili  x   /  AST  30  /  ALT  21  /  AlkPhos  73  12-23  ABG - ( 23 Dec 2017 04:31 )  pH: 7.27  /  pCO2: 36    /  pO2: 133   / HCO3: 16    / Base Excess: -9.6  /  SaO2: 98        Vancomycin Level, Trough (12.22.17 @ 15:38)    Vancomycin Level, Trough: 21.9: Vancomycin trough levels should be rapidly reached and maintained at       CULTURES:   Culture - Bronchial (12.13.17 @ 08:06)    Gram Stain:   No polymorphonuclear cells seen per low power field  No squamous epithelial cells per low power field  Moderate Gram Positive Cocci in Pairs and Chains per oil power field  Few-moderate Gram Negative Coccobacilli per oil power field    Specimen Source: .Broncial received in trap    Culture Results:   Normal Respiratory Lissy present    Culture - Surgical Swab (12.12.17 @ 22:52)    Specimen Source: .Surgical Swab Bronchial Alveolar Lavage    Culture Results:   Few Normal Respiratory Lissy present    Culture - Tissue with Gram Stain (12.12.17 @ 22:45)    Gram Stain:   Rare polymorphonuclear leukocytes seen per low power field  Numerous Gram Negative Rods per oil power field    Specimen Source: .Tissue Left Lower Lobe    Culture Results:   Rare Haemophilus parainfluenzae  Rare Neisseria species, not gonorrhoeae or meningitidis    Culture - Blood (12.11.17 @ 11:22)    Specimen Source: .Blood Blood    Culture Results:   No growth to date.    Culture - Blood (12.11.17 @ 11:22)    Specimen Source: .Blood Blood    Culture Results:   No growth to date.    Culture - Blood (12.11.17 @ 11:22)    Specimen Source: .Blood Blood    Culture Results:   No growth to date.            RADIOLOGY    EXAM:  CHEST PORTABLE ROUTINE                            PROCEDURE DATE:  12/17/2017          INTERPRETATION:  [facility] Chest one view    HISTORY: Check ET tube placement    COMPARISON STUDY: 12/16/2017    Frontal expiratory view of the chest shows the heart to be similar in   size. Endotracheal tube, feeding tube and left chest tube remain present.   The lungs are slightly clearer and there is no evidence of pneumothorax   nor definite pleural effusion.    IMPRESSION:  No pneumothorax.    Thank you for the courtesy of this referral.          IMPRESSION:  Changing lung infiltrates.      EXAM:  CT CHEST                            PROCEDURE DATE:  12/15/2017          INTERPRETATION:  CLINICAL INFORMATION: Evaluate mid right lung opacity   seen on previous chest radiograph.    COMPARISON: Chest x-ray from 12/15/2017. Chest CT from 12/5/2017.    PROCEDURE:   CT of the Chest was performed without intravenous contrast.  Sagittal and coronal reformats were performed.      FINDINGS:    CHEST:     LINES AND TUBES: An endotracheal tube terminates at the level of the   aortic arch, above the jules. An enteric tube terminates in the stomach.   A right IJ central venous catheter terminates in the SVC. A left-sided   chest tube is noted.  LUNGS/LARGE AIRWAYS/PLEURA: Previously seen left lower lobe necrotic mass   now encompasses the entire left lower lobe with internal gas loculations.   Small left apical pneumothorax. Peribronchovascular groundglass opacities   are present in all lobes. Small bilateral pleural effusions with   associated compressive atelectasis.  VESSELS: Atherosclerotic changes of the aorta and coronary arteries.  HEART: Heart size is normal. No pericardial effusion.  MEDIASTINUM AND ALPHONSE: Pneumomediastinum. Redemonstrated 3.8 x 2.1 cm AP   window lymph node.  CHEST WALL AND LOWER NECK: Extensive bilateral subcutaneous emphysema.  VISUALIZED UPPER ABDOMEN: Redemonstrated, unchanged indeterminant central   low density splenic lesion. Small perihepatic ascites  BONES: Redemonstrated destruction of the left sixth and seventh ribs and   left T6 transverse process. Degenerative changes.    IMPRESSION:     Previously seen left lower lobe necrotic mass now encompasses the entire   left lower lobe with internal gas loculations. Destruction of the   posterior sixth and seventh ribs and left sixth transverse process again   noted.    Peribronchovascular groundglass opacity is present in all lobes and has a   broad differential that includes infection, edema or ARDS.     Small left apical pneumothorax with chest tube in place.              Assessment and Recommendation:   72 years old male from home with PMHx of HTN and BPH and no PSH presents to ED c/o L-sided chest pain radiating to the back with associated general weakness x6 months. Patient also reports loss of voice x6 months, in addition to decreased appetite and weight loss x2 weeks, only been able to consume x3 Ensure today.   10/6/17 patient had bronchoscopy and subsequent pneumothorax and chest tube insertion.  Patient was initially transferred from ICU to regular floor, then upgraded to the icu after condition became unstable.  Patient was started on IV Zosyn on 12/12/17.    12/18/17 WBC is increasing again.  12/19/17 WBC improved but H&H dropped.  Vancomycin was started as suggested, but Mycamine is not started as suggested, and discussed with ICU resident on 12/19/17.   12/21/17 Patient is tachycardiac, and BP is low, O2 is low, but PH, and WBC are better.  Micafungin was started 12/21/17, patient is hypotensive, and tachycardiac and labs for today are not available yet.      Problem/Recommendation - 1:  Problem: Pneumonia, bacterial.   Recommendation:   1- UA & CS.  2- Follow Blood culture to final report are negative.  3- Blood transfusions as needed.  4- Continue IV Zosyn 3.375 gm IVPB q 12 hours.  5- Fluid and electrolytes management.  6- CBC and CMP follow up.   7- Follow up CXR.  8- Respirator and Chest tube management.  9- Procalcitonin level, HIV, ESR, and CRP testing.  10- Continue Mycamine 100 mg IVPB q day empirically As patient has been on long duration of ABX, with intubation and central lines (Started on 12/21/17).  11- Continue Vancomycin, but decrease the dose to 500 mg IVPB q day, and closely follow trough(daily), and renal functions.  12- Oncology work and follow up.    Problem/Recommendation - 2:  ·  Problem: COPD (chronic obstructive pulmonary disease).    Recommendation:   1- Bronchodilators.  2- O2 as needed.  3- Pulmonary, and cardiology management.  4- Steroids as per primary, and pulmonary team if needed.     Problem/Recommendation - 3:  ·  Problem: ZENY (acute kidney injury).    Recommendation:   1- Renal management.  2- Fluid and electrolytes management and follow up.     Discussed with ICU medical resident, and Attending.

## 2017-12-23 NOTE — PROGRESS NOTE ADULT - SUBJECTIVE AND OBJECTIVE BOX
Patient is a 72y old  Male who presents with a chief complaint of weakness, chest discomfort and voice and appetite changes.  Patient remains on mechanical ventilation, sedated, intubated in NAD. +anasarca. Urine output decreased.  Clinically deteriorated. Remains on pressors meds. Unable to wean. Will need tracheostomy next week.          INTERVAL HPI/OVERNIGHT EVENTS:      VITAL SIGNS:  T(F): 101.5 (12-23-17 @ 06:00)  HR: 99 (12-23-17 @ 11:00)  BP: 83/46 (12-23-17 @ 11:00)  RR: 27 (12-23-17 @ 11:00)  SpO2: 98% (12-23-17 @ 11:00)  Wt(kg): --  I&O's Detail    22 Dec 2017 07:01  -  23 Dec 2017 07:00  --------------------------------------------------------  IN:    Enteral Tube Flush: 60 mL    phenylephrine   Infusion: 345.2 mL    Solution: 125 mL    Solution: 105 mL    Vital HN: 960 mL  Total IN: 1595.2 mL    OUT:    Indwelling Catheter - Urethral: 815 mL  Total OUT: 815 mL    Total NET: 780.2 mL      23 Dec 2017 07:01  -  23 Dec 2017 11:55  --------------------------------------------------------  IN:    phenylephrine   Infusion: 93.1 mL    Vital HN: 200 mL  Total IN: 293.1 mL    OUT:    Indwelling Catheter - Urethral: 140 mL  Total OUT: 140 mL    Total NET: 153.1 mL        Mode: AC/ CMV (Assist Control/ Continuous Mandatory Ventilation)  RR (machine): 28  TV (machine): 500  FiO2: 50  PEEP: 5  ITime: 1  MAP: 14  PIP: 27        REVIEW OF SYSTEMS:    CONSTITUTIONAL:  No fevers, chills, sweats    HEENT:  Eyes:  No diplopia or blurred vision. ENT:  No earache, sore throat or runny nose.    CARDIOVASCULAR:  No pressure, squeezing, tightness, or heaviness about the chest; no palpitations.    RESPIRATORY:  Per HPI    GASTROINTESTINAL:  No abdominal pain, nausea, vomiting or diarrhea.    GENITOURINARY:  No dysuria, frequency or urgency.    NEUROLOGIC:  No paresthesias, fasciculations, seizures or weakness.    PSYCHIATRIC:  No disorder of thought or mood.      PHYSICAL EXAM:    Constitutional: Well developed and nourished  Eyes:Perrla  ENMT: normal  Neck:supple  Respiratory: good air entry  Cardiovascular: S1 S2 regular  Gastrointestinal: Soft, Non tender  Extremities: No edema  Vascular:normal  Neurological:Awake, alert,Ox3  Musculoskeletal:Normal      MEDICATIONS  (STANDING):  amiodarone    Tablet 200 milliGRAM(s) Oral daily  aspirin  chewable 81 milliGRAM(s) Oral daily  BACItracin   Ointment 1 Application(s) Topical two times a day  heparin  Injectable 5000 Unit(s) SubCutaneous every 12 hours  micafungin IVPB      micafungin IVPB 100 milliGRAM(s) IV Intermittent every 24 hours  midodrine 5 milliGRAM(s) Oral every 8 hours  pantoprazole  Injectable 40 milliGRAM(s) IV Push daily  phenylephrine    Infusion 0.5 MICROgram(s)/kG/Min (6.563 mL/Hr) IV Continuous <Continuous>  piperacillin/tazobactam IVPB. 3.375 Gram(s) IV Intermittent every 12 hours  sodium bicarbonate 1300 milliGRAM(s) Oral two times a day  vancomycin  IVPB 1000 milliGRAM(s) IV Intermittent every 24 hours    MEDICATIONS  (PRN):  acetaminophen  Suppository 650 milliGRAM(s) Rectal every 6 hours PRN For Temp greater than 38 C (100.4 F)      Allergies    No Known Allergies    Intolerances        LABS:                        9.3    19.0  )-----------( 49       ( 23 Dec 2017 07:01 )             29.8     12-23    145  |  116<H>  |  149  ----------------------------<  176<H>  4.9   |  16<L>  |  4.24<H>    Ca    7.3<L>      23 Dec 2017 07:01  Phos  7.0     12-23  Mg     3.0     12-23    TPro  5.8<L>  /  Alb  1.2<L>  /  TBili  1.3<H>  /  DBili  x   /  AST  30  /  ALT  21  /  AlkPhos  73  12-23        ABG - ( 23 Dec 2017 04:31 )  pH: 7.27  /  pCO2: 36    /  pO2: 133   / HCO3: 16    / Base Excess: -9.6  /  SaO2: 98                    CAPILLARY BLOOD GLUCOSE            RADIOLOGY & ADDITIONAL TESTS:    CXR:    < from: Xray Chest 1 View AP -PORTABLE-Routine (12.21.17 @ 18:24) >    Bilateral internal jugular central venous catheters, both with the tips in the superior vena cava. Endotracheal tube with tip at the level of the clavicular heads. Enteric tube entering the stomach, tip not visualized. Small bilateral pleural effusions, left side greater than right, are noted. Hazy bilateral airspace disease is unchanged. There is no   pneumothorax.    < end of copied text >    Ct scan chest:    < from: CT Chest No Cont (12.15.17 @ 14:17) >    Previously seen left lower lobe necrotic mass now encompasses the entire left lower lobe with internal gas loculations. Destruction of the posterior sixth and seventh ribs and left sixth transverse process again noted. Peribronchovascular groundglass opacity is present in all lobes and has a broad differential that includes infection, edema or ARDS.   Small left apical pneumothorax with chest tube in place.    < end of copied text >    ekg;    < from: 12 Lead ECG (12.13.17 @ 17:57) >    Atrial fibrillation with rapid ventricular response with premature ventricular or aberrantly conducted complexes Low voltage QRS. Abnormal ECG    < end of copied text >    echo:    < from: Transthoracic Echocardiogram (12.13.17 @ 07:19) >    1. Endocardium not well visualized; unable to evaluate left ventricular function.  2. Right ventricle not well visualized. Probably normal right ventricular size and systolic function.  3. RV systolic pressure is 51 mm Hg. Moderatepulmonary hypertension.  4. Trace pericardial effusion. No echocardiographic evidence of pericardial tamponade.    < end of copied text > Patient is a 72y old  Male who presents with a chief complaint of weakness, chest discomfort and voice and appetite changes.  Patient remains on mechanical ventilation, sedated, intubated in NAD. +anasarca. Urine output decreased.  Clinically deteriorated. Remains on pressors meds. Unable to wean. Will need tracheostomy next week. +anasarca          INTERVAL HPI/OVERNIGHT EVENTS:      VITAL SIGNS:  T(F): 101.5 (12-23-17 @ 06:00)  HR: 99 (12-23-17 @ 11:00)  BP: 83/46 (12-23-17 @ 11:00)  RR: 27 (12-23-17 @ 11:00)  SpO2: 98% (12-23-17 @ 11:00)  Wt(kg): --  I&O's Detail    22 Dec 2017 07:01  -  23 Dec 2017 07:00  --------------------------------------------------------  IN:    Enteral Tube Flush: 60 mL    phenylephrine   Infusion: 345.2 mL    Solution: 125 mL    Solution: 105 mL    Vital HN: 960 mL  Total IN: 1595.2 mL    OUT:    Indwelling Catheter - Urethral: 815 mL  Total OUT: 815 mL    Total NET: 780.2 mL      23 Dec 2017 07:01  -  23 Dec 2017 11:55  --------------------------------------------------------  IN:    phenylephrine   Infusion: 93.1 mL    Vital HN: 200 mL  Total IN: 293.1 mL    OUT:    Indwelling Catheter - Urethral: 140 mL  Total OUT: 140 mL    Total NET: 153.1 mL        Mode: AC/ CMV (Assist Control/ Continuous Mandatory Ventilation)  RR (machine): 28  TV (machine): 500  FiO2: 50  PEEP: 5  ITime: 1  MAP: 14  PIP: 27        REVIEW OF SYSTEMS:    CONSTITUTIONAL:  No fevers, chills, sweats    HEENT:  Eyes:  No diplopia or blurred vision. ENT:  No earache, sore throat or runny nose.    CARDIOVASCULAR:  No pressure, squeezing, tightness, or heaviness about the chest; no palpitations.    RESPIRATORY:  Per HPI    GASTROINTESTINAL:  No abdominal pain, nausea, vomiting or diarrhea.    GENITOURINARY:  No dysuria, frequency or urgency.    NEUROLOGIC:  No paresthesias, fasciculations, seizures or weakness.    PSYCHIATRIC:  No disorder of thought or mood.      PHYSICAL EXAM:    Constitutional: Well developed and nourished  Eyes:Perrla  ENMT: normal  Neck:supple  Respiratory: good air entry  Cardiovascular: S1 S2 regular  Gastrointestinal: Soft, Non tender  Extremities: + edema  Vascular:normal  Neurological:Sedated  Musculoskeletal:Anasarca      MEDICATIONS  (STANDING):  amiodarone    Tablet 200 milliGRAM(s) Oral daily  aspirin  chewable 81 milliGRAM(s) Oral daily  BACItracin   Ointment 1 Application(s) Topical two times a day  heparin  Injectable 5000 Unit(s) SubCutaneous every 12 hours  micafungin IVPB      micafungin IVPB 100 milliGRAM(s) IV Intermittent every 24 hours  midodrine 5 milliGRAM(s) Oral every 8 hours  pantoprazole  Injectable 40 milliGRAM(s) IV Push daily  phenylephrine    Infusion 0.5 MICROgram(s)/kG/Min (6.563 mL/Hr) IV Continuous <Continuous>  piperacillin/tazobactam IVPB. 3.375 Gram(s) IV Intermittent every 12 hours  sodium bicarbonate 1300 milliGRAM(s) Oral two times a day  vancomycin  IVPB 1000 milliGRAM(s) IV Intermittent every 24 hours    MEDICATIONS  (PRN):  acetaminophen  Suppository 650 milliGRAM(s) Rectal every 6 hours PRN For Temp greater than 38 C (100.4 F)      Allergies    No Known Allergies    Intolerances        LABS:                        9.3    19.0  )-----------( 49       ( 23 Dec 2017 07:01 )             29.8     12-23    145  |  116<H>  |  149  ----------------------------<  176<H>  4.9   |  16<L>  |  4.24<H>    Ca    7.3<L>      23 Dec 2017 07:01  Phos  7.0     12-23  Mg     3.0     12-23    TPro  5.8<L>  /  Alb  1.2<L>  /  TBili  1.3<H>  /  DBili  x   /  AST  30  /  ALT  21  /  AlkPhos  73  12-23        ABG - ( 23 Dec 2017 04:31 )  pH: 7.27  /  pCO2: 36    /  pO2: 133   / HCO3: 16    / Base Excess: -9.6  /  SaO2: 98                    CAPILLARY BLOOD GLUCOSE            RADIOLOGY & ADDITIONAL TESTS:    CXR:    < from: Xray Chest 1 View AP -PORTABLE-Routine (12.21.17 @ 18:24) >    Bilateral internal jugular central venous catheters, both with the tips in the superior vena cava. Endotracheal tube with tip at the level of the clavicular heads. Enteric tube entering the stomach, tip not visualized. Small bilateral pleural effusions, left side greater than right, are noted. Hazy bilateral airspace disease is unchanged. There is no   pneumothorax.    < end of copied text >    Ct scan chest:    < from: CT Chest No Cont (12.15.17 @ 14:17) >    Previously seen left lower lobe necrotic mass now encompasses the entire left lower lobe with internal gas loculations. Destruction of the posterior sixth and seventh ribs and left sixth transverse process again noted. Peribronchovascular groundglass opacity is present in all lobes and has a broad differential that includes infection, edema or ARDS.   Small left apical pneumothorax with chest tube in place.    < end of copied text >    ekg;    < from: 12 Lead ECG (12.13.17 @ 17:57) >    Atrial fibrillation with rapid ventricular response with premature ventricular or aberrantly conducted complexes Low voltage QRS. Abnormal ECG    < end of copied text >    echo:    < from: Transthoracic Echocardiogram (12.13.17 @ 07:19) >    1. Endocardium not well visualized; unable to evaluate left ventricular function.  2. Right ventricle not well visualized. Probably normal right ventricular size and systolic function.  3. RV systolic pressure is 51 mm Hg. Moderatepulmonary hypertension.  4. Trace pericardial effusion. No echocardiographic evidence of pericardial tamponade.    < end of copied text >

## 2017-12-23 NOTE — PROGRESS NOTE ADULT - PROBLEM SELECTOR PLAN 4
in the setting of sepsis. BP low normal. Continue with pressors as per MICU. If pursuing RRT, will need to titrate up pressors.

## 2017-12-23 NOTE — PROGRESS NOTE ADULT - PROBLEM SELECTOR PLAN 5
in the setting of ZENY. Vital AF 1.2 has fairly low phos content.  Nepro has ~100mg less phos/24h period at 40ml/hr rate. Okay to continue Vital AF 1.2 for now. Will consider adding phosphorus binders pending GOC discussion. Monitor serum phos.

## 2017-12-23 NOTE — PROGRESS NOTE ADULT - SUBJECTIVE AND OBJECTIVE BOX
Patient is a 72y old  Male who presents with a chief complaint of weakness, chest discomfort and voice and appetite changes (05 Dec 2017 01:22)    pt seen in icu [ x ], reg med floor [ ], bed [ x ], chair at bedside [   ], a+o x3 [  ],sedated [ x ], nad [x  ],     et tube [x],  ngt feed [ x ], frias to bsd [x], left ij cent line [x], phenylepherine drip [x]      Allergies    No Known Allergies        Vitals    T(F): 101.5 (12-23-17 @ 06:00), Max: 101.5 (12-23-17 @ 06:00)  HR: 113 (12-23-17 @ 07:00) (95 - 135)  BP: 101/49 (12-23-17 @ 07:00) (83/43 - 130/55)  RR: 32 (12-23-17 @ 07:00) (20 - 35)  SpO2: 97% (12-23-17 @ 07:00) (92% - 100%)  Wt(kg): --  CAPILLARY BLOOD GLUCOSE          Labs                          9.3    19.0  )-----------( x        ( 23 Dec 2017 07:01 )             29.8       12-23    145  |  116<H>  |  x   ----------------------------<  176<H>  4.9   |  16<L>  |  4.24<H>    Ca    7.3<L>      23 Dec 2017 07:01  Phos  7.0     12-23  Mg     3.0     12-23    TPro  5.8<L>  /  Alb  1.2<L>  /  TBili  1.3<H>  /  DBili  x   /  AST  30  /  ALT  21  /  AlkPhos  73  12-23            .Sputum Sputum  12-20 @ 21:49   Normal Respiratory Lissy present  --    Moderate polymorphonuclear leukocytes per low power field  No Squamous epithelial cells per low power field  Few Gram positive cocci in pairs per oil power field      .Blood Blood  12-14 @ 00:30   No growth at 5 days.  --  --      .Broncial received in trap  12-13 @ 08:06   Normal Respiratory Lissy present  --    No polymorphonuclear cells seen per low power field  No squamous epithelial cells per low power field  Moderate Gram Positive Cocci in Pairs and Chains per oil power field  Few-moderate Gram Negative Coccobacilli per oil power field      .Surgical Swab Bronchial Alveolar Lavage  12-12 @ 22:52   Moderate Prevotella intermedia  Rare Prevotella bivia  Few Normal Respiratory Lissy present  --  --      .Tissue Left Lower Lobe  12-12 @ 22:45   Rare Haemophilus parainfluenzae  Rare Neisseria species, not gonorrhoeae or meningitidis  Rare Alpha hemolytic strep  Few Prevotella melaninogenica  Numerous Prevotella intermedia  --    Rare polymorphonuclear leukocytes seen per low power field  Numerous Gram Negative Rods per oil power field      .Blood Blood  12-11 @ 11:22   No growth at 5 days.  --  --      .Broncial Other, bronchioalveolar lavage  12-06 @ 22:28   No growth at 1 week.  --  --      .Blood Blood-Peripheral  12-05 @ 10:57   No growth at 5 days.  --  --          Radiology Results      Meds    MEDICATIONS  (STANDING):  amiodarone    Tablet 200 milliGRAM(s) Oral daily  aspirin  chewable 81 milliGRAM(s) Oral daily  BACItracin   Ointment 1 Application(s) Topical two times a day  heparin  Injectable 5000 Unit(s) SubCutaneous every 12 hours  micafungin IVPB      micafungin IVPB 100 milliGRAM(s) IV Intermittent every 24 hours  midodrine 5 milliGRAM(s) Oral every 8 hours  pantoprazole  Injectable 40 milliGRAM(s) IV Push daily  phenylephrine    Infusion 0.5 MICROgram(s)/kG/Min (6.563 mL/Hr) IV Continuous <Continuous>  piperacillin/tazobactam IVPB. 3.375 Gram(s) IV Intermittent every 12 hours  sodium bicarbonate 1300 milliGRAM(s) Oral two times a day  vancomycin  IVPB 1000 milliGRAM(s) IV Intermittent every 24 hours      MEDICATIONS  (PRN):  acetaminophen  Suppository 650 milliGRAM(s) Rectal every 6 hours PRN For Temp greater than 38 C (100.4 F)      Physical Exam    Neuro :  no focal deficits  Respiratory: CTA B/L,   CV: RRR, S1S2, no murmurs,   Abdominal: Soft, NT, ND +BS,  Extremities: b/l le edema, + peripheral pulses  genitals: edematous penis with some erythema, frias to bsd    ASSESSMENT    septic shock  left lower lobe mass with bony destruction,   hepatic and splenic lesions,   r/o malig,   copd   destruction of left 6th and 7th ribs and left T6 transverse process   exophytic left renal lesion  subcutaneous emphysema  pneumothorax  s/p hyponatremia  silverio  new afib  penile edema  h/o BPH (benign prostatic hyperplasia)  Hypertension        PLAN        S/P flexible bronchoscopy showing possible Large B-cell lymphoma,   s/p flex bronch transbronchial lung bx and EBU bx 12/12  cytopath of flex bronch and ebus neg for malig  cx from flex bronch with Few Normal Respiratory Lissy present and Rare Haemophilus parainfluenzae  Rare Neisseria species, not gonorrhoeae or meningitidis  -- Rare polymorphonuclear leukocytes seen per low power field  Numerous Gram Negative Rods per oil power field  noted above   thoracic surg f/u  vent mgmt  cont atrov and prov nebs,   cont supplemental oxygen,  ct chest-abd-pelv result note  rept cxr result with improvement noted  pulm f/u   s/p bronch with bx   cytopath of bronch neg for malig noted  cytopatho transbronchial bx with Interstitial fibrosis and intra-alveolar smoker's  macrophages noted above.   urology cons for renal lesion  cont ivf  renal f/u   cardio f/u  cont amiodarone 200 mg daily  cont midodrine  id f/u noted  cont vanco  vanco t noted above  cont zosyn  Mycamine 100 mg IVPB q day added.  paliative eval noted  cont current meds  pt for ir bx of liver or renal lesion when stable  mgmt as per icu Patient is a 72y old  Male who presents with a chief complaint of weakness, chest discomfort and voice and appetite changes (05 Dec 2017 01:22)    pt seen in icu [ x ], reg med floor [ ], bed [ x ], chair at bedside [   ], a+o x3 [  ],sedated [ x ], nad [x  ],     et tube [x],  ngt feed [ x ], frias to bsd [x], left ij cent line [x], phenylepherine drip [x]      Allergies    No Known Allergies        Vitals    T(F): 101.5 (12-23-17 @ 06:00), Max: 101.5 (12-23-17 @ 06:00)  HR: 113 (12-23-17 @ 07:00) (95 - 135)  BP: 101/49 (12-23-17 @ 07:00) (83/43 - 130/55)  RR: 32 (12-23-17 @ 07:00) (20 - 35)  SpO2: 97% (12-23-17 @ 07:00) (92% - 100%)  Wt(kg): --  CAPILLARY BLOOD GLUCOSE          Labs                          9.3    19.0  )-----------( x        ( 23 Dec 2017 07:01 )             29.8       12-23    145  |  116<H>  |  x   ----------------------------<  176<H>  4.9   |  16<L>  |  4.24<H>    Ca    7.3<L>      23 Dec 2017 07:01  Phos  7.0     12-23  Mg     3.0     12-23    TPro  5.8<L>  /  Alb  1.2<L>  /  TBili  1.3<H>  /  DBili  x   /  AST  30  /  ALT  21  /  AlkPhos  73  12-23            .Sputum Sputum  12-20 @ 21:49   Normal Respiratory Lissy present  --    Moderate polymorphonuclear leukocytes per low power field  No Squamous epithelial cells per low power field  Few Gram positive cocci in pairs per oil power field      .Blood Blood  12-14 @ 00:30   No growth at 5 days.  --  --      .Broncial received in trap  12-13 @ 08:06   Normal Respiratory Lissy present  --    No polymorphonuclear cells seen per low power field  No squamous epithelial cells per low power field  Moderate Gram Positive Cocci in Pairs and Chains per oil power field  Few-moderate Gram Negative Coccobacilli per oil power field      .Surgical Swab Bronchial Alveolar Lavage  12-12 @ 22:52   Moderate Prevotella intermedia  Rare Prevotella bivia  Few Normal Respiratory Lissy present  --  --      .Tissue Left Lower Lobe  12-12 @ 22:45   Rare Haemophilus parainfluenzae  Rare Neisseria species, not gonorrhoeae or meningitidis  Rare Alpha hemolytic strep  Few Prevotella melaninogenica  Numerous Prevotella intermedia  --    Rare polymorphonuclear leukocytes seen per low power field  Numerous Gram Negative Rods per oil power field      .Blood Blood  12-11 @ 11:22   No growth at 5 days.  --  --      .Broncial Other, bronchioalveolar lavage  12-06 @ 22:28   No growth at 1 week.  --  --      .Blood Blood-Peripheral  12-05 @ 10:57   No growth at 5 days.  --  --          Radiology Results      Meds    MEDICATIONS  (STANDING):  amiodarone    Tablet 200 milliGRAM(s) Oral daily  aspirin  chewable 81 milliGRAM(s) Oral daily  BACItracin   Ointment 1 Application(s) Topical two times a day  heparin  Injectable 5000 Unit(s) SubCutaneous every 12 hours  micafungin IVPB      micafungin IVPB 100 milliGRAM(s) IV Intermittent every 24 hours  midodrine 5 milliGRAM(s) Oral every 8 hours  pantoprazole  Injectable 40 milliGRAM(s) IV Push daily  phenylephrine    Infusion 0.5 MICROgram(s)/kG/Min (6.563 mL/Hr) IV Continuous <Continuous>  piperacillin/tazobactam IVPB. 3.375 Gram(s) IV Intermittent every 12 hours  sodium bicarbonate 1300 milliGRAM(s) Oral two times a day  vancomycin  IVPB 1000 milliGRAM(s) IV Intermittent every 24 hours      MEDICATIONS  (PRN):  acetaminophen  Suppository 650 milliGRAM(s) Rectal every 6 hours PRN For Temp greater than 38 C (100.4 F)      Physical Exam    Neuro :  no focal deficits  Respiratory: CTA B/L,   CV: RRR, S1S2, no murmurs,   Abdominal: Soft, NT, ND +BS,  Extremities: b/l le edema, + peripheral pulses  genitals: edematous penis with some erythema, frias to bsd    ASSESSMENT    septic shock  left lower lobe mass with bony destruction,   hepatic and splenic lesions,   r/o malig,   copd   destruction of left 6th and 7th ribs and left T6 transverse process   exophytic left renal lesion  subcutaneous emphysema  pneumothorax  s/p hyponatremia  silverio  new afib  penile edema  h/o BPH (benign prostatic hyperplasia)  Hypertension        PLAN        S/P flexible bronchoscopy showing possible Large B-cell lymphoma,   s/p flex bronch transbronchial lung bx and EBU bx 12/12  cytopath of flex bronch and ebus neg for malig  cx from flex bronch with Few Normal Respiratory Lissy present and Rare Haemophilus parainfluenzae  Rare Neisseria species, not gonorrhoeae or meningitidis  -- Rare polymorphonuclear leukocytes seen per low power field  Numerous Gram Negative Rods per oil power field  noted above   thoracic surg f/u  vent mgmt  cont atrov and prov nebs,   cont supplemental oxygen,  ct chest-abd-pelv result note  rept cxr result with improvement noted  pulm f/u   s/p bronch with bx   cytopath of bronch neg for malig noted  cytopatho transbronchial bx with Interstitial fibrosis and intra-alveolar smoker's  macrophages noted above.   urology cons for renal lesion  cont ivf  renal f/u   cardio f/u  cont amiodarone 200 mg daily  cont midodrine  id f/u noted  cont vanco  vanco t noted above  cont zosyn  Mycamine 100 mg IVPB q day added.  paliative eval noted  heme onc cons noted  cont current meds  pt for ir bx of liver or renal lesion when stable  mgmt as per icu

## 2017-12-23 NOTE — PROGRESS NOTE ADULT - PROBLEM SELECTOR PLAN 1
Ventilator support  Follow up ABGs  Correct metabolic acidosis  Bronchodilators  Pulmonary toilet  Aspiration precautions  Decubitus prevention  Trach next week

## 2017-12-23 NOTE — PROGRESS NOTE ADULT - SUBJECTIVE AND OBJECTIVE BOX
CARDIOLOGY     PROGRESS  NOTE   ________________________________________________    CHIEF COMPLAINT:Patient is a 72y old  Male who presents with a chief complaint of weakness, chest discomfort and voice and appetite changes (05 Dec 2017 01:22)    	  REVIEW OF SYSTEMS:    [ ] All others negative	  [ X] Unable to obtain    PHYSICAL EXAM:  T(C): 38.6 (12-23-17 @ 06:00), Max: 38.6 (12-23-17 @ 06:00)  HR: 99 (12-23-17 @ 11:00) (98 - 135)  BP: 83/46 (12-23-17 @ 11:00) (79/43 - 130/55)  RR: 27 (12-23-17 @ 11:00) (20 - 35)  SpO2: 98% (12-23-17 @ 11:00) (92% - 100%)  Wt(kg): --  I&O's Summary    22 Dec 2017 07:01  -  23 Dec 2017 07:00  --------------------------------------------------------  IN: 1595.2 mL / OUT: 815 mL / NET: 780.2 mL    23 Dec 2017 07:01  -  23 Dec 2017 11:19  --------------------------------------------------------  IN: 293.1 mL / OUT: 140 mL / NET: 153.1 mL        Appearance: Normal	  HEENT:   Normal oral mucosa, PERRL, EOMI	  Lymphatic: No lymphadenopathy  Cardiovascular: Normal S1 S2, No JVD, +murmurs, No edema  Respiratory: + rhonchi	  Psychiatry: A & O x 3, Mood & affect appropriate  Gastrointestinal:  Soft, Non-tender, + BS	  Skin: No rashes, No ecchymoses, No cyanosis	  Neurologic: Non-focal  Extremities: Normal range of motion, No clubbing, cyanosis or edema  Vascular: Peripheral pulses palpable 2+ bilaterally    MEDICATIONS  (STANDING):  amiodarone    Tablet 200 milliGRAM(s) Oral daily  aspirin  chewable 81 milliGRAM(s) Oral daily  BACItracin   Ointment 1 Application(s) Topical two times a day  heparin  Injectable 5000 Unit(s) SubCutaneous every 12 hours  micafungin IVPB      micafungin IVPB 100 milliGRAM(s) IV Intermittent every 24 hours  midodrine 5 milliGRAM(s) Oral every 8 hours  pantoprazole  Injectable 40 milliGRAM(s) IV Push daily  phenylephrine    Infusion 0.5 MICROgram(s)/kG/Min (6.563 mL/Hr) IV Continuous <Continuous>  piperacillin/tazobactam IVPB. 3.375 Gram(s) IV Intermittent every 12 hours  sodium bicarbonate 1300 milliGRAM(s) Oral two times a day  vancomycin  IVPB 1000 milliGRAM(s) IV Intermittent every 24 hours      TELEMETRY: 	    ECG:  	  RADIOLOGY:  OTHER: 	  	  LABS:	 	    CARDIAC MARKERS:                                9.3    19.0  )-----------( 49       ( 23 Dec 2017 07:01 )             29.8     12-23    145  |  116<H>  |  149  ----------------------------<  176<H>  4.9   |  16<L>  |  4.24<H>    Ca    7.3<L>      23 Dec 2017 07:01  Phos  7.0     12-23  Mg     3.0     12-23    TPro  5.8<L>  /  Alb  1.2<L>  /  TBili  1.3<H>  /  DBili  x   /  AST  30  /  ALT  21  /  AlkPhos  73  12-23    proBNP:   Lipid Profile: Cholesterol 64  LDL 20  HDL 26  TG 88    HgA1c: Hemoglobin A1C, Whole Blood: 6.2 % (12-05 @ 09:32)    TSH: Thyroid Stimulating Hormone, Serum: 0.45 uU/mL (12-05 @ 07:14)          Assessment and plan  ---------------------------  72 years old male from home with PMHx of HTN and BPH and no PSH presents to ED c/o L-sided chest pain ,lung and hepatic mass, s/p PTHX,now respiratory failure and afib with RVR.  1.Vent support as per ICU.  2.Amiodarone  200mg qd.  3.Continue ASA for stroke prevention.  4.Given suspected metastatic disease ,high risk for bleeding with full AC.  5.ABX.  6.Pressor support as per ICU.  7.GI and DVT prophylaxis.  8.Overall prognosis is poor.

## 2017-12-23 NOTE — PROGRESS NOTE ADULT - PROBLEM SELECTOR PLAN 7
Due in large part to low oncotic pressure from hypoalbuminemia.  Diuresis is not likely to help.  Continue TF's to improve nutrition and possibly oncotic pressure.

## 2017-12-24 NOTE — PROGRESS NOTE ADULT - SUBJECTIVE AND OBJECTIVE BOX
INTERVAL HPI/OVERNIGHT EVENTS:     PRESSORS: [x ] YES [ ] NO  WHICH:    ANTIBIOTICS:                  DATE STARTED:  ANTIBIOTICS:                  DATE STARTED:  ANTIBIOTICS:                  DATE STARTED:    Antimicrobial:  micafungin IVPB      micafungin IVPB 100 milliGRAM(s) IV Intermittent every 24 hours  piperacillin/tazobactam IVPB. 3.375 Gram(s) IV Intermittent every 12 hours  vancomycin  IVPB 500 milliGRAM(s) IV Intermittent every 12 hours    Cardiovascular:  amiodarone    Tablet 200 milliGRAM(s) Oral daily  midodrine 5 milliGRAM(s) Oral every 8 hours  phenylephrine    Infusion 0.5 MICROgram(s)/kG/Min IV Continuous <Continuous>    Pulmonary:    Hematalogic:  aspirin  chewable 81 milliGRAM(s) Oral daily  heparin  Injectable 5000 Unit(s) SubCutaneous every 12 hours    Other:  acetaminophen  Suppository 650 milliGRAM(s) Rectal every 6 hours PRN  BACItracin   Ointment 1 Application(s) Topical two times a day  pantoprazole  Injectable 40 milliGRAM(s) IV Push daily  sodium bicarbonate 1300 milliGRAM(s) Oral three times a day    acetaminophen  Suppository 650 milliGRAM(s) Rectal every 6 hours PRN  amiodarone    Tablet 200 milliGRAM(s) Oral daily  aspirin  chewable 81 milliGRAM(s) Oral daily  BACItracin   Ointment 1 Application(s) Topical two times a day  heparin  Injectable 5000 Unit(s) SubCutaneous every 12 hours  micafungin IVPB      micafungin IVPB 100 milliGRAM(s) IV Intermittent every 24 hours  midodrine 5 milliGRAM(s) Oral every 8 hours  pantoprazole  Injectable 40 milliGRAM(s) IV Push daily  phenylephrine    Infusion 0.5 MICROgram(s)/kG/Min IV Continuous <Continuous>  piperacillin/tazobactam IVPB. 3.375 Gram(s) IV Intermittent every 12 hours  sodium bicarbonate 1300 milliGRAM(s) Oral three times a day  vancomycin  IVPB 500 milliGRAM(s) IV Intermittent every 12 hours    Drug Dosing Weight  Height (cm): 165 (12 Dec 2017 13:14)  Weight (kg): 76.4 (12 Dec 2017 16:00)  BMI (kg/m2): 28.1 (12 Dec 2017 16:00)  BSA (m2): 1.84 (12 Dec 2017 16:00)    CENTRAL LINE: [ ] YES [ ] NO  LOCATION:   DATE INSERTED:  REMOVE: [ ] YES [ ] NO  EXPLAIN:    LERMA: [ ] YES [ ] NO    DATE INSERTED:  REMOVE:  [ ] YES [ ] NO  EXPLAIN:    A-LINE:  [ ] YES [ ] NO  LOCATION:   DATE INSERTED:  REMOVE:  [ ] YES [ ] NO  EXPLAIN:    PMH -reviewed admission note, no change since admission  Heart faliure: acute [ ] chronic [ ] acute or chronic [ ] diastolic [ ] systolic [ ] combied systolic and diastolic[ ]  ZENY: ATN[ ] renal medullary necrosis [ ] CKD I [ ]CKDII [ ]CKD III [ ]CKD IV [ ]CKD V [ ]Other pathological lesions [ ]  Abdominal Nutrition Status: malnutrition [ ] cachexia [ ] morbid obesity/BMI=40 [ ] Supplement ordered [___________]     ICU Vital Signs Last 24 Hrs  T(C): 37.7 (24 Dec 2017 01:00), Max: 38.9 (23 Dec 2017 19:45)  T(F): 99.8 (24 Dec 2017 01:00), Max: 102 (23 Dec 2017 19:45)  HR: 110 (24 Dec 2017 01:10) (99 - 136)  BP: 106/55 (24 Dec 2017 01:00) (79/43 - 124/65)  BP(mean): 66 (24 Dec 2017 01:00) (49 - 84)  ABP: --  ABP(mean): --  RR: 38 (24 Dec 2017 01:00) (22 - 38)  SpO2: 98% (24 Dec 2017 01:10) (92% - 100%)      ABG - ( 23 Dec 2017 04:31 )  pH: 7.27  /  pCO2: 36    /  pO2: 133   / HCO3: 16    / Base Excess: -9.6  /  SaO2: 98                    12-22 @ 07:01  -  12-23 @ 07:00  --------------------------------------------------------  IN: 1595.2 mL / OUT: 815 mL / NET: 780.2 mL        Mode: AC/ CMV (Assist Control/ Continuous Mandatory Ventilation)  RR (machine): 28  TV (machine): 500  FiO2: 50  PEEP: 5  ITime: 1  MAP: 15  PIP: 27      PHYSICAL EXAM:    GENERAL: NAD, well-groomed, well-developed  HEAD:  Atraumatic, Normocephalic  EYES: EOMI, PERRLA, conjunctiva and sclera clear  ENMT: No tonsillar erythema, exudates, or enlargement; Moist mucous membranes, Good dentition, No lesions  NECK: Supple, normal appearance, No JVD; Normal thyroid; Trachea midline  NERVOUS SYSTEM:  Alert & Oriented X3, Good concentration; Motor Strength 5/5 B/L upper and lower extremities; DTRs 2+ intact and symmetric  CHEST/LUNG: No chest deformity; Normal percussion bilaterally; No rales, rhonchi, wheezing   HEART: Regular rate and rhythm; No murmurs, rubs, or gallops  ABDOMEN: Soft, Nontender, Nondistended; Bowel sounds present  EXTREMITIES:  2+ Peripheral Pulses, No clubbing, cyanosis, or edema  LYMPH: No lymphadenopathy noted  SKIN: No rashes or lesions; Good capillary refill      LABS:  CBC Full  -  ( 23 Dec 2017 07:01 )  WBC Count : 19.0 K/uL  Hemoglobin : 9.3 g/dL  Hematocrit : 29.8 %  Platelet Count - Automated : 49 K/uL  Mean Cell Volume : 101.4 fl  Mean Cell Hemoglobin : 31.6 pg  Mean Cell Hemoglobin Concentration : 31.2 gm/dL  Auto Neutrophil # : 16.9 K/uL  Auto Lymphocyte # : 1.4 K/uL  Auto Monocyte # : 0.5 K/uL  Auto Eosinophil # : 0.1 K/uL  Auto Basophil # : 0.1 K/uL  Auto Neutrophil % : 89.2 %  Auto Lymphocyte % : 7.5 %  Auto Monocyte % : 2.5 %  Auto Eosinophil % : 0.3 %  Auto Basophil % : 0.5 %    12-23    145  |  116<H>  |  149  ----------------------------<  176<H>  4.9   |  16<L>  |  4.24<H>    Ca    7.3<L>      23 Dec 2017 07:01  Phos  7.0     12-23  Mg     3.0     12-23    TPro  5.8<L>  /  Alb  1.2<L>  /  TBili  1.3<H>  /  DBili  x   /  AST  30  /  ALT  21  /  AlkPhos  73  12-23        Culture Results:   No growth to date. (12-22 @ 23:43)  Culture Results:   No growth to date. (12-22 @ 23:43)      RADIOLOGY & ADDITIONAL STUDIES REVIEWED:  ***    [ ]GOALS OF CARE DISCUSSION WITH PATIENT/FAMILY/PROXY:    CRITICAL CARE TIME SPENT: 35 minutes INTERVAL HPI/OVERNIGHT EVENTS: fever spike to 102, repeated blood culture sent    PRESSORS: [x ] YES [ ] NO  WHICH:    Antimicrobial:  micafungin IVPB      micafungin IVPB 100 milliGRAM(s) IV Intermittent every 24 hours  piperacillin/tazobactam IVPB. 3.375 Gram(s) IV Intermittent every 12 hours  vancomycin  IVPB 500 milliGRAM(s) IV Intermittent every 12 hours    Cardiovascular:  amiodarone    Tablet 200 milliGRAM(s) Oral daily  midodrine 5 milliGRAM(s) Oral every 8 hours  phenylephrine    Infusion 0.5 MICROgram(s)/kG/Min IV Continuous <Continuous>    Pulmonary:    Hematalogic:  aspirin  chewable 81 milliGRAM(s) Oral daily  heparin  Injectable 5000 Unit(s) SubCutaneous every 12 hours    Other:  acetaminophen  Suppository 650 milliGRAM(s) Rectal every 6 hours PRN  BACItracin   Ointment 1 Application(s) Topical two times a day  pantoprazole  Injectable 40 milliGRAM(s) IV Push daily  sodium bicarbonate 1300 milliGRAM(s) Oral three times a day    acetaminophen  Suppository 650 milliGRAM(s) Rectal every 6 hours PRN  amiodarone    Tablet 200 milliGRAM(s) Oral daily  aspirin  chewable 81 milliGRAM(s) Oral daily  BACItracin   Ointment 1 Application(s) Topical two times a day  heparin  Injectable 5000 Unit(s) SubCutaneous every 12 hours  micafungin IVPB      micafungin IVPB 100 milliGRAM(s) IV Intermittent every 24 hours  midodrine 5 milliGRAM(s) Oral every 8 hours  pantoprazole  Injectable 40 milliGRAM(s) IV Push daily  phenylephrine    Infusion 0.5 MICROgram(s)/kG/Min IV Continuous <Continuous>  piperacillin/tazobactam IVPB. 3.375 Gram(s) IV Intermittent every 12 hours  sodium bicarbonate 1300 milliGRAM(s) Oral three times a day  vancomycin  IVPB 500 milliGRAM(s) IV Intermittent every 12 hours    Drug Dosing Weight  Height (cm): 165 (12 Dec 2017 13:14)  Weight (kg): 76.4 (12 Dec 2017 16:00)  BMI (kg/m2): 28.1 (12 Dec 2017 16:00)  BSA (m2): 1.84 (12 Dec 2017 16:00)    CENTRAL LINE: [ x] YES [ ] NO  LOCATION:   DATE INSERTED:  REMOVE: [ ] YES [ ] NO  EXPLAIN:    LERMA: [ x] YES [ ] NO    DATE INSERTED:  REMOVE:  [ ] YES [ ] NO  EXPLAIN:    A-LINE:  [ ] YES [ x] NO  LOCATION:   DATE INSERTED:  REMOVE:  [ ] YES [ ] NO  EXPLAIN:    ICU Vital Signs Last 24 Hrs  T(C): 37.7 (24 Dec 2017 01:00), Max: 38.9 (23 Dec 2017 19:45)  T(F): 99.8 (24 Dec 2017 01:00), Max: 102 (23 Dec 2017 19:45)  HR: 110 (24 Dec 2017 01:10) (99 - 136)  BP: 106/55 (24 Dec 2017 01:00) (79/43 - 124/65)  BP(mean): 66 (24 Dec 2017 01:00) (49 - 84)  ABP: --  ABP(mean): --  RR: 38 (24 Dec 2017 01:00) (22 - 38)  SpO2: 98% (24 Dec 2017 01:10) (92% - 100%)      ABG - ( 23 Dec 2017 04:31 )  pH: 7.27  /  pCO2: 36    /  pO2: 133   / HCO3: 16    / Base Excess: -9.6  /  SaO2: 98                    12-22 @ 07:01  -  12-23 @ 07:00  --------------------------------------------------------  IN: 1595.2 mL / OUT: 815 mL / NET: 780.2 mL        Mode: AC/ CMV (Assist Control/ Continuous Mandatory Ventilation)  RR (machine): 28  TV (machine): 500  FiO2: 50  PEEP: 5  ITime: 1  MAP: 15  PIP: 27      Physical exam:  GENERAL: [x ]NAD,On mechanical ventilation, sleepy  HEAD:  [x ]Atraumatic, [x]Normocephalic  EYES:[x ]conjunctiva and sclera clear  ENMT: ETT placed,  NECK: [x ]Supple, normal appearance,   NERVOUS SYSTEM:   CHEST/LUNG: On ETT tube, Clear to auscultation, [ x]No chest deformity; [ ]Normal percussion bilaterally; [ ]No rales, rhonchi, wheezing   HEART: [ x]Regular rate and rhythm; [x ]No murmurs, rubs, or gallops  ABDOMEN: [ x]Soft, Nontender, Nondistended; [x ]Bowel sounds present  EXTREMITIES:  [x ]2+ Peripheral Pulses, [ x]No clubbing, cyanosis, or edema, on SCD boots,  GENITOURINARY: Large swollen penile area with Lerma catheter  LYMPH: [ x]No lymphadenopathy noted  SKIN: [x ]No rashes or lesions; [ ]Good capillary refill      LABS:  CBC Full  -  ( 23 Dec 2017 07:01 )  WBC Count : 19.0 K/uL  Hemoglobin : 9.3 g/dL  Hematocrit : 29.8 %  Platelet Count - Automated : 49 K/uL  Mean Cell Volume : 101.4 fl  Mean Cell Hemoglobin : 31.6 pg  Mean Cell Hemoglobin Concentration : 31.2 gm/dL  Auto Neutrophil # : 16.9 K/uL  Auto Lymphocyte # : 1.4 K/uL  Auto Monocyte # : 0.5 K/uL  Auto Eosinophil # : 0.1 K/uL  Auto Basophil # : 0.1 K/uL  Auto Neutrophil % : 89.2 %  Auto Lymphocyte % : 7.5 %  Auto Monocyte % : 2.5 %  Auto Eosinophil % : 0.3 %  Auto Basophil % : 0.5 %    12-23    145  |  116<H>  |  149  ----------------------------<  176<H>  4.9   |  16<L>  |  4.24<H>    Ca    7.3<L>      23 Dec 2017 07:01  Phos  7.0     12-23  Mg     3.0     12-23    TPro  5.8<L>  /  Alb  1.2<L>  /  TBili  1.3<H>  /  DBili  x   /  AST  30  /  ALT  21  /  AlkPhos  73  12-23        Culture Results:   No growth to date. (12-22 @ 23:43)  Culture Results:   No growth to date. (12-22 @ 23:43)      RADIOLOGY & ADDITIONAL STUDIES REVIEWED:  ***    [ ]GOALS OF CARE DISCUSSION WITH PATIENT/FAMILY/PROXY:    CRITICAL CARE TIME SPENT: 35 minutes INTERVAL HPI/OVERNIGHT EVENTS: fever spike to 102, repeated blood culture sent. Pt had 3 episodes of very loose stool since last night, will f/u C. diff. hold tube feeding for now.    PRESSORS: [x ] YES [ ] NO  WHICH:    Antimicrobial:  micafungin IVPB      micafungin IVPB 100 milliGRAM(s) IV Intermittent every 24 hours  piperacillin/tazobactam IVPB. 3.375 Gram(s) IV Intermittent every 12 hours  vancomycin  IVPB 500 milliGRAM(s) IV Intermittent every 12 hours    Cardiovascular:  amiodarone    Tablet 200 milliGRAM(s) Oral daily  midodrine 5 milliGRAM(s) Oral every 8 hours  phenylephrine    Infusion 0.5 MICROgram(s)/kG/Min IV Continuous <Continuous>    Pulmonary:    Hematalogic:  aspirin  chewable 81 milliGRAM(s) Oral daily  heparin  Injectable 5000 Unit(s) SubCutaneous every 12 hours    Other:  acetaminophen  Suppository 650 milliGRAM(s) Rectal every 6 hours PRN  BACItracin   Ointment 1 Application(s) Topical two times a day  pantoprazole  Injectable 40 milliGRAM(s) IV Push daily  sodium bicarbonate 1300 milliGRAM(s) Oral three times a day    acetaminophen  Suppository 650 milliGRAM(s) Rectal every 6 hours PRN  amiodarone    Tablet 200 milliGRAM(s) Oral daily  aspirin  chewable 81 milliGRAM(s) Oral daily  BACItracin   Ointment 1 Application(s) Topical two times a day  heparin  Injectable 5000 Unit(s) SubCutaneous every 12 hours  micafungin IVPB      micafungin IVPB 100 milliGRAM(s) IV Intermittent every 24 hours  midodrine 5 milliGRAM(s) Oral every 8 hours  pantoprazole  Injectable 40 milliGRAM(s) IV Push daily  phenylephrine    Infusion 0.5 MICROgram(s)/kG/Min IV Continuous <Continuous>  piperacillin/tazobactam IVPB. 3.375 Gram(s) IV Intermittent every 12 hours  sodium bicarbonate 1300 milliGRAM(s) Oral three times a day  vancomycin  IVPB 500 milliGRAM(s) IV Intermittent every 12 hours    Drug Dosing Weight  Height (cm): 165 (12 Dec 2017 13:14)  Weight (kg): 76.4 (12 Dec 2017 16:00)  BMI (kg/m2): 28.1 (12 Dec 2017 16:00)  BSA (m2): 1.84 (12 Dec 2017 16:00)    CENTRAL LINE: [ x] YES [ ] NO  LOCATION:   DATE INSERTED:  REMOVE: [ ] YES [ ] NO  EXPLAIN:    LERMA: [ x] YES [ ] NO    DATE INSERTED:  REMOVE:  [ ] YES [ ] NO  EXPLAIN:    A-LINE:  [ ] YES [ x] NO  LOCATION:   DATE INSERTED:  REMOVE:  [ ] YES [ ] NO  EXPLAIN:    ICU Vital Signs Last 24 Hrs  T(C): 37.7 (24 Dec 2017 01:00), Max: 38.9 (23 Dec 2017 19:45)  T(F): 99.8 (24 Dec 2017 01:00), Max: 102 (23 Dec 2017 19:45)  HR: 110 (24 Dec 2017 01:10) (99 - 136)  BP: 106/55 (24 Dec 2017 01:00) (79/43 - 124/65)  BP(mean): 66 (24 Dec 2017 01:00) (49 - 84)  ABP: --  ABP(mean): --  RR: 38 (24 Dec 2017 01:00) (22 - 38)  SpO2: 98% (24 Dec 2017 01:10) (92% - 100%)      ABG - ( 23 Dec 2017 04:31 )  pH: 7.27  /  pCO2: 36    /  pO2: 133   / HCO3: 16    / Base Excess: -9.6  /  SaO2: 98                    12-22 @ 07:01  -  12-23 @ 07:00  --------------------------------------------------------  IN: 1595.2 mL / OUT: 815 mL / NET: 780.2 mL        Mode: AC/ CMV (Assist Control/ Continuous Mandatory Ventilation)  RR (machine): 28  TV (machine): 500  FiO2: 50  PEEP: 5  ITime: 1  MAP: 15  PIP: 27      Physical exam:  GENERAL: [x ]NAD,On mechanical ventilation, sleepy  HEAD:  [x ]Atraumatic, [x]Normocephalic  EYES:[x ]conjunctiva and sclera clear  ENMT: ETT placed,  NECK: [x ]Supple, normal appearance,   NERVOUS SYSTEM: sedated   CHEST/LUNG: On ETT tube, Clear to auscultation, [ x]No chest deformity; [ ]Normal percussion bilaterally; [ ]No rales, rhonchi, wheezing   HEART: [ x]Regular rate and rhythm; [x ]No murmurs, rubs, or gallops  ABDOMEN: [ x]Soft, Nontender, Nondistended; [x ]Bowel sounds present  EXTREMITIES:  [x ]2+ Peripheral Pulses, [ x]No clubbing, cyanosis, or edema, on SCD boots,  GENITOURINARY: Large swollen penile area with Lerma catheter  LYMPH: [ x]No lymphadenopathy noted  SKIN: [x ]No rashes or lesions; [ ]Good capillary refill      LABS:  CBC Full  -  ( 23 Dec 2017 07:01 )  WBC Count : 19.0 K/uL  Hemoglobin : 9.3 g/dL  Hematocrit : 29.8 %  Platelet Count - Automated : 49 K/uL  Mean Cell Volume : 101.4 fl  Mean Cell Hemoglobin : 31.6 pg  Mean Cell Hemoglobin Concentration : 31.2 gm/dL  Auto Neutrophil # : 16.9 K/uL  Auto Lymphocyte # : 1.4 K/uL  Auto Monocyte # : 0.5 K/uL  Auto Eosinophil # : 0.1 K/uL  Auto Basophil # : 0.1 K/uL  Auto Neutrophil % : 89.2 %  Auto Lymphocyte % : 7.5 %  Auto Monocyte % : 2.5 %  Auto Eosinophil % : 0.3 %  Auto Basophil % : 0.5 %    12-23    145  |  116<H>  |  149  ----------------------------<  176<H>  4.9   |  16<L>  |  4.24<H>    Ca    7.3<L>      23 Dec 2017 07:01  Phos  7.0     12-23  Mg     3.0     12-23    TPro  5.8<L>  /  Alb  1.2<L>  /  TBili  1.3<H>  /  DBili  x   /  AST  30  /  ALT  21  /  AlkPhos  73  12-23        Culture Results:   No growth to date. (12-22 @ 23:43)  Culture Results:   No growth to date. (12-22 @ 23:43)      RADIOLOGY & ADDITIONAL STUDIES REVIEWED:  yes    [ ]GOALS OF CARE DISCUSSION WITH PATIENT/FAMILY/PROXY:    CRITICAL CARE TIME SPENT: 35 minutes    Assessment and Plan:   · Assessment		  A 72 y M  former smoker 20 PPD with Hx HTN BPH  p/w generalized weakness, voice changes with imaging showing left hilar mass in CT scan , s/p bronch and biopsy on 12/06 , admitted to ICU on 12/06 for pneumothorax s/p diagnostic bronchoscopy for left lobe lesions found on CT imaging - chest tube placed in ICU 12/6 on suction - admitted for urgent CT placement and monitoring. pt was downgraded on Lung biopsy from 12/07. Lung biopsy did not show any malignancy. s/p transbronchial lung biopsy , s/p endobronchial lung biopsy, s/p flexible bronchoscopy today by Dr. Piper.   ROS - unable to get as pt is intubated    admit to ICU post op monitoring s/p intubation s/p chest tube , hypotensive 2/2 septic shock, requiring pressors, on phenylephrine      1: Septic Shock  -s/p intubation , c/w mechanical ventilation  -Chest tube removed on 12/20.  -Patient is on pheny   -increased white count, tissue culture growing gram negative rods rare neisseria, haemophilus, BCx negative to date  - UA negative  - currently on zosyn, vanco and mycamine  - ID Dr. Nicholas      2: ATN  -c/w IVF  -BUN/cr increasing  -monitor urine output   -Sodium bicar increased dose  -HCP decided, Discuss with Brother for Dialysis otherwise two PCP  -Nephro: Dr. Reagan      3: New onset Atrial Fibrillation  - Rate controlled   - EKG shows Atrial fibrillation (new onset)  - Completed amiodarone loading, now amio 200mg  c/w amiodarone  - f/u cardiology - Dr. Jackson    Pneumothorax   Resolved  D/patricio chest tube    Lung mass  -c/w post op prophylaxis  -Bronch path negative for Ca.  -s/p EBUS VS mediastinoscopy  -cyto report, negative for  malignancy  - 1 biopsy still pending  -repeat CT chest - worsening necrotic mass from prior imaging    Hypertension.    -hypotension , holding home BP meds,  c/w pressors    BPH (benign prostatic hyperplasia)  - monitor U/o  -c/w NG tube    Penile swelling:   Urology consult requested, cold compresses, bacitracin ointment application    Hyperphosphatemia:  -Patient phosphate high in setting for kidney failure  -stable will c/w monitoring    Prophylactic measure  - IMPROVE score 2   - c/w heparin S.C for VTE prophylaxis  - GI stress ulcer PPx w/ Protonix.    Goals of care and discussion:  Patient has poor prognosis, Patient is going in ATN, very low UO,  Palliative on board: Patient is DNR INTERVAL HPI/OVERNIGHT EVENTS: fever spike to 102, repeated blood culture sent. Pt had 3 episodes of very loose stool since last night, will f/u C. diff. hold tube feeding for now.    PRESSORS: [x ] YES [ ] NO  WHICH:    Antimicrobial:  micafungin IVPB      micafungin IVPB 100 milliGRAM(s) IV Intermittent every 24 hours  piperacillin/tazobactam IVPB. 3.375 Gram(s) IV Intermittent every 12 hours  vancomycin  IVPB 500 milliGRAM(s) IV Intermittent every 12 hours    Cardiovascular:  amiodarone    Tablet 200 milliGRAM(s) Oral daily  midodrine 5 milliGRAM(s) Oral every 8 hours  phenylephrine    Infusion 0.5 MICROgram(s)/kG/Min IV Continuous <Continuous>    Pulmonary:    Hematalogic:  aspirin  chewable 81 milliGRAM(s) Oral daily  heparin  Injectable 5000 Unit(s) SubCutaneous every 12 hours    Other:  acetaminophen  Suppository 650 milliGRAM(s) Rectal every 6 hours PRN  BACItracin   Ointment 1 Application(s) Topical two times a day  pantoprazole  Injectable 40 milliGRAM(s) IV Push daily  sodium bicarbonate 1300 milliGRAM(s) Oral three times a day    acetaminophen  Suppository 650 milliGRAM(s) Rectal every 6 hours PRN  amiodarone    Tablet 200 milliGRAM(s) Oral daily  aspirin  chewable 81 milliGRAM(s) Oral daily  BACItracin   Ointment 1 Application(s) Topical two times a day  heparin  Injectable 5000 Unit(s) SubCutaneous every 12 hours  micafungin IVPB      micafungin IVPB 100 milliGRAM(s) IV Intermittent every 24 hours  midodrine 5 milliGRAM(s) Oral every 8 hours  pantoprazole  Injectable 40 milliGRAM(s) IV Push daily  phenylephrine    Infusion 0.5 MICROgram(s)/kG/Min IV Continuous <Continuous>  piperacillin/tazobactam IVPB. 3.375 Gram(s) IV Intermittent every 12 hours  sodium bicarbonate 1300 milliGRAM(s) Oral three times a day  vancomycin  IVPB 500 milliGRAM(s) IV Intermittent every 12 hours    Drug Dosing Weight  Height (cm): 165 (12 Dec 2017 13:14)  Weight (kg): 76.4 (12 Dec 2017 16:00)  BMI (kg/m2): 28.1 (12 Dec 2017 16:00)  BSA (m2): 1.84 (12 Dec 2017 16:00)    CENTRAL LINE: [ x] YES [ ] NO  LOCATION:   DATE INSERTED:  REMOVE: [ ] YES [ ] NO  EXPLAIN:    LERMA: [ x] YES [ ] NO    DATE INSERTED:  REMOVE:  [ ] YES [ ] NO  EXPLAIN:    A-LINE:  [ ] YES [ x] NO  LOCATION:   DATE INSERTED:  REMOVE:  [ ] YES [ ] NO  EXPLAIN:    ICU Vital Signs Last 24 Hrs  T(C): 37.7 (24 Dec 2017 01:00), Max: 38.9 (23 Dec 2017 19:45)  T(F): 99.8 (24 Dec 2017 01:00), Max: 102 (23 Dec 2017 19:45)  HR: 110 (24 Dec 2017 01:10) (99 - 136)  BP: 106/55 (24 Dec 2017 01:00) (79/43 - 124/65)  BP(mean): 66 (24 Dec 2017 01:00) (49 - 84)  ABP: --  ABP(mean): --  RR: 38 (24 Dec 2017 01:00) (22 - 38)  SpO2: 98% (24 Dec 2017 01:10) (92% - 100%)      ABG - ( 23 Dec 2017 04:31 )  pH: 7.27  /  pCO2: 36    /  pO2: 133   / HCO3: 16    / Base Excess: -9.6  /  SaO2: 98                    12-22 @ 07:01  -  12-23 @ 07:00  --------------------------------------------------------  IN: 1595.2 mL / OUT: 815 mL / NET: 780.2 mL        Mode: AC/ CMV (Assist Control/ Continuous Mandatory Ventilation)  RR (machine): 28  TV (machine): 500  FiO2: 50  PEEP: 5  ITime: 1  MAP: 15  PIP: 27      Physical exam:  GENERAL: [x ]NAD,On mechanical ventilation, sleepy  HEAD:  [x ]Atraumatic, [x]Normocephalic  EYES:[x ]conjunctiva and sclera clear  ENMT: ETT placed,  NECK: [x ]Supple, normal appearance,   NERVOUS SYSTEM: sedated   CHEST/LUNG: On ETT tube, Clear to auscultation, [ x]No chest deformity; [ ]Normal percussion bilaterally; [ ]No rales, rhonchi, wheezing   HEART: [ x]Regular rate and rhythm; [x ]No murmurs, rubs, or gallops  ABDOMEN: [ x]Soft, Nontender, Nondistended; [x ]Bowel sounds present  EXTREMITIES:  [x ]2+ Peripheral Pulses, [ x]No clubbing, cyanosis, or edema, on SCD boots,  GENITOURINARY: Large swollen penile area with Lerma catheter  LYMPH: [ x]No lymphadenopathy noted  SKIN: [x ]No rashes or lesions; [ ]Good capillary refill      LABS:  CBC Full  -  ( 23 Dec 2017 07:01 )  WBC Count : 19.0 K/uL  Hemoglobin : 9.3 g/dL  Hematocrit : 29.8 %  Platelet Count - Automated : 49 K/uL  Mean Cell Volume : 101.4 fl  Mean Cell Hemoglobin : 31.6 pg  Mean Cell Hemoglobin Concentration : 31.2 gm/dL  Auto Neutrophil # : 16.9 K/uL  Auto Lymphocyte # : 1.4 K/uL  Auto Monocyte # : 0.5 K/uL  Auto Eosinophil # : 0.1 K/uL  Auto Basophil # : 0.1 K/uL  Auto Neutrophil % : 89.2 %  Auto Lymphocyte % : 7.5 %  Auto Monocyte % : 2.5 %  Auto Eosinophil % : 0.3 %  Auto Basophil % : 0.5 %    12-23    145  |  116<H>  |  149  ----------------------------<  176<H>  4.9   |  16<L>  |  4.24<H>    Ca    7.3<L>      23 Dec 2017 07:01  Phos  7.0     12-23  Mg     3.0     12-23    TPro  5.8<L>  /  Alb  1.2<L>  /  TBili  1.3<H>  /  DBili  x   /  AST  30  /  ALT  21  /  AlkPhos  73  12-23        Culture Results:   No growth to date. (12-22 @ 23:43)  Culture Results:   No growth to date. (12-22 @ 23:43)      RADIOLOGY & ADDITIONAL STUDIES REVIEWED:  yes    [ ]GOALS OF CARE DISCUSSION WITH PATIENT/FAMILY/PROXY:    CRITICAL CARE TIME SPENT: 35 minutes    Assessment and Plan:   · Assessment		  A 72 y M  former smoker 20 PPD with Hx HTN BPH  p/w generalized weakness, voice changes with imaging showing left hilar mass in CT scan , s/p bronch and biopsy on 12/06 , admitted to ICU on 12/06 for pneumothorax s/p diagnostic bronchoscopy for left lobe lesions found on CT imaging - chest tube placed in ICU 12/6 on suction - admitted for urgent CT placement and monitoring. pt was downgraded on Lung biopsy from 12/07. Lung biopsy did not show any malignancy. s/p transbronchial lung biopsy , s/p endobronchial lung biopsy, s/p flexible bronchoscopy today by Dr. Pipre.   ROS - unable to get as pt is intubated    admit to ICU post op monitoring s/p intubation s/p chest tube , hypotensive 2/2 septic shock, requiring pressors, on phenylephrine      1: Septic Shock  -s/p intubation , c/w mechanical ventilation  -will need trach  -Chest tube removed on 12/20.  -Patient is on pheny   -increased white count, tissue culture growing gram negative rods rare neisseria, haemophilus, BCx negative to date  - UA negative  - currently on zosyn, vanco and mycamine  - ID Dr. Nicholas      2: ATN  -c/w IVF  -BUN/cr increasing  -monitor urine output   -Sodium bicar increased dose  -HCP decided, Discuss with Brother for Dialysis otherwise two PCP  -Nephro: Dr. Reagan      3: New onset Atrial Fibrillation  - Rate controlled   - EKG shows Atrial fibrillation (new onset)  - Completed amiodarone loading, now amio 200mg  c/w amiodarone  - f/u cardiology - Dr. Jackson    Pneumothorax   Resolved  D/patricio chest tube    Lung mass  -c/w post op prophylaxis  -Bronch path negative for Ca.  -s/p EBUS VS mediastinoscopy  -cyto report, negative for  malignancy  - 1 biopsy still pending  -repeat CT chest - worsening necrotic mass from prior imaging    Hypertension.    -hypotension , holding home BP meds,  c/w pressors    BPH (benign prostatic hyperplasia)  - monitor U/o  -c/w NG tube    Penile swelling:   Urology consult requested, cold compresses, bacitracin ointment application    Hyperphosphatemia:  -Patient phosphate high in setting for kidney failure  -stable will c/w monitoring    Prophylactic measure  - IMPROVE score 2   - c/w heparin S.C for VTE prophylaxis  - GI stress ulcer PPx w/ Protonix.    Goals of care and discussion:  Patient has poor prognosis, Patient is going in ATN, very low UO,  Palliative on board: Patient is DNR

## 2017-12-24 NOTE — PROGRESS NOTE ADULT - SUBJECTIVE AND OBJECTIVE BOX
Meds:  Zosyn 3.375 gm IVPB q 12 hours.  Vancomycin 1 gm IVPB Q day.  Micafungin 100 mg IVPB q day.       Allergies:  Allergies    No Known Allergies    Intolerances  ROS  [ x ] UNABLE TO ELICIT    General:  [  ] None  [  ] Fever  [  ] Chills  [  ] Malaise    Skin:  [  ] None [  ] Rash  [  ] Wound  [  ] Ulcer    HEENT:  [  ] None  [  ] Sore Throat  [  ] Nasal congestion/ runny nose  [  ] Photophobia [  ] Neck pain      Chest:  [  ] None   [  ] SOB  [  ] Cough  [  ] None    Cardiovascular:   [  ] None  [  ] CP  [  ] Palpitation    Gastrointestinal:  [  ] None  [  ] Abd pain   [  ] Nausea    [  ] Vomiting   [  ] Diarrhea	     Genitourinary:  [  ] None [  ] Polyuria   [  ] Urgency  [  ] Frequency  [  ] Dysuria    [  ]  Hematuria       Musculoskeletal:  [  ] None [  ] Back Pain	[  ] Body aches  [  ] Joint pain    Neurological: [  ] None [  ]Dizziness  [  ]Visual Disturbance  [  ]Headaches   [  ] Weakness        PHYSICAL EXAM:  Vital Signs Last 24 Hrs  T(C): 37.9 (24 Dec 2017 10:30), Max: 39.6 (24 Dec 2017 08:30)  T(F): 100.3 (24 Dec 2017 10:30), Max: 103.2 (24 Dec 2017 08:30)  HR: 103 (24 Dec 2017 12:38) (100 - 149)  BP: 116/59 (24 Dec 2017 11:30) (83/49 - 124/65)  BP(mean): 73 (24 Dec 2017 11:30) (53 - 95)  RR: 31 (24 Dec 2017 11:30) (18 - 38)  SpO2: 100% (24 Dec 2017 12:38) (96% - 100%)    Constitutional:    HEENT: [ x ] Wnl  [ x ] ET and NGT in place    Neck:  [ x ] Supple  [  ]Lymphadenopathy  [  ] No JVD   [  ] JVD  [  ] Masses   [  ] WNL    CHEST/Respiratory:  [  ]Clear to auscultation  [ x ] Rales   [ x ] Rhonchi   [  ] Wheezing     [ x ] Left side Chest tube      Cardiovascular:  [ x ] Reg S1 S2   [  ] Irreg S1 S2   [ x ]No Murmur  [  ] +ve Murmurs  [  ]Systolic [  ]Diastolic      Abdomen:  [ x ] Soft  [ x ] No tendrerness  [  ] Tenderness  [  ] Organomegaly  [  ] ABD Distention  [  ] Rigidity                       [ x ] No Regidity                       [ x ] No Rebound Tenderness  [ x ] No Guarding Rigidity  [  ] Rebound Tenderness[  ] Guarding Rigidity                          [ x ]  +ve Bowel Sounds  [  ] Decreased Bowel Sounds    [  ] Absent Bowel Sounds                            Extremities: [ x ] No edema [  ] Edema  [  ] Clubbing   [  ] Cyanosis                         [ x ] No Tender Calf muscles  [  ] Tender Calf muscles                        [ x ] Palpable peripheral pulses    Neurological: [  ] Awake  [  ] Alert  [  ] Oriented  x                             [  ] Confused  [  ] Drowzy  [ x ] respond to painful stimuli  [  ] Unresponsive    Skin:  [ x ] Intact [  ] Redness [  ] Thrombophlebitis  [  ] Rashes  [  ] Dry  [  ] Ulcers    Ortho:  [  ] Joint Swelling  [  ] Joint erythema [  ] Joint tenderness                [  ] Increased temp. to touch  [  ] DJD [ x ] WNL            LABS/DIAGNOSTIC TESTS                                    9.2    21.0  )-----------( 67       ( 24 Dec 2017 07:06 )             29.5   12-24    149<H>  |  118<H>  |  164<H>  ----------------------------<  133<H>  5.3   |  16<L>  |  4.80<H>    Ca    7.4<L>      24 Dec 2017 07:12  Phos  7.8     12-24  Mg     3.0     12-24    TPro  5.8<L>  /  Alb  1.2<L>  /  TBili  1.3<H>  /  DBili  x   /  AST  30  /  ALT  21  /  AlkPhos  73  12-23    Vancomycin Level, Trough: 25.6: TYPE:(C=Critical, N=Notification, A=Abnormal) c         CULTURES:   Culture - Bronchial (12.13.17 @ 08:06)    Gram Stain:   No polymorphonuclear cells seen per low power field  No squamous epithelial cells per low power field  Moderate Gram Positive Cocci in Pairs and Chains per oil power field  Few-moderate Gram Negative Coccobacilli per oil power field    Specimen Source: .Broncial received in trap    Culture Results:   Normal Respiratory Lissy present    Culture - Surgical Swab (12.12.17 @ 22:52)    Specimen Source: .Surgical Swab Bronchial Alveolar Lavage    Culture Results:   Few Normal Respiratory Lissy present    Culture - Tissue with Gram Stain (12.12.17 @ 22:45)    Gram Stain:   Rare polymorphonuclear leukocytes seen per low power field  Numerous Gram Negative Rods per oil power field    Specimen Source: .Tissue Left Lower Lobe    Culture Results:   Rare Haemophilus parainfluenzae  Rare Neisseria species, not gonorrhoeae or meningitidis    Culture - Blood (12.11.17 @ 11:22)    Specimen Source: .Blood Blood    Culture Results:   No growth to date.    Culture - Blood (12.11.17 @ 11:22)    Specimen Source: .Blood Blood    Culture Results:   No growth to date.    Culture - Blood (12.11.17 @ 11:22)    Specimen Source: .Blood Blood    Culture Results:   No growth to date.            RADIOLOGY    EXAM:  CHEST PORTABLE ROUTINE                            PROCEDURE DATE:  12/17/2017          INTERPRETATION:  [facility] Chest one view    HISTORY: Check ET tube placement    COMPARISON STUDY: 12/16/2017    Frontal expiratory view of the chest shows the heart to be similar in   size. Endotracheal tube, feeding tube and left chest tube remain present.   The lungs are slightly clearer and there is no evidence of pneumothorax   nor definite pleural effusion.    IMPRESSION:  No pneumothorax.    Thank you for the courtesy of this referral.          IMPRESSION:  Changing lung infiltrates.      EXAM:  CT CHEST                            PROCEDURE DATE:  12/15/2017          INTERPRETATION:  CLINICAL INFORMATION: Evaluate mid right lung opacity   seen on previous chest radiograph.    COMPARISON: Chest x-ray from 12/15/2017. Chest CT from 12/5/2017.    PROCEDURE:   CT of the Chest was performed without intravenous contrast.  Sagittal and coronal reformats were performed.      FINDINGS:    CHEST:     LINES AND TUBES: An endotracheal tube terminates at the level of the   aortic arch, above the jules. An enteric tube terminates in the stomach.   A right IJ central venous catheter terminates in the SVC. A left-sided   chest tube is noted.  LUNGS/LARGE AIRWAYS/PLEURA: Previously seen left lower lobe necrotic mass   now encompasses the entire left lower lobe with internal gas loculations.   Small left apical pneumothorax. Peribronchovascular groundglass opacities   are present in all lobes. Small bilateral pleural effusions with   associated compressive atelectasis.  VESSELS: Atherosclerotic changes of the aorta and coronary arteries.  HEART: Heart size is normal. No pericardial effusion.  MEDIASTINUM AND ALPHONSE: Pneumomediastinum. Redemonstrated 3.8 x 2.1 cm AP   window lymph node.  CHEST WALL AND LOWER NECK: Extensive bilateral subcutaneous emphysema.  VISUALIZED UPPER ABDOMEN: Redemonstrated, unchanged indeterminant central   low density splenic lesion. Small perihepatic ascites  BONES: Redemonstrated destruction of the left sixth and seventh ribs and   left T6 transverse process. Degenerative changes.    IMPRESSION:     Previously seen left lower lobe necrotic mass now encompasses the entire   left lower lobe with internal gas loculations. Destruction of the   posterior sixth and seventh ribs and left sixth transverse process again   noted.    Peribronchovascular groundglass opacity is present in all lobes and has a   broad differential that includes infection, edema or ARDS.     Small left apical pneumothorax with chest tube in place.              Assessment and Recommendation:   72 years old male from home with PMHx of HTN and BPH and no PSH presents to ED c/o L-sided chest pain radiating to the back with associated general weakness x6 months. Patient also reports loss of voice x6 months, in addition to decreased appetite and weight loss x2 weeks, only been able to consume x3 Ensure today.   10/6/17 patient had bronchoscopy and subsequent pneumothorax and chest tube insertion.  Patient was initially transferred from ICU to regular floor, then upgraded to the icu after condition became unstable.  Patient was started on IV Zosyn on 12/12/17.    12/18/17 WBC is increasing again.  12/19/17 WBC improved but H&H dropped.  Vancomycin was started as suggested, but Mycamine is not started as suggested, and discussed with ICU resident on 12/19/17.   12/21/17 Patient is tachycardiac, and BP is low, O2 is low, but PH, and WBC are better.  Micafungin was started 12/21/17, patient is hypotensive, and tachycardiac and labs for today are not available yet.    12/24/17 Central line was removed, WBC is increased, and Vancomycin trough is high.    Problem/Recommendation - 1:  Problem: Pneumonia, bacterial.   Recommendation:   1- UA & CS.  2- Follow Blood culture to final report are negative.  3- Blood transfusions as needed.  4- Change IV Zosyn 3.375 gm IVPB q 12 hours to Meropenem 500 mg IVPB q 8 hours.  5- Fluid, electrolytes and renal management.  6- CBC and CMP follow up.   7- Follow up CXR.  8- Respirator and Chest tube management.  9- Procalcitonin level, HIV, ESR, and CRP testing.  10- Continue Mycamine 100 mg IVPB q day empirically As patient has been on long duration of ABX, with intubation and central lines (Started on 12/21/17).  11- Continue Vancomycin, but decrease the dose to 500 mg IVPB q 28 hours, but hold it till the trough is less than 20.  12- Oncology work and follow up.    Problem/Recommendation - 2:  ·  Problem: COPD (chronic obstructive pulmonary disease).    Recommendation:   1- Bronchodilators.  2- O2 as needed.  3- Pulmonary, and cardiology management.  4- Steroids as per primary, and pulmonary team if needed.     Problem/Recommendation - 3:  ·  Problem: ZENY (acute kidney injury).    Recommendation:   1- Renal management.  2- Fluid and electrolytes management and follow up.     Discussed with medical resident.

## 2017-12-24 NOTE — PROGRESS NOTE ADULT - SUBJECTIVE AND OBJECTIVE BOX
remain intubated  i think most likely it is cancer of the lung  path are inconclusive  ?palliative care

## 2017-12-24 NOTE — PROGRESS NOTE ADULT - PROBLEM SELECTOR PLAN 1
Non-oliguric ZENY likely hemodynamically mediated in the setting of septic shock/hypotension; ATN. Scr increasing. Had a GOC meeting with Adolph Omalley (pt's surrogate), his wife and with Elaina Palliative care NP.  Discussed pt's poor overall status. He is undecided and wants to contact pt's estranged brother before making a decision regarding HD. Patient however would not currently tolerate RRT as on 2 pressors and hemodynamically unstable.    UA with 30 protein and mod blood in the setting of infection. Will repeat UA once infection cleared. Monitor Vanco trough.    CT abd/pelvis on 12/5 1.0 cm indeterminate exophytic lesion arising from the   lower pole of left kidney. No hydronephrosis. Subsequent, renal US performed with left renal lesion not seen.

## 2017-12-24 NOTE — PROGRESS NOTE ADULT - SUBJECTIVE AND OBJECTIVE BOX
CARDIOLOGY     PROGRESS  NOTE   ________________________________________________    CHIEF COMPLAINT:Patient is a 72y old  Male who presents with a chief complaint of weakness, chest discomfort and voice and appetite changes (05 Dec 2017 01:22)  with lung mass.  	  REVIEW OF SYSTEMS:  r eczema	    [ ] All others negative	  [X ] Unable to obtain    PHYSICAL EXAM:  T(C): 37.9 (12-24-17 @ 10:30), Max: 39.6 (12-24-17 @ 08:30)  HR: 103 (12-24-17 @ 12:38) (100 - 149)  BP: 116/59 (12-24-17 @ 11:30) (83/49 - 124/65)  RR: 31 (12-24-17 @ 11:30) (18 - 38)  SpO2: 100% (12-24-17 @ 12:38) (96% - 100%)  Wt(kg): --  I&O's Summary    23 Dec 2017 07:01  -  24 Dec 2017 07:00  --------------------------------------------------------  IN: 1673.1 mL / OUT: 765 mL / NET: 908.1 mL    24 Dec 2017 07:01  -  24 Dec 2017 13:02  --------------------------------------------------------  IN: 203 mL / OUT: 125 mL / NET: 78 mL        Appearance: Normal	  HEENT:   Normal oral mucosa, PERRL, EOMI	  Lymphatic: No lymphadenopathy  Cardiovascular: Normal S1 S2, No JVD, + murmurs, No edema  Respiratory: + rhonchi  Psychiatry: A & O x 3, Mood & affect appropriate  Gastrointestinal:  Soft, Non-tender, + BS	  Skin: No rashes, No ecchymoses, No cyanosis	  Neurologic: Non-focal  Extremities: Normal range of motion, No clubbing, cyanosis or edema  Vascular: Peripheral pulses palpable 2+ bilaterally    MEDICATIONS  (STANDING):  amiodarone    Tablet 200 milliGRAM(s) Oral daily  aspirin  chewable 81 milliGRAM(s) Oral daily  BACItracin   Ointment 1 Application(s) Topical two times a day  heparin  Injectable 5000 Unit(s) SubCutaneous every 12 hours  micafungin IVPB      micafungin IVPB 100 milliGRAM(s) IV Intermittent every 24 hours  midodrine 5 milliGRAM(s) Oral every 8 hours  pantoprazole  Injectable 40 milliGRAM(s) IV Push daily  phenylephrine    Infusion 0.5 MICROgram(s)/kG/Min (6.563 mL/Hr) IV Continuous <Continuous>  piperacillin/tazobactam IVPB. 3.375 Gram(s) IV Intermittent every 12 hours  sodium bicarbonate 1300 milliGRAM(s) Oral three times a day  vasopressin Infusion 0.04 Unit(s)/Min (2.4 mL/Hr) IV Continuous <Continuous>      TELEMETRY: 	    ECG:  	  RADIOLOGY:  OTHER: 	  	  LABS:	 	    CARDIAC MARKERS:                                9.2    21.0  )-----------( 67       ( 24 Dec 2017 07:06 )             29.5     12-24    149<H>  |  118<H>  |  164<H>  ----------------------------<  133<H>  5.3   |  16<L>  |  4.80<H>    Ca    7.4<L>      24 Dec 2017 07:12  Phos  7.8     12-24  Mg     3.0     12-24    TPro  5.8<L>  /  Alb  1.2<L>  /  TBili  1.3<H>  /  DBili  x   /  AST  30  /  ALT  21  /  AlkPhos  73  12-23    proBNP:   Lipid Profile: Cholesterol 64  LDL 20  HDL 26  TG 88    HgA1c: Hemoglobin A1C, Whole Blood: 6.2 % (12-05 @ 09:32)    TSH: Thyroid Stimulating Hormone, Serum: 0.45 uU/mL (12-05 @ 07:14)          Assessment and plan  ---------------------------  A 72 y M  former smoker 20 PPD with Hx HTN BPH  p/w generalized weakness, voice changes with imaging showing left hilar mass in CT scan , s/p bronch and biopsy on 12/06 , admitted to ICU on 12/06 for pneumothorax s/p diagnostic bronchoscopy for left lobe lesions found on CT imaging - chest tube placed in ICU 12/6 on suction - admitted for urgent CT placement and monitoring. pt was downgraded on Lung biopsy from 12/07. Lung biopsy did not show any malignancy. s/p transbronchial lung biopsy , s/p endobronchial lung biopsy,     sepsis continue abx r/o c diff  still in a.fib overall hr is controlled Ac if no contraindication

## 2017-12-24 NOTE — PROGRESS NOTE ADULT - PROBLEM SELECTOR PLAN 2
Resolved. Patient now with hypernatremia however would not start free water at this time given volume overload unless serum sodium continues to increase.

## 2017-12-24 NOTE — PROGRESS NOTE ADULT - PROBLEM SELECTOR PLAN 5
in the setting of ZENY. Vital AF 1.2 has fairly low phos content.  Nepro has ~100mg less phos/24h period at 40ml/hr rate. Okay to continue Vital AF 1.2 for now. Had discussion with pharmacy and they do not currently carry any liquid formulations of any phosphorus binders and capsules cannot be crushed. Monitor serum phos.

## 2017-12-24 NOTE — PROGRESS NOTE ADULT - PROBLEM SELECTOR PLAN 7
Due in large part to low oncotic pressure from hypoalbuminemia. Continue TF's to improve nutrition and possibly oncotic pressure.

## 2017-12-24 NOTE — PROGRESS NOTE ADULT - SUBJECTIVE AND OBJECTIVE BOX
Patient is a 72y old  Male who presents with a chief complaint of weakness, chest discomfort and voice and appetite changes (05 Dec 2017 01:22)  Sedated, intubated on mechanical ventilation with anasarca. Remains on IV pressors meds.    INTERVAL HPI/OVERNIGHT EVENTS:      VITAL SIGNS:  T(F): 100.3 (12-24-17 @ 10:30)  HR: 108 (12-24-17 @ 10:30)  BP: 116/62 (12-24-17 @ 10:30)  RR: 26 (12-24-17 @ 10:30)  SpO2: 100% (12-24-17 @ 10:30)  Wt(kg): --  I&O's Detail    23 Dec 2017 07:01  -  24 Dec 2017 07:00  --------------------------------------------------------  IN:    phenylephrine   Infusion: 953.1 mL    Vital HN: 720 mL  Total IN: 1673.1 mL    OUT:    Indwelling Catheter - Urethral: 765 mL  Total OUT: 765 mL    Total NET: 908.1 mL      24 Dec 2017 07:01  -  24 Dec 2017 11:55  --------------------------------------------------------  IN:    phenylephrine   Infusion: 200.6 mL    vasopressin Infusion: 2.4 mL  Total IN: 203 mL    OUT:    Indwelling Catheter - Urethral: 125 mL  Total OUT: 125 mL    Total NET: 78 mL        Mode: AC/ CMV (Assist Control/ Continuous Mandatory Ventilation)  RR (machine): 28  TV (machine): 500  FiO2: 50  PEEP: 5  ITime: 1  MAP: 15  PIP: 25        REVIEW OF SYSTEMS:    CONSTITUTIONAL:  No fevers, chills, sweats    HEENT:  Eyes:  No diplopia or blurred vision. ENT:  No earache, sore throat or runny nose.    CARDIOVASCULAR:  No pressure, squeezing, tightness, or heaviness about the chest; no palpitations.    RESPIRATORY:  Per HPI    GASTROINTESTINAL:  No abdominal pain, nausea, vomiting or diarrhea.    GENITOURINARY:  No dysuria, frequency or urgency.    NEUROLOGIC:  No paresthesias, fasciculations, seizures or weakness.    PSYCHIATRIC:  No disorder of thought or mood.      PHYSICAL EXAM:    Constitutional: Well developed and nourished  Eyes:Perrla  ENMT: normal  Neck:supple  Respiratory: good air entry  Cardiovascular: S1 S2 regular  Gastrointestinal: Soft, Non tender  Extremities: No edema  Vascular:normal  Neurological:sedated  Musculoskeletal: Anasarca.      MEDICATIONS  (STANDING):  amiodarone    Tablet 200 milliGRAM(s) Oral daily  aspirin  chewable 81 milliGRAM(s) Oral daily  BACItracin   Ointment 1 Application(s) Topical two times a day  heparin  Injectable 5000 Unit(s) SubCutaneous every 12 hours  micafungin IVPB      micafungin IVPB 100 milliGRAM(s) IV Intermittent every 24 hours  midodrine 5 milliGRAM(s) Oral every 8 hours  pantoprazole  Injectable 40 milliGRAM(s) IV Push daily  phenylephrine    Infusion 0.5 MICROgram(s)/kG/Min (6.563 mL/Hr) IV Continuous <Continuous>  piperacillin/tazobactam IVPB. 3.375 Gram(s) IV Intermittent every 12 hours  sodium bicarbonate 1300 milliGRAM(s) Oral three times a day  vasopressin Infusion 0.04 Unit(s)/Min (2.4 mL/Hr) IV Continuous <Continuous>    MEDICATIONS  (PRN):  acetaminophen  Suppository 650 milliGRAM(s) Rectal every 6 hours PRN For Temp greater than 38 C (100.4 F)      Allergies    No Known Allergies    Intolerances        LABS:                        9.2    21.0  )-----------( 67       ( 24 Dec 2017 07:06 )             29.5     12-24    149<H>  |  118<H>  |  164<H>  ----------------------------<  133<H>  5.3   |  16<L>  |  4.80<H>    Ca    7.4<L>      24 Dec 2017 07:12  Phos  7.8     12-24  Mg     3.0     12-24    TPro  5.8<L>  /  Alb  1.2<L>  /  TBili  1.3<H>  /  DBili  x   /  AST  30  /  ALT  21  /  AlkPhos  73  12-23        ABG - ( 24 Dec 2017 04:47 )  pH: 7.22  /  pCO2: 40    /  pO2: 116   / HCO3: 16    / Base Excess: -10.9 /  SaO2: 98                    CAPILLARY BLOOD GLUCOSE            RADIOLOGY & ADDITIONAL TESTS:    CXR:    Ct scan chest:    ekg;    echo:

## 2017-12-24 NOTE — PROGRESS NOTE ADULT - SUBJECTIVE AND OBJECTIVE BOX
[facility] NEPHROLOGY- PROGRESS NOTE    71 yo M with HTN, BPH a/w chest discomfort, suprahilar/ hepatic mass and hyponatremia. Hosp course cb subcutaneous emphysema s/p bronch with Left chest tube placement. s/p EBUS 12/12 with  endobronchial lung biopsy neg for malignancy however mediastinal biopsy indeterminate. Now intubated with septic shock 2/2 Necrotizing PNA, hypotension on pressors, with ZENY and hyponatremia.    Renal consulted for hyponatremia and ZENY.     Hospital Medications: Medications reviewed.    REVIEW OF SYSTEMS: Unable to obtain as patient intubated    VITALS:  T(F): 100.3 (12-24-17 @ 10:30), Max: 103.2 (12-24-17 @ 08:30)  HR: 103 (12-24-17 @ 12:38)  BP: 116/59 (12-24-17 @ 11:30)  RR: 31 (12-24-17 @ 11:30)  SpO2: 100% (12-24-17 @ 12:38)  Wt(kg): --    12-23 @ 07:01  -  12-24 @ 07:00  --------------------------------------------------------  IN: 1673.1 mL / OUT: 765 mL / NET: 908.1 mL    12-24 @ 07:01  -  12-24 @ 14:31  --------------------------------------------------------  IN: 203 mL / OUT: 125 mL / NET: 78 mL      PHYSICAL EXAM:  Gen: Unresponsive to tactile stimuli (not on sedation)  HEENT: intubated  Cards: Irregular and tachycardic, +S1/S2, no M/G/R  Resp: course BS B/L   GI: soft, NT/ND, NABS  : + frias  Extremities: +B LE edema/anasarca    LABS:  12-24    149<H>  |  118<H>  |  164<H>  ----------------------------<  133<H>  5.3   |  16<L>  |  4.80<H>    Ca    7.4<L>      24 Dec 2017 07:12  Phos  7.8     12-24  Mg     3.0     12-24    TPro  5.8<L>  /  Alb  1.2<L>  /  TBili  1.3<H>  /  DBili      /  AST  30  /  ALT  21  /  AlkPhos  73  12-23    Creatinine Trend: 4.80 <--, 4.24 <--, 3.92 <--, 3.63 <--, 3.53 <--, 3.51 <--, 2.83 <--, 2.87 <--                        9.2    21.0  )-----------( 67       ( 24 Dec 2017 07:06 )             29.5

## 2017-12-24 NOTE — PROGRESS NOTE ADULT - SUBJECTIVE AND OBJECTIVE BOX
Patient is a 72y old  Male who presents with a chief complaint of weakness, chest discomfort and voice and appetite changes (05 Dec 2017 01:22)      pt seen in icu [ x ], reg med floor [ ], bed [ x ], chair at bedside [   ], a+o x3 [  ],sedated [ x ], nad [x  ],     et tube [x],  ngt feed [ x ], frias to bsd [x], left ij cent line [x], phenylepherine drip [x]      Allergies    No Known Allergies        Vitals    T(F): 99.8 (12-24-17 @ 01:00), Max: 102 (12-23-17 @ 19:45)  HR: 144 (12-24-17 @ 07:00) (99 - 149)  BP: 97/37 (12-24-17 @ 07:00) (79/43 - 124/65)  RR: 31 (12-24-17 @ 07:00) (18 - 38)  SpO2: 100% (12-24-17 @ 07:00) (96% - 100%)  Wt(kg): --  CAPILLARY BLOOD GLUCOSE          Labs                          9.3    19.0  )-----------( 49       ( 23 Dec 2017 07:01 )             29.8       12-23    145  |  116<H>  |  149  ----------------------------<  176<H>  4.9   |  16<L>  |  4.24<H>    Ca    7.3<L>      23 Dec 2017 07:01  Phos  7.0     12-23  Mg     3.0     12-23    TPro  5.8<L>  /  Alb  1.2<L>  /  TBili  1.3<H>  /  DBili  x   /  AST  30  /  ALT  21  /  AlkPhos  73  12-23            .Blood Blood  12-22 @ 23:43   No growth to date.  --  --      .Sputum Sputum  12-20 @ 21:49   Normal Respiratory Lissy present  --    Moderate polymorphonuclear leukocytes per low power field  No Squamous epithelial cells per low power field  Few Gram positive cocci in pairs per oil power field      .Blood Blood  12-14 @ 00:30   No growth at 5 days.  --  --      .Broncial received in trap  12-13 @ 08:06   Normal Respiratory Lissy present  --    No polymorphonuclear cells seen per low power field  No squamous epithelial cells per low power field  Moderate Gram Positive Cocci in Pairs and Chains per oil power field  Few-moderate Gram Negative Coccobacilli per oil power field      .Surgical Swab Bronchial Alveolar Lavage  12-12 @ 22:52   Moderate Prevotella intermedia  Rare Prevotella bivia  Few Normal Respiratory Lissy present  --  --      .Tissue Left Lower Lobe  12-12 @ 22:45   Rare Haemophilus parainfluenzae  Rare Neisseria species, not gonorrhoeae or meningitidis  Rare Alpha hemolytic strep  Few Prevotella melaninogenica  Numerous Prevotella intermedia  --    Rare polymorphonuclear leukocytes seen per low power field  Numerous Gram Negative Rods per oil power field      .Blood Blood  12-11 @ 11:22   No growth at 5 days.  --  --      .Broncial Other, bronchioalveolar lavage  12-06 @ 22:28   No growth at 1 week.  --  --      .Blood Blood-Peripheral  12-05 @ 10:57   No growth at 5 days.  --  --          Radiology Results      Meds    MEDICATIONS  (STANDING):  amiodarone    Tablet 200 milliGRAM(s) Oral daily  aspirin  chewable 81 milliGRAM(s) Oral daily  BACItracin   Ointment 1 Application(s) Topical two times a day  heparin  Injectable 5000 Unit(s) SubCutaneous every 12 hours  micafungin IVPB      micafungin IVPB 100 milliGRAM(s) IV Intermittent every 24 hours  midodrine 5 milliGRAM(s) Oral every 8 hours  pantoprazole  Injectable 40 milliGRAM(s) IV Push daily  phenylephrine    Infusion 0.5 MICROgram(s)/kG/Min (6.563 mL/Hr) IV Continuous <Continuous>  piperacillin/tazobactam IVPB. 3.375 Gram(s) IV Intermittent every 12 hours  sodium bicarbonate 1300 milliGRAM(s) Oral three times a day  vancomycin  IVPB 500 milliGRAM(s) IV Intermittent every 12 hours      MEDICATIONS  (PRN):  acetaminophen  Suppository 650 milliGRAM(s) Rectal every 6 hours PRN For Temp greater than 38 C (100.4 F)      Physical Exam    Neuro :  no focal deficits  Respiratory: CTA B/L,   CV: RRR, S1S2, no murmurs,   Abdominal: Soft, NT, ND +BS,  Extremities: b/l le edema, + peripheral pulses  genitals: edematous penis with some erythema, frias to bsd    ASSESSMENT    septic shock  left lower lobe mass with bony destruction,   hepatic and splenic lesions,   r/o malig,   copd   destruction of left 6th and 7th ribs and left T6 transverse process   exophytic left renal lesion  subcutaneous emphysema  pneumothorax  s/p hyponatremia  silverio  new afib  penile edema  h/o BPH (benign prostatic hyperplasia)  Hypertension        PLAN        S/P flexible bronchoscopy showing possible Large B-cell lymphoma,   s/p flex bronch transbronchial lung bx and EBU bx 12/12  cytopath of flex bronch and ebus neg for malig  cx from flex bronch with Few Normal Respiratory Lissy present and Rare Haemophilus parainfluenzae  Rare Neisseria species, not gonorrhoeae or meningitidis  -- Rare polymorphonuclear leukocytes seen per low power field  Numerous Gram Negative Rods per oil power field  noted above   thoracic surg f/u  vent mgmt  cont atrov and prov nebs,   cont supplemental oxygen,  ct chest-abd-pelv result note  rept cxr result with improvement noted  pulm f/u   s/p bronch with bx   cytopath of bronch neg for malig noted  cytopatho transbronchial bx with Interstitial fibrosis and intra-alveolar smoker's  macrophages noted above.   urology cons for renal lesion  cont ivf  renal f/u   cardio f/u  cont amiodarone 200 mg daily  cont midodrine  id f/u noted  cont vanco  vanco t noted above  cont zosyn  Mycamine 100 mg IVPB q day added.  paliative eval noted  heme onc cons noted  cont current meds  pt for ir bx of liver or renal lesion when stable  mgmt as per icu Patient is a 72y old  Male who presents with a chief complaint of weakness, chest discomfort and voice and appetite changes (05 Dec 2017 01:22)      pt seen in icu [ x ], reg med floor [ ], bed [ x ], chair at bedside [   ], a+o x3 [  ],sedated [ x ], nad [x  ],     et tube [x],  ngt feed [ x ], frias to bsd [x], left ij cent line [x], phenylepherine drip [x]      Allergies    No Known Allergies        Vitals    T(F): 99.8 (12-24-17 @ 01:00), Max: 102 (12-23-17 @ 19:45)  HR: 144 (12-24-17 @ 07:00) (99 - 149)  BP: 97/37 (12-24-17 @ 07:00) (79/43 - 124/65)  RR: 31 (12-24-17 @ 07:00) (18 - 38)  SpO2: 100% (12-24-17 @ 07:00) (96% - 100%)  Wt(kg): --  CAPILLARY BLOOD GLUCOSE          Labs                          9.3    19.0  )-----------( 49       ( 23 Dec 2017 07:01 )             29.8       12-23    145  |  116<H>  |  149  ----------------------------<  176<H>  4.9   |  16<L>  |  4.24<H>    Ca    7.3<L>      23 Dec 2017 07:01  Phos  7.0     12-23  Mg     3.0     12-23    TPro  5.8<L>  /  Alb  1.2<L>  /  TBili  1.3<H>  /  DBili  x   /  AST  30  /  ALT  21  /  AlkPhos  73  12-23            .Blood Blood  12-22 @ 23:43   No growth to date.  --  --      .Sputum Sputum  12-20 @ 21:49   Normal Respiratory Lissy present  --    Moderate polymorphonuclear leukocytes per low power field  No Squamous epithelial cells per low power field  Few Gram positive cocci in pairs per oil power field      .Blood Blood  12-14 @ 00:30   No growth at 5 days.  --  --      .Broncial received in trap  12-13 @ 08:06   Normal Respiratory Lissy present  --    No polymorphonuclear cells seen per low power field  No squamous epithelial cells per low power field  Moderate Gram Positive Cocci in Pairs and Chains per oil power field  Few-moderate Gram Negative Coccobacilli per oil power field      .Surgical Swab Bronchial Alveolar Lavage  12-12 @ 22:52   Moderate Prevotella intermedia  Rare Prevotella bivia  Few Normal Respiratory Lissy present  --  --      .Tissue Left Lower Lobe  12-12 @ 22:45   Rare Haemophilus parainfluenzae  Rare Neisseria species, not gonorrhoeae or meningitidis  Rare Alpha hemolytic strep  Few Prevotella melaninogenica  Numerous Prevotella intermedia  --    Rare polymorphonuclear leukocytes seen per low power field  Numerous Gram Negative Rods per oil power field      .Blood Blood  12-11 @ 11:22   No growth at 5 days.  --  --      .Broncial Other, bronchioalveolar lavage  12-06 @ 22:28   No growth at 1 week.  --  --      .Blood Blood-Peripheral  12-05 @ 10:57   No growth at 5 days.  --  --          Radiology Results      Meds    MEDICATIONS  (STANDING):  amiodarone    Tablet 200 milliGRAM(s) Oral daily  aspirin  chewable 81 milliGRAM(s) Oral daily  BACItracin   Ointment 1 Application(s) Topical two times a day  heparin  Injectable 5000 Unit(s) SubCutaneous every 12 hours  micafungin IVPB      micafungin IVPB 100 milliGRAM(s) IV Intermittent every 24 hours  midodrine 5 milliGRAM(s) Oral every 8 hours  pantoprazole  Injectable 40 milliGRAM(s) IV Push daily  phenylephrine    Infusion 0.5 MICROgram(s)/kG/Min (6.563 mL/Hr) IV Continuous <Continuous>  piperacillin/tazobactam IVPB. 3.375 Gram(s) IV Intermittent every 12 hours  sodium bicarbonate 1300 milliGRAM(s) Oral three times a day  vancomycin  IVPB 500 milliGRAM(s) IV Intermittent every 12 hours      MEDICATIONS  (PRN):  acetaminophen  Suppository 650 milliGRAM(s) Rectal every 6 hours PRN For Temp greater than 38 C (100.4 F)      Physical Exam    Neuro :  no focal deficits  Respiratory: CTA B/L,   CV: RRR, S1S2, no murmurs,   Abdominal: Soft, NT, ND +BS,  Extremities: b/l le edema, + peripheral pulses  genitals: edematous penis with some erythema, frias to bsd    ASSESSMENT    septic shock  left lower lobe mass with bony destruction,   hepatic and splenic lesions,   r/o malig,   copd   destruction of left 6th and 7th ribs and left T6 transverse process   exophytic left renal lesion  subcutaneous emphysema  pneumothorax  s/p hyponatremia  silverio  new afib  penile edema  h/o BPH (benign prostatic hyperplasia)  Hypertension        PLAN        S/P flexible bronchoscopy showing possible Large B-cell lymphoma,   s/p flex bronch transbronchial lung bx and EBU bx 12/12  cytopath of flex bronch and ebus neg for malig  cx from flex bronch with Few Normal Respiratory Lissy present and Rare Haemophilus parainfluenzae  Rare Neisseria species, not gonorrhoeae or meningitidis  -- Rare polymorphonuclear leukocytes seen per low power field  Numerous Gram Negative Rods per oil power field  noted above   thoracic surg f/u  vent mgmt  cont atrov and prov nebs,   cont supplemental oxygen,  ct chest-abd-pelv result note  rept cxr result with improvement noted  pulm f/u   s/p bronch with bx   cytopath of bronch neg for malig noted  cytopatho transbronchial bx with Interstitial fibrosis and intra-alveolar smoker's  macrophages noted above.   urology cons for renal lesion  cont ivf  renal f/u   cardio f/u  cont amiodarone 200 mg daily  cont midodrine  id f/u   f/u Procalcitonin level, HIV, ESR, and CRP  cont vanco  vanco t noted above  cont zosyn  Mycamine 100 mg IVPB q day added.  paliative eval noted  heme onc cons noted  cont current meds  pt for ir bx of liver or renal lesion when stable  icu team planning trach and peg  pt's friend to d/w pt's brother with regards to hd  pt dnr  mgmt as per icu

## 2017-12-24 NOTE — PROGRESS NOTE ADULT - PROBLEM SELECTOR PLAN 6
Non-anion gap metabolic acidosis, gap-metabolic acidosis with respiratory acidosis. Increase sodium bicarbonate to 1950 mg TID. Can increase RR or TV to increase pH via respiratory compensation.  Monitor pH.

## 2017-12-25 NOTE — PROGRESS NOTE ADULT - PROBLEM SELECTOR PLAN 1
Ventilator support  Follow up ABGs  Correct metabolic acidosis  Bronchodilators  Pulmonary toilet  Aspiration precautions  Decubitus prevention  Trach/ Peg this week

## 2017-12-25 NOTE — PROGRESS NOTE ADULT - SUBJECTIVE AND OBJECTIVE BOX
Meds:  Zosyn 3.375 gm IVPB q 12 hours.  Vancomycin 1 gm IVPB Q day.  Micafungin 100 mg IVPB q day.       Allergies:  Allergies    No Known Allergies    Intolerances  ROS  [ x ] UNABLE TO ELICIT    General:  [  ] None  [  ] Fever  [  ] Chills  [  ] Malaise    Skin:  [  ] None [  ] Rash  [  ] Wound  [  ] Ulcer    HEENT:  [  ] None  [  ] Sore Throat  [  ] Nasal congestion/ runny nose  [  ] Photophobia [  ] Neck pain      Chest:  [  ] None   [  ] SOB  [  ] Cough  [  ] None    Cardiovascular:   [  ] None  [  ] CP  [  ] Palpitation    Gastrointestinal:  [  ] None  [  ] Abd pain   [  ] Nausea    [  ] Vomiting   [  ] Diarrhea	     Genitourinary:  [  ] None [  ] Polyuria   [  ] Urgency  [  ] Frequency  [  ] Dysuria    [  ]  Hematuria       Musculoskeletal:  [  ] None [  ] Back Pain	[  ] Body aches  [  ] Joint pain    Neurological: [  ] None [  ]Dizziness  [  ]Visual Disturbance  [  ]Headaches   [  ] Weakness        PHYSICAL EXAM:  Vital Signs Last 24 Hrs  T(C): 36.8 (25 Dec 2017 09:00), Max: 37.9 (24 Dec 2017 10:30)  T(F): 98.3 (25 Dec 2017 09:00), Max: 100.3 (24 Dec 2017 10:30)  HR: 143 (25 Dec 2017 09:30) (96 - 166)  BP: 120/34 (25 Dec 2017 09:30) (53/40 - 153/74)  BP(mean): 54 (25 Dec 2017 09:30) (20 - 110)  RR: 30 (25 Dec 2017 09:30) (22 - 34)  SpO2: 100% (25 Dec 2017 09:30) (64% - 100%)    Constitutional:    HEENT: [ x ] Wnl  [ x ] ET and NGT in place    Neck:  [ x ] Supple  [  ]Lymphadenopathy  [  ] No JVD   [  ] JVD  [  ] Masses   [  ] WNL    CHEST/Respiratory:  [  ]Clear to auscultation  [ x ] Rales   [ x ] Rhonchi   [  ] Wheezing     [ x ] S/P Left side Chest tube insertion and removal     Cardiovascular:  [ x ] Reg S1 S2   [  ] Irreg S1 S2   [ x ]No Murmur  [  ] +ve Murmurs  [  ]Systolic [  ]Diastolic      Abdomen:  [ x ] Soft  [ x ] No tendrerness  [  ] Tenderness  [  ] Organomegaly  [  ] ABD Distention  [  ] Rigidity                       [ x ] No Regidity                       [ x ] No Rebound Tenderness  [ x ] No Guarding Rigidity  [  ] Rebound Tenderness[  ] Guarding Rigidity                          [ x ]  +ve Bowel Sounds  [  ] Decreased Bowel Sounds    [  ] Absent Bowel Sounds                            Extremities: [ x ] No edema [  ] Edema  [  ] Clubbing   [  ] Cyanosis                         [ x ] No Tender Calf muscles  [  ] Tender Calf muscles                        [ x ] Palpable peripheral pulses    Neurological: [  ] Awake  [  ] Alert  [  ] Oriented  x                             [  ] Confused  [  ] Drowzy  [ x ] respond to painful stimuli  [  ] Unresponsive    Skin:  [ x ] Intact [  ] Redness [  ] Thrombophlebitis  [  ] Rashes  [  ] Dry  [  ] Ulcers    Ortho:  [  ] Joint Swelling  [  ] Joint erythema [  ] Joint tenderness                [  ] Increased temp. to touch  [  ] DJD [ x ] WNL            LABS/DIAGNOSTIC TESTS                         10.1   31.6  )-----------( 71       ( 25 Dec 2017 04:19 )             32.2   12-25    148<H>  |  119<H>  |  171  ----------------------------<  168<H>  5.9<H>   |  12<L>  |  5.22<H>    Ca    7.3<L>      25 Dec 2017 04:19  Phos  7.8     12-25  Mg     3.1     12-25                               Vancomycin Level, Trough: 25.6: TYPE:(C=Critical, N=Notification, A=Abnormal) c    C Diff by PCR Result: NotDetec (12.24.17 @ 22:20)       CULTURES:     Culture - Bronchial (12.13.17 @ 08:06)    Gram Stain:   No polymorphonuclear cells seen per low power field  No squamous epithelial cells per low power field  Moderate Gram Positive Cocci in Pairs and Chains per oil power field  Few-moderate Gram Negative Coccobacilli per oil power field    Specimen Source: .Broncial received in trap    Culture Results:   Normal Respiratory Lissy present    Culture - Surgical Swab (12.12.17 @ 22:52)    Specimen Source: .Surgical Swab Bronchial Alveolar Lavage    Culture Results:   Few Normal Respiratory Lissy present    Culture - Tissue with Gram Stain (12.12.17 @ 22:45)    Gram Stain:   Rare polymorphonuclear leukocytes seen per low power field  Numerous Gram Negative Rods per oil power field    Specimen Source: .Tissue Left Lower Lobe    Culture Results:   Rare Haemophilus parainfluenzae  Rare Neisseria species, not gonorrhoeae or meningitidis    Culture - Blood (12.11.17 @ 11:22)    Specimen Source: .Blood Blood    Culture Results:   No growth to date.    Culture - Blood (12.11.17 @ 11:22)    Specimen Source: .Blood Blood    Culture Results:   No growth to date.    Culture - Blood (12.11.17 @ 11:22)    Specimen Source: .Blood Blood    Culture Results:   No growth to date.            RADIOLOGY    EXAM:  CHEST PORTABLE ROUTINE                            PROCEDURE DATE:  12/25/2017          INTERPRETATION:  History: Pneumonia.    Findings: Frontal chest.    Comparison: 12/24/2017.    Exam is limited by overlying cooling blanket and patient rotation towards   the right.    Endotracheal tube, nasogastric tube and left internal jugular central   venous catheter again seen in place. Multiple age clips and wires overlie   the chest.    Difficult to evaluate adequately assess heart size due to marked patient   rotation. Worsened lung opacities, greater on the left; effusion,   atelectasis and/or infiltrates, may be exaggerated by marked patient   rotation. Cystic changes left lower lung.    Impression:    Worsening lung opacities, greater on the left; effusion, atelectasis   and/or infiltrates, may be exaggerated by marked patient rotation.           Assessment and Recommendation:   72 years old male from home with PMHx of HTN and BPH and no PSH presents to ED c/o L-sided chest pain radiating to the back with associated general weakness x6 months. Patient also reports loss of voice x6 months, in addition to decreased appetite and weight loss x2 weeks, only been able to consume x3 Ensure today.   10/6/17 patient had bronchoscopy and subsequent pneumothorax and chest tube insertion.  Patient was initially transferred from ICU to regular floor, then upgraded to the icu after condition became unstable.  Patient was started on IV Zosyn on 12/12/17.    12/18/17 WBC is increasing again.  12/19/17 WBC improved but H&H dropped.  Vancomycin was started as suggested, but Mycamine is not started as suggested, and discussed with ICU resident on 12/19/17.   12/20 chest tube was removed.  12/21/17 Patient is tachycardiac, and BP is low, O2 is low, but PH, and WBC are better.  Micafungin was started 12/21/17, patient is hypotensive, and tachycardiac and labs for today are not available yet.    12/24/17 Central line was removed, WBC is increased, and Vancomycin trough is high.    Problem/Recommendation - 1:  Problem: Pneumonia, bacterial.   Recommendation:   1- UA & CS.  2- Follow Blood culture to final report are negative.  3- Blood transfusions as needed.  4- Change IV Zosyn 3.375 gm IVPB q 12 hours to Meropenem 500 mg IVPB q 8 hours.  5- Fluid, electrolytes and renal management.  6- CBC and CMP follow up.   7- Follow up CXR.  8- Respirator management.  9- Procalcitonin level, HIV, ESR, and CRP testing, and repeat sputum culture.  10- Continue Mycamine 100 mg IVPB q day empirically As patient has been on long duration of ABX, with intubation and central lines (Started on 12/21/17).  11- Continue Vancomycin, but decrease the dose to 500 mg IVPB q 28 hours, but hold it till the trough is less than 20.  12- Oncology work and follow up.    Problem/Recommendation - 2:  ·  Problem: COPD (chronic obstructive pulmonary disease).    Recommendation:   1- Bronchodilators.  2- O2 as needed.  3- Pulmonary, and cardiology management.  4- Steroids as per primary, and pulmonary team if needed.     Problem/Recommendation - 3:  ·  Problem: ZENY (acute kidney injury).    Recommendation:   1- Renal management.  2- Fluid and electrolytes management and follow up.     Discussed with medical resident.

## 2017-12-25 NOTE — PROGRESS NOTE ADULT - SUBJECTIVE AND OBJECTIVE BOX
INTERVAL HPI/OVERNIGHT EVENTS:     PRESSORS: [ ] YES [ ] NO  WHICH:    ANTIBIOTICS: Zosyn               DATE STARTED: 12/12/17    Antimicrobial:  micafungin IVPB      micafungin IVPB 100 milliGRAM(s) IV Intermittent every 24 hours  piperacillin/tazobactam IVPB. 3.375 Gram(s) IV Intermittent every 12 hours    Cardiovascular:  amiodarone    Tablet 200 milliGRAM(s) Oral daily  midodrine 5 milliGRAM(s) Oral every 8 hours  norepinephrine Infusion 0.5 MICROgram(s)/kG/Min IV Continuous <Continuous>  phenylephrine    Infusion 0.5 MICROgram(s)/kG/Min IV Continuous <Continuous>    Pulmonary:    Hematalogic:  aspirin  chewable 81 milliGRAM(s) Oral daily  heparin  Injectable 5000 Unit(s) SubCutaneous every 12 hours    Other:  acetaminophen  Suppository 650 milliGRAM(s) Rectal every 6 hours PRN  BACItracin   Ointment 1 Application(s) Topical two times a day  HYDROmorphone  Injectable 1 milliGRAM(s) IV Push every 4 hours PRN  pantoprazole  Injectable 40 milliGRAM(s) IV Push daily  sodium bicarbonate 1950 milliGRAM(s) Oral three times a day  sodium bicarbonate  Injectable 50 milliEquivalent(s) IV Push once  vasopressin Infusion 0.04 Unit(s)/Min IV Continuous <Continuous>    acetaminophen  Suppository 650 milliGRAM(s) Rectal every 6 hours PRN  amiodarone    Tablet 200 milliGRAM(s) Oral daily  aspirin  chewable 81 milliGRAM(s) Oral daily  BACItracin   Ointment 1 Application(s) Topical two times a day  heparin  Injectable 5000 Unit(s) SubCutaneous every 12 hours  HYDROmorphone  Injectable 1 milliGRAM(s) IV Push every 4 hours PRN  micafungin IVPB      micafungin IVPB 100 milliGRAM(s) IV Intermittent every 24 hours  midodrine 5 milliGRAM(s) Oral every 8 hours  norepinephrine Infusion 0.5 MICROgram(s)/kG/Min IV Continuous <Continuous>  pantoprazole  Injectable 40 milliGRAM(s) IV Push daily  phenylephrine    Infusion 0.5 MICROgram(s)/kG/Min IV Continuous <Continuous>  piperacillin/tazobactam IVPB. 3.375 Gram(s) IV Intermittent every 12 hours  sodium bicarbonate 1950 milliGRAM(s) Oral three times a day  sodium bicarbonate  Injectable 50 milliEquivalent(s) IV Push once  vasopressin Infusion 0.04 Unit(s)/Min IV Continuous <Continuous>    Drug Dosing Weight  Height (cm): 165 (12 Dec 2017 13:14)  Weight (kg): 76.4 (12 Dec 2017 16:00)  BMI (kg/m2): 28.1 (12 Dec 2017 16:00)  BSA (m2): 1.84 (12 Dec 2017 16:00)    CENTRAL LINE: [ ] YES [ ] NO  LOCATION:   DATE INSERTED:  REMOVE: [ ] YES [ ] NO  EXPLAIN:    LERMA: [ ] YES [ ] NO    DATE INSERTED:  REMOVE:  [ ] YES [ ] NO  EXPLAIN:    A-LINE:  [ ] YES [ ] NO  LOCATION:   DATE INSERTED:  REMOVE:  [ ] YES [ ] NO  EXPLAIN:    PMH -reviewed admission note, no change since admission  Heart faliure: acute [ ] chronic [ ] acute or chronic [ ] diastolic [ ] systolic [ ] combied systolic and diastolic[ ]  ZENY: ATN[ ] renal medullary necrosis [ ] CKD I [ ]CKDII [ ]CKD III [ ]CKD IV [ ]CKD V [ ]Other pathological lesions [ ]  Abdominal Nutrition Status: malnutrition [ ] cachexia [ ] morbid obesity/BMI=40 [ ] Supplement ordered [___________]     ICU Vital Signs Last 24 Hrs  T(C): 36.7 (25 Dec 2017 05:52), Max: 39.6 (24 Dec 2017 08:30)  T(F): 98.1 (25 Dec 2017 05:52), Max: 103.2 (24 Dec 2017 08:30)  HR: 160 (25 Dec 2017 07:00) (96 - 166)  BP: 111/74 (25 Dec 2017 07:00) (53/40 - 153/74)  BP(mean): 83 (25 Dec 2017 07:00) (20 - 110)  ABP: --  ABP(mean): --  RR: 29 (25 Dec 2017 07:00) (22 - 36)  SpO2: 96% (25 Dec 2017 03:00) (64% - 100%)      ABG - ( 25 Dec 2017 04:44 )  pH: 7.14  /  pCO2: 39    /  pO2: 92    / HCO3: 13    / Base Excess: -15.1 /  SaO2: 95                    12-24 @ 07:01  -  12-25 @ 07:00  --------------------------------------------------------  IN: 2191 mL / OUT: 1010 mL / NET: 1181 mL        Mode: AC/ CMV (Assist Control/ Continuous Mandatory Ventilation)  RR (machine): 28  TV (machine): 500  FiO2: 50  PEEP: 5  ITime: 1  MAP: 9  PIP: 20      PHYSICAL EXAM:    GENERAL: [x ]NAD,On mechanical ventilation, sleepy  HEAD:  [x ]Atraumatic, [x]Normocephalic  EYES:[x ]conjunctiva and sclera clear  ENMT: ETT placed,  NECK: [x ]Supple, normal appearance,   NERVOUS SYSTEM: sedated   CHEST/LUNG: On ETT tube, Clear to auscultation, [ x]No chest deformity; [ ]Normal percussion bilaterally; [ ]No rales, rhonchi, wheezing   HEART: [ x]Regular rate and rhythm; [x ]No murmurs, rubs, or gallops  ABDOMEN: [ x]Soft, Nontender, Nondistended; [x ]Bowel sounds present  EXTREMITIES:  [x ]2+ Peripheral Pulses, [ x]No clubbing, cyanosis, or edema, on SCD boots,  GENITOURINARY: Large swollen penile area with Lerma catheter  LYMPH: [ x]No lymphadenopathy noted  SKIN: [x ]No rashes or lesions; [ ]Good capillary refill        LABS:  CBC Full  -  ( 25 Dec 2017 04:19 )  WBC Count : 31.6 K/uL  Hemoglobin : 10.1 g/dL  Hematocrit : 32.2 %  Platelet Count - Automated : 71 K/uL  Mean Cell Volume : 99.4 fl  Mean Cell Hemoglobin : 31.2 pg  Mean Cell Hemoglobin Concentration : 31.4 gm/dL  Auto Neutrophil # : 28.4 K/uL  Auto Lymphocyte # : 2.2 K/uL  Auto Monocyte # : 0.7 K/uL  Auto Eosinophil # : 0.2 K/uL  Auto Basophil # : 0.1 K/uL  Auto Neutrophil % : 90.0 %  Auto Lymphocyte % : 6.8 %  Auto Monocyte % : 2.3 %  Auto Eosinophil % : 0.6 %  Auto Basophil % : 0.2 %    12-25    148<H>  |  119<H>  |  171  ----------------------------<  168<H>  5.9<H>   |  12<L>  |  5.22<H>    Ca    7.3<L>      25 Dec 2017 04:19  Phos  7.8     12-25  Mg     3.1     12-25          Culture Results:   No growth to date. (12-22 @ 23:43)  Culture Results:   No growth to date. (12-22 @ 23:43)      RADIOLOGY & ADDITIONAL STUDIES REVIEWED:      [ ]GOALS OF CARE DISCUSSION WITH PATIENT/FAMILY/PROXY:    CRITICAL CARE TIME SPENT: 35 minutes      Assessment and Plan:   · Assessment		  A 72 y M  former smoker 20 PPD with Hx HTN BPH  p/w generalized weakness, voice changes with imaging showing left hilar mass in CT scan , s/p bronch and biopsy on 12/06 , admitted to ICU on 12/06 for pneumothorax s/p diagnostic bronchoscopy for left lobe lesions found on CT imaging - chest tube placed in ICU 12/6 on suction - admitted for urgent CT placement and monitoring. pt was downgraded on Lung biopsy from 12/07. Lung biopsy did not show any malignancy. s/p transbronchial lung biopsy , s/p endobronchial lung biopsy, s/p flexible bronchoscopy today by Dr. Piper.   ROS - unable to get as pt is intubated    admit to ICU post op monitoring s/p intubation s/p chest tube , hypotensive 2/2 septic shock, requiring pressors, on phenylephrine      1: Septic Shock  -s/p intubation , c/w mechanical ventilation  -will need trach  -Chest tube removed on 12/20.  -Patient is on pheny   -increased white count, tissue culture growing gram negative rods rare neisseria, haemophilus, BCx negative to date  - UA negative  - currently on zosyn, vanco and mycamine  - ID Dr. Nicholas      2: ATN  -c/w IVF  -BUN/cr increasing  -monitor urine output   -Sodium bicar increased dose  -HCP decided, Discuss with Brother for Dialysis otherwise two PCP  -Nephro: Dr. Reagan      3: New onset Atrial Fibrillation  - Rate controlled   - EKG shows Atrial fibrillation (new onset)  - Completed amiodarone loading, now amio 200mg  c/w amiodarone  - f/u cardiology - Dr. Nabatian    Pneumothorax   -Resolved  -D/patricio chest tube    Lung mass  -c/w post op prophylaxis  -Bronch path negative for Ca.  -s/p EBUS VS mediastinoscopy  -cyto report, negative for  malignancy  - 1 biopsy still pending  -repeat CT chest - worsening necrotic mass from prior imaging    Hypertension.    -hypotension , holding home BP meds,  c/w pressors    BPH (benign prostatic hyperplasia)  - monitor U/o  -c/w NG tube    Penile swelling:   Urology consult requested, cold compresses, bacitracin ointment application    Hyperphosphatemia:  -Patient phosphate high in setting for kidney failure  -stable will c/w monitoring    Prophylactic measure  - IMPROVE score 2   - c/w heparin S.C for VTE prophylaxis  - GI stress ulcer PPx w/ Protonix.    Goals of care and discussion:  Patient has poor prognosis, Patient is going in ATN, very low UO,  Palliative on board: Patient is DNR INTERVAL HPI/OVERNIGHT EVENTS: Vancomycin being held 2/2 high Vanco trough. C diff was sent for diarrhea which came back negative. K of 5.2 in am, EKG no ST T wave changes. Calcium gluconate, Insulin and Dextrose given.     PRESSORS: [ x ] YES [ ] NO  WHICH: Pheny , Norepi, Vasopressin    ANTIBIOTICS: Zosyn               DATE STARTED: 12/12/17    Antimicrobial:  micafungin IVPB      micafungin IVPB 100 milliGRAM(s) IV Intermittent every 24 hours  piperacillin/tazobactam IVPB. 3.375 Gram(s) IV Intermittent every 12 hours    Cardiovascular:  amiodarone    Tablet 200 milliGRAM(s) Oral daily  midodrine 5 milliGRAM(s) Oral every 8 hours  norepinephrine Infusion 0.5 MICROgram(s)/kG/Min IV Continuous <Continuous>  phenylephrine    Infusion 0.5 MICROgram(s)/kG/Min IV Continuous <Continuous>    Pulmonary:    Hematalogic:  aspirin  chewable 81 milliGRAM(s) Oral daily  heparin  Injectable 5000 Unit(s) SubCutaneous every 12 hours    Other:  acetaminophen  Suppository 650 milliGRAM(s) Rectal every 6 hours PRN  BACItracin   Ointment 1 Application(s) Topical two times a day  HYDROmorphone  Injectable 1 milliGRAM(s) IV Push every 4 hours PRN  pantoprazole  Injectable 40 milliGRAM(s) IV Push daily  sodium bicarbonate 1950 milliGRAM(s) Oral three times a day  sodium bicarbonate  Injectable 50 milliEquivalent(s) IV Push once  vasopressin Infusion 0.04 Unit(s)/Min IV Continuous <Continuous>    acetaminophen  Suppository 650 milliGRAM(s) Rectal every 6 hours PRN  amiodarone    Tablet 200 milliGRAM(s) Oral daily  aspirin  chewable 81 milliGRAM(s) Oral daily  BACItracin   Ointment 1 Application(s) Topical two times a day  heparin  Injectable 5000 Unit(s) SubCutaneous every 12 hours  HYDROmorphone  Injectable 1 milliGRAM(s) IV Push every 4 hours PRN  micafungin IVPB      micafungin IVPB 100 milliGRAM(s) IV Intermittent every 24 hours  midodrine 5 milliGRAM(s) Oral every 8 hours  norepinephrine Infusion 0.5 MICROgram(s)/kG/Min IV Continuous <Continuous>  pantoprazole  Injectable 40 milliGRAM(s) IV Push daily  phenylephrine    Infusion 0.5 MICROgram(s)/kG/Min IV Continuous <Continuous>  piperacillin/tazobactam IVPB. 3.375 Gram(s) IV Intermittent every 12 hours  sodium bicarbonate 1950 milliGRAM(s) Oral three times a day  sodium bicarbonate  Injectable 50 milliEquivalent(s) IV Push once  vasopressin Infusion 0.04 Unit(s)/Min IV Continuous <Continuous>    Drug Dosing Weight  Height (cm): 165 (12 Dec 2017 13:14)  Weight (kg): 76.4 (12 Dec 2017 16:00)  BMI (kg/m2): 28.1 (12 Dec 2017 16:00)  BSA (m2): 1.84 (12 Dec 2017 16:00)    CENTRAL LINE: [ x ] YES [ ] NO  LOCATION: [facility]  DATE INSERTED: 12/21/17  REMOVE: [ ] YES [ ] NO  EXPLAIN:    LERMA: [ x ] YES [ ] NO    DATE INSERTED: 12/12/17  REMOVE:  [ ] YES [ ] NO  EXPLAIN:    A-LINE:  [ ] YES [ x ] NO  LOCATION:   DATE INSERTED:  REMOVE:  [ ] YES [ ] NO  EXPLAIN:    PMH -reviewed admission note, no change since admission  Heart faliure: acute [ ] chronic [ ] acute or chronic [ ] diastolic [ ] systolic [ ] combied systolic and diastolic[ ]  ZENY: ATN[ ] renal medullary necrosis [ ] CKD I [ ]CKDII [ ]CKD III [ ]CKD IV [ ]CKD V [ ]Other pathological lesions [ ]  Abdominal Nutrition Status: malnutrition [ ] cachexia [ ] morbid obesity/BMI=40 [ ] Supplement ordered [___________]     ICU Vital Signs Last 24 Hrs  T(C): 36.7 (25 Dec 2017 05:52), Max: 39.6 (24 Dec 2017 08:30)  T(F): 98.1 (25 Dec 2017 05:52), Max: 103.2 (24 Dec 2017 08:30)  HR: 160 (25 Dec 2017 07:00) (96 - 166)  BP: 111/74 (25 Dec 2017 07:00) (53/40 - 153/74)  BP(mean): 83 (25 Dec 2017 07:00) (20 - 110)  ABP: --  ABP(mean): --  RR: 29 (25 Dec 2017 07:00) (22 - 36)  SpO2: 96% (25 Dec 2017 03:00) (64% - 100%)      ABG - ( 25 Dec 2017 04:44 )  pH: 7.14  /  pCO2: 39    /  pO2: 92    / HCO3: 13    / Base Excess: -15.1 /  SaO2: 95                    12-24 @ 07:01  -  12-25 @ 07:00  --------------------------------------------------------  IN: 2191 mL / OUT: 1010 mL / NET: 1181 mL        Mode: AC/ CMV (Assist Control/ Continuous Mandatory Ventilation)  RR (machine): 28  TV (machine): 500  FiO2: 50  PEEP: 5  ITime: 1  MAP: 9  PIP: 20      PHYSICAL EXAM:    GENERAL:  intubated, sedated  HEAD:  [x ]Atraumatic, [x]Normocephalic  EYES:[x ]conjunctiva and sclera clear  ENMT: ETT in place  NECK: [x ]Supple, normal appearance,   NERVOUS SYSTEM: sedated   CHEST/LUNG: On ETT tube, Clear to auscultation, [ x]No chest deformity; [x]Normal percussion bilaterally; [ ]No rales, rhonchi, wheezing   HEART: [ x]Regular rate and rhythm; [x ]No murmurs, rubs, or gallops  ABDOMEN: [ x]Soft, Nontender, Nondistended; [x ]Bowel sounds not appreciated  EXTREMITIES:  [x ]2+ Peripheral Pulses, [ x]No clubbing, cyanosis, or edema, on SCD boots,  GENITOURINARY: Large swollen penile area with Lerma catheter  LYMPH: [ x]No lymphadenopathy noted  SKIN: [x ]No rashes or lesions; [ ]Good capillary refill        LABS:  CBC Full  -  ( 25 Dec 2017 04:19 )  WBC Count : 31.6 K/uL  Hemoglobin : 10.1 g/dL  Hematocrit : 32.2 %  Platelet Count - Automated : 71 K/uL  Mean Cell Volume : 99.4 fl  Mean Cell Hemoglobin : 31.2 pg  Mean Cell Hemoglobin Concentration : 31.4 gm/dL  Auto Neutrophil # : 28.4 K/uL  Auto Lymphocyte # : 2.2 K/uL  Auto Monocyte # : 0.7 K/uL  Auto Eosinophil # : 0.2 K/uL  Auto Basophil # : 0.1 K/uL  Auto Neutrophil % : 90.0 %  Auto Lymphocyte % : 6.8 %  Auto Monocyte % : 2.3 %  Auto Eosinophil % : 0.6 %  Auto Basophil % : 0.2 %    12-25    148<H>  |  119<H>  |  171  ----------------------------<  168<H>  5.9<H>   |  12<L>  |  5.22<H>    Ca    7.3<L>      25 Dec 2017 04:19  Phos  7.8     12-25  Mg     3.1     12-25          Culture Results:   No growth to date. (12-22 @ 23:43)  Culture Results:   No growth to date. (12-22 @ 23:43)      RADIOLOGY & ADDITIONAL STUDIES REVIEWED:      [ ]GOALS OF CARE DISCUSSION WITH PATIENT/FAMILY/PROXY:    CRITICAL CARE TIME SPENT: 35 minutes      Assessment and Plan:   · Assessment		  72 male with hx of HTN, BPH, lung mass, underwent bronchoscopy with biopsies on 12/6 complicated by pneumothorax, pneumomediastinum, subcutaneous emphysema, s/p chest tube placement on 12/6.  Repeat bronch with EBUS performed on 12/12, so far no biopsy has been positive for malignancy but suspicion is still high.  Now with necrotizing pneumonia, lung abscess, acute resp failure, septic shock, ZENY.        1: Septic Shock  -s/p intubation , c/w mechanical ventilation  -will need trach  -Chest tube removed on 12/20.  -Patient is on pheny , norepinephrine and Vasopressin  -increased white count, tissue culture growing gram negative rods rare neisseria, haemophilus, BCx negative to date  - UA negative  - currently on zosyn, vanco and mycamine  - ID Dr. Nicholas      2: ATN  -c/w IVF  -BUN/cr increasing  -monitor urine output   -Sodium bicar increased dose  -HCP decided, Discuss with Brother for Dialysis otherwise two PCP  -Nephro: Dr. Reagan      3: New onset Atrial Fibrillation  - Rate controlled   - EKG shows Atrial fibrillation (new onset)  - Completed amiodarone loading, now amio 200mg  c/w amiodarone  - f/u cardiology - Dr. Jackson    Pneumothorax   -Resolved  -D/patricio chest tube    Lung mass  -c/w post op prophylaxis  -Bronch path negative for Ca.  -s/p EBUS VS mediastinoscopy  -cyto report, negative for  malignancy  - 1 biopsy still pending  -repeat CT chest - worsening necrotic mass from prior imaging    Hypertension.    -hypotension , holding home BP meds,  c/w pressors    BPH (benign prostatic hyperplasia)  - monitor U/o  -c/w NG tube    Penile swelling:   Urology consult requested, cold compresses, bacitracin ointment application    Hyperphosphatemia:  -Patient phosphate high in setting for kidney failure  -stable will c/w monitoring    Prophylactic measure  - IMPROVE score 2   - c/w heparin S.C for VTE prophylaxis  - GI stress ulcer PPx w/ Protonix.    Goals of care and discussion:  Patient has poor prognosis, Patient is going in ATN, very low UO,  Palliative on board: Patient is DNR

## 2017-12-25 NOTE — PROGRESS NOTE ADULT - SUBJECTIVE AND OBJECTIVE BOX
SURGERY PRE-OP    Diagnosis: prolonged intubatin  Procedure: tracheostomy    ICU Vital Signs Last 24 Hrs  T(C): 36.8 (25 Dec 2017 09:00), Max: 37.9 (24 Dec 2017 10:30)  T(F): 98.3 (25 Dec 2017 09:00), Max: 100.3 (24 Dec 2017 10:30)  HR: 143 (25 Dec 2017 09:30) (96 - 166)  BP: 120/34 (25 Dec 2017 09:30) (53/40 - 153/74)  BP(mean): 54 (25 Dec 2017 09:30) (20 - 110)  RR: 30 (25 Dec 2017 09:30) (22 - 36)  SpO2: 100% (25 Dec 2017 09:30) (64% - 100%)      Pt inubated and on pressors, prognosis poor, palliative consulted

## 2017-12-25 NOTE — CHART NOTE - NSCHARTNOTEFT_GEN_A_CORE
Spoke to HCP (Adolph Prabhakar [phone]) over the phone who spoke to brother and said is ok with dialysis but not ok with trach. Spoke to brother also Marcie Reza ([phone] who called from [place] and said is ok with dialysis but will discuss again with HCP for trach as he is not sure. First cousin Allen also called to know patient's status and was informed she needs to speak to HCP to know more details.

## 2017-12-25 NOTE — PROGRESS NOTE ADULT - PROBLEM SELECTOR PLAN 5
in the setting of ZENY. Vital AF 1.2 has fairly low phos content but would change diet to nepro. Had discussion with pharmacy and they do not currently carry any liquid formulations of any phosphorus binders and capsules cannot be crushed. Monitor serum phos.

## 2017-12-25 NOTE — PROGRESS NOTE ADULT - SUBJECTIVE AND OBJECTIVE BOX
Patient is a 72y old  Male who presents with a chief complaint of weakness, chest discomfort and voice and appetite changes (05 Dec 2017 01:22)  Patient remains intubated, awake not alert, on mechanical ventilation and on pressors meds.    INTERVAL HPI/OVERNIGHT EVENTS:      VITAL SIGNS:  T(F): 98.3 (12-25-17 @ 09:00)  HR: 162 (12-25-17 @ 10:30)  BP: 127/63 (12-25-17 @ 10:30)  RR: 28 (12-25-17 @ 10:30)  SpO2: 100% (12-25-17 @ 10:30)  Wt(kg): --  I&O's Detail    24 Dec 2017 07:01  -  25 Dec 2017 07:00  --------------------------------------------------------  IN:    Enteral Tube Flush: 200 mL    norepinephrine Infusion: 125.3 mL    phenylephrine   Infusion: 1767.7 mL    Solution: 50 mL    Solution: 50 mL    vasopressin Infusion: 48 mL  Total IN: 2241 mL    OUT:    Indwelling Catheter - Urethral: 610 mL    Rectal Tube: 400 mL  Total OUT: 1010 mL    Total NET: 1231 mL      25 Dec 2017 07:01  -  25 Dec 2017 11:44  --------------------------------------------------------  IN:    norepinephrine Infusion: 93.2 mL    phenylephrine   Infusion: 300 mL    vasopressin Infusion: 7.2 mL  Total IN: 400.4 mL    OUT:    Indwelling Catheter - Urethral: 40 mL  Total OUT: 40 mL    Total NET: 360.4 mL        Mode: AC/ CMV (Assist Control/ Continuous Mandatory Ventilation)  RR (machine): 28  TV (machine): 500  FiO2: 50  PEEP: 5  ITime: 1  MAP: 12  PIP: 25        REVIEW OF SYSTEMS:    CONSTITUTIONAL:  No fevers, chills, sweats    HEENT:  Eyes:  No diplopia or blurred vision. ENT:  No earache, sore throat or runny nose.    CARDIOVASCULAR:  No pressure, squeezing, tightness, or heaviness about the chest; no palpitations.    RESPIRATORY:  Per HPI    GASTROINTESTINAL:  No abdominal pain, nausea, vomiting or diarrhea.    GENITOURINARY:  No dysuria, frequency or urgency.    NEUROLOGIC:  No paresthesias, fasciculations, seizures or weakness.    PSYCHIATRIC:  No disorder of thought or mood.      PHYSICAL EXAM:    Constitutional: Well developed and nourished  Eyes:Perrla  ENMT: normal  Neck:supple  Respiratory: occasional ronchi  Cardiovascular: S1 S2 regular  Gastrointestinal: Soft, Non tender  Extremities: No edema  Vascular:normal  Neurological:Awake, not alert  Musculoskeletal:Normal      MEDICATIONS  (STANDING):  amiodarone    Tablet 200 milliGRAM(s) Oral daily  aspirin  chewable 81 milliGRAM(s) Oral daily  BACItracin   Ointment 1 Application(s) Topical two times a day  fentaNYL   Infusion 0.5 MICROgram(s)/kG/Hr (3.82 mL/Hr) IV Continuous <Continuous>  heparin  Injectable 5000 Unit(s) SubCutaneous every 12 hours  micafungin IVPB      micafungin IVPB 100 milliGRAM(s) IV Intermittent every 24 hours  midodrine 5 milliGRAM(s) Oral every 8 hours  norepinephrine Infusion 0.5 MICROgram(s)/kG/Min (35.813 mL/Hr) IV Continuous <Continuous>  pantoprazole  Injectable 40 milliGRAM(s) IV Push daily  phenylephrine    Infusion 0.5 MICROgram(s)/kG/Min (6.563 mL/Hr) IV Continuous <Continuous>  piperacillin/tazobactam IVPB. 3.375 Gram(s) IV Intermittent every 12 hours  sodium bicarbonate 1950 milliGRAM(s) Oral three times a day  vasopressin Infusion 0.04 Unit(s)/Min (2.4 mL/Hr) IV Continuous <Continuous>    MEDICATIONS  (PRN):  acetaminophen  Suppository 650 milliGRAM(s) Rectal every 6 hours PRN For Temp greater than 38 C (100.4 F)  HYDROmorphone  Injectable 1 milliGRAM(s) IV Push every 4 hours PRN agitation, resp distress      Allergies    No Known Allergies    Intolerances        LABS:                        10.1   31.6  )-----------( 71       ( 25 Dec 2017 04:19 )             32.2     12-25    148<H>  |  119<H>  |  171  ----------------------------<  168<H>  5.9<H>   |  12<L>  |  5.22<H>    Ca    7.3<L>      25 Dec 2017 04:19  Phos  7.8     12-25  Mg     3.1     12-25          ABG - ( 25 Dec 2017 04:44 )  pH: 7.14  /  pCO2: 39    /  pO2: 92    / HCO3: 13    / Base Excess: -15.1 /  SaO2: 95                    CAPILLARY BLOOD GLUCOSE            RADIOLOGY & ADDITIONAL TESTS:    CXR:  < from: Xray Chest 1 View AP -PORTABLE-Routine (12.24.17 @ 10:01) >  Impression:    Interval removal right internal jugular central venous catheter.     Pulmonary vascular congestion. Scattered hazy streaky lung opacities.   Cystic changes seen left lower lobe. Small left pleural effusion.     < end of copied text >    Ct scan chest:    ekg;    echo:

## 2017-12-25 NOTE — PROGRESS NOTE ADULT - SUBJECTIVE AND OBJECTIVE BOX
CHIEF COMPLAINT:Patient is a 72y old  Male who presents with a chief complaint of weakness, chest discomfort and voice and appetite changes .Pt remains intubated.    	  REVIEW OF SYSTEMS:  [X ] Unable to obtain    PHYSICAL EXAM:  T(C): 36.8 (12-25-17 @ 09:00), Max: 37.3 (12-24-17 @ 13:00)  HR: 162 (12-25-17 @ 10:30) (96 - 166)  BP: 127/63 (12-25-17 @ 10:30) (53/40 - 153/74)  RR: 28 (12-25-17 @ 10:30) (22 - 34)  SpO2: 100% (12-25-17 @ 10:30) (64% - 100%)  Wt(kg): --  I&O's Summary    24 Dec 2017 07:01  -  25 Dec 2017 07:00  --------------------------------------------------------  IN: 2241 mL / OUT: 1010 mL / NET: 1231 mL    25 Dec 2017 07:01  -  25 Dec 2017 11:38  --------------------------------------------------------  IN: 400.4 mL / OUT: 40 mL / NET: 360.4 mL        Appearance: Normal	  HEENT:   Normal oral mucosa, PERRL, EOMI	  Lymphatic: No lymphadenopathy  Cardiovascular: Normal S1 S2, No JVD, No murmurs, No edema  Respiratory: B/L ronchi  Gastrointestinal:  Soft, Non-tender, + BS	  Skin: No rashes, No ecchymoses, No cyanosis	  Extremities: Normal range of motion, No clubbing, cyanosis or edema  Vascular: Peripheral pulses palpable 2+ bilaterally    MEDICATIONS  (STANDING):  amiodarone    Tablet 200 milliGRAM(s) Oral daily  aspirin  chewable 81 milliGRAM(s) Oral daily  BACItracin   Ointment 1 Application(s) Topical two times a day  fentaNYL   Infusion 0.5 MICROgram(s)/kG/Hr (3.82 mL/Hr) IV Continuous <Continuous>  heparin  Injectable 5000 Unit(s) SubCutaneous every 12 hours  micafungin IVPB      micafungin IVPB 100 milliGRAM(s) IV Intermittent every 24 hours  midodrine 5 milliGRAM(s) Oral every 8 hours  norepinephrine Infusion 0.5 MICROgram(s)/kG/Min (35.813 mL/Hr) IV Continuous <Continuous>  pantoprazole  Injectable 40 milliGRAM(s) IV Push daily  phenylephrine    Infusion 0.5 MICROgram(s)/kG/Min (6.563 mL/Hr) IV Continuous <Continuous>  piperacillin/tazobactam IVPB. 3.375 Gram(s) IV Intermittent every 12 hours  sodium bicarbonate 1950 milliGRAM(s) Oral three times a day  vasopressin Infusion 0.04 Unit(s)/Min (2.4 mL/Hr) IV Continuous <Continuous>      	  LABS:	 	                          10.1   31.6  )-----------( 71       ( 25 Dec 2017 04:19 )             32.2     12-25    148<H>  |  119<H>  |  171  ----------------------------<  168<H>  5.9<H>   |  12<L>  |  5.22<H>    Ca    7.3<L>      25 Dec 2017 04:19  Phos  7.8     12-25  Mg     3.1     12-25      Lipid Profile: Cholesterol 64  LDL 20  HDL 26  TG 88    HgA1c: Hemoglobin A1C, Whole Blood: 6.2 % (12-05 @ 09:32)    TSH: Thyroid Stimulating Hormone, Serum: 0.45 uU/mL (12-05 @ 07:14)

## 2017-12-25 NOTE — PROGRESS NOTE ADULT - PROBLEM SELECTOR PLAN 1
Non-oliguric ZENY likely hemodynamically mediated in the setting of septic shock/hypotension; ATN. Scr increasing. Had a GO meeting with Adolph Omalley (pt's surrogate), his wife and with Elaina Palliative care NP.  Discussed pt's poor overall status. He defer decision for RRT to brother who agrees to have procedure performed if needed. Patient however would not currently tolerate RRT as on 3 pressors and hemodynamically unstable. Would reassess on 12/26 and would only consider RRT if hemodynamically stable and would only be offered on a limited trial to monitor for improvement in mental status.    UA with 30 protein and mod blood in the setting of infection. Will repeat UA once infection cleared. Monitor Vanco trough.    CT abd/pelvis on 12/5 1.0 cm indeterminate exophytic lesion arising from the   lower pole of left kidney. No hydronephrosis. Subsequent, renal US performed with left renal lesion not seen.

## 2017-12-25 NOTE — PROGRESS NOTE ADULT - ASSESSMENT
72 year old male with prolonged intubation, plan for tracheostomy tomorrow if cleared by ICU    1) consent to be obtained in AM  2)pre-op labs  3) continue icu management

## 2017-12-25 NOTE — PROGRESS NOTE ADULT - PROBLEM SELECTOR PLAN 6
Non-anion gap metabolic acidosis, gap-metabolic acidosis with respiratory acidosis. Would discontinue sodium bicarbonate tablets and start sodium bicarbonate gtt. Can increase RR or TV to increase pH via respiratory compensation.  Monitor pH.

## 2017-12-25 NOTE — PROGRESS NOTE ADULT - PROBLEM SELECTOR PLAN 8
S/P calcium gluconate, insulin with D50 and ampule of sodium bicarbonate this morning. Would change diet to nepro. Sodium bicarbonate gtt as mentioned above.    Overall extremely poor prognosis.

## 2017-12-25 NOTE — PROGRESS NOTE ADULT - SUBJECTIVE AND OBJECTIVE BOX
Patient is a 72y old  Male who presents with a chief complaint of weakness, chest discomfort and voice and appetite changes (05 Dec 2017 01:22)    pt seen in icu [ x ], reg med floor [ ], bed [ x ], chair at bedside [   ], a+o x3 [  ],sedated [ x ], nad [x  ],     et tube [x],  ngt feed [ x ], frias to bsd [x], left ij cent line [x], phenylepherine drip [x]      Allergies    No Known Allergies        Vitals    T(F): 98.1 (12-25-17 @ 05:52), Max: 103.2 (12-24-17 @ 08:30)  HR: 160 (12-25-17 @ 07:00) (96 - 166)  BP: 111/74 (12-25-17 @ 07:00) (53/40 - 153/74)  RR: 29 (12-25-17 @ 07:00) (22 - 36)  SpO2: 96% (12-25-17 @ 03:00) (64% - 100%)  Wt(kg): --  CAPILLARY BLOOD GLUCOSE          Labs                          10.1   31.6  )-----------( 71       ( 25 Dec 2017 04:19 )             32.2       12-25    148<H>  |  119<H>  |  171  ----------------------------<  168<H>  5.9<H>   |  12<L>  |  5.22<H>    Ca    7.3<L>      25 Dec 2017 04:19  Phos  7.8     12-25  Mg     3.1     12-25              .Blood Blood  12-22 @ 23:43   No growth to date.  --  --      .Sputum Sputum  12-20 @ 21:49   Normal Respiratory Lissy present  --    Moderate polymorphonuclear leukocytes per low power field  No Squamous epithelial cells per low power field  Few Gram positive cocci in pairs per oil power field      .Blood Blood  12-14 @ 00:30   No growth at 5 days.  --  --      .Broncial received in trap  12-13 @ 08:06   Normal Respiratory Lissy present  --    No polymorphonuclear cells seen per low power field  No squamous epithelial cells per low power field  Moderate Gram Positive Cocci in Pairs and Chains per oil power field  Few-moderate Gram Negative Coccobacilli per oil power field      .Surgical Swab Bronchial Alveolar Lavage  12-12 @ 22:52   Moderate Prevotella intermedia  Rare Prevotella bivia  Few Normal Respiratory Lissy present  --  --      .Tissue Left Lower Lobe  12-12 @ 22:45   Rare Haemophilus parainfluenzae  Rare Neisseria species, not gonorrhoeae or meningitidis  Rare Alpha hemolytic strep  Few Prevotella melaninogenica  Numerous Prevotella intermedia  --    Rare polymorphonuclear leukocytes seen per low power field  Numerous Gram Negative Rods per oil power field      .Blood Blood  12-11 @ 11:22   No growth at 5 days.  --  --      .Broncial Other, bronchioalveolar lavage  12-06 @ 22:28   No growth at 1 week.  --  --      .Blood Blood-Peripheral  12-05 @ 10:57   No growth at 5 days.  --  --          Radiology Results      Meds    MEDICATIONS  (STANDING):  amiodarone    Tablet 200 milliGRAM(s) Oral daily  aspirin  chewable 81 milliGRAM(s) Oral daily  BACItracin   Ointment 1 Application(s) Topical two times a day  heparin  Injectable 5000 Unit(s) SubCutaneous every 12 hours  micafungin IVPB      micafungin IVPB 100 milliGRAM(s) IV Intermittent every 24 hours  midodrine 5 milliGRAM(s) Oral every 8 hours  norepinephrine Infusion 0.5 MICROgram(s)/kG/Min (35.813 mL/Hr) IV Continuous <Continuous>  pantoprazole  Injectable 40 milliGRAM(s) IV Push daily  phenylephrine    Infusion 0.5 MICROgram(s)/kG/Min (6.563 mL/Hr) IV Continuous <Continuous>  piperacillin/tazobactam IVPB. 3.375 Gram(s) IV Intermittent every 12 hours  sodium bicarbonate 1950 milliGRAM(s) Oral three times a day  sodium bicarbonate  Injectable 50 milliEquivalent(s) IV Push once  vasopressin Infusion 0.04 Unit(s)/Min (2.4 mL/Hr) IV Continuous <Continuous>      MEDICATIONS  (PRN):  acetaminophen  Suppository 650 milliGRAM(s) Rectal every 6 hours PRN For Temp greater than 38 C (100.4 F)  HYDROmorphone  Injectable 1 milliGRAM(s) IV Push every 4 hours PRN agitation, resp distress      Physical Exam    Neuro :  no focal deficits  Respiratory: CTA B/L,   CV: RRR, S1S2, no murmurs,   Abdominal: Soft, NT, ND +BS,  Extremities: b/l le edema, + peripheral pulses  genitals: edematous penis with some erythema, frias to bsd    ASSESSMENT    septic shock  left lower lobe mass with bony destruction,   hepatic and splenic lesions,   r/o malig,   copd   destruction of left 6th and 7th ribs and left T6 transverse process   exophytic left renal lesion  subcutaneous emphysema  pneumothorax  s/p hyponatremia  silverio  new afib  penile edema  h/o BPH (benign prostatic hyperplasia)  Hypertension        PLAN        S/P flexible bronchoscopy showing possible Large B-cell lymphoma,   s/p flex bronch transbronchial lung bx and EBU bx 12/12  cytopath of flex bronch and ebus neg for malig  cx from flex bronch with Few Normal Respiratory Lissy present and Rare Haemophilus parainfluenzae  Rare Neisseria species, not gonorrhoeae or meningitidis  -- Rare polymorphonuclear leukocytes seen per low power field  Numerous Gram Negative Rods per oil power field  noted above   thoracic surg f/u  vent mgmt  cont atrov and prov nebs,   cont supplemental oxygen,  ct chest-abd-pelv result note  rept cxr result with improvement noted  pulm f/u   s/p bronch with bx   cytopath of bronch neg for malig noted  cytopatho transbronchial bx with Interstitial fibrosis and intra-alveolar smoker's  macrophages noted above.   urology cons for renal lesion  cont ivf  renal f/u   cardio f/u  cont amiodarone 200 mg daily  cont midodrine  id f/u   f/u Procalcitonin level, HIV, ESR, and CRP  cont vanco  vanco t noted above  cont zosyn  Mycamine 100 mg IVPB q day added.  paliative eval noted  heme onc cons noted  cont current meds  pt for ir bx of liver or renal lesion when stable  icu team planning trach and peg  pt's friend to d/w pt's brother with regards to hd  pt dnr  mgmt as per icu Patient is a 72y old  Male who presents with a chief complaint of weakness, chest discomfort and voice and appetite changes (05 Dec 2017 01:22)    pt seen in icu [ x ], reg med floor [ ], bed [ x ], chair at bedside [   ], a+o x3 [  ],sedated [ x ], nad [x  ],     et tube [x],  ngt feed [ x ], frias to bsd [x], left ij cent line [x], phenylepherine and vasopressin drip [x]      Allergies    No Known Allergies        Vitals    T(F): 98.1 (12-25-17 @ 05:52), Max: 103.2 (12-24-17 @ 08:30)  HR: 160 (12-25-17 @ 07:00) (96 - 166)  BP: 111/74 (12-25-17 @ 07:00) (53/40 - 153/74)  RR: 29 (12-25-17 @ 07:00) (22 - 36)  SpO2: 96% (12-25-17 @ 03:00) (64% - 100%)  Wt(kg): --  CAPILLARY BLOOD GLUCOSE          Labs                          10.1   31.6  )-----------( 71       ( 25 Dec 2017 04:19 )             32.2       12-25    148<H>  |  119<H>  |  171  ----------------------------<  168<H>  5.9<H>   |  12<L>  |  5.22<H>    Ca    7.3<L>      25 Dec 2017 04:19  Phos  7.8     12-25  Mg     3.1     12-25              .Blood Blood  12-22 @ 23:43   No growth to date.  --  --      .Sputum Sputum  12-20 @ 21:49   Normal Respiratory Lissy present  --    Moderate polymorphonuclear leukocytes per low power field  No Squamous epithelial cells per low power field  Few Gram positive cocci in pairs per oil power field      .Blood Blood  12-14 @ 00:30   No growth at 5 days.  --  --      .Broncial received in trap  12-13 @ 08:06   Normal Respiratory Lissy present  --    No polymorphonuclear cells seen per low power field  No squamous epithelial cells per low power field  Moderate Gram Positive Cocci in Pairs and Chains per oil power field  Few-moderate Gram Negative Coccobacilli per oil power field      .Surgical Swab Bronchial Alveolar Lavage  12-12 @ 22:52   Moderate Prevotella intermedia  Rare Prevotella bivia  Few Normal Respiratory Lissy present  --  --      .Tissue Left Lower Lobe  12-12 @ 22:45   Rare Haemophilus parainfluenzae  Rare Neisseria species, not gonorrhoeae or meningitidis  Rare Alpha hemolytic strep  Few Prevotella melaninogenica  Numerous Prevotella intermedia  --    Rare polymorphonuclear leukocytes seen per low power field  Numerous Gram Negative Rods per oil power field      .Blood Blood  12-11 @ 11:22   No growth at 5 days.  --  --      .Broncial Other, bronchioalveolar lavage  12-06 @ 22:28   No growth at 1 week.  --  --      .Blood Blood-Peripheral  12-05 @ 10:57   No growth at 5 days.  --  --          Radiology Results      Meds    MEDICATIONS  (STANDING):  amiodarone    Tablet 200 milliGRAM(s) Oral daily  aspirin  chewable 81 milliGRAM(s) Oral daily  BACItracin   Ointment 1 Application(s) Topical two times a day  heparin  Injectable 5000 Unit(s) SubCutaneous every 12 hours  micafungin IVPB      micafungin IVPB 100 milliGRAM(s) IV Intermittent every 24 hours  midodrine 5 milliGRAM(s) Oral every 8 hours  norepinephrine Infusion 0.5 MICROgram(s)/kG/Min (35.813 mL/Hr) IV Continuous <Continuous>  pantoprazole  Injectable 40 milliGRAM(s) IV Push daily  phenylephrine    Infusion 0.5 MICROgram(s)/kG/Min (6.563 mL/Hr) IV Continuous <Continuous>  piperacillin/tazobactam IVPB. 3.375 Gram(s) IV Intermittent every 12 hours  sodium bicarbonate 1950 milliGRAM(s) Oral three times a day  sodium bicarbonate  Injectable 50 milliEquivalent(s) IV Push once  vasopressin Infusion 0.04 Unit(s)/Min (2.4 mL/Hr) IV Continuous <Continuous>      MEDICATIONS  (PRN):  acetaminophen  Suppository 650 milliGRAM(s) Rectal every 6 hours PRN For Temp greater than 38 C (100.4 F)  HYDROmorphone  Injectable 1 milliGRAM(s) IV Push every 4 hours PRN agitation, resp distress      Physical Exam    Neuro :  no focal deficits  Respiratory: CTA B/L,   CV: RRR, S1S2, no murmurs,   Abdominal: Soft, NT, ND +BS,  Extremities: b/l le edema, + peripheral pulses  genitals: edematous penis with some erythema, frias to bsd    ASSESSMENT    septic shock  left lower lobe mass with bony destruction,   hepatic and splenic lesions,   r/o malig,   copd   destruction of left 6th and 7th ribs and left T6 transverse process   exophytic left renal lesion  subcutaneous emphysema  pneumothorax  s/p hyponatremia  silverio 2nd to atn  new afib  penile edema  h/o BPH (benign prostatic hyperplasia)  Hypertension        PLAN        S/P flexible bronchoscopy showing possible Large B-cell lymphoma,   s/p flex bronch transbronchial lung bx and EBU bx 12/12  cytopath of flex bronch and ebus neg for malig  cx from flex bronch with Few Normal Respiratory Lissy present and Rare Haemophilus parainfluenzae  Rare Neisseria species, not gonorrhoeae or meningitidis  -- Rare polymorphonuclear leukocytes seen per low power field  Numerous Gram Negative Rods per oil power field  noted above   thoracic surg f/u  vent mgmt  cont atrov and prov nebs,   cont supplemental oxygen,  ct chest-abd-pelv result note  rept cxr result with improvement noted  pulm f/u   s/p bronch with bx   cytopath of bronch neg for malig noted  cytopatho transbronchial bx with Interstitial fibrosis and intra-alveolar smoker's  macrophages noted above.   urology cons for renal lesion  cont ivf  renal f/u   cardio f/u  cont amiodarone 200 mg daily  pt on double pressors  id f/u   f/u Procalcitonin level, HIV, ESR, and CRP  rept ct chest  cont vanco  vanco t noted above  cont zosyn  Mycamine 100 mg IVPB q day added.  paliative eval noted  heme onc cons noted  cont current meds  pt for ir bx of liver or renal lesion when stable  icu team planning trach and peg in am  pt's friend to d/w pt's brother with regards to hd  pt dnr  mgmt as per icu Patient is a 72y old  Male who presents with a chief complaint of weakness, chest discomfort and voice and appetite changes (05 Dec 2017 01:22)    pt seen in icu [ x ], reg med floor [ ], bed [ x ], chair at bedside [   ], a+o x3 [  ],sedated [ x ], nad [x  ],     et tube [x],  ngt feed [ x ], frias to bsd [x], rectal tube [x], left ij cent line [x], phenylepherine and vasopressin drip [x]      Allergies    No Known Allergies        Vitals    T(F): 98.1 (12-25-17 @ 05:52), Max: 103.2 (12-24-17 @ 08:30)  HR: 160 (12-25-17 @ 07:00) (96 - 166)  BP: 111/74 (12-25-17 @ 07:00) (53/40 - 153/74)  RR: 29 (12-25-17 @ 07:00) (22 - 36)  SpO2: 96% (12-25-17 @ 03:00) (64% - 100%)  Wt(kg): --  CAPILLARY BLOOD GLUCOSE          Labs                          10.1   31.6  )-----------( 71       ( 25 Dec 2017 04:19 )             32.2       12-25    148<H>  |  119<H>  |  171  ----------------------------<  168<H>  5.9<H>   |  12<L>  |  5.22<H>    Ca    7.3<L>      25 Dec 2017 04:19  Phos  7.8     12-25  Mg     3.1     12-25              .Blood Blood  12-22 @ 23:43   No growth to date.  --  --      .Sputum Sputum  12-20 @ 21:49   Normal Respiratory Lissy present  --    Moderate polymorphonuclear leukocytes per low power field  No Squamous epithelial cells per low power field  Few Gram positive cocci in pairs per oil power field      .Blood Blood  12-14 @ 00:30   No growth at 5 days.  --  --      .Broncial received in trap  12-13 @ 08:06   Normal Respiratory Lissy present  --    No polymorphonuclear cells seen per low power field  No squamous epithelial cells per low power field  Moderate Gram Positive Cocci in Pairs and Chains per oil power field  Few-moderate Gram Negative Coccobacilli per oil power field      .Surgical Swab Bronchial Alveolar Lavage  12-12 @ 22:52   Moderate Prevotella intermedia  Rare Prevotella bivia  Few Normal Respiratory Lissy present  --  --      .Tissue Left Lower Lobe  12-12 @ 22:45   Rare Haemophilus parainfluenzae  Rare Neisseria species, not gonorrhoeae or meningitidis  Rare Alpha hemolytic strep  Few Prevotella melaninogenica  Numerous Prevotella intermedia  --    Rare polymorphonuclear leukocytes seen per low power field  Numerous Gram Negative Rods per oil power field      .Blood Blood  12-11 @ 11:22   No growth at 5 days.  --  --      .Broncial Other, bronchioalveolar lavage  12-06 @ 22:28   No growth at 1 week.  --  --      .Blood Blood-Peripheral  12-05 @ 10:57   No growth at 5 days.  --  --          Radiology Results      Meds    MEDICATIONS  (STANDING):  amiodarone    Tablet 200 milliGRAM(s) Oral daily  aspirin  chewable 81 milliGRAM(s) Oral daily  BACItracin   Ointment 1 Application(s) Topical two times a day  heparin  Injectable 5000 Unit(s) SubCutaneous every 12 hours  micafungin IVPB      micafungin IVPB 100 milliGRAM(s) IV Intermittent every 24 hours  midodrine 5 milliGRAM(s) Oral every 8 hours  norepinephrine Infusion 0.5 MICROgram(s)/kG/Min (35.813 mL/Hr) IV Continuous <Continuous>  pantoprazole  Injectable 40 milliGRAM(s) IV Push daily  phenylephrine    Infusion 0.5 MICROgram(s)/kG/Min (6.563 mL/Hr) IV Continuous <Continuous>  piperacillin/tazobactam IVPB. 3.375 Gram(s) IV Intermittent every 12 hours  sodium bicarbonate 1950 milliGRAM(s) Oral three times a day  sodium bicarbonate  Injectable 50 milliEquivalent(s) IV Push once  vasopressin Infusion 0.04 Unit(s)/Min (2.4 mL/Hr) IV Continuous <Continuous>      MEDICATIONS  (PRN):  acetaminophen  Suppository 650 milliGRAM(s) Rectal every 6 hours PRN For Temp greater than 38 C (100.4 F)  HYDROmorphone  Injectable 1 milliGRAM(s) IV Push every 4 hours PRN agitation, resp distress      Physical Exam    Neuro :  no focal deficits  Respiratory: right ronchi, decr breathsounds left lower lobe    CV: RRR, S1S2, no murmurs,   Abdominal: Soft, NT, ND +BS,  Extremities: b/l le edema, + peripheral pulses  genitals: edematous penis with some erythema, frias to bsd    ASSESSMENT    septic shock  left lower lobe mass with bony destruction,   hepatic and splenic lesions,   r/o malig,   copd   destruction of left 6th and 7th ribs and left T6 transverse process   exophytic left renal lesion  subcutaneous emphysema  pneumothorax  s/p hyponatremia  silverio 2nd to atn  new afib  penile edema  h/o BPH (benign prostatic hyperplasia)  Hypertension        PLAN        S/P flexible bronchoscopy showing possible Large B-cell lymphoma,   s/p flex bronch transbronchial lung bx and EBU bx 12/12  cytopath of flex bronch and ebus neg for malig  cx from flex bronch with Few Normal Respiratory Lissy present and Rare Haemophilus parainfluenzae  Rare Neisseria species, not gonorrhoeae or meningitidis  -- Rare polymorphonuclear leukocytes seen per low power field  Numerous Gram Negative Rods per oil power field  noted above   thoracic surg f/u  vent mgmt  cont atrov and prov nebs,   cont supplemental oxygen,  ct chest-abd-pelv result note  rept cxr result with improvement noted  pulm f/u   s/p bronch with bx   cytopath of bronch neg for malig noted  cytopatho transbronchial bx with Interstitial fibrosis and intra-alveolar smoker's  macrophages noted above.   urology cons for renal lesion  cont ivf  renal f/u   cardio f/u  cont amiodarone 200 mg daily  pt on double pressors  id f/u   f/u Procalcitonin level, HIV, ESR, and CRP  rept ct chest  cont vanco  vanco t noted above  cont zosyn  Mycamine 100 mg IVPB q day added.  paliative eval noted  heme onc cons noted  cont current meds  pt for ir bx of liver or renal lesion when stable  icu team planning trach and peg in am  pt's friend to d/w pt's brother with regards to hd  pt dnr  mgmt as per icu

## 2017-12-25 NOTE — PROGRESS NOTE ADULT - SUBJECTIVE AND OBJECTIVE BOX
[facility] NEPHROLOGY- PROGRESS NOTE    73 yo M with HTN, BPH a/w chest discomfort, suprahilar/ hepatic mass and hyponatremia. Hosp course cb subcutaneous emphysema s/p bronch with Left chest tube placement. s/p EBUS 12/12 with  endobronchial lung biopsy neg for malignancy however mediastinal biopsy indeterminate. Now intubated with septic shock 2/2 Necrotizing PNA, hypotension on pressors, with ZENY and hyponatremia.    Renal consulted for hyponatremia and ZENY.     Hospital Medications: Medications reviewed.    REVIEW OF SYSTEMS: Unable to obtain as patient intubated and unresponsive    VITALS:  T(F): 98.3 (12-25-17 @ 09:00), Max: 98.3 (12-25-17 @ 09:00)  HR: 144 (12-25-17 @ 12:20)  BP: 131/40 (12-25-17 @ 12:00)  RR: 31 (12-25-17 @ 12:00)  SpO2: 100% (12-25-17 @ 12:20)  Wt(kg): --    12-24 @ 07:01  -  12-25 @ 07:00  --------------------------------------------------------  IN: 2241 mL / OUT: 1010 mL / NET: 1231 mL    12-25 @ 07:01  -  12-25 @ 14:11  --------------------------------------------------------  IN: 862.7 mL / OUT: 95 mL / NET: 767.7 mL      PHYSICAL EXAM:  Gen: Unresponsive to tactile stimuli  HEENT: intubated  Cards: Irregular and tachycardic, +S1/S2, no M/G/R  Resp: course BS B/L   GI: soft, NT/ND, NABS  : + frias  Extremities: +B LE edema/anasarca      LABS:  12-25    148<H>  |  119<H>  |  171  ----------------------------<  168<H>  5.9<H>   |  12<L>  |  5.22<H>    Ca    7.3<L>      25 Dec 2017 04:19  Phos  7.8     12-25  Mg     3.1     12-25      Creatinine Trend: 5.22 <--, 4.80 <--, 4.24 <--, 3.92 <--, 3.63 <--, 3.53 <--, 3.51 <--, 2.83 <--                        10.1   31.6  )-----------( 71       ( 25 Dec 2017 04:19 )             32.2

## 2017-12-25 NOTE — PROGRESS NOTE ADULT - ASSESSMENT
72 years old male from home with PMHx of HTN and BPH and no PSH presents to ED c/o L-sided chest pain ,lung and hepatic mass, s/p PTHX,now respiratory failure and afib with RVR.  1.Vent support as per ICU.  2.Amiodarone  200mg qd.  3.Continue ASA for stroke prevention.  4.Given suspected metastatic disease ,high risk for bleeding with full AC.  5.ABX.  6.Pressor support as per ICU.  7.GI and DVT prophylaxis.  8.Overall prognosis is poor.  9.Plan for trach in AM.

## 2017-12-26 NOTE — PROGRESS NOTE ADULT - SUBJECTIVE AND OBJECTIVE BOX
Meds:  Meropenem 500 mg IVPB q 8 hours.  Vancomycin 1 gm IVPB Q day.  Micafungin 100 mg IVPB q day.       Allergies:  Allergies    No Known Allergies    Intolerances  ROS  [ x ] UNABLE TO ELICIT    General:  [  ] None  [  ] Fever  [  ] Chills  [  ] Malaise    Skin:  [  ] None [  ] Rash  [  ] Wound  [  ] Ulcer    HEENT:  [  ] None  [  ] Sore Throat  [  ] Nasal congestion/ runny nose  [  ] Photophobia [  ] Neck pain      Chest:  [  ] None   [  ] SOB  [  ] Cough  [  ] None    Cardiovascular:   [  ] None  [  ] CP  [  ] Palpitation    Gastrointestinal:  [  ] None  [  ] Abd pain   [  ] Nausea    [  ] Vomiting   [  ] Diarrhea	     Genitourinary:  [  ] None [  ] Polyuria   [  ] Urgency  [  ] Frequency  [  ] Dysuria    [  ]  Hematuria       Musculoskeletal:  [  ] None [  ] Back Pain	[  ] Body aches  [  ] Joint pain    Neurological: [  ] None [  ]Dizziness  [  ]Visual Disturbance  [  ]Headaches   [  ] Weakness        PHYSICAL EXAM:  Vital Signs Last 24 Hrs  T(C): 36.4 (26 Dec 2017 05:00), Max: 36.7 (25 Dec 2017 16:00)  T(F): 97.6 (26 Dec 2017 05:00), Max: 98.1 (25 Dec 2017 16:00)  HR: 140 (26 Dec 2017 12:11) (130 - 169)  BP: 93/60 (26 Dec 2017 10:30) (60/44 - 136/59)  BP(mean): 68 (26 Dec 2017 10:30) (48 - 103)  RR: 7 (26 Dec 2017 10:30) (0 - 31)  SpO2: 100% (26 Dec 2017 12:11) (95% - 100%)    Constitutional:    HEENT: [ x ] Wnl  [ x ] ET and NGT in place    Neck:  [ x ] Supple  [  ]Lymphadenopathy  [  ] No JVD   [  ] JVD  [  ] Masses   [  ] WNL    CHEST/Respiratory:  [  ]Clear to auscultation  [ x ] Rales   [ x ] Rhonchi   [  ] Wheezing     [ x ] S/P Left side Chest tube insertion and removal     Cardiovascular:  [ x ] Reg S1 S2   [  ] Irreg S1 S2   [ x ]No Murmur  [  ] +ve Murmurs  [  ]Systolic [  ]Diastolic      Abdomen:  [ x ] Soft  [ x ] No tendrerness  [  ] Tenderness  [  ] Organomegaly  [  ] ABD Distention  [  ] Rigidity                       [ x ] No Regidity                       [ x ] No Rebound Tenderness  [ x ] No Guarding Rigidity  [  ] Rebound Tenderness[  ] Guarding Rigidity                          [ x ]  +ve Bowel Sounds  [  ] Decreased Bowel Sounds    [  ] Absent Bowel Sounds                            Extremities: [ x ] No edema [  ] Edema  [  ] Clubbing   [  ] Cyanosis                         [ x ] No Tender Calf muscles  [  ] Tender Calf muscles                        [ x ] Palpable peripheral pulses    Neurological: [  ] Awake  [  ] Alert  [  ] Oriented  x                             [  ] Confused  [  ] Drowzy  [ x ] respond to painful stimuli  [  ] Unresponsive    Skin:  [ x ] Intact [  ] Redness [  ] Thrombophlebitis  [  ] Rashes  [  ] Dry  [  ] Ulcers    Ortho:  [  ] Joint Swelling  [  ] Joint erythema [  ] Joint tenderness                [  ] Increased temp. to touch  [  ] DJD [ x ] WNL            LABS/DIAGNOSTIC TESTS                                      8.9    32.0  )-----------( 56       ( 26 Dec 2017 06:44 )             28.9   12-26    149<H>  |  116<H>  |  174  ----------------------------<  172<H>  6.1<H>   |  13<L>  |  5.50<H>    Ca    6.9<L>      26 Dec 2017 06:44  Phos  9.7     12-26  Mg     3.0     12-26    TPro  5.0<L>  /  Alb  1.3<L>  /  TBili  3.0<H>  /  DBili  x   /  AST  50<H>  /  ALT  42  /  AlkPhos  91  12-26    ABG - ( 26 Dec 2017 05:00 )  pH: 7.16  /  pCO2: 37    /  pO2: 148   / HCO3: 12    / Base Excess: -15.0 /  SaO2: 98              Sedimentation Rate, Erythrocyte: 40 mm/Hr (12.23.17 @ 15:10)    C-Reactive Protein, Serum: 5.80 mg/dL (12.23.17 @ 20:55)                                 Vancomycin Level, Trough: 25.6: TYPE:(C=Critical, N=Notification, A=Abnormal) c    C Diff by PCR Result: NotDetec (12.24.17 @ 22:20)       CULTURES:     Culture - Bronchial (12.13.17 @ 08:06)    Gram Stain:   No polymorphonuclear cells seen per low power field  No squamous epithelial cells per low power field  Moderate Gram Positive Cocci in Pairs and Chains per oil power field  Few-moderate Gram Negative Coccobacilli per oil power field    Specimen Source: .Broncial received in trap    Culture Results:   Normal Respiratory Lissy present    Culture - Surgical Swab (12.12.17 @ 22:52)    Specimen Source: .Surgical Swab Bronchial Alveolar Lavage    Culture Results:   Few Normal Respiratory Lissy present    Culture - Tissue with Gram Stain (12.12.17 @ 22:45)    Gram Stain:   Rare polymorphonuclear leukocytes seen per low power field  Numerous Gram Negative Rods per oil power field    Specimen Source: .Tissue Left Lower Lobe    Culture Results:   Rare Haemophilus parainfluenzae  Rare Neisseria species, not gonorrhoeae or meningitidis    Culture - Blood (12.11.17 @ 11:22)    Specimen Source: .Blood Blood    Culture Results:   No growth to date.    Culture - Blood (12.11.17 @ 11:22)    Specimen Source: .Blood Blood    Culture Results:   No growth to date.    Culture - Blood (12.11.17 @ 11:22)    Specimen Source: .Blood Blood    Culture Results:   No growth to date.            RADIOLOGY    EXAM:  CHEST PORTABLE ROUTINE                            PROCEDURE DATE:  12/25/2017          INTERPRETATION:  History: Pneumonia.    Findings: Frontal chest.    Comparison: 12/24/2017.    Exam is limited by overlying cooling blanket and patient rotation towards   the right.    Endotracheal tube, nasogastric tube and left internal jugular central   venous catheter again seen in place. Multiple age clips and wires overlie   the chest.    Difficult to evaluate adequately assess heart size due to marked patient   rotation. Worsened lung opacities, greater on the left; effusion,   atelectasis and/or infiltrates, may be exaggerated by marked patient   rotation. Cystic changes left lower lung.    Impression:    Worsening lung opacities, greater on the left; effusion, atelectasis   and/or infiltrates, may be exaggerated by marked patient rotation.           Assessment and Recommendation:   72 years old male from home with PMHx of HTN and BPH and no PSH presents to ED c/o L-sided chest pain radiating to the back with associated general weakness x6 months. Patient also reports loss of voice x6 months, in addition to decreased appetite and weight loss x2 weeks, only been able to consume x3 Ensure today.   10/6/17 patient had bronchoscopy and subsequent pneumothorax and chest tube insertion.  Patient was initially transferred from ICU to regular floor, then upgraded to the icu after condition became unstable.  Patient was started on IV Zosyn on 12/12/17.    12/18/17 WBC is increasing again.  12/19/17 WBC improved but H&H dropped.  Vancomycin was started as suggested, but Mycamine is not started as suggested, and discussed with ICU resident on 12/19/17.   12/20 chest tube was removed.  12/21/17 Patient is tachycardiac, and BP is low, O2 is low, but PH, and WBC are better.  Micafungin was started 12/21/17, patient is hypotensive, and tachycardiac and labs for today are not available yet.    12/24/17 Central line was removed, WBC is increased, and Vancomycin trough is high.  CRP & ESR are high.    Problem/Recommendation - 1:  Problem: Pneumonia, bacterial.   Recommendation:   1- UA & CS.  2- Follow Blood culture to final report are negative.  3- Blood transfusions as needed.  4- Continue Meropenem 500 mg IVPB q 8 hours.  5- Fluid, electrolytes and renal management.  6- CBC and CMP follow up.   7- Follow up CXR.  8- Respirator management.  9- Procalcitonin level, HIV testing, and repeat sputum culture.  10- Continue Mycamine 100 mg IVPB q day empirically As patient has been on long duration of ABX, with intubation and central lines (Started on 12/21/17).  11- Continue Vancomycin, but decrease the dose to 500 mg IVPB q 28 hours, but hold it till the trough is less than 20.  12- Oncology work and follow up.    Problem/Recommendation - 2:  ·  Problem: COPD (chronic obstructive pulmonary disease).    Recommendation:   1- Bronchodilators.  2- O2 as needed.  3- Pulmonary, and cardiology management.  4- Steroids as per primary, and pulmonary team if needed.     Problem/Recommendation - 3:  ·  Problem: ZENY (acute kidney injury).    Recommendation:   1- Renal management.  2- Fluid and electrolytes management and follow up.     Discussed with medical resident, and ICU attending.

## 2017-12-26 NOTE — PROGRESS NOTE ADULT - ASSESSMENT
72 male with hx of HTN, BPH, lung mass, underwent bronchoscopy with biopsies on 12/6 complicated by pneumothorax, pneumomediastinum, subcutaneous emphysema, s/p chest tube placement on 12/6.  Repeat bronch with EBUS performed on 12/12, so far no biopsy has been positive for malignancy but suspicion is still high.  Now with necrotizing pneumonia, lung abscess, acute resp failure, septic shock, ZENY.        1: Septic Shock  -s/p intubation , c/w mechanical ventilation  -patient has very poor prognosis, on 3 pressors, trach recommended by thoracic surgery but family does not want and patient not stable.   -Patient is on pheny , norepinephrine and Vasopressin  -increased white count, tissue culture growing gram negative rods rare neisseria, haemophilus, BCx negative to date  -antibiotics changed to Meropenem as per ID Dr. Nicholas  -c/w Mycamine       2: ATN  -BUN/cr increasing  -discussed with HCP and brother yesterday who are ok with dialysis, but patient unlikely to tolerate as still requiring 3 pressors.   -monitor urine output   -Nephro: Dr. Reagan      3: New onset Atrial Fibrillation  - Rate controlled   - EKG shows Atrial fibrillation (new onset)  - Completed amiodarone loading, now amio 200mg  c/w amiodarone  - f/u cardiology - Dr. Jackson    Pneumothorax   -Resolved  -D/patricio chest tube    Lung mass  -c/w post op prophylaxis  -Bronch path negative for Ca.  -s/p EBUS VS mediastinoscopy  -cyto report, negative for  malignancy  - 1 biopsy still pending  -repeat CT chest - worsening necrotic mass from prior imaging    Hyperkalemia:  -2/2 acute kidney failure  -will give calcium gluconate, insulin, dextrose and Kayexalate  -f/u repeat bmp    Acidosis:  c/w sodium bicarbonate    Hyperphosphatemia:  -Patient phosphate high in setting for kidney failure  -stable will c/w monitoring    Penile swelling:   Urology consult requested, cold compresses, bacitracin ointment application    Prophylactic measure  - IMPROVE score 2   - c/w heparin S.C for VTE prophylaxis  - GI stress ulcer PPx w/ Protonix.    Goals of care and discussion:  Patient has poor prognosis, Patient is going in ATN, very low UO,  Palliative on board: Patient is DNR

## 2017-12-26 NOTE — CHART NOTE - NSCHARTNOTEFT_GEN_A_CORE
Thoracic Surgery     Discussed tracheostomy/PEG with patient's HCP (Adolph - friend) who stated he was in agreement but wanted to touch base with the patient's estranged brother. patient's INR 2.1 and K 6.1 - treated but pending repeat BMP. will reschedule for Thursday, plan for vitamin K, FFP for Thursday. repeat labs wednesday afternoon with transfusion FFP as needed for goal INR 1.5. repeat K - [facility]

## 2017-12-26 NOTE — PROGRESS NOTE ADULT - SUBJECTIVE AND OBJECTIVE BOX
Patient is a 72y old  Male who presents with a chief complaint of weakness, chest discomfort and voice and appetite changes (05 Dec 2017 01:22)    pt seen in icu [ x ], reg med floor [ ], bed [ x ], chair at bedside [   ], a+o x3 [  ],sedated [ x ], nad [x  ],     et tube [x],  ngt feed [ x ], frias to bsd [x], rectal tube [x], left ij cent line [x], phenylepherine and  vasopressin drip [x]        Allergies    No Known Allergies        Vitals    T(F): 97.6 (12-26-17 @ 05:00), Max: 98.3 (12-25-17 @ 09:00)  HR: 152 (12-26-17 @ 05:30) (131 - 163)  BP: 112/74 (12-26-17 @ 05:30) (60/44 - 143/61)  RR: 28 (12-26-17 @ 05:30) (0 - 31)  SpO2: 100% (12-26-17 @ 05:30) (95% - 100%)  Wt(kg): --  CAPILLARY BLOOD GLUCOSE      POCT Blood Glucose.: 201 mg/dL (26 Dec 2017 06:09)      Labs                          8.9    32.0  )-----------( x        ( 26 Dec 2017 06:44 )             28.9       12-26    149<H>  |  116<H>  |  x   ----------------------------<  x   6.1<H>   |  x   |  x     Ca    6.9<L>      26 Dec 2017 06:44  Phos  7.8     12-25  Mg     3.0     12-26    TPro  x   /  Alb  1.3<L>  /  TBili  x   /  DBili  x   /  AST  x   /  ALT  x   /  AlkPhos  x   12-26            .Blood Blood  12-24 @ 14:10   No growth to date.  --  --      .Blood Blood  12-22 @ 23:43   No growth to date.  --  --      .Sputum Sputum  12-20 @ 21:49   Normal Respiratory Lissy present  --    Moderate polymorphonuclear leukocytes per low power field  No Squamous epithelial cells per low power field  Few Gram positive cocci in pairs per oil power field      .Blood Blood  12-14 @ 00:30   No growth at 5 days.  --  --      .Broncial received in trap  12-13 @ 08:06   Normal Respiratory Lissy present  --    No polymorphonuclear cells seen per low power field  No squamous epithelial cells per low power field  Moderate Gram Positive Cocci in Pairs and Chains per oil power field  Few-moderate Gram Negative Coccobacilli per oil power field      .Surgical Swab Bronchial Alveolar Lavage  12-12 @ 22:52   Moderate Prevotella intermedia  Rare Prevotella bivia  Few Normal Respiratory Lissy present  --  --      .Tissue Left Lower Lobe  12-12 @ 22:45   Rare Haemophilus parainfluenzae  Rare Neisseria species, not gonorrhoeae or meningitidis  Rare Alpha hemolytic strep  Few Prevotella melaninogenica  Numerous Prevotella intermedia  --    Rare polymorphonuclear leukocytes seen per low power field  Numerous Gram Negative Rods per oil power field      .Blood Blood  12-11 @ 11:22   No growth at 5 days.  --  --      .Broncial Other, bronchioalveolar lavage  12-06 @ 22:28   No growth at 1 week.  --  --      .Blood Blood-Peripheral  12-05 @ 10:57   No growth at 5 days.  --  --          Radiology Results      Meds    MEDICATIONS  (STANDING):  amiodarone    Tablet 200 milliGRAM(s) Oral daily  aspirin  chewable 81 milliGRAM(s) Oral daily  BACItracin   Ointment 1 Application(s) Topical two times a day  fentaNYL   Infusion 3 MICROgram(s)/kG/Hr (22.92 mL/Hr) IV Continuous <Continuous>  heparin  Injectable 5000 Unit(s) SubCutaneous every 12 hours  meropenem IVPB 500 milliGRAM(s) IV Intermittent every 8 hours  micafungin IVPB      micafungin IVPB 100 milliGRAM(s) IV Intermittent every 24 hours  midodrine 5 milliGRAM(s) Oral every 8 hours  norepinephrine Infusion 0.5 MICROgram(s)/kG/Min (35.813 mL/Hr) IV Continuous <Continuous>  pantoprazole  Injectable 40 milliGRAM(s) IV Push daily  phenylephrine    Infusion 0.5 MICROgram(s)/kG/Min (6.563 mL/Hr) IV Continuous <Continuous>  sodium bicarbonate  Infusion 0.25 mEq/kG/Hr (127.333 mL/Hr) IV Continuous <Continuous>  vasopressin Infusion 0.04 Unit(s)/Min (2.4 mL/Hr) IV Continuous <Continuous>      MEDICATIONS  (PRN):  acetaminophen  Suppository 650 milliGRAM(s) Rectal every 6 hours PRN For Temp greater than 38 C (100.4 F)  HYDROmorphone  Injectable 1 milliGRAM(s) IV Push every 4 hours PRN agitation, resp distress      Physical Exam    Neuro :  no focal deficits  Respiratory: right ronchi, decr breathsounds left lower lobe    CV: RRR, S1S2, no murmurs,   Abdominal: Soft, NT, ND +BS,  Extremities: b/l le edema, + peripheral pulses  genitals: edematous penis with some erythema, frias to bsd    ASSESSMENT    septic shock  left lower lobe mass with bony destruction,   hepatic and splenic lesions,   r/o malig,   copd   destruction of left 6th and 7th ribs and left T6 transverse process   exophytic left renal lesion  subcutaneous emphysema  pneumothorax  s/p hyponatremia  silverio 2nd to atn  new afib  penile edema  h/o BPH (benign prostatic hyperplasia)  Hypertension        PLAN        S/P flexible bronchoscopy showing possible Large B-cell lymphoma,   s/p flex bronch transbronchial lung bx and EBU bx 12/12  cytopath of flex bronch and ebus neg for malig  cx from flex bronch with Few Normal Respiratory Lissy present and Rare Haemophilus parainfluenzae  Rare Neisseria species, not gonorrhoeae or meningitidis  -- Rare polymorphonuclear leukocytes seen per low power field  Numerous Gram Negative Rods per oil power field  noted above   thoracic surg f/u  vent mgmt  cont atrov and prov nebs,   cont supplemental oxygen,  ct chest-abd-pelv result note  rept cxr result with improvement noted  pulm f/u   s/p bronch with bx   cytopath of bronch neg for malig noted  cytopatho transbronchial bx with Interstitial fibrosis and intra-alveolar smoker's  macrophages noted above.   urology cons for renal lesion  cont ivf  renal f/u   cardio f/u  cont amiodarone 200 mg daily  pt on double pressors  id f/u   f/u Procalcitonin level, HIV, ESR, and CRP  rept ct chest  cont vanco  vanco t noted above  cont zosyn  Mycamine 100 mg IVPB q day added.  paliative eval noted  heme onc cons noted  cont current meds  pt for ir bx of liver or renal lesion when stable  icu team planning trach and peg in am  pt's friend to d/w pt's brother with regards to hd  pt dnr  mgmt as per icu Patient is a 72y old  Male who presents with a chief complaint of weakness, chest discomfort and voice and appetite changes (05 Dec 2017 01:22)    pt seen in icu [ x ], reg med floor [ ], bed [ x ], chair at bedside [   ], a+o x3 [  ],sedated [ x ], nad [x  ],     et tube [x],  ngt feed [ x ], frias to bsd [x], rectal tube [x], left ij cent line [x], fentanyl, phenylepherine, norepinepherine and  vasopressin drip [x]        Allergies    No Known Allergies        Vitals    T(F): 97.6 (12-26-17 @ 05:00), Max: 98.3 (12-25-17 @ 09:00)  HR: 152 (12-26-17 @ 05:30) (131 - 163)  BP: 112/74 (12-26-17 @ 05:30) (60/44 - 143/61)  RR: 28 (12-26-17 @ 05:30) (0 - 31)  SpO2: 100% (12-26-17 @ 05:30) (95% - 100%)  Wt(kg): --  CAPILLARY BLOOD GLUCOSE      POCT Blood Glucose.: 201 mg/dL (26 Dec 2017 06:09)      Labs                          8.9    32.0  )-----------( x        ( 26 Dec 2017 06:44 )             28.9       12-26    149<H>  |  116<H>  |  x   ----------------------------<  x   6.1<H>   |  x   |  x     Ca    6.9<L>      26 Dec 2017 06:44  Phos  7.8     12-25  Mg     3.0     12-26    TPro  x   /  Alb  1.3<L>  /  TBili  x   /  DBili  x   /  AST  x   /  ALT  x   /  AlkPhos  x   12-26      Vancomycin Level, Trough (12.25.17 @ 10:41)    Vancomycin Level, Trough: 20.8: Vancomycin trough levels should be rapidly reached and maintained at  15-20 ug/ml for life threatening MRSA  infections such as sepsis, endocarditis, osteomyelitis and pneumonia. A  first trough level should be drawn  before the 3rd or 4th dose.  Risk of renal toxicity is increased for levels >15 ug/ml, in patients on  other nephrotoxic drugs, who are  hemodynamically unstable, have unstable renal function, or are on  Vancomycin therapy for >14 days. Renal function with  creatinine levels should be monitored for those patients. ug/mL          .Blood Blood  12-24 @ 14:10   No growth to date.  --  --      .Blood Blood  12-22 @ 23:43   No growth to date.  --  --      .Sputum Sputum  12-20 @ 21:49   Normal Respiratory Lissy present  --    Moderate polymorphonuclear leukocytes per low power field  No Squamous epithelial cells per low power field  Few Gram positive cocci in pairs per oil power field      .Blood Blood  12-14 @ 00:30   No growth at 5 days.  --  --      .Broncial received in trap  12-13 @ 08:06   Normal Respiratory Lissy present  --    No polymorphonuclear cells seen per low power field  No squamous epithelial cells per low power field  Moderate Gram Positive Cocci in Pairs and Chains per oil power field  Few-moderate Gram Negative Coccobacilli per oil power field      .Surgical Swab Bronchial Alveolar Lavage  12-12 @ 22:52   Moderate Prevotella intermedia  Rare Prevotella bivia  Few Normal Respiratory Lissy present  --  --      .Tissue Left Lower Lobe  12-12 @ 22:45   Rare Haemophilus parainfluenzae  Rare Neisseria species, not gonorrhoeae or meningitidis  Rare Alpha hemolytic strep  Few Prevotella melaninogenica  Numerous Prevotella intermedia  --    Rare polymorphonuclear leukocytes seen per low power field  Numerous Gram Negative Rods per oil power field      .Blood Blood  12-11 @ 11:22   No growth at 5 days.  --  --      .Broncial Other, bronchioalveolar lavage  12-06 @ 22:28   No growth at 1 week.  --  --      .Blood Blood-Peripheral  12-05 @ 10:57   No growth at 5 days.  --  --          Radiology Results      Meds    MEDICATIONS  (STANDING):  amiodarone    Tablet 200 milliGRAM(s) Oral daily  aspirin  chewable 81 milliGRAM(s) Oral daily  BACItracin   Ointment 1 Application(s) Topical two times a day  fentaNYL   Infusion 3 MICROgram(s)/kG/Hr (22.92 mL/Hr) IV Continuous <Continuous>  heparin  Injectable 5000 Unit(s) SubCutaneous every 12 hours  meropenem IVPB 500 milliGRAM(s) IV Intermittent every 8 hours  micafungin IVPB      micafungin IVPB 100 milliGRAM(s) IV Intermittent every 24 hours  midodrine 5 milliGRAM(s) Oral every 8 hours  norepinephrine Infusion 0.5 MICROgram(s)/kG/Min (35.813 mL/Hr) IV Continuous <Continuous>  pantoprazole  Injectable 40 milliGRAM(s) IV Push daily  phenylephrine    Infusion 0.5 MICROgram(s)/kG/Min (6.563 mL/Hr) IV Continuous <Continuous>  sodium bicarbonate  Infusion 0.25 mEq/kG/Hr (127.333 mL/Hr) IV Continuous <Continuous>  vasopressin Infusion 0.04 Unit(s)/Min (2.4 mL/Hr) IV Continuous <Continuous>      MEDICATIONS  (PRN):  acetaminophen  Suppository 650 milliGRAM(s) Rectal every 6 hours PRN For Temp greater than 38 C (100.4 F)  HYDROmorphone  Injectable 1 milliGRAM(s) IV Push every 4 hours PRN agitation, resp distress      Physical Exam    Neuro :  no focal deficits  Respiratory: right ronchi, decr breathsounds left lower lobe    CV: RRR, S1S2, no murmurs,   Abdominal: Soft, NT, ND +BS,  Extremities: b/l le edema, + peripheral pulses  genitals: edematous penis with some erythema, frias to bsd    ASSESSMENT    septic shock  left lower lobe mass with bony destruction,   hepatic and splenic lesions,   r/o malig,   copd   destruction of left 6th and 7th ribs and left T6 transverse process   exophytic left renal lesion  subcutaneous emphysema  pneumothorax  s/p hyponatremia  silverio 2nd to atn  new afib  penile edema  h/o BPH (benign prostatic hyperplasia)  Hypertension        PLAN        S/P flexible bronchoscopy showing possible Large B-cell lymphoma,   s/p flex bronch transbronchial lung bx and EBU bx 12/12  cytopath of flex bronch and ebus neg for malig  cx from flex bronch with Few Normal Respiratory Lissy present and Rare Haemophilus parainfluenzae  Rare Neisseria species, not gonorrhoeae or meningitidis  -- Rare polymorphonuclear leukocytes seen per low power field  Numerous Gram Negative Rods per oil power field  noted above   thoracic surg f/u  vent mgmt  cont atrov and prov nebs,   cont supplemental oxygen,  ct chest-abd-pelv result note  rept cxr result with improvement noted  pulm f/u   s/p bronch with bx   cytopath of bronch neg for malig noted  cytopatho transbronchial bx with Interstitial fibrosis and intra-alveolar smoker's  macrophages noted above.   urology cons for renal lesion  cont ivf  renal f/u   cardio f/u  cont amiodarone 200 mg daily  pt on double pressors  id f/u   f/u Procalcitonin level, HIV, ESR, and CRP  rept ct chest  hold vanco  vanco t noted above  zosyn d/c'd and pt started on meropenem  Mycamine 100 mg IVPB q day added.  paliative eval noted  heme onc cons noted  cont current meds  pt for ir bx of liver or renal lesion when stable  icu team planning trach and peg  icu team spoke to HCP (Adolph Prabhakar [phone]) over the phone who spoke to brother   and said is ok with dialysis but not ok with trach. Spoke to brother also Marcie Reza ([phone])   who called from [place] and said is ok with dialysis but will discuss again with HCP for trach as he is not sure.  pt dnr  mgmt as per icu

## 2017-12-26 NOTE — PROGRESS NOTE ADULT - PROBLEM SELECTOR PLAN 2
Continue sodium bicarbonate gtt.  If feeding, would use low K+ feeds (e.g. Nepro).  Can consider kayexlate via NGT or rectally if pt can tolerate/c/w GOC.  Overall extremely poor prognosis.

## 2017-12-26 NOTE — PROGRESS NOTE ADULT - PROBLEM SELECTOR PLAN 2
2/2 septic shock, s/p EBUS. Patient remains intubated; unable to wean. Family request terminal extubation tomorrow.

## 2017-12-26 NOTE — PROGRESS NOTE ADULT - PROBLEM SELECTOR PLAN 1
Ventilator support  Follow up ABGs  Correct metabolic acidosis  Bronchodilators  Pulmonary toilet  Aspiration precautions  Decubitus prevention  Trach/ Peg this week if consent obtained

## 2017-12-26 NOTE — PROGRESS NOTE ADULT - ASSESSMENT
71 yo M with HTN, BPH a/w chest discomfort, suprahilar/ hepatic mass and hyponatremia. Hosp course cb subcutaneous emphysema s/p bronch with Left chest tube placement. 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 Now intubated with septic shock 2/2 necrotizing PNA,  hypotension on pressors, with ZENY and hyponatremia.   Renal consulted for hyponatremia and ZENY.     ZENY nonresolving, with ATN at this point due to sepsis.  Septic shock with hypotension still requiring 3 pressors at high doses.  At this point, pt is not stable enough for hemodialysis.  Metabolic acidosis due to renal failure and lactic acidosis/sepsis, with patient on bicarbonate gtt.  Hyperkalemia now due to renal failure.    Poor prognosis.

## 2017-12-26 NOTE — PROGRESS NOTE ADULT - SUBJECTIVE AND OBJECTIVE BOX
Patient is a 72y old  Male who presents with a chief complaint of weakness, chest discomfort and voice and appetite changes (05 Dec 2017 01:22)  Patient remains on ventilator, intubated, sedated in NAD. Scheduled for trach today because of inability to wean from Ventilator however still awaiting consent from his friend in view of lack of family members at this time.  Still with anasarca on pressors meds  INTERVAL HPI/OVERNIGHT EVENTS:      VITAL SIGNS:  T(F): 97.6 (12-26-17 @ 05:00)  HR: 140 (12-26-17 @ 08:36)  BP: 82/55 (12-26-17 @ 08:30)  RR: 28 (12-26-17 @ 08:30)  SpO2: 99% (12-26-17 @ 08:36)  Wt(kg): --  I&O's Detail    25 Dec 2017 07:01  -  26 Dec 2017 07:00  --------------------------------------------------------  IN:    fentaNYL  Infusion: 460 mL    norepinephrine Infusion: 523.5 mL    phenylephrine   Infusion: 2300 mL    sodium bicarbonate  Infusion: 2032 mL    Solution: 150 mL    vasopressin Infusion: 31.2 mL    vasopressin Infusion: 24 mL  Total IN: 5520.7 mL    OUT:    Indwelling Catheter - Urethral: 652 mL    Rectal Tube: 200 mL  Total OUT: 852 mL    Total NET: 4668.7 mL        Mode: AC/ CMV (Assist Control/ Continuous Mandatory Ventilation)  RR (machine): 28  TV (machine): 500  FiO2: 50  PEEP: 5  ITime: 1  MAP: 13  PIP: 25        REVIEW OF SYSTEMS:    CONSTITUTIONAL:  No fevers, chills, sweats    HEENT:  Eyes:  No diplopia or blurred vision. ENT:  No earache, sore throat or runny nose.    CARDIOVASCULAR:  No pressure, squeezing, tightness, or heaviness about the chest; no palpitations.    RESPIRATORY:  Per HPI    GASTROINTESTINAL:  No abdominal pain, nausea, vomiting or diarrhea.    GENITOURINARY:  No dysuria, frequency or urgency.    NEUROLOGIC:  No paresthesias, fasciculations, seizures or weakness.    PSYCHIATRIC:  No disorder of thought or mood.      PHYSICAL EXAM:    Constitutional: Anasarca  Eyes:Perrla  ENMT: normal  Neck:supple  Respiratory: good air entry  Cardiovascular: S1 S2 regular  Gastrointestinal: Soft, Non tender  Extremities: + edema  Vascular:normal  Neurological:Sedated  Musculoskeletal:Normal      MEDICATIONS  (STANDING):  amiodarone    Tablet 200 milliGRAM(s) Oral daily  aspirin  chewable 81 milliGRAM(s) Oral daily  BACItracin   Ointment 1 Application(s) Topical two times a day  dextrose 50% Injectable 25 milliLiter(s) IV Push once  fentaNYL   Infusion 3 MICROgram(s)/kG/Hr (22.92 mL/Hr) IV Continuous <Continuous>  heparin  Injectable 5000 Unit(s) SubCutaneous every 12 hours  meropenem IVPB 500 milliGRAM(s) IV Intermittent every 8 hours  micafungin IVPB      micafungin IVPB 100 milliGRAM(s) IV Intermittent every 24 hours  midodrine 5 milliGRAM(s) Oral every 8 hours  norepinephrine Infusion 0.5 MICROgram(s)/kG/Min (35.813 mL/Hr) IV Continuous <Continuous>  pantoprazole  Injectable 40 milliGRAM(s) IV Push daily  phenylephrine    Infusion 0.5 MICROgram(s)/kG/Min (6.563 mL/Hr) IV Continuous <Continuous>  sodium bicarbonate 1950 milliGRAM(s) Oral three times a day  vasopressin Infusion 0.04 Unit(s)/Min (2.4 mL/Hr) IV Continuous <Continuous>    MEDICATIONS  (PRN):  acetaminophen  Suppository 650 milliGRAM(s) Rectal every 6 hours PRN For Temp greater than 38 C (100.4 F)  HYDROmorphone  Injectable 1 milliGRAM(s) IV Push every 4 hours PRN agitation, resp distress      Allergies    No Known Allergies    Intolerances        LABS:                        8.9    32.0  )-----------( 56       ( 26 Dec 2017 06:44 )             28.9     12-26    149<H>  |  116<H>  |  174  ----------------------------<  172<H>  6.1<H>   |  13<L>  |  5.50<H>    Ca    6.9<L>      26 Dec 2017 06:44  Phos  9.7     12-26  Mg     3.0     12-26    TPro  5.0<L>  /  Alb  1.3<L>  /  TBili  3.0<H>  /  DBili  x   /  AST  50<H>  /  ALT  42  /  AlkPhos  91  12-26    PT/INR - ( 26 Dec 2017 06:44 )   PT: 23.5 sec;   INR: 2.12 ratio             ABG - ( 26 Dec 2017 05:00 )  pH: 7.16  /  pCO2: 37    /  pO2: 148   / HCO3: 12    / Base Excess: -15.0 /  SaO2: 98                    CAPILLARY BLOOD GLUCOSE      POCT Blood Glucose.: 201 mg/dL (26 Dec 2017 06:09)        RADIOLOGY & ADDITIONAL TESTS:    CXR:  < from: Xray Chest 1 View AP -PORTABLE-Routine (12.26.17 @ 08:21) >  IMPRESSION:  ET tube and feeding tube again noted. Study limited due to rotation.    Increased interstitial lung markings without significant change. New   small right pleural effusion and adjacent opacity. Stable small left   pleural effusion.    Heart size cannot be accurately assessed in this projection.    < end of copied text >    Ct scan chest:    ekg;    echo:

## 2017-12-26 NOTE — PROGRESS NOTE ADULT - PROBLEM SELECTOR PLAN 1
Non-oliguric ZENY likely hemodynamically mediated in the setting of septic shock/hypotension; ATN. Scr increasing.    Dr. Reyes had a [facility] meeting with Adolph Omalley (pt's surrogate), his wife and with Elaina Palliative care NP.  Discussed pt's poor overall status. He defer decision for RRT to brother who agrees to have procedure performed if needed. Patient however would not currently tolerate RRT as on 3 pressors and hemodynamically unstable. Will reassess daily.    UA with 30 protein and mod blood in the setting of infection. Will repeat UA once infection cleared. Monitor Vanco trough.    CT abd/pelvis on 12/5 1.0 cm indeterminate exophytic lesion arising from the   lower pole of left kidney. No hydronephrosis. Subsequent, renal US performed with left renal lesion not seen.

## 2017-12-26 NOTE — CHART NOTE - NSCHARTNOTEFT_GEN_A_CORE
Met with patient's surrogate, Adolph, face-to-face; patient's brother, Hector ([phone]) on telephone. Discussed patient's status; all acknowledge patient's poor prognosis. Patient's brother reports he never would have wanted to "live in a nursing home" and requests to "Let him go; it's only prolonging his suffering." Patient's surrogate, Adolph, and brother agreeable with no reintubation, no escalation of care, request terminal extubation tomorrow 12/27/17. DNR form updated per family's wishes. Met with patient's surrogate, Adolph ([phone]), face-to-face; patient's brother, Hector ([phone]) on telephone. Discussed patient's status; all acknowledge patient's poor prognosis. Patient's brother reports he never would have wanted to "live in a nursing home" and requests to "Let him go; it's only prolonging his suffering." Patient's surrogate, Adolph, and brother agreeable with no reintubation, no escalation of care, request terminal extubation tomorrow 12/27/17. DNR form updated per family's wishes. Met with patient's surrogate, Adolph ([phone]), face-to-face; patient's brother, Hector ([phone]) on telephone. Discussed patient's status; all acknowledge patient's poor prognosis. Patient's brother reports he never would have wanted to "live in a nursing home" and requests to "Let him go; it's only prolonging his suffering." Patient's surrogate, Adolph, and brother agreeable with no reintubation, no escalation of care, request terminal extubation tomorrow morning 12/27/17. DNR form updated per family's wishes.

## 2017-12-26 NOTE — PROGRESS NOTE ADULT - SUBJECTIVE AND OBJECTIVE BOX
CHIEF COMPLAINT:Patient is a 72y old  Male who presents with a chief complaint of weakness, chest discomfort and voice and appetite changes. Pt on vent via trach.    	  REVIEW OF SYSTEMS:  [X ] Unable to obtain    PHYSICAL EXAM:  T(C): 36.4 (12-26-17 @ 05:00), Max: 36.7 (12-25-17 @ 13:00)  HR: 140 (12-26-17 @ 08:36) (131 - 169)  BP: 82/55 (12-26-17 @ 08:30) (60/44 - 143/61)  RR: 28 (12-26-17 @ 08:30) (0 - 31)  SpO2: 99% (12-26-17 @ 08:36) (95% - 100%)    I&O's Summary    25 Dec 2017 07:01  -  26 Dec 2017 07:00  --------------------------------------------------------  IN: 5520.7 mL / OUT: 852 mL / NET: 4668.7 mL        Appearance: Normal	  HEENT:   Normal oral mucosa, PERRL, EOMI	  Lymphatic: No lymphadenopathy  Cardiovascular: Normal S1 S2, No JVD, No murmurs, No edema  Respiratory: B/L ronchi  Gastrointestinal:  Soft, Non-tender, + BS	  Skin: No rashes, No ecchymoses, No cyanosis	  Extremities: Normal range of motion, No clubbing, cyanosis or edema  Vascular: Peripheral pulses palpable 2+ bilaterally    MEDICATIONS  (STANDING):  amiodarone    Tablet 200 milliGRAM(s) Oral daily  aspirin  chewable 81 milliGRAM(s) Oral daily  BACItracin   Ointment 1 Application(s) Topical two times a day  dextrose 50% Injectable 25 milliLiter(s) IV Push once  fentaNYL   Infusion 3 MICROgram(s)/kG/Hr (22.92 mL/Hr) IV Continuous <Continuous>  heparin  Injectable 5000 Unit(s) SubCutaneous every 12 hours  meropenem IVPB 500 milliGRAM(s) IV Intermittent every 8 hours  micafungin IVPB      micafungin IVPB 100 milliGRAM(s) IV Intermittent every 24 hours  midodrine 5 milliGRAM(s) Oral every 8 hours  norepinephrine Infusion 0.5 MICROgram(s)/kG/Min (35.813 mL/Hr) IV Continuous <Continuous>  pantoprazole  Injectable 40 milliGRAM(s) IV Push daily  phenylephrine    Infusion 0.5 MICROgram(s)/kG/Min (6.563 mL/Hr) IV Continuous <Continuous>  sodium bicarbonate 1950 milliGRAM(s) Oral three times a day  vasopressin Infusion 0.04 Unit(s)/Min (2.4 mL/Hr) IV Continuous <Continuous>        	  LABS:	 	                        8.9    32.0  )-----------( 56       ( 26 Dec 2017 06:44 )             28.9     12-26    149<H>  |  116<H>  |  174  ----------------------------<  172<H>  6.1<H>   |  13<L>  |  5.50<H>    Ca    6.9<L>      26 Dec 2017 06:44  Phos  9.7     12-26  Mg     3.0     12-26    TPro  5.0<L>  /  Alb  1.3<L>  /  TBili  3.0<H>  /  DBili  x   /  AST  50<H>  /  ALT  42  /  AlkPhos  91  12-26      Lipid Profile: Cholesterol 64  LDL 20  HDL 26  TG 88    HgA1c: Hemoglobin A1C, Whole Blood: 6.2 % (12-05 @ 09:32)    TSH: Thyroid Stimulating Hormone, Serum: 0.45 uU/mL (12-05 @ 07:14)

## 2017-12-26 NOTE — PROGRESS NOTE ADULT - SUBJECTIVE AND OBJECTIVE BOX
[facility] NEPHROLOGY- PROGRESS NOTE    73 yo M with HTN, BPH a/w chest discomfort, suprahilar/ hepatic mass and hyponatremia. Hosp course cb subcutaneous emphysema s/p bronch with Left chest tube placement. s/p EBUS 12/12 with  endobronchial lung biopsy neg for malignancy however mediastinal biopsy indeterminate. Now intubated with septic shock 2/2 Necrotizing PNA, hypotension on pressors, with ZENY and hyponatremia.    GOC were discussed by primary team and family would like dialysis if necessary/safe/appropriate.  Renal consulted for hyponatremia and ZENY.    Pt is on 3 pressors at high doses.    Hospital Medications: Medications reviewed.    REVIEW OF SYSTEMS: Unable to obtain as patient intubated and unresponsive    VITALS:  T(F): 97.6 (12-26-17 @ 05:00), Max: 98.1 (12-25-17 @ 13:00)  HR: 161 (12-26-17 @ 10:30)  BP: 93/60 (12-26-17 @ 10:30)  RR: 7 (12-26-17 @ 10:30)  SpO2: 100% (12-26-17 @ 10:30)  Wt(kg): --    12-25 @ 07:01  -  12-26 @ 07:00  --------------------------------------------------------  IN: 5520.7 mL / OUT: 852 mL / NET: 4668.7 mL    PHYSICAL EXAM:  Gen: Unresponsive  HEENT: anicteric, intubated  Cards: Irregular and tachycardic, +S1/S2, no M/G/R  Resp: mechanical BS B, course BS B/L   GI: soft, NT/ND, NABS  : + frias  Extremities: +B LE edema/anasarca      LABS:  12-26    149<H>  |  116<H>  |  174  ----------------------------<  172<H>  6.1<H>   |  13<L>  |  5.50<H>    Ca    6.9<L>      26 Dec 2017 06:44  Phos  9.7     12-26  Mg     3.0     12-26    TPro  5.0<L>  /  Alb  1.3<L>  /  TBili  3.0<H>  /  DBili      /  AST  50<H>  /  ALT  42  /  AlkPhos  91  12-26    Creatinine Trend: 5.50 <--, 5.22 <--, 4.80 <--, 4.24 <--, 3.92 <--, 3.63 <--, 3.53 <--, 3.51 <--                        8.9    32.0  )-----------( 56       ( 26 Dec 2017 06:44 )             28.9

## 2017-12-26 NOTE — PROGRESS NOTE ADULT - ASSESSMENT
72 years old male from home with PMHx of HTN and BPH and no PSH presents to ED c/o L-sided chest pain ,lung and hepatic mass, s/p PTHX,now respiratory failure and afib with RVR.  1.Vent support as per ICU.  2.Amiodarone  200mg qd.  3.Continue ASA for stroke prevention.  4.Given suspected metastatic disease ,high risk for bleeding with full AC.  5.ABX.  6.Pressor support as per ICU.  7.GI and DVT prophylaxis.  8.Overall prognosis is poor.  9.Plan for trach ?

## 2017-12-26 NOTE — PROGRESS NOTE ADULT - SUBJECTIVE AND OBJECTIVE BOX
OVERNIGHT EVENTS: pt now on 3 pressors.     Present Symptoms:   Review of Systems: Pt unresponsive - unable to obtain     MEDICATIONS  (STANDING):  amiodarone    Tablet 200 milliGRAM(s) Oral daily  aspirin  chewable 81 milliGRAM(s) Oral daily  BACItracin   Ointment 1 Application(s) Topical two times a day  dextrose 50% Injectable 25 milliLiter(s) IV Push once  fentaNYL   Infusion 3 MICROgram(s)/kG/Hr (22.92 mL/Hr) IV Continuous <Continuous>  heparin  Injectable 5000 Unit(s) SubCutaneous every 12 hours  meropenem IVPB 500 milliGRAM(s) IV Intermittent every 8 hours  micafungin IVPB      micafungin IVPB 100 milliGRAM(s) IV Intermittent every 24 hours  midodrine 5 milliGRAM(s) Oral every 8 hours  norepinephrine Infusion 0.5 MICROgram(s)/kG/Min (35.813 mL/Hr) IV Continuous <Continuous>  pantoprazole  Injectable 40 milliGRAM(s) IV Push daily  phenylephrine    Infusion 0.5 MICROgram(s)/kG/Min (6.563 mL/Hr) IV Continuous <Continuous>  sodium bicarbonate  Infusion 0.25 mEq/kG/Hr (125 mL/Hr) IV Continuous <Continuous>  vasopressin Infusion 0.04 Unit(s)/Min (2.4 mL/Hr) IV Continuous <Continuous>    MEDICATIONS  (PRN):  acetaminophen  Suppository 650 milliGRAM(s) Rectal every 6 hours PRN For Temp greater than 38 C (100.4 F)  HYDROmorphone  Injectable 1 milliGRAM(s) IV Push every 4 hours PRN agitation, resp distress      PHYSICAL EXAM:  Vital Signs Last 24 Hrs  T(C): 36.4 (26 Dec 2017 05:00), Max: 36.7 (25 Dec 2017 16:00)  T(F): 97.6 (26 Dec 2017 05:00), Max: 98.1 (25 Dec 2017 16:00)  HR: 140 (26 Dec 2017 12:11) (130 - 169)  BP: 93/60 (26 Dec 2017 10:30) (60/44 - 136/59)  BP(mean): 68 (26 Dec 2017 10:30) (48 - 103)  RR: 7 (26 Dec 2017 10:30) (0 - 31)  SpO2: 100% (26 Dec 2017 12:11) (95% - 100%)  General: pt unresponsive, + anasarca, unarousable  Karnofsky Performance Score/Palliative Performance Status Version2:  20  %    HEENT: dry mouth, ET tube   Lungs: on vent  CV:  tachycardia  GI:  incontinent, NG  : frias  Musculoskeletal: bedbound, anasarca  Skin: DTIs: L flank, R ischium, R flank, sacrum  Neuro: cognitive impairment, pt unresponsive  Oral intake ability: unable/only mouth care   Diet: NPO- NGT    LABS:                          8.9    32.0  )-----------( 56       ( 26 Dec 2017 06:44 )             28.9     12-26    149<H>  |  116<H>  |  174  ----------------------------<  172<H>  6.1<H>   |  13<L>  |  5.50<H>    Ca    6.9<L>      26 Dec 2017 06:44  Phos  9.7     12-26  Mg     3.0     12-26    TPro  5.0<L>  /  Alb  1.3<L>  /  TBili  3.0<H>  /  DBili  x   /  AST  50<H>  /  ALT  42  /  AlkPhos  91  12-26        RADIOLOGY & ADDITIONAL STUDIES: reviewed    ADVANCE DIRECTIVES: DNR, no reintubation, no escalation of care. Plan: terminal extubation tomorrow.

## 2017-12-26 NOTE — PROGRESS NOTE ADULT - PROBLEM SELECTOR PLAN 1
s/p EBUS; comorbid multiorgan failure. Pt remains intubated - unable to be weaned; remains off sedation, but pt remains unresponsive. Increasing WBC and worsening kidney function. Now requiring 3 pressors. Not a candidate for HD, trach or PEG. Very poor prognosis. Met with patient's surrogate, Adolph; patient's brother, Hector was on the phone - all agreeable with DNR, no reintubation, no escalation of care. Plan is for terminal extubation tomorrow 12/27/17.

## 2017-12-26 NOTE — PROGRESS NOTE ADULT - PROBLEM SELECTOR PLAN 4
Met with patient's surrogate, Adolph, face-to-face; patient's brother, Hector ([phone]) on telephone. Dr. Cervantes present. Discussed patient's status; all acknowledge patient's poor prognosis. Patient's brother reports he never would have wanted to "live in a nursing home" and requests to "Let him go; it's only prolonging his suffering." Patient's surrogate, Adolph, and brother agreeable with no reintubation, no escalation of care, request terminal extubation tomorrow. DNR form updated per family's wishes.

## 2017-12-26 NOTE — PROGRESS NOTE ADULT - SUBJECTIVE AND OBJECTIVE BOX
INTERVAL HPI/ OVERNIGHT EVENTS: Still persistent tachycardia and requiring 3 pressors.  Yesterday was started on Bicarb drip due to worsening acidosis. Abx changed  as per ID.     PRESSORS: [ x ] YES [ ] NO  WHICH: Pheny , Norepi, Vasopressin    ANTIBIOTICS: Meropenem           DATE STARTED: 12/25/17        Antimicrobial:  meropenem IVPB 500 milliGRAM(s) IV Intermittent every 8 hours  micafungin IVPB      micafungin IVPB 100 milliGRAM(s) IV Intermittent every 24 hours    Cardiovascular:  amiodarone    Tablet 200 milliGRAM(s) Oral daily  midodrine 5 milliGRAM(s) Oral every 8 hours  norepinephrine Infusion 0.5 MICROgram(s)/kG/Min IV Continuous <Continuous>  phenylephrine    Infusion 0.5 MICROgram(s)/kG/Min IV Continuous <Continuous>    Pulmonary:    Hematalogic:  aspirin  chewable 81 milliGRAM(s) Oral daily  heparin  Injectable 5000 Unit(s) SubCutaneous every 12 hours    Other:  acetaminophen  Suppository 650 milliGRAM(s) Rectal every 6 hours PRN  BACItracin   Ointment 1 Application(s) Topical two times a day  fentaNYL   Infusion 3 MICROgram(s)/kG/Hr IV Continuous <Continuous>  HYDROmorphone  Injectable 1 milliGRAM(s) IV Push every 4 hours PRN  pantoprazole  Injectable 40 milliGRAM(s) IV Push daily  sodium bicarbonate  Infusion 0.25 mEq/kG/Hr IV Continuous <Continuous>  vasopressin Infusion 0.04 Unit(s)/Min IV Continuous <Continuous>    acetaminophen  Suppository 650 milliGRAM(s) Rectal every 6 hours PRN  amiodarone    Tablet 200 milliGRAM(s) Oral daily  aspirin  chewable 81 milliGRAM(s) Oral daily  BACItracin   Ointment 1 Application(s) Topical two times a day  fentaNYL   Infusion 3 MICROgram(s)/kG/Hr IV Continuous <Continuous>  heparin  Injectable 5000 Unit(s) SubCutaneous every 12 hours  HYDROmorphone  Injectable 1 milliGRAM(s) IV Push every 4 hours PRN  meropenem IVPB 500 milliGRAM(s) IV Intermittent every 8 hours  micafungin IVPB      micafungin IVPB 100 milliGRAM(s) IV Intermittent every 24 hours  midodrine 5 milliGRAM(s) Oral every 8 hours  norepinephrine Infusion 0.5 MICROgram(s)/kG/Min IV Continuous <Continuous>  pantoprazole  Injectable 40 milliGRAM(s) IV Push daily  phenylephrine    Infusion 0.5 MICROgram(s)/kG/Min IV Continuous <Continuous>  sodium bicarbonate  Infusion 0.25 mEq/kG/Hr IV Continuous <Continuous>  vasopressin Infusion 0.04 Unit(s)/Min IV Continuous <Continuous>    Drug Dosing Weight  Height (cm): 165 (12 Dec 2017 13:14)  Weight (kg): 76.4 (12 Dec 2017 16:00)  BMI (kg/m2): 28.1 (12 Dec 2017 16:00)  BSA (m2): 1.84 (12 Dec 2017 16:00)    CENTRAL LINE: [ x ] YES [ ] NO  LOCATION: [facility]  DATE INSERTED: 12/21/17  REMOVE: [ ] YES [ ] NO  EXPLAIN:    LERMA: [ x ] YES [ ] NO    DATE INSERTED: 12/12/17  REMOVE:  [ ] YES [ ] NO  EXPLAIN:    A-LINE:  [ ] YES [ x ] NO  LOCATION:   DATE INSERTED:  REMOVE:  [ ] YES [ ] NO  EXPLAIN:    PMH -reviewed admission note, no change since admission  Heart faliure: acute [ ] chronic [ ] acute or chronic [ ] diastolic [ ] systolic [ ] combied systolic and diastolic[ ]  ZENY: ATN[ ] renal medullary necrosis [ ] CKD I [ ]CKDII [ ]CKD III [ ]CKD IV [ ]CKD V [ ]Other pathological lesions [ ]  Abdominal Nutrition Status: malnutrition [ ] cachexia [ ] morbid obesity/BMI=40 [ ] Supplement ordered [___________]     ICU Vital Signs Last 24 Hrs  T(C): 36.4 (26 Dec 2017 05:00), Max: 36.8 (25 Dec 2017 09:00)  T(F): 97.6 (26 Dec 2017 05:00), Max: 98.3 (25 Dec 2017 09:00)  HR: 152 (26 Dec 2017 05:30) (131 - 163)  BP: 112/74 (26 Dec 2017 05:30) (60/44 - 143/61)  BP(mean): 83 (26 Dec 2017 05:30) (48 - 103)  ABP: --  ABP(mean): --  RR: 28 (26 Dec 2017 05:30) (0 - 31)  SpO2: 100% (26 Dec 2017 05:30) (95% - 100%)      ABG - ( 26 Dec 2017 05:00 )  pH: 7.16  /  pCO2: 37    /  pO2: 148   / HCO3: 12    / Base Excess: -15.0 /  SaO2: 98                    12-25 @ 07:01  -  12-26 @ 07:00  --------------------------------------------------------  IN: 5520.7 mL / OUT: 852 mL / NET: 4668.7 mL        Mode: AC/ CMV (Assist Control/ Continuous Mandatory Ventilation)  RR (machine): 28  TV (machine): 500  FiO2: 50  PEEP: 5  ITime: 1  MAP: 11  PIP: 21      PHYSICAL EXAM:    GENERAL:  intubated, sedated  HEAD:  [x ]Atraumatic, [x]Normocephalic  EYES:[x ] Pupillary reaction not appreciated.  ENMT: ETT in place  NECK: [x ]Supple, normal appearance,   NERVOUS SYSTEM: sedated   CHEST/LUNG: On ETT tube, Clear to auscultation, [ x]No chest deformity; [x]Normal percussion bilaterally; [ ]No rales, rhonchi, wheezing   HEART: [ x]Regular rate and rhythm; [x ]No murmurs, rubs, or gallops  ABDOMEN: [ x]Soft, Nontender, Nondistended; [x ]Bowel sounds not appreciated  EXTREMITIES:  [x ]2+ Peripheral Pulses, [ x]No clubbing, cyanosis, or edema, on SCD boots,  GENITOURINARY: Large swollen penile area with Lerma catheter  LYMPH: [ x]No lymphadenopathy noted  SKIN: [x ]No rashes or lesions; [ ]Good capillary refill    LABS:  CBC Full  -  ( 26 Dec 2017 06:44 )  WBC Count : 32.0 K/uL  Hemoglobin : 8.9 g/dL  Hematocrit : 28.9 %  Platelet Count - Automated : x  Mean Cell Volume : 102.0 fl  Mean Cell Hemoglobin : 31.3 pg  Mean Cell Hemoglobin Concentration : 30.7 gm/dL  Auto Neutrophil # : x  Auto Lymphocyte # : x  Auto Monocyte # : x  Auto Eosinophil # : x  Auto Basophil # : x  Auto Neutrophil % : x  Auto Lymphocyte % : x  Auto Monocyte % : x  Auto Eosinophil % : x  Auto Basophil % : x    12-26    149<H>  |  116<H>  |  174  ----------------------------<  172<H>  6.1<H>   |  13<L>  |  5.50<H>    Ca    6.9<L>      26 Dec 2017 06:44  Phos  7.8     12-25  Mg     3.0     12-26    TPro  5.0<L>  /  Alb  1.3<L>  /  TBili  3.0<H>  /  DBili  x   /  AST  50<H>  /  ALT  42  /  AlkPhos  91  12-26    PT/INR - ( 26 Dec 2017 06:44 )   PT: 23.5 sec;   INR: 2.12 ratio             Culture Results:   No growth to date. (12-24 @ 14:10)  Culture Results:   No growth to date. (12-24 @ 14:10)      RADIOLOGY & ADDITIONAL STUDIES REVIEWED:  ***    [ ]GOALS OF CARE DISCUSSION WITH PATIENT/FAMILY/PROXY:    CRITICAL CARE TIME SPENT: 35 minutes

## 2017-12-27 NOTE — PROGRESS NOTE ADULT - ASSESSMENT
72 male with hx of HTN, BPH, lung mass, underwent bronchoscopy with biopsies on 12/6 complicated by pneumothorax, pneumomediastinum, subcutaneous emphysema, s/p chest tube placement on 12/6.  Repeat bronch with EBUS performed on 12/12, so far no biopsy has been positive for malignancy but suspicion is still high.  Now with necrotizing pneumonia, lung abscess, acute resp failure, septic shock, ZENY.        1: Septic Shock  -s/p intubation , c/w mechanical ventilation  -patient has very poor prognosis, on 3 pressors, trach recommended by thoracic surgery but family does not want and patient not stable.   -Patient is on pheny , norepinephrine and Vasopressin  -increased white count, tissue culture growing gram negative rods rare neisseria, haemophilus, BCx negative to date  -antibiotics changed to Meropenem as per ID Dr. Nicholas  -c/w Mycamine       2: ATN  -BUN/cr increasing  -discussed with HCP and brother yesterday who are ok with dialysis, but patient unlikely to tolerate as still requiring 3 pressors.   -monitor urine output   -Nephro: Dr. Reagan      3: New onset Atrial Fibrillation  - Rate controlled   - EKG shows Atrial fibrillation (new onset)  - Completed amiodarone loading, now amio 200mg  c/w amiodarone  - f/u cardiology - Dr. Jackson    Pneumothorax   -Resolved  -D/patricio chest tube    Lung mass  -c/w post op prophylaxis  -Bronch path negative for Ca.  -s/p EBUS VS mediastinoscopy  -cyto report, negative for  malignancy  - 1 biopsy still pending  -repeat CT chest - worsening necrotic mass from prior imaging    Hyperkalemia:  -2/2 acute kidney failure  -will give calcium gluconate, insulin, dextrose and Kayexalate  -f/u repeat bmp    Acidosis:  c/w sodium bicarbonate    Hyperphosphatemia:  -Patient phosphate high in setting for kidney failure  -stable will c/w monitoring    Penile swelling:   Urology consult requested, cold compresses, bacitracin ointment application    Prophylactic measure  - IMPROVE score 2   - c/w heparin S.C for VTE prophylaxis  - GI stress ulcer PPx w/ Protonix.    Goals of care and discussion:  Patient has poor prognosis, Patient is going in ATN, very low UO,  Palliative on board: Patient is DNR 72 male with hx of HTN, BPH, lung mass, underwent bronchoscopy with biopsies on 12/6 complicated by pneumothorax, pneumomediastinum, subcutaneous emphysema, s/p chest tube placement on 12/6.  Repeat bronch with EBUS performed on 12/12, so far no biopsy has been positive for malignancy but suspicion is still high.  Now with necrotizing pneumonia, lung abscess, acute resp failure, septic shock, ZENY.        1: Septic Shock  -s/p intubation , c/w mechanical ventilation  -patient has very poor prognosis, on 3 pressors, trach recommended by thoracic surgery but family does not want and patient not stable.   -Patient is on pheny , norepinephrine and Vasopressin  -increased white count, tissue culture growing gram negative rods rare neisseria, haemophilus, BCx negative to date  -antibiotics changed to Meropenem as per ID Dr. Nicholas  -c/w Mycamine       2: ATN  -BUN/cr increasing  -discussed with HCP and brother yesterday who are ok with dialysis, but patient unlikely to tolerate as still requiring 3 pressors.   -monitor urine output   -Nephro: Dr. Reagan      3: New onset Atrial Fibrillation  - Rate controlled   - EKG shows Atrial fibrillation (new onset)  - Completed amiodarone loading, now amio 200mg  c/w amiodarone  - f/u cardiology - Dr. Jackson    Pneumothorax   -Resolved  -D/patricio chest tube    Lung mass  -c/w post op prophylaxis  -Bronch path negative for Ca.  -s/p EBUS VS mediastinoscopy  -cyto report, negative for  malignancy  - 1 biopsy still pending  -repeat CT chest - worsening necrotic mass from prior imaging    Hyperkalemia:  -2/2 acute kidney failure  -will give calcium gluconate, insulin, dextrose and Kayexalate  -f/u repeat bmp    Acidosis:  c/w sodium bicarbonate    Hyperphosphatemia:  -Patient phosphate high in setting for kidney failure  -stable will c/w monitoring    Penile swelling:   Urology consult requested, cold compresses, bacitracin ointment application    Prophylactic measure  - IMPROVE score 2   - c/w heparin S.C for VTE prophylaxis  - GI stress ulcer PPx w/ Protonix.    Goals of care and discussion:  - Family meeting done with palliative team, HCP in person and brother over phone. Plan is for terminal extubation. 72 male with hx of HTN, BPH, lung mass, underwent bronchoscopy with biopsies on 12/6 complicated by pneumothorax, pneumomediastinum, subcutaneous emphysema, s/p chest tube placement on 12/6.  Repeat bronch with EBUS performed on 12/12, so far no biopsy has been positive for malignancy but suspicion is still high.  Now with necrotizing pneumonia, lung abscess, acute resp failure, septic shock, ZENY.        1: Septic Shock  -s/p intubation , on mechanical ventilation, plan for terminal extubation today  -patient has very poor prognosis, on 3 pressors, trach recommended by thoracic surgery but family does not want and patient not stable.   -Patient is on pheny , norepinephrine and Vasopressin    2: ATN  -BUN/cr increasing  -Per Nephro, patient unlikely to tolerate as still requiring 3 pressors.       3: New onset Atrial Fibrillation  - new onset, Rate not controlled   - On amiodarone 200mg    4. Pneumothorax   -Resolved    5. Lung mass  -Bronch path negative for Ca.  -s/p EBUS VS mediastinoscopy  -cyto report, negative for  malignancy  -repeat CT chest - worsening necrotic mass from prior imaging    6. Penile swelling:   Urology consult requested, cold compresses, bacitracin ointment application    7. Prophylactic measure  - IMPROVE score 2   - heparin S.C for VTE prophylaxis  - GI stress ulcer PPx w/ Protonix.    8. Goals of care and discussion:  - Family meeting done with palliative team, HCP in person and brother over phone. Plan is for terminal extubation.

## 2017-12-27 NOTE — PROGRESS NOTE ADULT - SUBJECTIVE AND OBJECTIVE BOX
Meds:  Meropenem 500 mg IVPB q 8 hours.  Vancomycin 1 gm IVPB Q day.  Micafungin 100 mg IVPB q day.       Allergies:  Allergies    No Known Allergies    Intolerances  ROS  [ x ] UNABLE TO ELICIT    General:  [  ] None  [  ] Fever  [  ] Chills  [  ] Malaise    Skin:  [  ] None [  ] Rash  [  ] Wound  [  ] Ulcer    HEENT:  [  ] None  [  ] Sore Throat  [  ] Nasal congestion/ runny nose  [  ] Photophobia [  ] Neck pain      Chest:  [  ] None   [  ] SOB  [  ] Cough  [  ] None    Cardiovascular:   [  ] None  [  ] CP  [  ] Palpitation    Gastrointestinal:  [  ] None  [  ] Abd pain   [  ] Nausea    [  ] Vomiting   [  ] Diarrhea	     Genitourinary:  [  ] None [  ] Polyuria   [  ] Urgency  [  ] Frequency  [  ] Dysuria    [  ]  Hematuria       Musculoskeletal:  [  ] None [  ] Back Pain	[  ] Body aches  [  ] Joint pain    Neurological: [  ] None [  ]Dizziness  [  ]Visual Disturbance  [  ]Headaches   [  ] Weakness        PHYSICAL EXAM:  Vital Signs Last 24 Hrs  T(C): 36.8 (27 Dec 2017 07:30), Max: 37.2 (26 Dec 2017 19:30)  T(F): 98.3 (27 Dec 2017 07:30), Max: 98.9 (26 Dec 2017 19:30)  HR: 137 (27 Dec 2017 08:19) (119 - 176)  BP: 98/58 (27 Dec 2017 07:30) (76/47 - 162/93)  BP(mean): 68 (27 Dec 2017 07:30) (54 - 108)  RR: 25 (27 Dec 2017 07:30) (0 - 32)  SpO2: 100% (27 Dec 2017 08:19) (90% - 100%)    Constitutional:    HEENT: [ x ] Wnl  [ x ] ET and NGT in place    Neck:  [ x ] Supple  [  ]Lymphadenopathy  [  ] No JVD   [  ] JVD  [  ] Masses   [  ] WNL    CHEST/Respiratory:  [  ]Clear to auscultation  [ x ] Rales   [ x ] Rhonchi   [  ] Wheezing     [ x ] S/P Left side Chest tube insertion and removal     Cardiovascular:  [ x ] Reg S1 S2   [  ] Irreg S1 S2   [ x ]No Murmur  [  ] +ve Murmurs  [  ]Systolic [  ]Diastolic      Abdomen:  [ x ] Soft  [ x ] No tendrerness  [  ] Tenderness  [  ] Organomegaly  [  ] ABD Distention  [  ] Rigidity                       [ x ] No Regidity                       [ x ] No Rebound Tenderness  [ x ] No Guarding Rigidity  [  ] Rebound Tenderness[  ] Guarding Rigidity                          [ x ]  +ve Bowel Sounds  [  ] Decreased Bowel Sounds    [  ] Absent Bowel Sounds                            Extremities: [ x ] No edema [  ] Edema  [  ] Clubbing   [  ] Cyanosis                         [ x ] No Tender Calf muscles  [  ] Tender Calf muscles                        [ x ] Palpable peripheral pulses    Neurological: [  ] Awake  [  ] Alert  [  ] Oriented  x                             [  ] Confused  [  ] Drowzy  [ x ] respond to painful stimuli  [  ] Unresponsive    Skin:  [ x ] Intact [  ] Redness [  ] Thrombophlebitis  [  ] Rashes  [  ] Dry  [  ] Ulcers    Ortho:  [  ] Joint Swelling  [  ] Joint erythema [  ] Joint tenderness                [  ] Increased temp. to touch  [  ] DJD [ x ] WNL            LABS/DIAGNOSTIC TESTS                                      8.9    32.0  )-----------( 56       ( 26 Dec 2017 06:44 )             28.9   12-26    149<H>  |  116<H>  |  174  ----------------------------<  172<H>  6.1<H>   |  13<L>  |  5.50<H>    Ca    6.9<L>      26 Dec 2017 06:44  Phos  9.7     12-26  Mg     3.0     12-26    TPro  5.0<L>  /  Alb  1.3<L>  /  TBili  3.0<H>  /  DBili  x   /  AST  50<H>  /  ALT  42  /  AlkPhos  91  12-26    ABG - ( 26 Dec 2017 05:00 )  pH: 7.16  /  pCO2: 37    /  pO2: 148   / HCO3: 12    / Base Excess: -15.0 /  SaO2: 98              Sedimentation Rate, Erythrocyte: 40 mm/Hr (12.23.17 @ 15:10)    C-Reactive Protein, Serum: 5.80 mg/dL (12.23.17 @ 20:55)                                 Vancomycin Level, Trough: 25.6: TYPE:(C=Critical, N=Notification, A=Abnormal) c    C Diff by PCR Result: NotDetec (12.24.17 @ 22:20)       CULTURES:     Culture - Bronchial (12.13.17 @ 08:06)    Gram Stain:   No polymorphonuclear cells seen per low power field  No squamous epithelial cells per low power field  Moderate Gram Positive Cocci in Pairs and Chains per oil power field  Few-moderate Gram Negative Coccobacilli per oil power field    Specimen Source: .Broncial received in trap    Culture Results:   Normal Respiratory Lissy present    Culture - Surgical Swab (12.12.17 @ 22:52)    Specimen Source: .Surgical Swab Bronchial Alveolar Lavage    Culture Results:   Few Normal Respiratory Lissy present    Culture - Tissue with Gram Stain (12.12.17 @ 22:45)    Gram Stain:   Rare polymorphonuclear leukocytes seen per low power field  Numerous Gram Negative Rods per oil power field    Specimen Source: .Tissue Left Lower Lobe    Culture Results:   Rare Haemophilus parainfluenzae  Rare Neisseria species, not gonorrhoeae or meningitidis    Culture - Blood (12.11.17 @ 11:22)    Specimen Source: .Blood Blood    Culture Results:   No growth to date.    Culture - Blood (12.11.17 @ 11:22)    Specimen Source: .Blood Blood    Culture Results:   No growth to date.    Culture - Blood (12.11.17 @ 11:22)    Specimen Source: .Blood Blood    Culture Results:   No growth to date.            RADIOLOGY    EXAM:  CHEST PORTABLE ROUTINE                            PROCEDURE DATE:  12/25/2017          INTERPRETATION:  History: Pneumonia.    Findings: Frontal chest.    Comparison: 12/24/2017.    Exam is limited by overlying cooling blanket and patient rotation towards   the right.    Endotracheal tube, nasogastric tube and left internal jugular central   venous catheter again seen in place. Multiple age clips and wires overlie   the chest.    Difficult to evaluate adequately assess heart size due to marked patient   rotation. Worsened lung opacities, greater on the left; effusion,   atelectasis and/or infiltrates, may be exaggerated by marked patient   rotation. Cystic changes left lower lung.    Impression:    Worsening lung opacities, greater on the left; effusion, atelectasis   and/or infiltrates, may be exaggerated by marked patient rotation.           Assessment and Recommendation:   72 years old male from home with PMHx of HTN and BPH and no PSH presents to ED c/o L-sided chest pain radiating to the back with associated general weakness x6 months. Patient also reports loss of voice x6 months, in addition to decreased appetite and weight loss x2 weeks, only been able to consume x3 Ensure today.   10/6/17 patient had bronchoscopy and subsequent pneumothorax and chest tube insertion.  Patient was initially transferred from ICU to regular floor, then upgraded to the icu after condition became unstable.  Patient was started on IV Zosyn on 12/12/17.    12/18/17 WBC is increasing again.  12/19/17 WBC improved but H&H dropped.  Vancomycin was started as suggested, but Mycamine is not started as suggested, and discussed with ICU resident on 12/19/17.   12/20 chest tube was removed.  12/21/17 Patient is tachycardiac, and BP is low, O2 is low, but PH, and WBC are better.  Micafungin was started 12/21/17, patient is hypotensive, and tachycardiac and labs for today are not available yet.    12/24/17 Central line was removed, WBC is increased, and Vancomycin trough is high.  CRP & ESR are high.  12/26/17 Patent family requested terminal weaning, after was discussed in detail with ICU team.    Problem/Recommendation - 1:  Problem: Pneumonia, bacterial.   Recommendation:   1- UA & CS.  2- Follow Blood culture to final report are negative.  3- Blood transfusions as needed.  4- Continue Meropenem 500 mg IVPB q 8 hours.  5- Fluid, electrolytes and renal management.  6- CBC and CMP follow up.   7- Follow up CXR.  8- Respirator management.  9- Procalcitonin level, HIV testing, and repeat sputum culture.  10- Continue Mycamine 100 mg IVPB q day empirically As patient has been on long duration of ABX, with intubation and central lines (Started on 12/21/17).  11- Continue Vancomycin, but decrease the dose to 500 mg IVPB q 28 hours, but hold it till the trough is less than 20.  12- Oncology work and follow up.  13- Comfort care and terminal weaning as per ICU.    Problem/Recommendation - 2:  ·  Problem: COPD (chronic obstructive pulmonary disease).    Recommendation:   1- Bronchodilators.  2- O2 as needed.  3- Pulmonary, and cardiology management.  4- Steroids as per primary, and pulmonary team if needed.     Problem/Recommendation - 3:  ·  Problem: ZENY (acute kidney injury).    Recommendation:   1- Renal management.  2- Fluid and electrolytes management and follow up.     Discussed with medical resident, and ICU team.

## 2017-12-27 NOTE — PROGRESS NOTE ADULT - NSHPATTENDINGPLANDISCUSS_GEN_ALL_CORE
signed out to covering house officer
signed out to surgical house officer on call
icu team on rounds
icu team , dr fountain
icu team on rounds
Dr. Bocanegra and ICU team, Dr. Reagan, Dr. Nicholas, Dr. Jackson, Dr. German; LM for Dr. Sultana
Dr. Schwartz, Dr. Sultana, Dr. Otero, Dr. Nicholas, Dr. Mancini
ICU
Dr. Bocanegra, ICU team, Dr. German, Dr. Jackson. Left message for Dr. Sultana
ICU
ICU
ICU team
ICU team
Palliative care team
Primary team

## 2017-12-27 NOTE — PROGRESS NOTE ADULT - ASSESSMENT
73 yo M with HTN, BPH a/w chest discomfort, suprahilar/ hepatic mass and hyponatremia. Hosp course cb subcutaneous emphysema s/p bronch with Left chest tube placement. 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 Now intubated with septic shock 2/2 necrotizing PNA,  hypotension on pressors, with ZENY and hyponatremia.   Renal consulted for hyponatremia and ZENY.     ZENY nonresolving, with ATN at this point due to sepsis.  Septic shock with hypotension still requiring 3 pressors at high doses.  At this point, pt is not stable enough for hemodialysis.  Metabolic acidosis due to renal failure and lactic acidosis/sepsis, with patient on bicarbonate gtt.  Hyperkalemia now due to renal failure.    Poor prognosis.    Terminal extubation planned for today.

## 2017-12-27 NOTE — PROGRESS NOTE ADULT - PROBLEM SELECTOR PLAN 1
Non-oliguric ZENY likely hemodynamically mediated in the setting of septic shock/hypotension; ATN. Scr increasing.    No further Rx as plan is for terminal extubation.    Dr. Reyes had a [facility] meeting with Adolph Omalley (pt's surrogate), his wife and with April Palliative care NP.  Discussed pt's poor overall status. He defer decision for RRT to brother who agrees to have procedure performed if needed. Patient however would not currently tolerate RRT as on 3 pressors and hemodynamically unstable. Will reassess daily.    UA with 30 protein and mod blood in the setting of infection. Will repeat UA once infection cleared. Monitor Vanco trough.    CT abd/pelvis on 12/5 1.0 cm indeterminate exophytic lesion arising from the   lower pole of left kidney. No hydronephrosis. Subsequent, renal US performed with left renal lesion not seen.

## 2017-12-27 NOTE — PROGRESS NOTE ADULT - SUBJECTIVE AND OBJECTIVE BOX
[facility] NEPHROLOGY- PROGRESS NOTE    73 yo M with HTN, BPH a/w chest discomfort, suprahilar/ hepatic mass and hyponatremia. Hosp course cb subcutaneous emphysema s/p bronch with Left chest tube placement. s/p EBUS 12/12 with  endobronchial lung biopsy neg for malignancy however mediastinal biopsy indeterminate. Now intubated with septic shock 2/2 Necrotizing PNA, hypotension on pressors, with ZENY and hyponatremia.    GOC were discussed by primary team and family would like dialysis if necessary/safe/appropriate.  Renal consulted for hyponatremia and ZENY.    Pt is on 3 pressors at high doses.    Plan today is for terminal extubation no further treatment will be pursued    Hospital Medications: Medications reviewed.    REVIEW OF SYSTEMS: Unable to obtain as patient intubated and unresponsive    VITALS:  T(F): 98.3 (12-27-17 @ 07:30), Max: 98.9 (12-26-17 @ 19:30)  HR: 146 (12-27-17 @ 10:00)  BP: 102/72 (12-27-17 @ 10:00)  RR: 30 (12-27-17 @ 10:00)  SpO2: 81% (12-27-17 @ 09:30)  Wt(kg): --    12-26 @ 07:01  -  12-27 @ 07:00  --------------------------------------------------------  IN: 6435.7 mL / OUT: 230 mL / NET: 6205.7 mL    12-27 @ 07:01  -  12-27 @ 11:29  --------------------------------------------------------  IN: 691.5 mL / OUT: 15 mL / NET: 676.5 mL      PHYSICAL EXAM:  Gen: Unresponsive  HEENT: anicteric, intubated  Cards: Irregular and tachycardic, +S1/S2, no M/G/R  Resp: mechanical BS B, course BS B/L   GI: soft, NT/ND, NABS  : + frias  Extremities: +B LE edema  LABS:  12-26    149<H>  |  116<H>  |  174  ----------------------------<  172<H>  6.1<H>   |  13<L>  |  5.50<H>    Ca    6.9<L>      26 Dec 2017 06:44  Phos  9.7     12-26  Mg     3.0     12-26    TPro  5.0<L>  /  Alb  1.3<L>  /  TBili  3.0<H>  /  DBili      /  AST  50<H>  /  ALT  42  /  AlkPhos  91  12-26    Creatinine Trend: 5.50 <--, 5.22 <--, 4.80 <--, 4.24 <--, 3.92 <--, 3.63 <--                        8.9    32.0  )-----------( 56       ( 26 Dec 2017 06:44 )             28.9

## 2017-12-27 NOTE — PROGRESS NOTE ADULT - ATTENDING COMMENTS
- pat is in MOSF requiring 3 pressors  - very acidotic  -brother and decision wants terminal extubation
-pat is on vent support,, sedated, chest tube to suction , no air leak. PTX resolving  - CT chest to evaluated for abscesses
72 male with hx of HTN, BPH, lung mass, underwent bronchoscopy with biopsies on 12/6 complicated by pneumothorax, pneumomediastinum, subcutaneous emphysema, s/p chest tube placement on 12/6.  Repeat bronch with EBUS performed on 12/12, so far no biopsy has been positive for malignancy but suspicion is still high.  Now with necrotizing pneumonia, lung abscess, acute resp failure, septic shock, ZENY.        1: Septic Shock  -s/p intubation , c/w mechanical ventilation  -will need trach  -Chest tube removed on 12/20.  -Patient is on pheny , norepinephrine and Vasopressin  -increased white count, tissue culture growing gram negative rods rare neisseria, haemophilus, BCx negative to date  - UA negative  - currently on zosyn, vanco and mycamine  - ID Dr. Nicholas      2: ATN  -c/w IVF  -BUN/cr increasing  -monitor urine output   -Sodium bicar increased dose  -HCP decided, Discuss with Brother for Dialysis otherwise two PCP  -Nephro: Dr. Reagan      3: New onset Atrial Fibrillation  - Rate controlled   - EKG shows Atrial fibrillation (new onset)  - Completed amiodarone loading, now amio 200mg  c/w amiodarone  - f/u cardiology - Dr. Jackson    Pneumothorax   -Resolved  -D/patricio chest tube    Lung mass  -c/w post op prophylaxis  -Bronch path negative for Ca.  -s/p EBUS VS mediastinoscopy  -cyto report, negative for  malignancy  - 1 biopsy still pending  -repeat CT chest - worsening necrotic mass from prior imaging    Hypertension.    -hypotension , holding home BP meds,  c/w pressors    BPH (benign prostatic hyperplasia)  - monitor U/o  -c/w NG tube    Penile swelling:   Urology consult requested, cold compresses, bacitracin ointment application    Hyperphosphatemia:  -Patient phosphate high in setting for kidney failure  -stable will c/w monitoring
72 male with hx of HTN, BPH, lung mass, underwent bronchoscopy with biopsies on 12/6 complicated by pneumothorax, pneumomediastinum, subcutaneous emphysema, s/p chest tube placement on 12/6.  Repeat bronch with EBUS performed on 12/12, so far no evidence of malignancy.  Now with necrotizing pneumonia, lung abscess, acute resp failure, septic shock, ZENY.  Chest tube removed 12/20.  Plan  - continue abx per ID, antifungals per ID  - continue tube feeds   - suspect encephalopathy due to uremia, awaiting decision from friend whether to initiate HD  - will need trach  Total cc time 35 min.
72 male with hx of HTN, BPH, lung mass, underwent bronchoscopy with biopsies on 12/6 complicated by pneumothorax, pneumomediastinum, subcutaneous emphysema, s/p chest tube placement on 12/6.  Repeat bronch with EBUS performed on 12/12, so far no evidence of malignancy.  Now with necrotizing pneumonia, lung abscess, acute resp failure, septic shock, ZENY.  Chest tube removed 12/20.  Plan  - continue abx per ID, start antifungals per ID  - continue tube feeds   - suspect encephalopathy due to uremia, may need HD.  Total cc time 35 min.
72 male with hx of HTN, BPH, lung mass, underwent bronchoscopy with biopsies on 12/6 complicated by pneumothorax, pneumomediastinum, subcutaneous emphysema, s/p chest tube placement on 12/6.  Repeat bronch with EBUS performed on 12/12, so far no evidence of malignancy.  Now with necrotizing pneumonia, lung abscess, acute resp failure, septic shock, ZENY.  Plan  - IR guided liver biopsy  - abx  - mechanical ventilation  - amiodarone for rhythm control, holding AC for IR biopsy   Total cc time 35 min.
72 male with hx of HTN, BPH, lung mass, underwent bronchoscopy with biopsies on 12/6 complicated by pneumothorax, pneumomediastinum, subcutaneous emphysema, s/p chest tube placement on 12/6.  Repeat bronch with EBUS performed on 12/12, so far no evidence of malignancy.  Now with necrotizing pneumonia, lung abscess, acute resp failure, septic shock, ZENY.  Chest tube removed 12/20.  Plan  - continue abx per ID, antifungals per ID  - continue tube feeds   - suspect encephalopathy due to uremia, possible HD   - will need trach  Total cc time 35 min.
72 male with hx of HTN, BPH, lung mass, underwent bronchoscopy with biopsies on 12/6 complicated by pneumothorax, pneumomediastinum, subcutaneous emphysema, s/p chest tube placement on 12/6.  Repeat bronch with EBUS performed on 12/12, so far no evidence of malignancy.  Now with necrotizing pneumonia, lung abscess, acute resp failure, septic shock, ZENY.  Plan  - IR guided liver biopsy when stable  - abx  - mechanical ventilation  - amiodarone for rhythm control, holding AC for IR biopsy   - trial of lasix, may eventually need HD for renal failure   Total cc time 35 min.
--continue antibx  -pat may have septic emboli  -thoracic f/u   -possible IR bx of liver lesion
72 male with hx of HTN, BPH, lung mass, underwent bronchoscopy with biopsies on 12/6 complicated by pneumothorax, pneumomediastinum, subcutaneous emphysema, s/p chest tube placement on 12/6.  Repeat bronch with EBUS performed on 12/12, so far no biopsy has been positive for malignancy but suspicion is still high.  Now with necrotizing pneumonia, lung abscess, acute resp failure, septic shock, ZENY.  I discussed with oncology, they still have high suspicion for malignancy.    Fevers may be tumor fever.  Plan  - continue abx per ID, antifungals per ID  - hold tube feeds, check c-diff  - continue pressors  Given high suspicion for malignancy, and poor overall prognosis even if this is not malignancy, then trach/peg and initiating dialysis would probably be inappropriate.  Total cc time 35 min.
72 male with hx of HTN, BPH, lung mass, underwent bronchoscopy with biopsies on 12/6 complicated by pneumothorax, pneumomediastinum, subcutaneous emphysema, s/p chest tube placement on 12/6.  Repeat bronch with EBUS performed on 12/12, so far no evidence of malignancy.  Now with necrotizing pneumonia, lung abscess, acute resp failure, septic shock, ZENY.  Plan  - continue abx per ID, will recheck sputum culture to  look for resistant organisms, hold off antifungals for now  - remove chest tube today  - continue tube feeds   - may need dialysis  Total cc time 35 min.
A 72 y old  former smoker 20 PPD with Hx HTN BPH p/w Male who p/w generalized weakness, voice changes with imaging showing left hilar mass in CT scan , s/p bronch and biopsy on 12/06 , admitted to ICU on 12/06 for pneumothorax s/p diagnostic bronchoscopy for left lobe lesions found on CT imaging - chest tube placed in ICU 12/6 on suction - admitted for urgent CT placement and monitoring. pt was downgraded on Lung biopsy from 12/07. Lung biopsy did not show any malignancy. s/p transbronchial lung biopsy , s/p endobronchial lung biopsy, s/p flexible bronchoscopy today by Dr. Piper.   ROS - unable to get as pt is intubated    admit to ICU post op monitoring s/p intubation POD - 1 ,s/p chest tube , hypotensive 2/2 septic shock, requiring pressors, on phenylephrine      POD -1 s/p EBUS  monitor u/o - approx 50ml/hr s/p 4 L LR bolus  -c/w chest tube to suction  -c/w post op abx px - zosyn  -s/p intubation , c/w mechanical ventilation  -c/w sedation  -hypotension post anesthesia , c/w IV fluids and c/w pressor   - increased HR with low dose of levophed, discontinued and restarted phenylephrine  - pt on propofol for sedation  -increased white count, tissue culture growing gram negative rods, BCx negative to date  - UA negative    New onset Atrial Fibrillation  -increased HR after given low dose levophed, continued after d/c of levophed  - EKG shows Atrial fibrillation (new onset)  - HR not responsive to digoxin, given 2 doses of Lopressor 2.5 mg IV and 1 LR bolus    Pneumothorax   Crepitus along neck and L anterior chest wall  -chest tube to suction  -f/u CXR    Lung mass  -c/w post op prophylaxis  -Bronch path negative for Ca.  -s/p EBUS VS mediastinoscopy  -Awaiting cyto report from [facility] and [facility].    -post op EBUS septic shock on pressors and intubated  -chest tube out put is pus, foul smelling  -keep chest tube to suction and start antibx  -f/u all bronch labs  -may need CT chest evaluate for abscess  -
-cxr is improving on iv antibx. CXR no ptx and no air leaks  -continue antibx  -put chest tube to water seal  -for possible IR bx of liver lesions
72 male with hx of HTN, BPH, lung mass, underwent bronchoscopy with biopsies on 12/6 complicated by pneumothorax, pneumomediastinum, subcutaneous emphysema, s/p chest tube placement on 12/6.  Repeat bronch with EBUS performed on 12/12, so far no biopsy has been positive for malignancy but suspicion is still high.  Now with necrotizing pneumonia, lung abscess, acute resp failure, septic shock, ZENY.        1: Septic Shock  -s/p intubation , on mechanical ventilation, plan for terminal extubation today  -patient has very poor prognosis, on 3 pressors, trach recommended by thoracic surgery but family does not want and patient not stable.   -Patient is on pheny , norepinephrine and Vasopressin    2: ATN  -BUN/cr increasing  -Per Nephro, patient unlikely to tolerate as still requiring 3 pressors.       3: New onset Atrial Fibrillation  - new onset, Rate not controlled   - On amiodarone 200mg    4. Pneumothorax   -Resolved    5. Lung mass  -Bronch path negative for Ca.  -s/p EBUS VS mediastinoscopy  -cyto report, negative for  malignancy  -repeat CT chest - worsening necrotic mass from prior imaging    6. Penile swelling:   Urology consult requested, cold compresses, bacitracin ointment application    7. Prophylactic measure  - IMPROVE score 2   - heparin S.C for VTE prophylaxis  - GI stress ulcer PPx w/ Protonix.      for comfort care and hospice,terminal extubation
-post EBUS resp failure, possible lung abscess  -antibx  -no air leak and ptx resolving
Pt. with poor overall prognosis. Palliative care following.     [facility] NEPHROLOGY  Antony Reagan M.D.  Malachi Reyes D.O.  Silvia Arnold M.D.  Jyotsna Fiore, MSN, ANP-C  [phone]    [address] [place] [address]
[facility] NEPHROLOGY  Antony Reagan M.D.  Malachi Reyes D.O.  Silvia Arnold M.D.  Jyotsna Fiore, MSN, ANP-C  [phone]    [address] [place] [address]
[facility] NEPHROLOGY  Antony Reagan M.D.  Malachi Reyes D.O.  Silvia Arnold M.D.  Jyotsna Fiore, MSN, ANP-C  [phone]    [address] [place] [address]
72 male with hx of HTN, BPH, lung mass, underwent bronchoscopy with biopsies on 12/6 complicated by pneumothorax, pneumomediastinum, subcutaneous emphysema, s/p chest tube placement on 121/6, now doing better.  Still with small air leak.  Stable for transfer to floor.
Pt with poor overall prognosis. Palliative care following.     [facility] NEPHROLOGY  Antony Reagan M.D.  Malachi Reyes D.O.  Silvia Arnold M.D.  Jyotsna Fiore, MSN, ANP-C  [phone]    [address] [place] [address]
[facility] NEPHROLOGY  Antony Reagan M.D.  Malachi Reyes D.O.  Silvia Arnold M.D.  Jyotsna Fiore, MSN, ANP-C  [phone]    [address] [place] [address]
[facility] NEPHROLOGY  Antony Reagan M.D.  Malachi Reyes D.O.  Silvia Arnold M.D.  Jyotsna Fiore, MSN, ANP-C  [phone]    [address] [place] [address]
Pt with poor overall prognosis. Palliative care following.     [facility] NEPHROLOGY  Antony Reagan M.D.  Malachi Reyes D.O.  Silvia Arnold M.D.  Jyotsna Fiore, MSN, ANP-C  [phone]    [address] [place] [address]
Pt with poor overall prognosis. Palliative care following.     [facility] NEPHROLOGY  Antony Reagan M.D.  Malachi Reyes D.O.  Silvia Arnold M.D.  Jyotsna Fiore, MSN, ANP-C  [phone]    [address] [place] [address]
Pt with poor overall prognosis. Palliative care following.     [facility] NEPHROLOGY  Antony Reagan M.D.  Malachi Reyes D.O.  Silvia Arnold M.D.  Jyotsna Fiore, MSN, ANP-C  [phone]    [address] [place] [address]
Pt with poor overall prognosis. Palliative care following.     [facility] NEPHROLOGY  Antony Reagan M.D.  Malachi Reyes D.O.  Silvia Arnold M.D.  Jyotsna Fiore, MSN, ANP-C  [phone]    [address] [place] [address]
Pt. with poor overall prognosis. Palliative care following.     [facility] NEPHROLOGY  Antony Reagan M.D.  Malachi Reyes D.O.  Silvia Arnold M.D.  Jyotsna Fiore, MSN, ANP-C  [phone]    [address] [place] [address]
Pt. with poor overall prognosis. Palliative care following.     [facility] NEPHROLOGY  Antony Reagan M.D.  Malachi Reyes D.O.  Silvia Arnold M.D.  Jyotsna Fiore, MSN, ANP-C  [phone]    [address] [place] [address]
Pt. with poor overall prognosis. Palliative care following.     [facility] NEPHROLOGY  Antony Reagan M.D.  Malachi Reyes D.O.  Silvia Arnold M.D.  Jyotsna Fiore, MSN, ANP-C  [phone]    [address] [place] [address]
Pt. with poor overall prognosis. Palliative care following.     [facility] NEPHROLOGY  Antony Reagan M.D.  Malachi Reyes D.O.  Silvia Arnold M.D.  Jyotsna Fiore, MSN, ANP-C  [phone]    [address] [place] [address]

## 2017-12-27 NOTE — PROGRESS NOTE ADULT - ASSESSMENT
72 year old male with prolonged intubation undergoing tracheostomy in AM    1) pre-op labs  2) consent to be obtained  3) continue icu managment

## 2017-12-27 NOTE — PROGRESS NOTE ADULT - SUBJECTIVE AND OBJECTIVE BOX
SURGERY PRE-OP    Diagnosis: prolonged intubation  Procedure: tracheostomy    ICU Vital Signs Last 24 Hrs  T(C): 36.8 (27 Dec 2017 07:30), Max: 37.2 (26 Dec 2017 19:30)  T(F): 98.3 (27 Dec 2017 07:30), Max: 98.9 (26 Dec 2017 19:30)  HR: 146 (27 Dec 2017 10:00) (119 - 176)  BP: 102/72 (27 Dec 2017 10:00) (76/47 - 162/93)  BP(mean): 79 (27 Dec 2017 10:00) (54 - 108)  RR: 30 (27 Dec 2017 10:00) (0 - 32)  SpO2: 81% (27 Dec 2017 09:30) (81% - 100%)                          8.9    32.0  )-----------( 56       ( 26 Dec 2017 06:44 )             28.9   12-26    149<H>  |  116<H>  |  174  ----------------------------<  172<H>  6.1<H>   |  13<L>  |  5.50<H>    Ca    6.9<L>      26 Dec 2017 06:44  Phos  9.7     12-26  Mg     3.0     12-26    TPro  5.0<L>  /  Alb  1.3<L>  /  TBili  3.0<H>  /  DBili  x   /  AST  50<H>  /  ALT  42  /  AlkPhos  91  12-26

## 2017-12-27 NOTE — PROGRESS NOTE ADULT - SUBJECTIVE AND OBJECTIVE BOX
Patient is a 72y old  Male who presents with a chief complaint of weakness, chest discomfort and voice and appetite changes (05 Dec 2017 01:22)  Patient remains on ventilator, sedated, intubated on pressors meds. Surrogate decided about terminal extubation.    INTERVAL HPI/OVERNIGHT EVENTS:      VITAL SIGNS:  T(F): 98.3 (12-27-17 @ 07:30)  HR: 146 (12-27-17 @ 10:00)  BP: 102/72 (12-27-17 @ 10:00)  RR: 30 (12-27-17 @ 10:00)  SpO2: 81% (12-27-17 @ 09:30)  Wt(kg): --  I&O's Detail    26 Dec 2017 07:01  -  27 Dec 2017 07:00  --------------------------------------------------------  IN:    Enteral Tube Flush: 30 mL    fentaNYL  Infusion: 755.7 mL    norepinephrine Infusion: 292.2 mL    phenylephrine   Infusion: 2173.2 mL    sodium bicarbonate  Infusion: 3000 mL    sodium bicarbonate  Infusion: 127 mL    vasopressin Infusion: 57.6 mL  Total IN: 6435.7 mL    OUT:    Indwelling Catheter - Urethral: 230 mL  Total OUT: 230 mL    Total NET: 6205.7 mL      27 Dec 2017 07:01  -  27 Dec 2017 10:57  --------------------------------------------------------  IN:    fentaNYL  Infusion: 68.7 mL    norepinephrine Infusion: 25.8 mL    phenylephrine   Infusion: 214.8 mL    sodium bicarbonate  Infusion: 375 mL    vasopressin Infusion: 7.2 mL  Total IN: 691.5 mL    OUT:    Indwelling Catheter - Urethral: 15 mL  Total OUT: 15 mL    Total NET: 676.5 mL        Mode: AC/ CMV (Assist Control/ Continuous Mandatory Ventilation)  RR (machine): 28  TV (machine): 500  FiO2: 50  PEEP: 5  ITime: 1  MAP: 11  PIP: 21        REVIEW OF SYSTEMS:    CONSTITUTIONAL:  No fevers, chills, sweats    HEENT:  Eyes:  No diplopia or blurred vision. ENT:  No earache, sore throat or runny nose.    CARDIOVASCULAR:  No pressure, squeezing, tightness, or heaviness about the chest; no palpitations.    RESPIRATORY:  Per HPI    GASTROINTESTINAL:  No abdominal pain, nausea, vomiting or diarrhea.    GENITOURINARY:  No dysuria, frequency or urgency.    NEUROLOGIC:  No paresthesias, fasciculations, seizures or weakness.    PSYCHIATRIC:  No disorder of thought or mood.      PHYSICAL EXAM:    Constitutional: Well developed and nourished  Eyes:Perrla  ENMT: normal  Neck:supple  Respiratory: good air entry  Cardiovascular: S1 S2 regular  Gastrointestinal: Soft, Non tender  Extremities: No edema  Vascular:normal  Neurological:sedated  Musculoskeletal:Anasarca      MEDICATIONS  (STANDING):  aspirin  chewable 81 milliGRAM(s) Oral daily  BACItracin   Ointment 1 Application(s) Topical two times a day  fentaNYL   Infusion 3 MICROgram(s)/kG/Hr (22.92 mL/Hr) IV Continuous <Continuous>  heparin  Injectable 5000 Unit(s) SubCutaneous every 12 hours  norepinephrine Infusion 0.5 MICROgram(s)/kG/Min (35.813 mL/Hr) IV Continuous <Continuous>  pantoprazole  Injectable 40 milliGRAM(s) IV Push daily  phenylephrine    Infusion 0.5 MICROgram(s)/kG/Min (6.563 mL/Hr) IV Continuous <Continuous>  sodium bicarbonate  Infusion 0.25 mEq/kG/Hr (125 mL/Hr) IV Continuous <Continuous>  vasopressin Infusion 0.04 Unit(s)/Min (2.4 mL/Hr) IV Continuous <Continuous>    MEDICATIONS  (PRN):  HYDROmorphone  Injectable 1 milliGRAM(s) IV Push every 4 hours PRN agitation, resp distress      Allergies    No Known Allergies    Intolerances        LABS:                        8.9    32.0  )-----------( 56       ( 26 Dec 2017 06:44 )             28.9     12-26    149<H>  |  116<H>  |  174  ----------------------------<  172<H>  6.1<H>   |  13<L>  |  5.50<H>    Ca    6.9<L>      26 Dec 2017 06:44  Phos  9.7     12-26  Mg     3.0     12-26    TPro  5.0<L>  /  Alb  1.3<L>  /  TBili  3.0<H>  /  DBili  x   /  AST  50<H>  /  ALT  42  /  AlkPhos  91  12-26    PT/INR - ( 26 Dec 2017 06:44 )   PT: 23.5 sec;   INR: 2.12 ratio             ABG - ( 26 Dec 2017 05:00 )  pH: 7.16  /  pCO2: 37    /  pO2: 148   / HCO3: 12    / Base Excess: -15.0 /  SaO2: 98                    CAPILLARY BLOOD GLUCOSE            RADIOLOGY & ADDITIONAL TESTS:    CXR:  < from: Xray Chest 1 View AP -PORTABLE-Routine (12.26.17 @ 08:21) >  IMPRESSION:  ET tube and feeding tube again noted. Study limited due to rotation.    Increased interstitial lung markings without significant change. New   small right pleural effusion and adjacent opacity. Stable small left   pleural effusion.    Heart size cannot be accurately assessed in this projection.    < end of copied text >    Ct scan chest:    ekg;    echo:

## 2017-12-27 NOTE — DISCHARGE NOTE FOR THE EXPIRED PATIENT - HOSPITAL COURSE
72 male with hx of HTN, BPH, lung mass, underwent bronchoscopy with biopsies on 12/6 complicated by pneumothorax, pneumomediastinum, subcutaneous emphysema, s/p chest tube placement on 12/6 (removed).  Repeat bronch with EBUS performed on 12/12, so far no biopsy has been positive for malignancy but suspicion was still high. Then developed necrotizing pneumonia, lung abscess, acute respiratory failure, septic shock, ZENY. Patient was on 3 pressors, pheny, norepinephrine and Vasopressin. Antibiotics were continued. Patient also developed new onset afib, so was loaded with amiodarone.         1: Septic Shock  -s/p intubation , on mechanical ventilation, plan for terminal extubation today  -patient has very poor prognosis, on 3 pressors, trach recommended by thoracic surgery but family does not want and patient not stable.     2: ATN  -BUN/cr increasing  -Per Nephro, patient unlikely to tolerate as still requiring 3 pressors.       4. Pneumothorax   -Resolved    5. Lung mass  -Bronch path negative for Ca.  -s/p EBUS VS mediastinoscopy  -cyto report, negative for  malignancy  -repeat CT chest - worsening necrotic mass from prior imaging    6. Penile swelling:   Urology consult requested, cold compresses, bacitracin ointment application    7. Prophylactic measure  - IMPROVE score 2   - heparin S.C for VTE prophylaxis  - GI stress ulcer PPx w/ Protonix.    8. Goals of care and discussion:  - Family meeting done with palliative team, HCP in person and brother over phone. Plan is for terminal extubation. 72 male with hx of HTN, BPH, lung mass, underwent bronchoscopy with biopsies on 12/6 complicated by pneumothorax, pneumomediastinum, subcutaneous emphysema, s/p chest tube placement on 12/6. Repeat bronch with EBUS performed on 12/12, so far no biopsy has been positive for malignancy but suspicion was still high. Then developed necrotizing pneumonia, lung abscess, acute respiratory failure, septic shock, ZENY. Patient was on 3 pressors, pheny, norepinephrine and Vasopressin. Antibiotics were continued. Patient also developed new onset afib, so was loaded with amiodarone. Pneumothorax resolved so chest tube was removed. Repeat CT chest - worsening necrotic mass from prior imaging. Patient also has huge penile swelling for which, urology was consulted and recommended cold compresses and bacitracin ointment application. Patient stated going into renal failure, dialysis was offered but since he was on 3 pressors, nephro recommended wont be able to tolerate dialysis. Family meeting done with palliative team, HCP in person and brother over phone. HCP (friend, Adolph) and brother decided for terminal extubation. Pateint was terminally extubated on 12/27, within a short period, patient was found to have asystole on tele monitor and no spontaneous respiration. Palliative team contacted friend Adolph and brother Marcie in [place] and informed about patient's demise. No autopsy. Attending Dr. Bocanegra informed.   This is a short hospital course summary, please review all charts for detailed hospital course.

## 2017-12-27 NOTE — PROGRESS NOTE ADULT - SUBJECTIVE AND OBJECTIVE BOX
Patient is a 72y old  Male who presents with a chief complaint of weakness, chest discomfort and voice and appetite changes (05 Dec 2017 01:22)    pt seen in icu [ x ], reg med floor [ ], bed [ x ], chair at bedside [   ], a+o x3 [  ],sedated [ x ], nad [x  ],     et tube [x],  ngt feed [ x ], frias to bsd [x], rectal tube [x], left ij cent line [x], fentanyl, phenylepherine, norepinepherine and  vasopressin drip [x]          Allergies    No Known Allergies        Vitals    T(F): 98.3 (12-27-17 @ 07:30), Max: 98.9 (12-26-17 @ 19:30)  HR: 132 (12-27-17 @ 07:30) (119 - 176)  BP: 98/58 (12-27-17 @ 07:30) (76/47 - 162/93)  RR: 25 (12-27-17 @ 07:30) (0 - 32)  SpO2: 90% (12-27-17 @ 05:22) (90% - 100%)  Wt(kg): --  CAPILLARY BLOOD GLUCOSE      POCT Blood Glucose.: 201 mg/dL (26 Dec 2017 06:09)      Labs                          8.9    32.0  )-----------( 56       ( 26 Dec 2017 06:44 )             28.9       12-26    149<H>  |  116<H>  |  174  ----------------------------<  172<H>  6.1<H>   |  13<L>  |  5.50<H>    Ca    6.9<L>      26 Dec 2017 06:44  Phos  9.7     12-26  Mg     3.0     12-26    TPro  5.0<L>  /  Alb  1.3<L>  /  TBili  3.0<H>  /  DBili  x   /  AST  50<H>  /  ALT  42  /  AlkPhos  91  12-26            .Blood Blood  12-24 @ 14:10   No growth to date.  --  --      .Blood Blood  12-22 @ 23:43   No growth to date.  --  --      .Sputum Sputum  12-20 @ 21:49   Normal Respiratory Lissy present  --    Moderate polymorphonuclear leukocytes per low power field  No Squamous epithelial cells per low power field  Few Gram positive cocci in pairs per oil power field      .Blood Blood  12-14 @ 00:30   No growth at 5 days.  --  --      .Broncial received in trap  12-13 @ 08:06   Normal Respiratory Lissy present  --    No polymorphonuclear cells seen per low power field  No squamous epithelial cells per low power field  Moderate Gram Positive Cocci in Pairs and Chains per oil power field  Few-moderate Gram Negative Coccobacilli per oil power field      .Surgical Swab Bronchial Alveolar Lavage  12-12 @ 22:52   Moderate Prevotella intermedia  Rare Prevotella bivia  Few Normal Respiratory Lissy present  --  --      .Tissue Left Lower Lobe  12-12 @ 22:45   Rare Haemophilus parainfluenzae  Rare Neisseria species, not gonorrhoeae or meningitidis  Rare Alpha hemolytic strep  Few Prevotella melaninogenica  Numerous Prevotella intermedia  --    Rare polymorphonuclear leukocytes seen per low power field  Numerous Gram Negative Rods per oil power field      .Blood Blood  12-11 @ 11:22   No growth at 5 days.  --  --      .Broncial Other, bronchioalveolar lavage  12-06 @ 22:28   No growth at 1 week.  --  --      .Blood Blood-Peripheral  12-05 @ 10:57   No growth at 5 days.  --  --          Radiology Results      Meds    MEDICATIONS  (STANDING):  amiodarone    Tablet 200 milliGRAM(s) Oral daily  aspirin  chewable 81 milliGRAM(s) Oral daily  BACItracin   Ointment 1 Application(s) Topical two times a day  fentaNYL   Infusion 3 MICROgram(s)/kG/Hr (22.92 mL/Hr) IV Continuous <Continuous>  heparin  Injectable 5000 Unit(s) SubCutaneous every 12 hours  meropenem IVPB 500 milliGRAM(s) IV Intermittent every 8 hours  micafungin IVPB      micafungin IVPB 100 milliGRAM(s) IV Intermittent every 24 hours  norepinephrine Infusion 0.5 MICROgram(s)/kG/Min (35.813 mL/Hr) IV Continuous <Continuous>  pantoprazole  Injectable 40 milliGRAM(s) IV Push daily  phenylephrine    Infusion 0.5 MICROgram(s)/kG/Min (6.563 mL/Hr) IV Continuous <Continuous>  sodium bicarbonate  Infusion 0.25 mEq/kG/Hr (125 mL/Hr) IV Continuous <Continuous>  vasopressin Infusion 0.04 Unit(s)/Min (2.4 mL/Hr) IV Continuous <Continuous>      MEDICATIONS  (PRN):  acetaminophen  Suppository 650 milliGRAM(s) Rectal every 6 hours PRN For Temp greater than 38 C (100.4 F)  HYDROmorphone  Injectable 1 milliGRAM(s) IV Push every 4 hours PRN agitation, resp distress      Physical Exam    Neuro :  no focal deficits  Respiratory: right ronchi, decr breathsounds left lower lobe    CV: RRR, S1S2, no murmurs,   Abdominal: Soft, NT, ND +BS,  Extremities: b/l le edema, + peripheral pulses  genitals: edematous penis with some erythema, frias to bsd    ASSESSMENT    septic shock  left lower lobe mass with bony destruction,   hepatic and splenic lesions,   r/o malig,   copd   destruction of left 6th and 7th ribs and left T6 transverse process   exophytic left renal lesion  subcutaneous emphysema  pneumothorax  s/p hyponatremia  silverio 2nd to atn  new afib  penile edema  h/o BPH (benign prostatic hyperplasia)  Hypertension        PLAN        S/P flexible bronchoscopy showing possible Large B-cell lymphoma,   s/p flex bronch transbronchial lung bx and EBU bx 12/12  cytopath of flex bronch and ebus neg for malig  cx from flex bronch with Few Normal Respiratory Lissy present and Rare Haemophilus parainfluenzae  Rare Neisseria species, not gonorrhoeae or meningitidis  -- Rare polymorphonuclear leukocytes seen per low power field  Numerous Gram Negative Rods per oil power field  noted above   thoracic surg f/u  vent mgmt  cont atrov and prov nebs,   cont supplemental oxygen,  ct chest-abd-pelv result note  rept cxr result with improvement noted  pulm f/u   s/p bronch with bx   cytopath of bronch neg for malig noted  cytopatho transbronchial bx with Interstitial fibrosis and intra-alveolar smoker's  macrophages noted above.   urology cons for renal lesion  cont ivf  renal f/u   cardio f/u  cont amiodarone 200 mg daily  pt on double pressors  id f/u   f/u Procalcitonin level, HIV, ESR, and CRP  rept ct chest  hold vanco  vanco t noted above  zosyn d/c'd and pt started on meropenem  Mycamine 100 mg IVPB q day added.  paliative eval noted  heme onc cons noted  cont current meds  pt for ir bx of liver or renal lesion when stable  icu team planning trach and peg  icu team spoke to HCP (Adolph Prabhakar [phone]) over the phone who spoke to brother   and said is ok with dialysis but not ok with trach. Spoke to brother also Marcie Reza ([phone])   who called from [place] and said is ok with dialysis but will discuss again with HCP for trach as he is not sure.  pt dnr  mgmt as per icu Patient is a 72y old  Male who presents with a chief complaint of weakness, chest discomfort and voice and appetite changes (05 Dec 2017 01:22)    pt seen in icu [ x ], reg med floor [ ], bed [ x ], chair at bedside [   ], a+o x3 [  ],sedated [ x ], nad [x  ],     et tube [x],  ngt to ws [ x ], frias to bsd [x], rectal tube [x], left ij cent line [x], fentanyl, phenylepherine, norepinepherine and  vasopressin drip [x]          Allergies    No Known Allergies        Vitals    T(F): 98.3 (12-27-17 @ 07:30), Max: 98.9 (12-26-17 @ 19:30)  HR: 132 (12-27-17 @ 07:30) (119 - 176)  BP: 98/58 (12-27-17 @ 07:30) (76/47 - 162/93)  RR: 25 (12-27-17 @ 07:30) (0 - 32)  SpO2: 90% (12-27-17 @ 05:22) (90% - 100%)  Wt(kg): --  CAPILLARY BLOOD GLUCOSE      POCT Blood Glucose.: 201 mg/dL (26 Dec 2017 06:09)      Labs                          8.9    32.0  )-----------( 56       ( 26 Dec 2017 06:44 )             28.9       12-26    149<H>  |  116<H>  |  174  ----------------------------<  172<H>  6.1<H>   |  13<L>  |  5.50<H>    Ca    6.9<L>      26 Dec 2017 06:44  Phos  9.7     12-26  Mg     3.0     12-26    TPro  5.0<L>  /  Alb  1.3<L>  /  TBili  3.0<H>  /  DBili  x   /  AST  50<H>  /  ALT  42  /  AlkPhos  91  12-26            .Blood Blood  12-24 @ 14:10   No growth to date.  --  --      .Blood Blood  12-22 @ 23:43   No growth to date.  --  --      .Sputum Sputum  12-20 @ 21:49   Normal Respiratory Lissy present  --    Moderate polymorphonuclear leukocytes per low power field  No Squamous epithelial cells per low power field  Few Gram positive cocci in pairs per oil power field      .Blood Blood  12-14 @ 00:30   No growth at 5 days.  --  --      .Broncial received in trap  12-13 @ 08:06   Normal Respiratory Lissy present  --    No polymorphonuclear cells seen per low power field  No squamous epithelial cells per low power field  Moderate Gram Positive Cocci in Pairs and Chains per oil power field  Few-moderate Gram Negative Coccobacilli per oil power field      .Surgical Swab Bronchial Alveolar Lavage  12-12 @ 22:52   Moderate Prevotella intermedia  Rare Prevotella bivia  Few Normal Respiratory Lissy present  --  --      .Tissue Left Lower Lobe  12-12 @ 22:45   Rare Haemophilus parainfluenzae  Rare Neisseria species, not gonorrhoeae or meningitidis  Rare Alpha hemolytic strep  Few Prevotella melaninogenica  Numerous Prevotella intermedia  --    Rare polymorphonuclear leukocytes seen per low power field  Numerous Gram Negative Rods per oil power field      .Blood Blood  12-11 @ 11:22   No growth at 5 days.  --  --      .Broncial Other, bronchioalveolar lavage  12-06 @ 22:28   No growth at 1 week.  --  --      .Blood Blood-Peripheral  12-05 @ 10:57   No growth at 5 days.  --  --          Radiology Results      Meds    MEDICATIONS  (STANDING):  amiodarone    Tablet 200 milliGRAM(s) Oral daily  aspirin  chewable 81 milliGRAM(s) Oral daily  BACItracin   Ointment 1 Application(s) Topical two times a day  fentaNYL   Infusion 3 MICROgram(s)/kG/Hr (22.92 mL/Hr) IV Continuous <Continuous>  heparin  Injectable 5000 Unit(s) SubCutaneous every 12 hours  meropenem IVPB 500 milliGRAM(s) IV Intermittent every 8 hours  micafungin IVPB      micafungin IVPB 100 milliGRAM(s) IV Intermittent every 24 hours  norepinephrine Infusion 0.5 MICROgram(s)/kG/Min (35.813 mL/Hr) IV Continuous <Continuous>  pantoprazole  Injectable 40 milliGRAM(s) IV Push daily  phenylephrine    Infusion 0.5 MICROgram(s)/kG/Min (6.563 mL/Hr) IV Continuous <Continuous>  sodium bicarbonate  Infusion 0.25 mEq/kG/Hr (125 mL/Hr) IV Continuous <Continuous>  vasopressin Infusion 0.04 Unit(s)/Min (2.4 mL/Hr) IV Continuous <Continuous>      MEDICATIONS  (PRN):  acetaminophen  Suppository 650 milliGRAM(s) Rectal every 6 hours PRN For Temp greater than 38 C (100.4 F)  HYDROmorphone  Injectable 1 milliGRAM(s) IV Push every 4 hours PRN agitation, resp distress      Physical Exam    Neuro :  no focal deficits  Respiratory: right ronchi, decr breathsounds left lower lobe    CV: RRR, S1S2, no murmurs,   Abdominal: Soft, NT, ND +BS,  Extremities: b/l le edema, + peripheral pulses  genitals: edematous penis with some erythema, frias to bsd    ASSESSMENT    septic shock  left lower lobe mass with bony destruction,   hepatic and splenic lesions,   r/o malig,   copd   destruction of left 6th and 7th ribs and left T6 transverse process   exophytic left renal lesion  subcutaneous emphysema  pneumothorax  s/p hyponatremia  silverio 2nd to atn  new afib  penile edema  h/o BPH (benign prostatic hyperplasia)  Hypertension        PLAN        S/P flexible bronchoscopy showing possible Large B-cell lymphoma,   s/p flex bronch transbronchial lung bx and EBU bx 12/12  cytopath of flex bronch and ebus neg for malig  cx from flex bronch with Few Normal Respiratory Lissy present and Rare Haemophilus parainfluenzae  Rare Neisseria species, not gonorrhoeae or meningitidis  -- Rare polymorphonuclear leukocytes seen per low power field  Numerous Gram Negative Rods per oil power field  noted above   thoracic surg f/u  vent mgmt  cont atrov and prov nebs,   cont supplemental oxygen,  ct chest-abd-pelv result note  rept cxr result with improvement noted  pulm f/u   s/p bronch with bx   cytopath of bronch neg for malig noted  cytopatho transbronchial bx with Interstitial fibrosis and intra-alveolar smoker's  macrophages noted above.   urology cons for renal lesion  cont ivf  renal f/u   cardio f/u  cont amiodarone 200 mg daily  pt on double pressors  id f/u   f/u Procalcitonin level, HIV, ESR, and CRP  rept ct chest  hold vanco  vanco t noted above  zosyn d/c'd and pt started on meropenem  Mycamine 100 mg IVPB q day added.  paliative eval noted  heme onc cons noted  cont current meds  ICU team met with patient's surrogate, Adolph ([phone]), face-to-face; patient's brother, Hector ([phone]) on telephone.   Discussed patient's status; all acknowledge patient's poor prognosis. Patient's brother reports he never   would have wanted to "live in a nursing home" and requests to "Let him go; it's only prolonging his suffering.  " Patient's surrogate, Adolph, and brother agreeable with no reintubation, no escalation of care,   request terminal extubation 12/27/17. DNR form updated per family's wishes.  pt dnr  mgmt as per icu

## 2017-12-27 NOTE — PROGRESS NOTE ADULT - PROBLEM SELECTOR PROBLEM 9
Prophylactic measure

## 2017-12-27 NOTE — PROGRESS NOTE ADULT - SUBJECTIVE AND OBJECTIVE BOX
CHIEF COMPLAINT:Patient is a 72y old  Male who presents with a chief complaint of weakness, chest discomfort and voice and appetite changes .Pt remains intubated.    	  REVIEW OF SYSTEMS:  [ ] All others negative	  [ X] Unable to obtain    PHYSICAL EXAM:  T(C): 36.8 (12-27-17 @ 07:30), Max: 37.2 (12-26-17 @ 19:30)  HR: 146 (12-27-17 @ 10:00) (119 - 176)  BP: 102/72 (12-27-17 @ 10:00) (76/47 - 162/93)  RR: 30 (12-27-17 @ 10:00) (0 - 32)  SpO2: 81% (12-27-17 @ 09:30) (81% - 100%)  Wt(kg): --  I&O's Summary    26 Dec 2017 07:01  -  27 Dec 2017 07:00  --------------------------------------------------------  IN: 6435.7 mL / OUT: 230 mL / NET: 6205.7 mL    27 Dec 2017 07:01  -  27 Dec 2017 10:56  --------------------------------------------------------  IN: 691.5 mL / OUT: 15 mL / NET: 676.5 mL        Appearance: Normal	  HEENT:   Normal oral mucosa, PERRL, EOMI	  Lymphatic: No lymphadenopathy  Cardiovascular: Normal S1 S2,  Respiratory: B/L ronchi  Gastrointestinal:  Soft, Non-tender, + BS	  Skin: No rashes, No ecchymoses, No cyanosis	  Extremities: Normal range of motion, No clubbing, cyanosis or edema      MEDICATIONS  (STANDING):  aspirin  chewable 81 milliGRAM(s) Oral daily  BACItracin   Ointment 1 Application(s) Topical two times a day  fentaNYL   Infusion 3 MICROgram(s)/kG/Hr (22.92 mL/Hr) IV Continuous <Continuous>  heparin  Injectable 5000 Unit(s) SubCutaneous every 12 hours  norepinephrine Infusion 0.5 MICROgram(s)/kG/Min (35.813 mL/Hr) IV Continuous <Continuous>  pantoprazole  Injectable 40 milliGRAM(s) IV Push daily  phenylephrine    Infusion 0.5 MICROgram(s)/kG/Min (6.563 mL/Hr) IV Continuous <Continuous>  sodium bicarbonate  Infusion 0.25 mEq/kG/Hr (125 mL/Hr) IV Continuous <Continuous>  vasopressin Infusion 0.04 Unit(s)/Min (2.4 mL/Hr) IV Continuous <Continuous>        	  LABS:	 	                        8.9    32.0  )-----------( 56       ( 26 Dec 2017 06:44 )             28.9     12-26    149<H>  |  116<H>  |  174  ----------------------------<  172<H>  6.1<H>   |  13<L>  |  5.50<H>    Ca    6.9<L>      26 Dec 2017 06:44  Phos  9.7     12-26  Mg     3.0     12-26    TPro  5.0<L>  /  Alb  1.3<L>  /  TBili  3.0<H>  /  DBili  x   /  AST  50<H>  /  ALT  42  /  AlkPhos  91  12-26      Lipid Profile: Cholesterol 64  LDL 20  HDL 26  TG 88    HgA1c: Hemoglobin A1C, Whole Blood: 6.2 % (12-05 @ 09:32)    TSH: Thyroid Stimulating Hormone, Serum: 0.45 uU/mL (12-05 @ 07:14)

## 2017-12-27 NOTE — PROGRESS NOTE ADULT - SUBJECTIVE AND OBJECTIVE BOX
INTERVAL HPI/OVERNIGHT EVENTS: Family meeting done with palliative team, HCP in person and brother over phone. Plan is for terminal extubation.     PRESSORS: [ x ] YES [ ] NO  WHICH: Pheny , Norepi, Vasopressin    ANTIBIOTICS: Meropenem           DATE STARTED: 12/25/17    Antimicrobial:  meropenem IVPB 500 milliGRAM(s) IV Intermittent every 8 hours  micafungin IVPB      micafungin IVPB 100 milliGRAM(s) IV Intermittent every 24 hours    Cardiovascular:  amiodarone    Tablet 200 milliGRAM(s) Oral daily  norepinephrine Infusion 0.5 MICROgram(s)/kG/Min IV Continuous <Continuous>  phenylephrine    Infusion 0.5 MICROgram(s)/kG/Min IV Continuous <Continuous>    Pulmonary:    Hematalogic:  aspirin  chewable 81 milliGRAM(s) Oral daily  heparin  Injectable 5000 Unit(s) SubCutaneous every 12 hours    Other:  acetaminophen  Suppository 650 milliGRAM(s) Rectal every 6 hours PRN  BACItracin   Ointment 1 Application(s) Topical two times a day  fentaNYL   Infusion 3 MICROgram(s)/kG/Hr IV Continuous <Continuous>  HYDROmorphone  Injectable 1 milliGRAM(s) IV Push every 4 hours PRN  pantoprazole  Injectable 40 milliGRAM(s) IV Push daily  sodium bicarbonate  Infusion 0.25 mEq/kG/Hr IV Continuous <Continuous>  vasopressin Infusion 0.04 Unit(s)/Min IV Continuous <Continuous>    acetaminophen  Suppository 650 milliGRAM(s) Rectal every 6 hours PRN  amiodarone    Tablet 200 milliGRAM(s) Oral daily  aspirin  chewable 81 milliGRAM(s) Oral daily  BACItracin   Ointment 1 Application(s) Topical two times a day  fentaNYL   Infusion 3 MICROgram(s)/kG/Hr IV Continuous <Continuous>  heparin  Injectable 5000 Unit(s) SubCutaneous every 12 hours  HYDROmorphone  Injectable 1 milliGRAM(s) IV Push every 4 hours PRN  meropenem IVPB 500 milliGRAM(s) IV Intermittent every 8 hours  micafungin IVPB      micafungin IVPB 100 milliGRAM(s) IV Intermittent every 24 hours  norepinephrine Infusion 0.5 MICROgram(s)/kG/Min IV Continuous <Continuous>  pantoprazole  Injectable 40 milliGRAM(s) IV Push daily  phenylephrine    Infusion 0.5 MICROgram(s)/kG/Min IV Continuous <Continuous>  sodium bicarbonate  Infusion 0.25 mEq/kG/Hr IV Continuous <Continuous>  vasopressin Infusion 0.04 Unit(s)/Min IV Continuous <Continuous>    Drug Dosing Weight  Height (cm): 165 (12 Dec 2017 13:14)  Weight (kg): 76.4 (12 Dec 2017 16:00)  BMI (kg/m2): 28.1 (12 Dec 2017 16:00)  BSA (m2): 1.84 (12 Dec 2017 16:00)    CENTRAL LINE: [ x ] YES [ ] NO  LOCATION: [facility]  DATE INSERTED: 12/21/17  REMOVE: [ ] YES [ ] NO  EXPLAIN:    LERMA: [ x ] YES [ ] NO    DATE INSERTED: 12/12/17  REMOVE:  [ ] YES [ ] NO  EXPLAIN:    A-LINE:  [ ] YES [ x ] NO  LOCATION:   DATE INSERTED:  REMOVE:  [ ] YES [ ] NO  EXPLAIN:    PMH -reviewed admission note, no change since admission  Heart faliure: acute [ ] chronic [ ] acute or chronic [ ] diastolic [ ] systolic [ ] combied systolic and diastolic[ ]  ZENY: ATN[ ] renal medullary necrosis [ ] CKD I [ ]CKDII [ ]CKD III [ ]CKD IV [ ]CKD V [ ]Other pathological lesions [ ]  Abdominal Nutrition Status: malnutrition [ ] cachexia [ ] morbid obesity/BMI=40 [ ] Supplement ordered [___________]     ICU Vital Signs Last 24 Hrs  T(C): 36.8 (27 Dec 2017 07:30), Max: 37.2 (26 Dec 2017 19:30)  T(F): 98.3 (27 Dec 2017 07:30), Max: 98.9 (26 Dec 2017 19:30)  HR: 137 (27 Dec 2017 08:19) (119 - 176)  BP: 98/58 (27 Dec 2017 07:30) (76/47 - 162/93)  BP(mean): 68 (27 Dec 2017 07:30) (54 - 108)  ABP: --  ABP(mean): --  RR: 25 (27 Dec 2017 07:30) (0 - 32)  SpO2: 100% (27 Dec 2017 08:19) (90% - 100%)      ABG - ( 26 Dec 2017 05:00 )  pH: 7.16  /  pCO2: 37    /  pO2: 148   / HCO3: 12    / Base Excess: -15.0 /  SaO2: 98                    12-26 @ 07:01  -  12-27 @ 07:00  --------------------------------------------------------  IN: 6205.2 mL / OUT: 225 mL / NET: 5980.2 mL        Mode: AC/ CMV (Assist Control/ Continuous Mandatory Ventilation)  RR (machine): 28  TV (machine): 500  FiO2: 50  PEEP: 5  ITime: 1  MAP: 11  PIP: 21      PHYSICAL EXAM:    GENERAL:  intubated, sedated  HEAD:  [x ]Atraumatic, [x]Normocephalic  EYES:[x ] Pupillary reaction not appreciated.  ENMT: ETT in place  NECK: [x ]Supple, normal appearance,   NERVOUS SYSTEM: sedated   CHEST/LUNG: On ETT tube, Clear to auscultation, [ x]No chest deformity; [x]Normal percussion bilaterally; [ ]No rales, rhonchi, wheezing   HEART: [ x]Regular rate and rhythm; [x ]No murmurs, rubs, or gallops  ABDOMEN: [ x]Soft, Nontender, Nondistended; [x ]Bowel sounds not appreciated  EXTREMITIES:  [x ]2+ Peripheral Pulses, [ x]No clubbing, cyanosis, or edema, on SCD boots,  GENITOURINARY: Large swollen penile area with Lerma catheter  LYMPH: [ x]No lymphadenopathy noted  SKIN: [x ]No rashes or lesions; [ ]Good capillary refill      LABS:  CBC Full  -  ( 26 Dec 2017 06:44 )  WBC Count : 32.0 K/uL  Hemoglobin : 8.9 g/dL  Hematocrit : 28.9 %  Platelet Count - Automated : 56 K/uL  Mean Cell Volume : 102.0 fl  Mean Cell Hemoglobin : 31.3 pg  Mean Cell Hemoglobin Concentration : 30.7 gm/dL  Auto Neutrophil # : x  Auto Lymphocyte # : x  Auto Monocyte # : x  Auto Eosinophil # : x  Auto Basophil # : x  Auto Neutrophil % : 88.0 %  Auto Lymphocyte % : 3.0 %  Auto Monocyte % : x  Auto Eosinophil % : x  Auto Basophil % : x    12-26    149<H>  |  116<H>  |  174  ----------------------------<  172<H>  6.1<H>   |  13<L>  |  5.50<H>    Ca    6.9<L>      26 Dec 2017 06:44  Phos  9.7     12-26  Mg     3.0     12-26    TPro  5.0<L>  /  Alb  1.3<L>  /  TBili  3.0<H>  /  DBili  x   /  AST  50<H>  /  ALT  42  /  AlkPhos  91  12-26    PT/INR - ( 26 Dec 2017 06:44 )   PT: 23.5 sec;   INR: 2.12 ratio             Culture Results:   No growth to date. (12-24 @ 14:10)  Culture Results:   No growth to date. (12-24 @ 14:10)      RADIOLOGY & ADDITIONAL STUDIES REVIEWED:      [ ]GOALS OF CARE DISCUSSION WITH PATIENT/FAMILY/PROXY:    CRITICAL CARE TIME SPENT: 35 minutes INTERVAL HPI/OVERNIGHT EVENTS: Family meeting done with palliative team, HCP in person and brother over phone. Plan is for terminal extubation.     PRESSORS: [ x ] YES [ ] NO  WHICH: Pheny , Norepi, Vasopressin    ANTIBIOTICS: Meropenem           DATE STARTED: 12/25/17    Antimicrobial:  meropenem IVPB 500 milliGRAM(s) IV Intermittent every 8 hours  micafungin IVPB      micafungin IVPB 100 milliGRAM(s) IV Intermittent every 24 hours    Cardiovascular:  amiodarone    Tablet 200 milliGRAM(s) Oral daily  norepinephrine Infusion 0.5 MICROgram(s)/kG/Min IV Continuous <Continuous>  phenylephrine    Infusion 0.5 MICROgram(s)/kG/Min IV Continuous <Continuous>    Pulmonary:    Hematalogic:  aspirin  chewable 81 milliGRAM(s) Oral daily  heparin  Injectable 5000 Unit(s) SubCutaneous every 12 hours    Other:  acetaminophen  Suppository 650 milliGRAM(s) Rectal every 6 hours PRN  BACItracin   Ointment 1 Application(s) Topical two times a day  fentaNYL   Infusion 3 MICROgram(s)/kG/Hr IV Continuous <Continuous>  HYDROmorphone  Injectable 1 milliGRAM(s) IV Push every 4 hours PRN  pantoprazole  Injectable 40 milliGRAM(s) IV Push daily  sodium bicarbonate  Infusion 0.25 mEq/kG/Hr IV Continuous <Continuous>  vasopressin Infusion 0.04 Unit(s)/Min IV Continuous <Continuous>    acetaminophen  Suppository 650 milliGRAM(s) Rectal every 6 hours PRN  amiodarone    Tablet 200 milliGRAM(s) Oral daily  aspirin  chewable 81 milliGRAM(s) Oral daily  BACItracin   Ointment 1 Application(s) Topical two times a day  fentaNYL   Infusion 3 MICROgram(s)/kG/Hr IV Continuous <Continuous>  heparin  Injectable 5000 Unit(s) SubCutaneous every 12 hours  HYDROmorphone  Injectable 1 milliGRAM(s) IV Push every 4 hours PRN  meropenem IVPB 500 milliGRAM(s) IV Intermittent every 8 hours  micafungin IVPB      micafungin IVPB 100 milliGRAM(s) IV Intermittent every 24 hours  norepinephrine Infusion 0.5 MICROgram(s)/kG/Min IV Continuous <Continuous>  pantoprazole  Injectable 40 milliGRAM(s) IV Push daily  phenylephrine    Infusion 0.5 MICROgram(s)/kG/Min IV Continuous <Continuous>  sodium bicarbonate  Infusion 0.25 mEq/kG/Hr IV Continuous <Continuous>  vasopressin Infusion 0.04 Unit(s)/Min IV Continuous <Continuous>    Drug Dosing Weight  Height (cm): 165 (12 Dec 2017 13:14)  Weight (kg): 76.4 (12 Dec 2017 16:00)  BMI (kg/m2): 28.1 (12 Dec 2017 16:00)  BSA (m2): 1.84 (12 Dec 2017 16:00)    CENTRAL LINE: [ x ] YES [ ] NO  LOCATION: [facility]  DATE INSERTED: 12/21/17  REMOVE: [ ] YES [ ] NO  EXPLAIN:    LERMA: [ x ] YES [ ] NO    DATE INSERTED: 12/12/17  REMOVE:  [ ] YES [ ] NO  EXPLAIN:    A-LINE:  [ ] YES [ x ] NO  LOCATION:   DATE INSERTED:  REMOVE:  [ ] YES [ ] NO  EXPLAIN:    PMH -reviewed admission note, no change since admission  Heart faliure: acute [ ] chronic [ ] acute or chronic [ ] diastolic [ ] systolic [ ] combied systolic and diastolic[ ]  ZENY: ATN[ ] renal medullary necrosis [ ] CKD I [ ]CKDII [ ]CKD III [ ]CKD IV [ ]CKD V [ ]Other pathological lesions [ ]  Abdominal Nutrition Status: malnutrition [ ] cachexia [ ] morbid obesity/BMI=40 [ ] Supplement ordered [___________]     ICU Vital Signs Last 24 Hrs  T(C): 36.8 (27 Dec 2017 07:30), Max: 37.2 (26 Dec 2017 19:30)  T(F): 98.3 (27 Dec 2017 07:30), Max: 98.9 (26 Dec 2017 19:30)  HR: 137 (27 Dec 2017 08:19) (119 - 176)  BP: 98/58 (27 Dec 2017 07:30) (76/47 - 162/93)  BP(mean): 68 (27 Dec 2017 07:30) (54 - 108)  ABP: --  ABP(mean): --  RR: 25 (27 Dec 2017 07:30) (0 - 32)  SpO2: 100% (27 Dec 2017 08:19) (90% - 100%)      ABG - ( 26 Dec 2017 05:00 )  pH: 7.16  /  pCO2: 37    /  pO2: 148   / HCO3: 12    / Base Excess: -15.0 /  SaO2: 98                    12-26 @ 07:01  -  12-27 @ 07:00  --------------------------------------------------------  IN: 6205.2 mL / OUT: 225 mL / NET: 5980.2 mL        Mode: AC/ CMV (Assist Control/ Continuous Mandatory Ventilation)  RR (machine): 28  TV (machine): 500  FiO2: 50  PEEP: 5  ITime: 1  MAP: 11  PIP: 21      PHYSICAL EXAM:    GENERAL:  intubated, not responsive  HEAD:  [x ]Atraumatic, [x]Normocephalic  EYES:[x ] Pupillary reaction not appreciated.  ENMT: ETT in place, Necrotic tongue.   NECK: [x ]Supple, normal appearance,   NERVOUS SYSTEM: not responding to stimuli  CHEST/LUNG: On ETT tube, Clear to auscultation, [ x]No chest deformity; [ ]Normal percussion bilaterally; [ ]No rales, rhonchi, wheezing   HEART: [ x]Regular rate and rhythm; [x ]No murmurs, rubs, or gallops  ABDOMEN: [ x]Soft, Nontender, Nondistended; [x ]Bowel sounds not appreciated  EXTREMITIES:  [x ]2+ Peripheral Pulses, [ x]No clubbing, cyanosis, or edema, on SCD boots,  GENITOURINARY: Large swollen penile area with Lerma catheter  LYMPH: [ x]No lymphadenopathy noted  SKIN: [x ]No rashes or lesions; [ ]Good capillary refill      LABS:  CBC Full  -  ( 26 Dec 2017 06:44 )  WBC Count : 32.0 K/uL  Hemoglobin : 8.9 g/dL  Hematocrit : 28.9 %  Platelet Count - Automated : 56 K/uL  Mean Cell Volume : 102.0 fl  Mean Cell Hemoglobin : 31.3 pg  Mean Cell Hemoglobin Concentration : 30.7 gm/dL  Auto Neutrophil # : x  Auto Lymphocyte # : x  Auto Monocyte # : x  Auto Eosinophil # : x  Auto Basophil # : x  Auto Neutrophil % : 88.0 %  Auto Lymphocyte % : 3.0 %  Auto Monocyte % : x  Auto Eosinophil % : x  Auto Basophil % : x    12-26    149<H>  |  116<H>  |  174  ----------------------------<  172<H>  6.1<H>   |  13<L>  |  5.50<H>    Ca    6.9<L>      26 Dec 2017 06:44  Phos  9.7     12-26  Mg     3.0     12-26    TPro  5.0<L>  /  Alb  1.3<L>  /  TBili  3.0<H>  /  DBili  x   /  AST  50<H>  /  ALT  42  /  AlkPhos  91  12-26    PT/INR - ( 26 Dec 2017 06:44 )   PT: 23.5 sec;   INR: 2.12 ratio             Culture Results:   No growth to date. (12-24 @ 14:10)  Culture Results:   No growth to date. (12-24 @ 14:10)      RADIOLOGY & ADDITIONAL STUDIES REVIEWED:      [ ]GOALS OF CARE DISCUSSION WITH PATIENT/FAMILY/PROXY:    CRITICAL CARE TIME SPENT: 35 minutes

## 2017-12-27 NOTE — PROGRESS NOTE ADULT - ASSESSMENT
72 years old male from home with PMHx of HTN and BPH and no PSH presents to ED c/o L-sided chest pain ,lung and hepatic mass, s/p PTHX,now respiratory failure and afib with RVR.  1.Plan for terminal extubation.

## 2017-12-28 LAB
CULTURE RESULTS: SIGNIFICANT CHANGE UP
CULTURE RESULTS: SIGNIFICANT CHANGE UP
SPECIMEN SOURCE: SIGNIFICANT CHANGE UP
SPECIMEN SOURCE: SIGNIFICANT CHANGE UP

## 2018-01-27 LAB
CULTURE RESULTS: SIGNIFICANT CHANGE UP
SPECIMEN SOURCE: SIGNIFICANT CHANGE UP

## 2019-08-08 NOTE — ED ADULT TRIAGE NOTE - CHIEF COMPLAINT QUOTE
Patient has been informed of below results.  Order placed.  Patient has a scheduled appointment with Dr. Storey on 9/10/19 and will discuss further.    c/o chest discomforts for 5 months , poor po intake for few days , feels weak

## 2020-02-03 NOTE — PRE-OP CHECKLIST - LAST TOOK
solids
full liquids

## 2022-10-13 NOTE — PROGRESS NOTE ADULT - PROBLEM SELECTOR PLAN 3
Patient referred to MD by Dr. Smyth for left knee pain and patellofemoral arthritis, consider patellofemoral replacement.     Please confirm if visit will be work comp. Per notes, visits with Dr. Smyth were related to work injury DOI 6/23/2022.     Please call to offer appointment (either work comp initial or NEW patient appointment, see above).   Can offer 11/9 at 10:20 am at [facility].    Quantiferon Gold TB test  Empiric antibiotics  Bronch path negative for Ca.  Cyto from [place] and BAL negative for CA  May need liver Bx of suspicious liver lesions when off pressors meds

## 2023-02-03 NOTE — PROGRESS NOTE ADULT - PROBLEM/PLAN-3
DISPLAY PLAN FREE TEXT
English

## 2023-04-03 NOTE — PHYSICAL THERAPY INITIAL EVALUATION ADULT - ASSISTIVE DEVICE FOR TRANSFER: STAND/SIT, REHAB EVAL
rolling walker Bilateral Helical Rim Advancement Flap Text: The defect edges were debeveled with a #15c blade scalpel.  Given the location of the defect and the proximity to free margins (helical rim) a bilateral helical rim advancement flap was deemed most appropriate.  Using a sterile surgical marker, the appropriate advancement flaps were drawn incorporating the defect and placing the expected incisions between the helical rim and antihelix where possible.  The area thus outlined was incised through and through with a #15 scalpel blade.  With a skin hook and iris scissors, the flaps were gently and sharply undermined and freed up.

## 2025-01-22 NOTE — ED ADULT NURSE NOTE - CADM POA URETHRAL CATHETER
Problem: Pain  Goal: Acceptable pain level achieved/maintained at rest using appropriate pain scale for the patient  Outcome: Monitoring/Evaluating progress     Problem: At Risk for Falls  Goal: Patient does not fall  Outcome: Monitoring/Evaluating progress     Problem: Diabetes  Goal: Achieves glycemic balance  Description: Goal is to maintain blood sugar within range with no episodes of hypoglycemia  Outcome: Monitoring/Evaluating progress      No

## 2025-07-01 NOTE — PROGRESS NOTE ADULT - PROBLEM SELECTOR PLAN 1
Occupational Therapy      Patient Name:  Jone Lugo   MRN:  [id number]    Patient not seen today secondary to patient reporting actively having loose BM and needing more time on 1st attempt (1055). Patient also deferred BSC t/f due to pain from IV site. On 2nd attempt (1008), patient deferred due to c/o fatigue. He stated he already worked with PT and was worn out. Will follow-up next scheduled visit.    7/1/2025   terminal extubation  supportive care